# Patient Record
Sex: MALE | Race: WHITE | NOT HISPANIC OR LATINO
[De-identification: names, ages, dates, MRNs, and addresses within clinical notes are randomized per-mention and may not be internally consistent; named-entity substitution may affect disease eponyms.]

---

## 2019-12-19 ENCOUNTER — APPOINTMENT (OUTPATIENT)
Dept: RADIOLOGY | Facility: CLINIC | Age: 74
End: 2019-12-19
Payer: MEDICARE

## 2019-12-19 ENCOUNTER — APPOINTMENT (OUTPATIENT)
Dept: CT IMAGING | Facility: CLINIC | Age: 74
End: 2019-12-19
Payer: MEDICARE

## 2019-12-19 ENCOUNTER — OUTPATIENT (OUTPATIENT)
Dept: OUTPATIENT SERVICES | Facility: HOSPITAL | Age: 74
LOS: 1 days | End: 2019-12-19
Payer: MEDICARE

## 2019-12-19 DIAGNOSIS — Z00.00 ENCOUNTER FOR GENERAL ADULT MEDICAL EXAMINATION WITHOUT ABNORMAL FINDINGS: ICD-10-CM

## 2019-12-19 PROCEDURE — 73110 X-RAY EXAM OF WRIST: CPT

## 2019-12-19 PROCEDURE — 73522 X-RAY EXAM HIPS BI 3-4 VIEWS: CPT | Mod: 26

## 2019-12-19 PROCEDURE — 71260 CT THORAX DX C+: CPT | Mod: 26

## 2019-12-19 PROCEDURE — 72100 X-RAY EXAM L-S SPINE 2/3 VWS: CPT | Mod: 26

## 2019-12-19 PROCEDURE — 73130 X-RAY EXAM OF HAND: CPT | Mod: 26,50

## 2019-12-19 PROCEDURE — 73110 X-RAY EXAM OF WRIST: CPT | Mod: 26,50

## 2019-12-19 PROCEDURE — 74177 CT ABD & PELVIS W/CONTRAST: CPT | Mod: 26

## 2019-12-19 PROCEDURE — 73522 X-RAY EXAM HIPS BI 3-4 VIEWS: CPT

## 2019-12-19 PROCEDURE — 74177 CT ABD & PELVIS W/CONTRAST: CPT

## 2019-12-19 PROCEDURE — 72040 X-RAY EXAM NECK SPINE 2-3 VW: CPT | Mod: 26

## 2019-12-19 PROCEDURE — 72040 X-RAY EXAM NECK SPINE 2-3 VW: CPT

## 2019-12-19 PROCEDURE — 71260 CT THORAX DX C+: CPT

## 2019-12-19 PROCEDURE — 72100 X-RAY EXAM L-S SPINE 2/3 VWS: CPT

## 2019-12-19 PROCEDURE — 73130 X-RAY EXAM OF HAND: CPT

## 2019-12-19 PROCEDURE — 82565 ASSAY OF CREATININE: CPT

## 2020-11-06 ENCOUNTER — TRANSCRIPTION ENCOUNTER (OUTPATIENT)
Age: 75
End: 2020-11-06

## 2020-12-04 ENCOUNTER — APPOINTMENT (OUTPATIENT)
Dept: DISASTER EMERGENCY | Facility: CLINIC | Age: 75
End: 2020-12-04

## 2020-12-04 DIAGNOSIS — Z01.818 ENCOUNTER FOR OTHER PREPROCEDURAL EXAMINATION: ICD-10-CM

## 2020-12-05 LAB — SARS-COV-2 N GENE NPH QL NAA+PROBE: NOT DETECTED

## 2021-01-29 ENCOUNTER — APPOINTMENT (OUTPATIENT)
Dept: DISASTER EMERGENCY | Facility: CLINIC | Age: 76
End: 2021-01-29

## 2021-01-30 LAB — SARS-COV-2 N GENE NPH QL NAA+PROBE: NOT DETECTED

## 2022-02-14 ENCOUNTER — RESULT REVIEW (OUTPATIENT)
Age: 77
End: 2022-02-14

## 2022-02-14 ENCOUNTER — APPOINTMENT (OUTPATIENT)
Dept: RHEUMATOLOGY | Facility: CLINIC | Age: 77
End: 2022-02-14
Payer: MEDICARE

## 2022-02-14 ENCOUNTER — LABORATORY RESULT (OUTPATIENT)
Age: 77
End: 2022-02-14

## 2022-02-14 VITALS
HEIGHT: 72 IN | RESPIRATION RATE: 17 BRPM | OXYGEN SATURATION: 97 % | SYSTOLIC BLOOD PRESSURE: 132 MMHG | HEART RATE: 68 BPM | DIASTOLIC BLOOD PRESSURE: 84 MMHG | TEMPERATURE: 98.3 F

## 2022-02-14 DIAGNOSIS — Z83.3 FAMILY HISTORY OF DIABETES MELLITUS: ICD-10-CM

## 2022-02-14 DIAGNOSIS — M19.90 UNSPECIFIED OSTEOARTHRITIS, UNSPECIFIED SITE: ICD-10-CM

## 2022-02-14 DIAGNOSIS — Z87.11 PERSONAL HISTORY OF PEPTIC ULCER DISEASE: ICD-10-CM

## 2022-02-14 DIAGNOSIS — M25.50 PAIN IN UNSPECIFIED JOINT: ICD-10-CM

## 2022-02-14 DIAGNOSIS — M75.21 BICIPITAL TENDINITIS, RIGHT SHOULDER: ICD-10-CM

## 2022-02-14 DIAGNOSIS — M75.22 BICIPITAL TENDINITIS, LEFT SHOULDER: ICD-10-CM

## 2022-02-14 PROCEDURE — 36415 COLL VENOUS BLD VENIPUNCTURE: CPT

## 2022-02-14 PROCEDURE — 99205 OFFICE O/P NEW HI 60 MIN: CPT | Mod: 25

## 2022-02-15 LAB
ALBUMIN SERPL ELPH-MCNC: 4.7 G/DL
ALP BLD-CCNC: 75 U/L
ALT SERPL-CCNC: 22 U/L
ANION GAP SERPL CALC-SCNC: 15 MMOL/L
APPEARANCE: ABNORMAL
AST SERPL-CCNC: 27 U/L
BACTERIA: NEGATIVE
BASOPHILS # BLD AUTO: 0.05 K/UL
BASOPHILS NFR BLD AUTO: 0.9 %
BILIRUB SERPL-MCNC: 0.6 MG/DL
BILIRUBIN URINE: NEGATIVE
BLOOD URINE: NEGATIVE
BUN SERPL-MCNC: 21 MG/DL
CALCIUM SERPL-MCNC: 9.7 MG/DL
CHLORIDE SERPL-SCNC: 107 MMOL/L
CK SERPL-CCNC: 85 U/L
CO2 SERPL-SCNC: 22 MMOL/L
COLOR: ABNORMAL
CREAT SERPL-MCNC: 1.13 MG/DL
CRP SERPL-MCNC: <3 MG/L
DEPRECATED KAPPA LC FREE/LAMBDA SER: 1.1 RATIO
EOSINOPHIL # BLD AUTO: 0.5 K/UL
EOSINOPHIL NFR BLD AUTO: 9.5 %
ERYTHROCYTE [SEDIMENTATION RATE] IN BLOOD BY WESTERGREN METHOD: 2 MM/HR
FOLATE SERPL-MCNC: >20 NG/ML
GLUCOSE QUALITATIVE U: NEGATIVE
GLUCOSE SERPL-MCNC: 106 MG/DL
HAV IGM SER QL: NONREACTIVE
HBV CORE IGG+IGM SER QL: NONREACTIVE
HBV CORE IGM SER QL: NONREACTIVE
HBV SURFACE AG SER QL: NORMAL
HCT VFR BLD CALC: 49.2 %
HCV AB SER QL: NONREACTIVE
HCV S/CO RATIO: 0.12 S/CO
HGB BLD-MCNC: 16.4 G/DL
HYALINE CASTS: 0 /LPF
IGA SER QL IEP: 274 MG/DL
IGG SER QL IEP: 761 MG/DL
IGM SER QL IEP: 222 MG/DL
IMM GRANULOCYTES NFR BLD AUTO: 0 %
KAPPA LC CSF-MCNC: 1.92 MG/DL
KAPPA LC SERPL-MCNC: 2.12 MG/DL
KETONES URINE: NEGATIVE
LDH SERPL-CCNC: 198 U/L
LEUKOCYTE ESTERASE URINE: NEGATIVE
LYMPHOCYTES # BLD AUTO: 1.51 K/UL
LYMPHOCYTES NFR BLD AUTO: 28.6 %
MAGNESIUM SERPL-MCNC: 2 MG/DL
MAN DIFF?: NORMAL
MCHC RBC-ENTMCNC: 32.9 PG
MCHC RBC-ENTMCNC: 33.3 GM/DL
MCV RBC AUTO: 98.6 FL
MICROSCOPIC-UA: NORMAL
MONOCYTES # BLD AUTO: 0.68 K/UL
MONOCYTES NFR BLD AUTO: 12.9 %
NEUTROPHILS # BLD AUTO: 2.54 K/UL
NEUTROPHILS NFR BLD AUTO: 48.1 %
NITRITE URINE: NEGATIVE
PH URINE: 5.5
PHOSPHATE SERPL-MCNC: 3.4 MG/DL
PLATELET # BLD AUTO: 158 K/UL
POTASSIUM SERPL-SCNC: 4.5 MMOL/L
PROT SERPL-MCNC: 6.6 G/DL
PROTEIN URINE: NORMAL
RBC # BLD: 4.99 M/UL
RBC # FLD: 12.3 %
RED BLOOD CELLS URINE: 1 /HPF
RHEUMATOID FACT SER QL: 11 IU/ML
SODIUM SERPL-SCNC: 143 MMOL/L
SPECIFIC GRAVITY URINE: 1.03
SQUAMOUS EPITHELIAL CELLS: 0 /HPF
T PALLIDUM AB SER QL IA: NEGATIVE
THYROGLOB AB SERPL-ACNC: <20 IU/ML
THYROPEROXIDASE AB SERPL IA-ACNC: 224 IU/ML
TSH SERPL-ACNC: 4.22 UIU/ML
URATE SERPL-MCNC: 7.5 MG/DL
UROBILINOGEN URINE: ABNORMAL
VIT B12 SERPL-MCNC: 420 PG/ML
WBC # FLD AUTO: 5.28 K/UL
WHITE BLOOD CELLS URINE: 0 /HPF

## 2022-02-16 ENCOUNTER — APPOINTMENT (OUTPATIENT)
Dept: OTOLARYNGOLOGY | Facility: CLINIC | Age: 77
End: 2022-02-16
Payer: MEDICARE

## 2022-02-16 VITALS
HEIGHT: 72 IN | BODY MASS INDEX: 27.77 KG/M2 | SYSTOLIC BLOOD PRESSURE: 153 MMHG | HEART RATE: 69 BPM | DIASTOLIC BLOOD PRESSURE: 99 MMHG | WEIGHT: 205 LBS

## 2022-02-16 DIAGNOSIS — R22.1 LOCALIZED SWELLING, MASS AND LUMP, NECK: ICD-10-CM

## 2022-02-16 PROCEDURE — 99204 OFFICE O/P NEW MOD 45 MIN: CPT | Mod: 25

## 2022-02-16 PROCEDURE — 31575 DIAGNOSTIC LARYNGOSCOPY: CPT

## 2022-02-16 NOTE — PHYSICAL EXAM
[Nodule] : nodule [Midline] : trachea located in midline position [Normal] : no rashes [FreeTextEntry1] : poorly mobile but nonfixed mass R parotid body.  close to but not involving overlying skin.  no other palp masses [de-identified] : pt w multiple cutaneous small red lesions over entire face  and non hair bearing scalp.

## 2022-02-16 NOTE — PROCEDURE
[None] : none [Flexible Endoscope] : examined with the flexible endoscope [Normal] : normal [de-identified] : c ANALIA.  nl bilat TVF motion.   [de-identified] : hx c spine surgery

## 2022-02-16 NOTE — CONSULT LETTER
[Dear  ___] : Dear  [unfilled], [Consult Letter:] : I had the pleasure of evaluating your patient, [unfilled]. [Please see my note below.] : Please see my note below. [Sincerely,] : Sincerely, [FreeTextEntry2] : Alex Boston MD ([place], [place])\par  [FreeTextEntry3] : Misbah Recio MD, FACS\par \par    [facility] [place]\par Associate Chair\par    Department of Otolaryngology\par \par Professor\par Otolaryngology & Molecular Medicine\par [employer] [facility]\par

## 2022-02-16 NOTE — HISTORY OF PRESENT ILLNESS
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 Alex Boston. States first felt a lump on the right side of his face in November. Was placed on PO antibiotic by his dentist without improvement.   No assoc pain.  no trismus.  not sure if enlarging. Is a hx SCCA skin rx 10 years ago R periauriclar area.  has had multiple lesions over the years.  no major lesion R side over last couple years.  Is a hx cardiac stents  X 6. Only on asa now. last stent 6 yrs ago. No hx MI. Is hx asthma. Is a cigar smoker. 1-4 cigars a day. Is a remote hx R ant c spine fusion.  Has recent cardiac cath and stress. Is also a hx R eye issues and h/o cataracts.

## 2022-02-16 NOTE — REASON FOR VISIT
[Initial Consultation] : an initial consultation for [Spouse] : spouse [FreeTextEntry2] : SCCa right parotid gland, referred by Dr. Alex Boston

## 2022-02-19 ENCOUNTER — OUTPATIENT (OUTPATIENT)
Dept: OUTPATIENT SERVICES | Facility: HOSPITAL | Age: 77
LOS: 1 days | End: 2022-02-19
Payer: MEDICARE

## 2022-02-19 ENCOUNTER — APPOINTMENT (OUTPATIENT)
Dept: RADIOLOGY | Facility: CLINIC | Age: 77
End: 2022-02-19
Payer: MEDICARE

## 2022-02-19 DIAGNOSIS — Z00.00 ENCOUNTER FOR GENERAL ADULT MEDICAL EXAMINATION WITHOUT ABNORMAL FINDINGS: ICD-10-CM

## 2022-02-19 PROCEDURE — 73630 X-RAY EXAM OF FOOT: CPT

## 2022-02-19 PROCEDURE — 73562 X-RAY EXAM OF KNEE 3: CPT | Mod: 26,50

## 2022-02-19 PROCEDURE — 73030 X-RAY EXAM OF SHOULDER: CPT

## 2022-02-19 PROCEDURE — 72050 X-RAY EXAM NECK SPINE 4/5VWS: CPT

## 2022-02-19 PROCEDURE — 73630 X-RAY EXAM OF FOOT: CPT | Mod: 26,50

## 2022-02-19 PROCEDURE — 77085 DXA BONE DENSITY AXL VRT FX: CPT

## 2022-02-19 PROCEDURE — 72110 X-RAY EXAM L-2 SPINE 4/>VWS: CPT

## 2022-02-19 PROCEDURE — 73130 X-RAY EXAM OF HAND: CPT

## 2022-02-19 PROCEDURE — 73110 X-RAY EXAM OF WRIST: CPT | Mod: 26,50

## 2022-02-19 PROCEDURE — 72110 X-RAY EXAM L-2 SPINE 4/>VWS: CPT | Mod: 26

## 2022-02-19 PROCEDURE — 73521 X-RAY EXAM HIPS BI 2 VIEWS: CPT | Mod: 26

## 2022-02-19 PROCEDURE — 73130 X-RAY EXAM OF HAND: CPT | Mod: 26,50

## 2022-02-19 PROCEDURE — 73030 X-RAY EXAM OF SHOULDER: CPT | Mod: 26,50

## 2022-02-19 PROCEDURE — 72050 X-RAY EXAM NECK SPINE 4/5VWS: CPT | Mod: 26

## 2022-02-19 PROCEDURE — 73562 X-RAY EXAM OF KNEE 3: CPT

## 2022-02-19 PROCEDURE — 77085 DXA BONE DENSITY AXL VRT FX: CPT | Mod: 26

## 2022-02-19 PROCEDURE — 73110 X-RAY EXAM OF WRIST: CPT

## 2022-02-19 PROCEDURE — 73521 X-RAY EXAM HIPS BI 2 VIEWS: CPT

## 2022-02-20 PROBLEM — Z83.3 FAMILY HISTORY OF DIABETES MELLITUS: Status: ACTIVE | Noted: 2022-02-14

## 2022-02-20 NOTE — CONSULT LETTER
[Dear  ___] : Dear  [unfilled], [Consult Letter:] : I had the pleasure of evaluating your patient, [unfilled]. [Please see my note below.] : Please see my note below. [Consult Closing:] : Thank you very much for allowing me to participate in the care of this patient.  If you have any questions, please do not hesitate to contact me. [Sincerely,] : Sincerely, [FreeTextEntry3] : Raheem\par Myron I. Kleiner, M.D., FACR\par Chief, Division of Rheumatology\par Department of Medicine\par [facility] [DrTristen  ___] : Dr. WHITMORE

## 2022-02-20 NOTE — PHYSICAL EXAM
[General Appearance - Alert] : alert [General Appearance - In No Acute Distress] : in no acute distress [General Appearance - Well Nourished] : well nourished [General Appearance - Well Developed] : well developed [General Appearance - Well-Appearing] : healthy appearing [Sclera] : the sclera and conjunctiva were normal [PERRL With Normal Accommodation] : pupils were equal in size, round, and reactive to light [Extraocular Movements] : extraocular movements were intact [Outer Ear] : the ears and nose were normal in appearance [Oropharynx] : the oropharynx was normal [Neck Appearance] : the appearance of the neck was normal [Neck Cervical Mass (___cm)] : no neck mass was observed [Jugular Venous Distention Increased] : there was no jugular-venous distention [Thyroid Diffuse Enlargement] : the thyroid was not enlarged [Thyroid Nodule] : there were no palpable thyroid nodules [Lungs Percussion] : the lungs were normal to percussion [Heart Rate And Rhythm] : heart rate was normal and rhythm regular [Edema] : there was no peripheral edema [Abdomen Soft] : soft [Abdomen Tenderness] : non-tender [Abdomen Mass (___ Cm)] : no abdominal mass palpated [Cervical Lymph Nodes Enlarged Posterior Bilaterally] : posterior cervical [Cervical Lymph Nodes Enlarged Anterior Bilaterally] : anterior cervical [Supraclavicular Lymph Nodes Enlarged Bilaterally] : supraclavicular [Axillary Lymph Nodes Enlarged Bilaterally] : axillary [No CVA Tenderness] : no ~M costovertebral angle tenderness [No Spinal Tenderness] : no spinal tenderness [Skin Color & Pigmentation] : normal skin color and pigmentation [] : no rash [Cranial Nerves] : cranial nerves 2-12 were intact [Deep Tendon Reflexes (DTR)] : deep tendon reflexes were 2+ and symmetric [Sensation] : the sensory exam was normal to light touch and pinprick [Motor Exam] : the motor exam was normal [No Focal Deficits] : no focal deficits [Oriented To Time, Place, And Person] : oriented to person, place, and time [Impaired Insight] : insight and judgment were intact [Affect] : the affect was normal [Mood] : the mood was normal [FreeTextEntry1] : Strength-5/5

## 2022-02-20 NOTE — ASSESSMENT
[FreeTextEntry1] : Impression: JOSIANE SHAW is a 76 year old  man who was referred here for further evaluation of joint symptoms and rheumatic diseases.\par \par Approximate 2 year history of intermittent pain in various joints. I will evaluate for various types of rheumatic diseases including various types of inflammatory arthritis, including rheumatoid arthritis.  His previous rheumatologist  diagnosed polymyalgia rheumatica and treated with Prednisone 40 mg q.d. with much relief which was later tapered and also treated with Actemra.. Some sleep disturbance and fatigue with snoring consistent with fibromyalgia. In 2020, patient developed swelling bilateral MCP/PIPs, treated with Actemra once every 4 weeks for 6 months without relief and developed pruritic, whitish scaly rash bilateral upper extremities with superficial ulcerations. Dermatology diagnosed dry skin. Today, he has persistent pain in bilateral shoulders, wrists, MCP/PIPs, hips, and ankles. Previous x-ray results revealed osteoarthritis in bilateral shoulders and hips.  Diffuse swelling bilateral fingers on exam - consider scleroderma, MCTD and other related diseases. He has bilateral bicipital tendonitis contributing to his pain. He has dry eyes and enlarged nontender right parotid gland - consider Sjogren's Syndrome and evaluate for sarcoidosis, hepatitis C, IgG 4 related disease and other related diseases.  However, apparently he was diagnosed with parotid cancer recently about which she will see ENT tomorrow to discuss further steps.  Paresthesias bilateral fingers possibly secondary to residual bilateral carpal tunnel syndrome versus cervical radiculopathy. Chronic neck pain without radiation. Chronic low back pain with radiation to the left lower extremity to the knee - treated with Diclofenac gel or OTC Voltaren p.r.n. Patient referred here for further evaluation. \par \par Plan:\par I reviewed previous lab results March 2021 (which patient handed to me) with patient with extensive discussion\par I reviewed previous x-ray results March 2021 (which patient handed to me) with patient with extensive discussion\par Laboratory tests ordered today - see list below\par X-rays ordered - see list below\par Bone densitometry ordered\par Diagnosis and prognosis discussed\par Continue current medications (other than those changed below)\par Discontinue Actemra\par Etodolac  mg q.d. end of breakfast (Possible side effects explained including cardiovascular risk/MI/CVA) \par Change timing Prophylactic aspirin 81 mg q.d. at end of lunch (Possible side effects explained) \par Flexeril 10 mg one half tab q.d. at supper time; if no better/side effects 7 days, increase to 10 mg q.d. (possible side effects explained) \par Diclofenac gel 4 g to left hip twice daily (Possible side effects explained including cardiovascular risk/MI/CVA) \par Artificial tears one drop each eye q.i.d. and p.r.n.(Possible side effects explained)\par Biotene mouthwash/spray q.i.d. and p.r.n.(Possible side effects explained) \par Oral Hydration\par Daily exercise starting at 10 minutes per day, gradually increasing to at least 30 minutes per day--emphasized \par Requested pending PET scan report be sent in \par Return visit 2-3 weeks

## 2022-02-20 NOTE — ADDENDUM
[FreeTextEntry1] : I, Karen Kaufman, acted solely as a scribe for Dr. Myron I. Kleiner, MD. on 02/14/2022 .

## 2022-02-20 NOTE — HISTORY OF PRESENT ILLNESS
[FreeTextEntry1] : JOSIANE SHAW is a 76 year old  man who was referred here for further evaluation of joint symptoms and rheumatic diseases.\par \par 2 and a half years ago, November 2019, patient developed intermittent pain in bilateral shoulder, hips, knees, wrists, MCP/PIPs. Difficulty sleeping secondary to pain. Denies joint swelling, erythema and heat. Pain worse as day progressed. Morning stiffness 1 hour. Denies trauma or injury. Patient saw rheumatologist  - diagnosed polymyalgia rheumatica and treated with Prednisone 40 mg q.d. with much relief and intra-articular cortisone injections bilateral shoulders x2 with relief and left hip x2 without relief. December 2019 - after playing golf, patient had an episode of syncope - was told secondary to polymyalgia rheumatica, and treated by decreasing Prednisone to 20 mg q.d. Patient experienced increased joint pain with sleep disturbance and fatigue with snoring. Prednisone 20 mg q.d. was gradually tapered to 5 mg. In 2020, patient developed swelling bilateral MCP/PIPs. Discontinued Prednisone November 2020. He was then given Actemra once every 4 weeks for 6 months without relief. Patient developed pruritic, whitish scaly rash bilateral upper extremities with superficial ulcerations. Summer 2021, Actemra switched to Cimzia 2 injections (400 mg) once every 4 weeks. Dermatology follow up suggests not psoriasis and diagnosed dry skin - treated with moisturizer. Cimzia was then discontinued and put back on Actemra.\par Nowadays,  he has persistent pain in bilateral shoulders, wrists, MCP/PIPs, hips, and ankles. Swelling bilateral MCP/PIPs. Morning stiffness 2 hours.  Last dose of Actemra was January 30.\par Status post bilateral carpal tunnel release - Dr.Pallata - EMG confirmed bilateral carpal tunnel syndrome. \par Denies dry eyes and dry mouth. Paresthesias bilateral fingers - same as before carpal tunnel release. Chronic neck pain without radiation. Chronic low back pain with radiation to the left lower extremity to the knee - treated with Diclofenac gel or OTC Voltaren p.r.n. Lyrica without relief.  On further questioning, he denies eating undercooked pork or beef.  Patient referred here for further evaluation. \par \par Of note, recently diagnosed right parotid gland carcinoma--he will be discussing options tomorrow with ENT

## 2022-02-20 NOTE — REASON FOR VISIT
[Consultation] : a consultation visit [FreeTextEntry1] : who was referred here for further evaluation of joint symptoms and rheumatic diseases

## 2022-02-21 LAB
A PHAGOCYTOPH IGG TITR SER IF: NORMAL TITER
ACE BLD-CCNC: 9 U/L
ANA SER IF-ACNC: NEGATIVE
B BURGDOR AB SER QL IA: NEGATIVE
B MICROTI IGG TITR SER: NORMAL TITER
CCP AB SER IA-ACNC: <8 UNITS
DSDNA AB SER-ACNC: <12 IU/ML
E CHAFFEENSIS IGG TITR SER IF: NORMAL TITER
ENA RNP AB SER IA-ACNC: <0.2 AL
ENA SCL70 IGG SER IA-ACNC: <0.2 AL
ENA SM AB SER IA-ACNC: <0.2 AL
ENA SS-A AB SER IA-ACNC: <0.2 AL
ENA SS-B AB SER IA-ACNC: <0.2 AL
ENDOMYSIUM IGA SER QL: NEGATIVE
ENDOMYSIUM IGA TITR SER: NORMAL
GLIADIN IGA SER QL: <5 UNITS
GLIADIN IGG SER QL: <5 UNITS
GLIADIN PEPTIDE IGA SER-ACNC: NEGATIVE
GLIADIN PEPTIDE IGG SER-ACNC: NEGATIVE
HBV DNA # SERPL NAA+PROBE: NOT DETECTED IU/ML
HBV E AB SER QL: NONREACTIVE
HBV E AG SER QL: NONREACTIVE
HBV SURFACE AB SER QL: NONREACTIVE
HEPB DNA PCR LOG: NOT DETECTED LOG10IU/ML
IGG SUBSET TOTAL IGG: 709 MG/DL
IGG1 SER-MCNC: 404 MG/DL
IGG2 SER-MCNC: 270 MG/DL
IGG3 SER-MCNC: 64 MG/DL
IGG4 SER-MCNC: 23 MG/DL
M PROTEIN SPEC IFE-MCNC: NORMAL
RF+CCP IGG SER-IMP: NEGATIVE
TRICHINELLA AB SER QL: NEGATIVE
TTG IGA SER IA-ACNC: <1.2 U/ML
TTG IGA SER-ACNC: NEGATIVE
TTG IGG SER IA-ACNC: <1.2 U/ML
TTG IGG SER IA-ACNC: NEGATIVE

## 2022-02-23 ENCOUNTER — RESULT REVIEW (OUTPATIENT)
Age: 77
End: 2022-02-23

## 2022-02-25 ENCOUNTER — OUTPATIENT (OUTPATIENT)
Dept: OUTPATIENT SERVICES | Facility: HOSPITAL | Age: 77
LOS: 1 days | End: 2022-02-25
Payer: MEDICARE

## 2022-02-25 VITALS
TEMPERATURE: 98 F | HEART RATE: 70 BPM | OXYGEN SATURATION: 99 % | HEIGHT: 72 IN | WEIGHT: 205.03 LBS | RESPIRATION RATE: 16 BRPM | DIASTOLIC BLOOD PRESSURE: 80 MMHG | SYSTOLIC BLOOD PRESSURE: 138 MMHG

## 2022-02-25 DIAGNOSIS — I10 ESSENTIAL (PRIMARY) HYPERTENSION: ICD-10-CM

## 2022-02-25 DIAGNOSIS — I25.10 ATHEROSCLEROTIC HEART DISEASE OF NATIVE CORONARY ARTERY WITHOUT ANGINA PECTORIS: ICD-10-CM

## 2022-02-25 DIAGNOSIS — C77.0 SECONDARY AND UNSPECIFIED MALIGNANT NEOPLASM OF LYMPH NODES OF HEAD, FACE AND NECK: ICD-10-CM

## 2022-02-25 DIAGNOSIS — Z98.1 ARTHRODESIS STATUS: Chronic | ICD-10-CM

## 2022-02-25 DIAGNOSIS — T78.40XA ALLERGY, UNSPECIFIED, INITIAL ENCOUNTER: ICD-10-CM

## 2022-02-25 DIAGNOSIS — Z95.5 PRESENCE OF CORONARY ANGIOPLASTY IMPLANT AND GRAFT: Chronic | ICD-10-CM

## 2022-02-25 LAB
ALBUMIN SERPL ELPH-MCNC: 4.6 G/DL — SIGNIFICANT CHANGE UP (ref 3.3–5)
ALP SERPL-CCNC: 67 U/L — SIGNIFICANT CHANGE UP (ref 40–120)
ALT FLD-CCNC: 24 U/L — SIGNIFICANT CHANGE UP (ref 4–41)
ANION GAP SERPL CALC-SCNC: 9 MMOL/L — SIGNIFICANT CHANGE UP (ref 7–14)
AST SERPL-CCNC: 31 U/L — SIGNIFICANT CHANGE UP (ref 4–40)
BILIRUB SERPL-MCNC: 0.8 MG/DL — SIGNIFICANT CHANGE UP (ref 0.2–1.2)
BLD GP AB SCN SERPL QL: NEGATIVE — SIGNIFICANT CHANGE UP
BUN SERPL-MCNC: 20 MG/DL — SIGNIFICANT CHANGE UP (ref 7–23)
CALCIUM SERPL-MCNC: 9.6 MG/DL — SIGNIFICANT CHANGE UP (ref 8.4–10.5)
CHLORIDE SERPL-SCNC: 103 MMOL/L — SIGNIFICANT CHANGE UP (ref 98–107)
CO2 SERPL-SCNC: 27 MMOL/L — SIGNIFICANT CHANGE UP (ref 22–31)
CREAT SERPL-MCNC: 1.13 MG/DL — SIGNIFICANT CHANGE UP (ref 0.5–1.3)
GLUCOSE SERPL-MCNC: 95 MG/DL — SIGNIFICANT CHANGE UP (ref 70–99)
HCT VFR BLD CALC: 46.5 % — SIGNIFICANT CHANGE UP (ref 39–50)
HGB BLD-MCNC: 15.7 G/DL — SIGNIFICANT CHANGE UP (ref 13–17)
MCHC RBC-ENTMCNC: 32.4 PG — SIGNIFICANT CHANGE UP (ref 27–34)
MCHC RBC-ENTMCNC: 33.8 GM/DL — SIGNIFICANT CHANGE UP (ref 32–36)
MCV RBC AUTO: 95.9 FL — SIGNIFICANT CHANGE UP (ref 80–100)
NRBC # BLD: 0 /100 WBCS — SIGNIFICANT CHANGE UP
NRBC # FLD: 0 K/UL — SIGNIFICANT CHANGE UP
PLATELET # BLD AUTO: 150 K/UL — SIGNIFICANT CHANGE UP (ref 150–400)
POTASSIUM SERPL-MCNC: 4.3 MMOL/L — SIGNIFICANT CHANGE UP (ref 3.5–5.3)
POTASSIUM SERPL-SCNC: 4.3 MMOL/L — SIGNIFICANT CHANGE UP (ref 3.5–5.3)
PROT SERPL-MCNC: 6.8 G/DL — SIGNIFICANT CHANGE UP (ref 6–8.3)
RBC # BLD: 4.85 M/UL — SIGNIFICANT CHANGE UP (ref 4.2–5.8)
RBC # FLD: 12.6 % — SIGNIFICANT CHANGE UP (ref 10.3–14.5)
RH IG SCN BLD-IMP: POSITIVE — SIGNIFICANT CHANGE UP
SODIUM SERPL-SCNC: 139 MMOL/L — SIGNIFICANT CHANGE UP (ref 135–145)
WBC # BLD: 4.99 K/UL — SIGNIFICANT CHANGE UP (ref 3.8–10.5)
WBC # FLD AUTO: 4.99 K/UL — SIGNIFICANT CHANGE UP (ref 3.8–10.5)

## 2022-02-25 PROCEDURE — 93010 ELECTROCARDIOGRAM REPORT: CPT

## 2022-02-25 RX ORDER — SODIUM CHLORIDE 9 MG/ML
1000 INJECTION, SOLUTION INTRAVENOUS
Refills: 0 | Status: DISCONTINUED | OUTPATIENT
Start: 2022-03-03 | End: 2022-03-05

## 2022-02-25 NOTE — H&P PST ADULT - NSICDXPASTMEDICALHX_GEN_ALL_CORE_FT
PAST MEDICAL HISTORY:  Arthritis     CAD (coronary artery disease)     GERD (gastroesophageal reflux disease)     HTN (hypertension)

## 2022-02-25 NOTE — H&P PST ADULT - ENMT COMMENTS
pre op dx: Sec & Uns malignant svetalna lymph nodes head face and neck pre op dx: Sec & Uns malignant svetlana lymph nodes head face and neck

## 2022-02-25 NOTE — H&P PST ADULT - NSICDXPASTSURGICALHX_GEN_ALL_CORE_FT
PAST SURGICAL HISTORY:  History of fusion of lumbar spine 1981    S/P coronary artery stent placement x 6 - last one 6 years ago    Status post cervical spinal fusion 1991

## 2022-02-25 NOTE — H&P PST ADULT - PROBLEM SELECTOR PLAN 2
Patient with of cardiac stents x 6. Patient instructed to continue low dose aspirin as advised by cardiologist.   Patient obtained Cardiac eval copy requested.    Last echo and stress results requested

## 2022-02-25 NOTE — H&P PST ADULT - PROBLEM SELECTOR PLAN 1
Patient tentatively scheduled for right neck dissection right parotidectomy excision of malignant lesion neck on 3/3/22.  Pre-op instructions provided. Pt given verbal and written instructions with teach back on chlorhexidine wash and will take his own pantoprazole . Pt verbalized understanding with return demonstration.   Covid testing scheduled prior to surgery as per patient.  TANESHA precautions,

## 2022-02-25 NOTE — H&P PST ADULT - HISTORY OF PRESENT ILLNESS
76 year old male with PMH of HTN, GERD, CAD (s/p Stent x 6) , arthritis presents to Presurgical testing with diagnosis of Sec & Uns malignant svetlana lymph nodes head face and neck scheduled for right neck dissection right parotidectomy excision of malignant lesion neck

## 2022-03-01 ENCOUNTER — APPOINTMENT (OUTPATIENT)
Dept: PLASTIC SURGERY | Facility: CLINIC | Age: 77
End: 2022-03-01
Payer: MEDICARE

## 2022-03-01 VITALS
HEART RATE: 77 BPM | DIASTOLIC BLOOD PRESSURE: 86 MMHG | SYSTOLIC BLOOD PRESSURE: 137 MMHG | OXYGEN SATURATION: 96 % | WEIGHT: 205 LBS | BODY MASS INDEX: 27.77 KG/M2 | TEMPERATURE: 98 F | HEIGHT: 72 IN

## 2022-03-01 PROCEDURE — 99203 OFFICE O/P NEW LOW 30 MIN: CPT

## 2022-03-02 ENCOUNTER — TRANSCRIPTION ENCOUNTER (OUTPATIENT)
Age: 77
End: 2022-03-02

## 2022-03-02 PROBLEM — K21.9 GASTRO-ESOPHAGEAL REFLUX DISEASE WITHOUT ESOPHAGITIS: Chronic | Status: ACTIVE | Noted: 2022-02-25

## 2022-03-02 PROBLEM — I10 ESSENTIAL (PRIMARY) HYPERTENSION: Chronic | Status: ACTIVE | Noted: 2022-02-25

## 2022-03-02 PROBLEM — I25.10 ATHEROSCLEROTIC HEART DISEASE OF NATIVE CORONARY ARTERY WITHOUT ANGINA PECTORIS: Chronic | Status: ACTIVE | Noted: 2022-02-25

## 2022-03-02 PROBLEM — M19.90 UNSPECIFIED OSTEOARTHRITIS, UNSPECIFIED SITE: Chronic | Status: ACTIVE | Noted: 2022-02-25

## 2022-03-02 NOTE — ASU PATIENT PROFILE, ADULT - FALL HARM RISK - UNIVERSAL INTERVENTIONS
Bed in lowest position, wheels locked, appropriate side rails in place/Call bell, personal items and telephone in reach/Non-slip footwear when patient is out of bed/[place] to call system/Physically safe environment - no spills, clutter or unnecessary equipment/Purposeful Proactive Rounding/Room/bathroom lighting operational, light cord in reach

## 2022-03-03 ENCOUNTER — RESULT REVIEW (OUTPATIENT)
Age: 77
End: 2022-03-03

## 2022-03-03 ENCOUNTER — INPATIENT (INPATIENT)
Facility: HOSPITAL | Age: 77
LOS: 2 days | Discharge: HOME | End: 2022-03-06
Attending: OTOLARYNGOLOGY | Admitting: PLASTIC SURGERY
Payer: MEDICARE

## 2022-03-03 ENCOUNTER — APPOINTMENT (OUTPATIENT)
Dept: OTOLARYNGOLOGY | Facility: HOSPITAL | Age: 77
End: 2022-03-03

## 2022-03-03 ENCOUNTER — APPOINTMENT (OUTPATIENT)
Dept: PLASTIC SURGERY | Facility: HOSPITAL | Age: 77
End: 2022-03-03

## 2022-03-03 VITALS
HEIGHT: 72 IN | OXYGEN SATURATION: 97 % | DIASTOLIC BLOOD PRESSURE: 78 MMHG | SYSTOLIC BLOOD PRESSURE: 121 MMHG | HEART RATE: 65 BPM | TEMPERATURE: 98 F | WEIGHT: 205.03 LBS | RESPIRATION RATE: 18 BRPM

## 2022-03-03 DIAGNOSIS — Z98.1 ARTHRODESIS STATUS: Chronic | ICD-10-CM

## 2022-03-03 DIAGNOSIS — Z95.5 PRESENCE OF CORONARY ANGIOPLASTY IMPLANT AND GRAFT: Chronic | ICD-10-CM

## 2022-03-03 DIAGNOSIS — C77.0 SECONDARY AND UNSPECIFIED MALIGNANT NEOPLASM OF LYMPH NODES OF HEAD, FACE AND NECK: ICD-10-CM

## 2022-03-03 LAB — RH IG SCN BLD-IMP: POSITIVE — SIGNIFICANT CHANGE UP

## 2022-03-03 PROCEDURE — 88307 TISSUE EXAM BY PATHOLOGIST: CPT | Mod: 26

## 2022-03-03 PROCEDURE — 88305 TISSUE EXAM BY PATHOLOGIST: CPT | Mod: 26

## 2022-03-03 PROCEDURE — 38724 REMOVAL OF LYMPH NODES NECK: CPT | Mod: GC,RT

## 2022-03-03 PROCEDURE — 42420 EXCISE PAROTID GLAND/LESION: CPT | Mod: GC,RT

## 2022-03-03 DEVICE — SURGICEL 2 X 14": Type: IMPLANTABLE DEVICE | Status: FUNCTIONAL

## 2022-03-03 RX ORDER — LISINOPRIL 2.5 MG/1
40 TABLET ORAL DAILY
Refills: 0 | Status: DISCONTINUED | OUTPATIENT
Start: 2022-03-04 | End: 2022-03-06

## 2022-03-03 RX ORDER — LISINOPRIL 2.5 MG/1
40 TABLET ORAL DAILY
Refills: 0 | Status: DISCONTINUED | OUTPATIENT
Start: 2022-03-03 | End: 2022-03-03

## 2022-03-03 RX ORDER — OXYCODONE HYDROCHLORIDE 5 MG/1
10 TABLET ORAL EVERY 8 HOURS
Refills: 0 | Status: DISCONTINUED | OUTPATIENT
Start: 2022-03-03 | End: 2022-03-06

## 2022-03-03 RX ORDER — HEPARIN SODIUM 5000 [USP'U]/ML
5000 INJECTION INTRAVENOUS; SUBCUTANEOUS EVERY 12 HOURS
Refills: 0 | Status: DISCONTINUED | OUTPATIENT
Start: 2022-03-04 | End: 2022-03-06

## 2022-03-03 RX ORDER — CHLORHEXIDINE GLUCONATE 213 G/1000ML
15 SOLUTION TOPICAL ONCE
Refills: 0 | Status: COMPLETED | OUTPATIENT
Start: 2022-03-03 | End: 2022-03-03

## 2022-03-03 RX ORDER — HYDROMORPHONE HYDROCHLORIDE 2 MG/ML
0.5 INJECTION INTRAMUSCULAR; INTRAVENOUS; SUBCUTANEOUS
Refills: 0 | Status: DISCONTINUED | OUTPATIENT
Start: 2022-03-03 | End: 2022-03-03

## 2022-03-03 RX ORDER — OXYCODONE HYDROCHLORIDE 5 MG/1
5 TABLET ORAL EVERY 8 HOURS
Refills: 0 | Status: DISCONTINUED | OUTPATIENT
Start: 2022-03-03 | End: 2022-03-06

## 2022-03-03 RX ORDER — SENNA PLUS 8.6 MG/1
1 TABLET ORAL AT BEDTIME
Refills: 0 | Status: DISCONTINUED | OUTPATIENT
Start: 2022-03-04 | End: 2022-03-06

## 2022-03-03 RX ORDER — POLYETHYLENE GLYCOL 3350 17 G/17G
17 POWDER, FOR SOLUTION ORAL DAILY
Refills: 0 | Status: DISCONTINUED | OUTPATIENT
Start: 2022-03-04 | End: 2022-03-06

## 2022-03-03 RX ORDER — HYDROCHLOROTHIAZIDE 25 MG
25 TABLET ORAL DAILY
Refills: 0 | Status: DISCONTINUED | OUTPATIENT
Start: 2022-03-03 | End: 2022-03-03

## 2022-03-03 RX ORDER — CEFAZOLIN SODIUM 1 G
2000 VIAL (EA) INJECTION EVERY 8 HOURS
Refills: 0 | Status: DISCONTINUED | OUTPATIENT
Start: 2022-03-03 | End: 2022-03-06

## 2022-03-03 RX ORDER — FENTANYL CITRATE 50 UG/ML
50 INJECTION INTRAVENOUS
Refills: 0 | Status: DISCONTINUED | OUTPATIENT
Start: 2022-03-03 | End: 2022-03-03

## 2022-03-03 RX ORDER — FENTANYL CITRATE 50 UG/ML
25 INJECTION INTRAVENOUS
Refills: 0 | Status: DISCONTINUED | OUTPATIENT
Start: 2022-03-03 | End: 2022-03-03

## 2022-03-03 RX ORDER — HYDROCHLOROTHIAZIDE 25 MG
25 TABLET ORAL DAILY
Refills: 0 | Status: DISCONTINUED | OUTPATIENT
Start: 2022-03-04 | End: 2022-03-06

## 2022-03-03 RX ORDER — ACETAMINOPHEN 500 MG
650 TABLET ORAL EVERY 6 HOURS
Refills: 0 | Status: DISCONTINUED | OUTPATIENT
Start: 2022-03-03 | End: 2022-03-06

## 2022-03-03 RX ORDER — ONDANSETRON 8 MG/1
4 TABLET, FILM COATED ORAL ONCE
Refills: 0 | Status: DISCONTINUED | OUTPATIENT
Start: 2022-03-03 | End: 2022-03-03

## 2022-03-03 RX ORDER — PANTOPRAZOLE SODIUM 20 MG/1
40 TABLET, DELAYED RELEASE ORAL
Refills: 0 | Status: DISCONTINUED | OUTPATIENT
Start: 2022-03-03 | End: 2022-03-06

## 2022-03-03 RX ORDER — ACETAMINOPHEN 500 MG
975 TABLET ORAL ONCE
Refills: 0 | Status: COMPLETED | OUTPATIENT
Start: 2022-03-03 | End: 2022-03-03

## 2022-03-03 RX ORDER — APREPITANT 80 MG/1
40 CAPSULE ORAL ONCE
Refills: 0 | Status: COMPLETED | OUTPATIENT
Start: 2022-03-03 | End: 2022-03-03

## 2022-03-03 RX ORDER — ASPIRIN/CALCIUM CARB/MAGNESIUM 324 MG
81 TABLET ORAL DAILY
Refills: 0 | Status: DISCONTINUED | OUTPATIENT
Start: 2022-03-03 | End: 2022-03-06

## 2022-03-03 RX ADMIN — Medication 100 MILLIGRAM(S): at 22:21

## 2022-03-03 RX ADMIN — FENTANYL CITRATE 50 MICROGRAM(S): 50 INJECTION INTRAVENOUS at 20:30

## 2022-03-03 RX ADMIN — Medication 975 MILLIGRAM(S): at 12:00

## 2022-03-03 RX ADMIN — CHLORHEXIDINE GLUCONATE 15 MILLILITER(S): 213 SOLUTION TOPICAL at 12:00

## 2022-03-03 RX ADMIN — APREPITANT 40 MILLIGRAM(S): 80 CAPSULE ORAL at 12:00

## 2022-03-03 RX ADMIN — FENTANYL CITRATE 50 MICROGRAM(S): 50 INJECTION INTRAVENOUS at 20:00

## 2022-03-03 NOTE — BRIEF OPERATIVE NOTE - OPERATION/FINDINGS
1) Large right sided parotid mass that was overlying all branches of the facial nerve   2) Facial nerve stimulating throughout case

## 2022-03-03 NOTE — PROGRESS NOTE ADULT - SUBJECTIVE AND OBJECTIVE BOX
ENT PROGRESS NOTE / POST-OP CHECK    HPI: 76M s/p R parotid, R SND 3/3    INTERVAL:    3/3: No acute periop events. Patient was extubated and transferred to room with no issues. Pt is awake and interactive. Pain well controlled. Breathing comfortably.     ICU Vital Signs Last 24 Hrs  T(C): 36.6 (03 Mar 2022 18:20), Max: 36.8 (03 Mar 2022 11:28)  T(F): 97.9 (03 Mar 2022 18:20), Max: 98.2 (03 Mar 2022 11:28)  HR: 82 (03 Mar 2022 19:00) (65 - 85)  BP: 144/90 (03 Mar 2022 19:00) (121/78 - 146/80)  BP(mean): 105 (03 Mar 2022 19:00) (88 - 105)  ABP: 144/65 (03 Mar 2022 19:00) (140/67 - 152/78)  ABP(mean): 97 (03 Mar 2022 19:00) (97 - 109)  RR: 12 (03 Mar 2022 19:00) (12 - 18)  SpO2: 95% (03 Mar 2022 19:00) (95% - 99%)    PHYSICAL EXAM:    CONSTITUTIONAL: Well nourished, well developed, NON-DYSMORPHIC, and in no acute distress.    HEAD: normocephalic, atraumatic.  NECK: Neck incision c/d/i. VALENTE 1x ss.  RESPIRATORY: Respirations unlabored, no increased work of breathing with use of accessory muscles and retractions. No stridor.  CARDIAC: Warm extremities, no cyanosis.     A/P: 76M s/p R parotid, R SND 3/3 - recovering adequately.    - VALENTE care  - Pain control  - C/w home meds  - Regular diet ENT PROGRESS NOTE / POST-OP CHECK    HPI: 76M s/p R parotid, R SND 3/3    INTERVAL:    3/3: No acute periop events. Patient was extubated and transferred to room with no issues. Pt is awake and interactive. Pain well controlled. Breathing comfortably.     ICU Vital Signs Last 24 Hrs  T(C): 36.6 (03 Mar 2022 18:20), Max: 36.8 (03 Mar 2022 11:28)  T(F): 97.9 (03 Mar 2022 18:20), Max: 98.2 (03 Mar 2022 11:28)  HR: 82 (03 Mar 2022 19:00) (65 - 85)  BP: 144/90 (03 Mar 2022 19:00) (121/78 - 146/80)  BP(mean): 105 (03 Mar 2022 19:00) (88 - 105)  ABP: 144/65 (03 Mar 2022 19:00) (140/67 - 152/78)  ABP(mean): 97 (03 Mar 2022 19:00) (97 - 109)  RR: 12 (03 Mar 2022 19:00) (12 - 18)  SpO2: 95% (03 Mar 2022 19:00) (95% - 99%)    PHYSICAL EXAM:    CONSTITUTIONAL: Well nourished, well developed, NON-DYSMORPHIC, and in no acute distress.    HEAD: normocephalic, atraumatic. R facial palsy, can close eyes  NECK: Neck incision c/d/i. VALENTE 1x ss.  RESPIRATORY: Respirations unlabored, no increased work of breathing with use of accessory muscles and retractions. No stridor.  CARDIAC: Warm extremities, no cyanosis.     A/P: 76M s/p R parotid, R SND 3/3 - recovering adequately.    - VALENTE care  - Pain control  - C/w home meds  - Regular diet

## 2022-03-04 RX ADMIN — HEPARIN SODIUM 5000 UNIT(S): 5000 INJECTION INTRAVENOUS; SUBCUTANEOUS at 17:22

## 2022-03-04 RX ADMIN — Medication 25 MILLIGRAM(S): at 05:47

## 2022-03-04 RX ADMIN — Medication 100 MILLIGRAM(S): at 05:47

## 2022-03-04 RX ADMIN — Medication 81 MILLIGRAM(S): at 12:26

## 2022-03-04 RX ADMIN — Medication 1 TABLET(S): at 12:25

## 2022-03-04 RX ADMIN — HEPARIN SODIUM 5000 UNIT(S): 5000 INJECTION INTRAVENOUS; SUBCUTANEOUS at 05:47

## 2022-03-04 RX ADMIN — LISINOPRIL 40 MILLIGRAM(S): 2.5 TABLET ORAL at 05:47

## 2022-03-04 RX ADMIN — Medication 100 MILLIGRAM(S): at 21:56

## 2022-03-04 RX ADMIN — Medication 100 MILLIGRAM(S): at 14:09

## 2022-03-05 ENCOUNTER — TRANSCRIPTION ENCOUNTER (OUTPATIENT)
Age: 77
End: 2022-03-05

## 2022-03-05 RX ADMIN — Medication 100 MILLIGRAM(S): at 05:34

## 2022-03-05 RX ADMIN — HEPARIN SODIUM 5000 UNIT(S): 5000 INJECTION INTRAVENOUS; SUBCUTANEOUS at 05:35

## 2022-03-05 RX ADMIN — Medication 100 MILLIGRAM(S): at 13:38

## 2022-03-05 RX ADMIN — HEPARIN SODIUM 5000 UNIT(S): 5000 INJECTION INTRAVENOUS; SUBCUTANEOUS at 17:38

## 2022-03-05 RX ADMIN — Medication 1 TABLET(S): at 12:23

## 2022-03-05 RX ADMIN — Medication 81 MILLIGRAM(S): at 12:23

## 2022-03-05 RX ADMIN — Medication 100 MILLIGRAM(S): at 21:40

## 2022-03-05 RX ADMIN — SENNA PLUS 1 TABLET(S): 8.6 TABLET ORAL at 21:45

## 2022-03-05 RX ADMIN — PANTOPRAZOLE SODIUM 40 MILLIGRAM(S): 20 TABLET, DELAYED RELEASE ORAL at 05:35

## 2022-03-05 RX ADMIN — LISINOPRIL 40 MILLIGRAM(S): 2.5 TABLET ORAL at 05:35

## 2022-03-05 RX ADMIN — Medication 25 MILLIGRAM(S): at 05:41

## 2022-03-05 NOTE — DISCHARGE NOTE PROVIDER - NSDCCPCAREPLAN_GEN_ALL_CORE_FT
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

## 2022-03-05 NOTE — DISCHARGE NOTE PROVIDER - CARE PROVIDER_API CALL
Misbah Recio)  [facility]; Otolaryngology  [address] [place] [address]  Phone: [phone]  Fax: [phone]  Follow Up Time:

## 2022-03-05 NOTE — DISCHARGE NOTE PROVIDER - NSDCMRMEDTOKEN_GEN_ALL_CORE_FT
aspirin 81 mg oral tablet: 1 tab(s) orally once a day  hydroCHLOROthiazide 25 mg oral tablet: 1 tab(s) orally once a day  Multiple Vitamins oral tablet: 1 tab(s) orally once a day  pantoprazole 40 mg oral delayed release tablet: 1 tab(s) orally once a day  ramipril 10 mg oral capsule: 2 cap(s) orally once a day   acetaminophen 325 mg oral tablet: 2 tab(s) orally every 6 hours, As needed, Mild Pain (1 - 3)  aspirin 81 mg oral tablet: 1 tab(s) orally once a day  hydroCHLOROthiazide 25 mg oral tablet: 1 tab(s) orally once a day  Multiple Vitamins oral tablet: 1 tab(s) orally once a day  oxyCODONE 5 mg oral capsule: 1 cap(s) orally every 8 hours MDD:max 15mg daily TAKE ONLY AS NEEDED FOR SEVERE PAIN  pantoprazole 40 mg oral delayed release tablet: 1 tab(s) orally once a day  ramipril 10 mg oral capsule: 2 cap(s) orally once a day

## 2022-03-05 NOTE — PROGRESS NOTE ADULT - SUBJECTIVE AND OBJECTIVE BOX
ENT PROGRESS NOTE / POST-OP CHECK    HPI: 76M s/p R parotid, R SND 3/3    INTERVAL:    Pt doing well overnight with no issues. He has been out of bed and tolerating this well. This morning, his VALENTE was noted to be slightly more bloody. Neck felt soft with no evidence of collection. Will continue to monitor.     Vital Signs Last 24 Hrs  T(C): 36.4 (05 Mar 2022 05:34), Max: 36.7 (04 Mar 2022 20:51)  T(F): 97.5 (05 Mar 2022 05:34), Max: 98.1 (05 Mar 2022 02:09)  HR: 66 (05 Mar 2022 05:34) (60 - 74)  BP: 126/84 (05 Mar 2022 05:34) (126/84 - 137/81)  BP(mean): --  RR: 16 (05 Mar 2022 05:34) (16 - 18)  SpO2: 97% (05 Mar 2022 05:34) (96% - 98%)    PHYSICAL EXAM:    CONSTITUTIONAL: Well nourished, well developed, NON-DYSMORPHIC, and in no acute distress.    HEAD: normocephalic, atraumatic. R facial palsy, can close eyes  NECK: Neck incision c/d/i. VALENTE 1x ss.  RESPIRATORY: Respirations unlabored, no increased work of breathing with use of accessory muscles and retractions. No stridor.  CARDIAC: Warm extremities, no cyanosis.     A/P: 76M s/p R parotid, R SND 3/3 - recovering well with no issues. Will continue to monitor neck and VALENTE output through the day today. Nursing aware.     - VALENTE care  - Pain control  - C/w home meds  - Regular diet

## 2022-03-05 NOTE — DISCHARGE NOTE PROVIDER - CARE PROVIDERS DIRECT ADDRESSES
[email or web address]

## 2022-03-05 NOTE — DISCHARGE NOTE PROVIDER - NSDCFUSCHEDAPPT_GEN_ALL_CORE_FT
JOSIANE SHAW ; 03/09/2022 ; [facility] ; [facility] ; 03/11/2022 ; NPP OtoLaryng [facility]  JOSIANE SHAW ; 05/18/2022 ; NPP Rheum [facility]

## 2022-03-05 NOTE — DISCHARGE NOTE PROVIDER - HOSPITAL COURSE
Pt was admitted s/p right parotidectomy with facial nerve dissection and right neck dissection. He did well postoperatively and passed his trial of void. His VALENTE was carefully monitored while he was admitted and his neck was carefully watched. He continued to progress nicely. He was given 24 hours of IV antibiotics post op. He was tolerating a diet well without issues. He was able to ambulate around the unit. On the day of discharge, all questions were answered and he was confident that he would do well at home. All instructions were given to him on the day of discharge.

## 2022-03-05 NOTE — DISCHARGE NOTE PROVIDER - NSDCFUADDINST_GEN_ALL_CORE_FT
- Please make sure to empty your drain and record the output as has been instructed by your nurse   - Please take Tylenol for pain control.   - Please follow up in Dr. Recio's office as scheduled early this week to remove your drain  - If you start to develop difficulty breathing, shortness of breath, neck swelling, severe increase in drain output or any other concerning symptom, please come back to the emergency room - Please take Tylenol every 6 hours, you can take oxycodone ONLY AS NEEDED FOR SEVERE BREAKTHROUGH PAIN   - Please follow up in Dr. Recio's office for postop check up   - If you start to develop difficulty breathing, shortness of breath, neck swelling, severe increase in drain output or any other concerning symptom, please come back to the emergency room

## 2022-03-06 ENCOUNTER — TRANSCRIPTION ENCOUNTER (OUTPATIENT)
Age: 77
End: 2022-03-06

## 2022-03-06 VITALS
DIASTOLIC BLOOD PRESSURE: 75 MMHG | OXYGEN SATURATION: 95 % | HEART RATE: 64 BPM | SYSTOLIC BLOOD PRESSURE: 119 MMHG | TEMPERATURE: 98 F | RESPIRATION RATE: 18 BRPM

## 2022-03-06 RX ORDER — OXYCODONE HYDROCHLORIDE 5 MG/1
1 TABLET ORAL
Qty: 9 | Refills: 0
Start: 2022-03-06 | End: 2022-03-08

## 2022-03-06 RX ORDER — ACETAMINOPHEN 500 MG
2 TABLET ORAL
Qty: 0 | Refills: 0 | DISCHARGE
Start: 2022-03-06

## 2022-03-06 RX ADMIN — Medication 100 MILLIGRAM(S): at 05:41

## 2022-03-06 RX ADMIN — LISINOPRIL 40 MILLIGRAM(S): 2.5 TABLET ORAL at 05:41

## 2022-03-06 RX ADMIN — HEPARIN SODIUM 5000 UNIT(S): 5000 INJECTION INTRAVENOUS; SUBCUTANEOUS at 05:41

## 2022-03-06 RX ADMIN — Medication 25 MILLIGRAM(S): at 05:41

## 2022-03-06 RX ADMIN — PANTOPRAZOLE SODIUM 40 MILLIGRAM(S): 20 TABLET, DELAYED RELEASE ORAL at 08:02

## 2022-03-06 NOTE — DISCHARGE NOTE NURSING/CASE MANAGEMENT/SOCIAL WORK - NSDCPEFALRISK_GEN_ALL_CORE
For information on Fall & Injury Prevention, visit: [email or web address] OR  [email or web address] OR  https://www.cdc.gov/steadi/patient.html

## 2022-03-06 NOTE — PROGRESS NOTE ADULT - SUBJECTIVE AND OBJECTIVE BOX
ENT PROGRESS NOTE  HPI: 76M s/p R parotid, R SND 3/3    INTERVAL:    Pt doing well overnight with no issues. He has been out of bed and tolerating this well. Neck felt soft with no evidence of collection. Will continue to monitor. Decreasing VALENTE output. Pt feels comfortable taking care of VALENTE at home and following up with Dr. Recio outpatient    Vital Signs Last 24 Hrs  T(C): 36.7 (06 Mar 2022 05:20), Max: 36.9 (05 Mar 2022 17:43)  T(F): 98.1 (06 Mar 2022 05:20), Max: 98.5 (05 Mar 2022 17:43)  HR: 61 (06 Mar 2022 05:20) (60 - 82)  BP: 114/77 (06 Mar 2022 05:20) (114/77 - 146/90)  BP(mean): --  RR: 18 (06 Mar 2022 05:20) (17 - 18)  SpO2: 99% (06 Mar 2022 05:20) (95% - 99%)    PHYSICAL EXAM:    CONSTITUTIONAL: Well nourished, well developed, NON-DYSMORPHIC, and in no acute distress.    HEAD: normocephalic, atraumatic. R facial palsy, can close eyes  NECK: Neck incision c/d/i. VALENTE 1x ss.  RESPIRATORY: Respirations unlabored, no increased work of breathing with use of accessory muscles and retractions. No stridor.  CARDIAC: Warm extremities, no cyanosis.     A/P: 76M s/p R parotid, R SND 3/3 - recovering well with no issues. Will continue to monitor neck and VALENTE output through the day today. Nursing aware.     - VALENTE care  - Pain control  - C/w home meds  - Regular diet

## 2022-03-06 NOTE — DISCHARGE NOTE NURSING/CASE MANAGEMENT/SOCIAL WORK - PATIENT PORTAL LINK FT
You can access the FollowMyHealth Patient Portal offered by [facility] by registering at the following website: [email or web address]. By joining [employer]’s FollowMyHealth portal, you will also be able to view your health information using other applications (apps) compatible with our system.

## 2022-03-09 ENCOUNTER — OUTPATIENT (OUTPATIENT)
Dept: OUTPATIENT SERVICES | Facility: HOSPITAL | Age: 77
LOS: 1 days | Discharge: HOME | End: 2022-03-09
Payer: MEDICARE

## 2022-03-09 ENCOUNTER — APPOINTMENT (OUTPATIENT)
Dept: RADIATION ONCOLOGY | Facility: CLINIC | Age: 77
End: 2022-03-09
Payer: MEDICARE

## 2022-03-09 VITALS — BODY MASS INDEX: 28.43 KG/M2 | WEIGHT: 209.6 LBS

## 2022-03-09 DIAGNOSIS — Z85.818 PERSONAL HISTORY OF MALIGNANT NEOPLASM OF OTHER SITES OF LIP, ORAL CAVITY, AND PHARYNX: ICD-10-CM

## 2022-03-09 DIAGNOSIS — F17.290 NICOTINE DEPENDENCE, OTHER TOBACCO PRODUCT, UNCOMPLICATED: ICD-10-CM

## 2022-03-09 DIAGNOSIS — Z85.828 PERSONAL HISTORY OF OTHER MALIGNANT NEOPLASM OF SKIN: ICD-10-CM

## 2022-03-09 DIAGNOSIS — Z98.1 ARTHRODESIS STATUS: Chronic | ICD-10-CM

## 2022-03-09 DIAGNOSIS — Z95.5 PRESENCE OF CORONARY ANGIOPLASTY IMPLANT AND GRAFT: Chronic | ICD-10-CM

## 2022-03-09 DIAGNOSIS — R25.2 CRAMP AND SPASM: ICD-10-CM

## 2022-03-09 DIAGNOSIS — Z72.89 OTHER PROBLEMS RELATED TO LIFESTYLE: ICD-10-CM

## 2022-03-09 LAB — SURGICAL PATHOLOGY STUDY: SIGNIFICANT CHANGE UP

## 2022-03-09 PROCEDURE — 77263 THER RADIOLOGY TX PLNG CPLX: CPT

## 2022-03-09 PROCEDURE — 99204 OFFICE O/P NEW MOD 45 MIN: CPT | Mod: 25

## 2022-03-09 NOTE — HISTORY OF PRESENT ILLNESS
[FreeTextEntry1] : This 76 year-old male presents for radiation medicine consultation.  Mr. 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 Recio by Dr. Alex Boston.  He reports initially feeling a lump on the right side of his face in November.  He was placed on PO antibiotic by his dentist without improvement.  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  He also reports having multiple skin lesions over the years. \par \par Mr. Daily is now s/p Right parotidectomy, Right neck dissection levels 1B, 2, and 3, and Right external jugular chain dissection 0n 3/3/2022 with Dr. Recio and Dr. Alvarado.\par \par Surgical pathology:\par \par 1. Lymph nodes, right level 1B, neck dissection\par - 3 lymph nodes negative for metastatic carcinoma\par - Submandibular gland negative for carcinoma\par \par 2. Parotid gland, right parotid gland with tumor, parotidectomy\par - Squamous cell carcinoma, moderately to poorly differentiated (3.0\par cm in greatest dimension) involving parotid gland and soft tissue, favor\par metastatic based on clinical information provided by surgeon\par - Carcinoma focally very close to (less than 0.1 mm) inked\par circumferential margin\par - 4 intraparotid lymph nodes negative for metastatic carcinoma\par \par 3. Lymph nodes, right levels 2 and 3, neck dissection\par - 15 lymph nodes negative for metastatic carcinoma\par \par 4. Neck, right external jugular chain, excision\par - Neurovascular and adipose tissue, negative for carcinoma\par

## 2022-03-09 NOTE — LETTER GREETING
[Dear Doctor] : Dear Doctor, [Consult Letter:] : Your patient, [unfilled] was seen in my office today for consultation. [Please see my note below.] : Please see my note below. [FreeTextEntry2] : Misbah Recio MD

## 2022-03-09 NOTE — LETTER CLOSING
[Consult Closing:] : Thank you for allowing me to participate in the care of this patient.  If you have any questions, please do not hesitate to contact me. [Sincerely yours,] : Sincerely yours, [FreeTextEntry3] : Frantz Richardson MD\par Physician in Chief\par Department of Radiation Medicine\par [facility] [place]\par [facility] [place]\par \par  of Radiation Medicine\par [facility]\par at  [facility]\par \par Radiation \par [facility]/\par [facility] [place]\par [address]\par [place], [place] [address]\par \par Tel: [phone]\par Fax: [phone]\par

## 2022-03-09 NOTE — DISEASE MANAGEMENT
[Pathological] : TNM Stage: p [II] : II [FreeTextEntry4] : squamous cell carcinoma [TTNM] : 2 [NTNM] : 0 [MTNM] : 0 [de-identified] : 2859 [de-identified] : right face and neck

## 2022-03-09 NOTE — REVIEW OF SYSTEMS
[Loss of Hearing] : loss of hearing [Negative] : Integumentary [Fever] : no fever [Chills] : no chills [Night Sweats] : no night sweats [Fatigue] : no fatigue [Recent Change In Weight] : ~T no recent weight change [Dysphagia] : no dysphagia [Nosebleeds] : no nosebleeds [Hoarseness] : no hoarseness [Odynophagia] : no odynophagia [Mucosal Pain] : no mucosal pain [FreeTextEntry2] : s/p head and neck surgery 3/3/2022 [FreeTextEntry3] : right lower lid ectropion  [FreeTextEntry4] : upper and lower full dentures, xerostomia, mild trismus, mild right-sided hearing loss [FreeTextEntry5] : cardiac stent X6, hypertension [FreeTextEntry6] : asthma [FreeTextEntry7] : Hx gastric ulcer [FreeTextEntry9] : osteoarthritis, fibromyalgia, b/l carpel tunnel syndrome

## 2022-03-09 NOTE — VITALS
[Maximal Pain Intensity: 2/10] : 2/10 [Least Pain Intensity: 0/10] : 0/10 [Pain Description/Quality: ___] : Pain description/quality: [unfilled] [Pain Duration: ___] : Pain duration: [unfilled] [Pain Location: ___] : Pain Location: [unfilled] [NoTreatment Scheduled] : no treatment scheduled [Date: ____________] : Patient's last distress assessment performed on [unfilled]. [10 - Extreme Distress] : Distress Level: 10 [Patient given social work contact information and resource sheet] : Patient was given social work contact information and resource sheet [Pain Interferes with ADLs] : Pain does not interfere with activities of daily living [80: Normal activity with effort; some signs or symptoms of disease.] : 80: Normal activity with effort; some signs or symptoms of disease.  [ECOG Performance Status: 1 - Restricted in physically strenuous activity but ambulatory and able to carry out work of a light or sedentary nature] : Performance Status: 1 - Restricted in physically strenuous activity but ambulatory and able to carry out work of a light or sedentary nature, e.g., light house work, office work

## 2022-03-09 NOTE — PHYSICAL EXAM
[Obese] : obese [Normal] : normal skin color and pigmentation and no rash [de-identified] : Right lower eyelid ectropion; slightly weak eyelid closure on right  [de-identified] : incision along anterior auricle well approximated with suture in place, slightly weak right perioral muscles. [de-identified] : s/p Right parotidectomy bed 2 X 3 cm area of scabbed tissue; neck dissection -- 10 cm incision edges well approximated, steri-strips in place with areas of scabbing [de-identified] : diffuse sun damage in exposed skin.

## 2022-03-10 ENCOUNTER — APPOINTMENT (OUTPATIENT)
Dept: RHEUMATOLOGY | Facility: CLINIC | Age: 77
End: 2022-03-10
Payer: MEDICARE

## 2022-03-10 VITALS
DIASTOLIC BLOOD PRESSURE: 80 MMHG | SYSTOLIC BLOOD PRESSURE: 130 MMHG | RESPIRATION RATE: 17 BRPM | WEIGHT: 203 LBS | OXYGEN SATURATION: 98 % | TEMPERATURE: 98.7 F | BODY MASS INDEX: 27.5 KG/M2 | HEIGHT: 72 IN | HEART RATE: 76 BPM

## 2022-03-10 PROCEDURE — 99215 OFFICE O/P EST HI 40 MIN: CPT | Mod: 25

## 2022-03-10 PROCEDURE — 36415 COLL VENOUS BLD VENIPUNCTURE: CPT

## 2022-03-10 PROCEDURE — G2212 PROLONG OUTPT/OFFICE VIS: CPT

## 2022-03-10 RX ORDER — ETODOLAC 500 MG/1
500 TABLET, FILM COATED, EXTENDED RELEASE ORAL
Qty: 90 | Refills: 0 | Status: DISCONTINUED | COMMUNITY
Start: 2022-02-14 | End: 2022-03-10

## 2022-03-11 ENCOUNTER — APPOINTMENT (OUTPATIENT)
Dept: OTOLARYNGOLOGY | Facility: CLINIC | Age: 77
End: 2022-03-11
Payer: MEDICARE

## 2022-03-11 PROCEDURE — 99024 POSTOP FOLLOW-UP VISIT: CPT

## 2022-03-11 NOTE — HISTORY OF PRESENT ILLNESS
[de-identified] : Mr. 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 Presents today foe postop incision assessment and possible suture removal. Facial n preserved\par Denies pain, dysphagia, dyspnea, dysphonia. No recent fevers or chills.\par C/o right neck swelling slightly larger over the past week. \par Also with bottom right eyelid droopiness.

## 2022-03-11 NOTE — PHYSICAL EXAM
[de-identified] : right neck surgical incision well-approximated. No discharge or warmth noted. Upper right neck with dark red scab. Swelling in right submandibular area  [de-identified] : right eye ectropion

## 2022-03-11 NOTE — CONSULT LETTER
[Dear  ___] : Dear  [unfilled], [Courtesy Letter:] : I had the pleasure of seeing your patient, [unfilled], in my office today. [Please see my note below.] : Please see my note below. [Sincerely,] : Sincerely, [FreeTextEntry2] : Alex Boston MD ([place], [place]) [FreeTextEntry3] : Misbah Recio MD, FACS\par \par    [facility] [place]\par Associate Chair\par    Department of Otolaryngology\par \par Professor\par Otolaryngology & Molecular Medicine\par [facility]\par

## 2022-03-11 NOTE — REASON FOR VISIT
[Post-Operative Visit] : a post-operative visit [FreeTextEntry2] : s/p  Right parotidectomy with facial nerve dissection and right neck dissection of levels 1 through 3 on 3/3/22.

## 2022-03-15 ENCOUNTER — APPOINTMENT (OUTPATIENT)
Dept: OTOLARYNGOLOGY | Facility: CLINIC | Age: 77
End: 2022-03-15
Payer: MEDICARE

## 2022-03-15 PROCEDURE — 99024 POSTOP FOLLOW-UP VISIT: CPT

## 2022-03-15 NOTE — CONSULT LETTER
[Dear  ___] : Dear  [unfilled], [Courtesy Letter:] : I had the pleasure of seeing your patient, [unfilled], in my office today. [Please see my note below.] : Please see my note below. [Sincerely,] : Sincerely, [FreeTextEntry2] : Alex Boston MD ([place], [place]) [FreeTextEntry3] : Misbah Recio MD, FACS\par \par    [facility] [place]\par Associate Chair\par    Department of Otolaryngology\par \par Professor\par Otolaryngology & Molecular Medicine\par [facility]\par

## 2022-03-15 NOTE — PHYSICAL EXAM
[FreeTextEntry1] : healing well except at post as pect inciosn where skin has sloughed [Midline] : trachea located in midline position [de-identified] : there is some facila weakness and epiphora. no sxs dry eye.  [Normal] : no rashes

## 2022-03-15 NOTE — HISTORY OF PRESENT ILLNESS
[de-identified] : Mr. Daily is  s/p Right parotidectomy with facial nerve dissection and right neck dissection of levels 1 through 3 on 3/3/2022 for met scca, facial nerve preserved. Presents today for postop incision assessment and possible? suture removal. \par

## 2022-03-17 ENCOUNTER — NON-APPOINTMENT (OUTPATIENT)
Age: 77
End: 2022-03-17

## 2022-03-20 LAB — HBV SURFACE AG SER QL: NONREACTIVE

## 2022-03-20 NOTE — HISTORY OF PRESENT ILLNESS
[FreeTextEntry1] : JOSIANE SHAW is a 76 year old man who presents for initial follow up for further evaluation of joint symptoms and rheumatic diseases. \par \par Patient has pain in the left proximal thigh radiating through the lower extremity to the lower heel, especially with standing up, but relief with weightbearing. Denies other joint pain. Patient discontinued cyclobenzaprine 10 mg secondary to recent hospital visit for his right parotid gland resection, but during duration of taking medication previously reported no relief. He also discontinued diclofenac gel and etodolac 500 mg with concerns of dyspnea on exertion and weakness several hours after taking the etodolac, no ankle swelling. Bilateral fingers paraesthesia has resolved, but has not received wrists splints. Some sleep disturbance and fatigue.  He has snoring.  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 \par \par

## 2022-03-20 NOTE — ADDENDUM
[FreeTextEntry1] : I, Thiago Blackwood, acted solely as a scribe for Dr. Myron I. Kleiner, MD. on 03/10/2022 .

## 2022-03-20 NOTE — ASSESSMENT
[FreeTextEntry1] : Impression: JOSIANE SHAW is a 76 year old man who presents for initial follow up for further evaluation of joint symptoms and rheumatic diseases, including osteoarthritis, fibromyalgia, consider Sjogren's syndrome.\par \par Patient has pain in the left proximal thigh radiating through the lower extremity to the lower heel, especially with standing up secondary to osteoarthritis and fibromyalgia--also consider herniated disc, spinal stenosis.  Recent x-ray tests revealed a right heel spur and several areas of osteoarthritis. Recent bone density was normal.  Recent lab tests reveal overactive thyroid gland, possibly secondary to Hashimoto's thyroiditis. Patient's dry eyes and dry mouth may be due to possibly Sjogren's syndrome--we will continue to monitor.. His persistent pain in bilateral shoulders, wrists, MCP/PIPs, hips, and ankles secondary to his osteoarthritis have improved since last visit, however patient has discontinued the medications. The patient denies other joint pain. Patient discontinued cyclobenzaprine 10 mg secondary to last hospital visit, but during duration of taking reported no relief. He also discontinued diclofenac gel and etodolac 500 mg with concerns of dyspnea on exertion and weakness, no ankle swelling. Bilateral fingers paraesthesia secondary to bilateral carpal tunnel syndrome has resolved, but has not received wrists splints. Some sleep disturbance and fatigue secondary to his fibromyalgia.  He does have snoring.\par \par Plan:\par I reviewed previous recent lab results with patient with extensive discussion\par I reviewed previous recent x-ray results with patient with extensive discussion\par I reviewed the bone densitometry with my analysis of the raw data with the patient with extensive discussion\par Laboratory tests ordered today - see list below\par Diagnosis and prognosis discussed\par Urged for MRI of lower LS spine -- patient declined\par Urged for sleep study -- patient declined\par Continue current medications (other than those changed below)\par Restart Flexeril 10 mg full tab q.d. at supper time (possible side effects explained) (he had taking only one half tab previously)\par Stay off etodolac\par Sulindac 150 mg b.i.d. at end of breakfast and supper (Possible side effects explained including cardiovascular risk/MI/CVA)\par If he gets recurrent dyspnea exertion with the sulindac, he should stop it and call me\par Prophylactic aspirin 81 mg q.d. at end of lunch (possible side effects explained) \par Artificial tears one drop each eye q.i.d. and p.r.n.(Possible side effects explained)\par Biotin mouthwash/spray q.i.d. and p.r.n.(Possible side effects explained)\par Oral Hydration\par Continue Daily exercise  at least 30 minutes per day--emphasized \par If his repeat hepatitis B surface antigen is positive, will obtain a hepatology consultation\par Follow-up PCP regarding serologies positive for Hashimoto's thyroiditis\par Return visit 3 months\par Total time for this office visit, including face-to-face time and nonface-to-face time, 100 minutes--- including review the chart and previous records, review of recent ENT notes regarding his parotid diagnosis and surgery, review of recent lab results with extensive discussion with the patient, review of his recent x-ray results with extensive discussion with the patient, review of his recent bone densitometry with my analysis of the raw data and extensive discussion with the patient, extensive discussion regarding the need for sleep study but patient declined despite knowing the need and the consequences, extensive discussion regarding the need for MRI LS spine which the patient declined, detailed medication history, review of his medications going forward including the possible side effects, extensive discussion regarding the need to continue his daily exercise as noted above, reviewed the impact of his rheumatic disease on his other medical problems, reviewed the impact of his other medical problems on his rheumatic disease

## 2022-03-20 NOTE — CONSULT LETTER
[Dear  ___] : Dear  [unfilled], [Consult Letter:] : I had the pleasure of evaluating your patient, [unfilled]. [Please see my note below.] : Please see my note below. [Consult Closing:] : Thank you very much for allowing me to participate in the care of this patient.  If you have any questions, please do not hesitate to contact me. [Sincerely,] : Sincerely, [DrTristen  ___] : Dr. WHITMORE [FreeTextEntry3] : Raheem\par Myron I. Kleiner, M.D., FACR\par Chief, Division of Rheumatology\par Department of Medicine\par [facility]

## 2022-04-08 PROCEDURE — 77301 RADIOTHERAPY DOSE PLAN IMRT: CPT | Mod: 26

## 2022-04-08 PROCEDURE — 77338 DESIGN MLC DEVICE FOR IMRT: CPT | Mod: 26

## 2022-04-08 PROCEDURE — 77300 RADIATION THERAPY DOSE PLAN: CPT | Mod: 26

## 2022-04-15 PROCEDURE — 77387B: CUSTOM | Mod: 26

## 2022-04-15 PROCEDURE — 77427 RADIATION TX MANAGEMENT X5: CPT

## 2022-04-18 ENCOUNTER — NON-APPOINTMENT (OUTPATIENT)
Age: 77
End: 2022-04-18

## 2022-04-18 PROCEDURE — 77387B: CUSTOM | Mod: 26

## 2022-04-19 ENCOUNTER — NON-APPOINTMENT (OUTPATIENT)
Age: 77
End: 2022-04-19

## 2022-04-19 PROCEDURE — 77387B: CUSTOM | Mod: 26

## 2022-04-20 PROCEDURE — 77387B: CUSTOM | Mod: 26

## 2022-04-21 ENCOUNTER — NON-APPOINTMENT (OUTPATIENT)
Age: 77
End: 2022-04-21

## 2022-04-21 VITALS
HEART RATE: 84 BPM | BODY MASS INDEX: 27.67 KG/M2 | WEIGHT: 204 LBS | DIASTOLIC BLOOD PRESSURE: 77 MMHG | SYSTOLIC BLOOD PRESSURE: 135 MMHG | OXYGEN SATURATION: 96 % | RESPIRATION RATE: 16 BRPM

## 2022-04-21 PROCEDURE — 77014: CPT | Mod: 26

## 2022-04-21 NOTE — PHYSICAL EXAM
[Normal] : well developed, well nourished, in no acute distress [Normal Oral Cavity] : oral cavity was normal [] : no respiratory distress [Heart Rate And Rhythm] : heart rate and rhythm were normal [de-identified] : trismus. no mucositis

## 2022-04-21 NOTE — DISEASE MANAGEMENT
[FreeTextEntry4] : squamous cell carcinoma [TTNM] : 2 [NTNM] : 0 [MTNM] : 0 [de-identified] : 400 [de-identified] : 2607 [de-identified] : right face and neck

## 2022-04-22 PROCEDURE — 77427 RADIATION TX MANAGEMENT X5: CPT

## 2022-04-22 PROCEDURE — 77387B: CUSTOM | Mod: 26

## 2022-04-24 NOTE — REASON FOR VISIT
[Consultation] : a consultation visit [FreeTextEntry1] : 77 y/o male presents in office for preop sx with Dr. Recio 3/3/22 on a SCCa of right parotid gland. Pt first felt lump on right side of face in November. Pt was placed on antibiotics by his dentist with no improvement. Pt denies pain. Hx of cardiac stents. Last stent placed 6 years ago. Hx of asthma and cigar smoking. Hx of remote spine fusion.

## 2022-04-24 NOTE — PHYSICAL EXAM
[NI] : Normal [de-identified] : poorly mobile but nonfixed mass R parotid body. close to but not involving overlying skin. no other palp masses. \par pt w multiple cutaneous small red lesions over entire face and non hair bearing scalp. \par \par

## 2022-04-24 NOTE — HISTORY OF PRESENT ILLNESS
[FreeTextEntry1] : 76 year old male referred by Dr. 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 States first felt a lump on the right side of his face in November. Was placed on PO antibiotic by his dentist without improvement. No assoc pain. no trismus. not sure if enlarging. Is a hx SCCA skin rx 10 years ago R periauriclar area. has had multiple lesions over the years. no major lesion R side over last couple years. Is a hx cardiac stents X 6. Only on asa now. last stent 6 yrs ago. No hx MI. Is hx asthma. Is a cigar smoker. 1-4 cigars a day. Is a remote hx R ant c spine fusion. Has recent cardiac cath and stress. Is also a hx R eye issues and h/o cataracts.

## 2022-04-25 ENCOUNTER — NON-APPOINTMENT (OUTPATIENT)
Age: 77
End: 2022-04-25

## 2022-04-25 VITALS
BODY MASS INDEX: 27.8 KG/M2 | SYSTOLIC BLOOD PRESSURE: 160 MMHG | WEIGHT: 205 LBS | OXYGEN SATURATION: 97 % | DIASTOLIC BLOOD PRESSURE: 97 MMHG | HEART RATE: 77 BPM | RESPIRATION RATE: 16 BRPM

## 2022-04-25 PROCEDURE — 77387B: CUSTOM | Mod: 26

## 2022-04-25 NOTE — PHYSICAL EXAM
[Normal] : well developed, well nourished, in no acute distress [Normal Oral Cavity] : oral cavity was normal [] : no respiratory distress [Heart Rate And Rhythm] : heart rate and rhythm were normal [de-identified] : trismus. no mucositis

## 2022-04-25 NOTE — REVIEW OF SYSTEMS
[Dysphagia: Grade 0] : Dysphagia: Grade 0 [Esophagitis: Grade 0] : Esophagitis: Grade 0 [Mucositis Oral: Grade 0] : Mucositis Oral: Grade 0  [Xerostomia: Grade 0] : Xerostomia: Grade 0 [Dysgeusia: Grade 0] : Dysgeusia: Grade 0 [Dermatitis Radiation: Grade 1 - Faint erythema or dry desquamation] : Dermatitis Radiation: Grade 1 - Faint erythema or dry desquamation

## 2022-04-25 NOTE — DISEASE MANAGEMENT
[Pathological] : TNM Stage: p [FreeTextEntry4] : squamous cell carcinoma [TTNM] : 2 [NTNM] : 0 [II] : II [MTNM] : 0 [de-identified] : 1400 cGy [de-identified] : 6600 cGy [de-identified] : right face and neck

## 2022-04-26 ENCOUNTER — APPOINTMENT (OUTPATIENT)
Dept: OTOLARYNGOLOGY | Facility: CLINIC | Age: 77
End: 2022-04-26

## 2022-04-26 ENCOUNTER — NON-APPOINTMENT (OUTPATIENT)
Age: 77
End: 2022-04-26

## 2022-04-26 PROCEDURE — 77387B: CUSTOM | Mod: 26

## 2022-04-27 PROCEDURE — 77387B: CUSTOM | Mod: 26

## 2022-04-28 PROCEDURE — 77014: CPT | Mod: 26

## 2022-04-29 PROCEDURE — 77387B: CUSTOM | Mod: 26

## 2022-05-02 ENCOUNTER — NON-APPOINTMENT (OUTPATIENT)
Age: 77
End: 2022-05-02

## 2022-05-02 VITALS
RESPIRATION RATE: 16 BRPM | WEIGHT: 204.6 LBS | OXYGEN SATURATION: 98 % | DIASTOLIC BLOOD PRESSURE: 89 MMHG | HEART RATE: 75 BPM | BODY MASS INDEX: 27.75 KG/M2 | SYSTOLIC BLOOD PRESSURE: 149 MMHG

## 2022-05-02 PROCEDURE — 77427 RADIATION TX MANAGEMENT X5: CPT

## 2022-05-02 PROCEDURE — 77387B: CUSTOM | Mod: 26

## 2022-05-02 NOTE — PHYSICAL EXAM
[Normal] : well developed, well nourished, in no acute distress [Normal Oral Cavity] : oral cavity was normal [Heart Rate And Rhythm] : heart rate and rhythm were normal [] : no respiratory distress [de-identified] : trismus. no mucositis

## 2022-05-02 NOTE — DISEASE MANAGEMENT
[Pathological] : TNM Stage: p [FreeTextEntry4] : squamous cell carcinoma [TTNM] : 2 [NTNM] : 0 [MTNM] : 0 [II] : II [de-identified] : 2400 cGy [de-identified] : 6600 cGy [de-identified] : right face and neck

## 2022-05-03 PROCEDURE — 77387B: CUSTOM | Mod: 26

## 2022-05-04 PROCEDURE — 77387B: CUSTOM | Mod: 26

## 2022-05-05 ENCOUNTER — NON-APPOINTMENT (OUTPATIENT)
Age: 77
End: 2022-05-05

## 2022-05-05 PROCEDURE — 77014: CPT | Mod: 26

## 2022-05-06 PROCEDURE — 77427 RADIATION TX MANAGEMENT X5: CPT

## 2022-05-06 PROCEDURE — 77387B: CUSTOM | Mod: 26

## 2022-05-09 ENCOUNTER — NON-APPOINTMENT (OUTPATIENT)
Age: 77
End: 2022-05-09

## 2022-05-09 VITALS
RESPIRATION RATE: 16 BRPM | DIASTOLIC BLOOD PRESSURE: 83 MMHG | OXYGEN SATURATION: 97 % | SYSTOLIC BLOOD PRESSURE: 122 MMHG | BODY MASS INDEX: 27.45 KG/M2 | WEIGHT: 202.4 LBS | HEART RATE: 83 BPM

## 2022-05-09 DIAGNOSIS — R11.0 NAUSEA: ICD-10-CM

## 2022-05-09 PROCEDURE — 77387B: CUSTOM | Mod: 26

## 2022-05-09 NOTE — VITALS
[Least Pain Intensity: 0/10] : 0/10 [80: Normal activity with effort; some signs or symptoms of disease.] : 80: Normal activity with effort; some signs or symptoms of disease.  [Maximal Pain Intensity: 3/10] : 3/10 [Pain Description/Quality: ___] : Pain description/quality: [unfilled] [Pain Duration: ___] : Pain duration: [unfilled] [Pain Location: ___] : Pain Location: [unfilled] [NoTreatment Scheduled] : no treatment scheduled [Pain Interferes with ADLs] : Pain does not interfere with activities of daily living

## 2022-05-09 NOTE — PHYSICAL EXAM
[Normal] : well developed, well nourished, in no acute distress [Normal Oral Cavity] : oral cavity was normal [] : no respiratory distress [Heart Rate And Rhythm] : heart rate and rhythm were normal [de-identified] : ipsilateral trismus; no oral mucositis [de-identified] : moderate erythema and dryness of the right face; no desquamation

## 2022-05-09 NOTE — DISEASE MANAGEMENT
[Pathological] : TNM Stage: p [II] : II [FreeTextEntry4] : squamous cell carcinoma [TTNM] : 2 [NTNM] : 0 [MTNM] : 0 [de-identified] : 3400 cGy [de-identified] : 6600 cGy [de-identified] : right face and neck

## 2022-05-09 NOTE — REVIEW OF SYSTEMS
[Dysphagia: Grade 0] : Dysphagia: Grade 0 [Esophagitis: Grade 0] : Esophagitis: Grade 0 [Mucositis Oral: Grade 0] : Mucositis Oral: Grade 0  [Dermatitis Radiation: Grade 1 - Faint erythema or dry desquamation] : Dermatitis Radiation: Grade 1 - Faint erythema or dry desquamation [Xerostomia: Grade 1 - Symptomatic (e.g., dry or thick saliva) without significant dietary alteration; unstimulated saliva flow >0.2 ml/min] : Xerostomia: Grade 1 - Symptomatic (e.g., dry or thick saliva) without significant dietary alteration; unstimulated saliva flow >0.2 ml/min [Dysgeusia: Grade 1- Altered taste but no change in diet] : Dysgeusia: Grade 1 - Altered taste but no change in diet

## 2022-05-10 PROCEDURE — 77387B: CUSTOM | Mod: 26

## 2022-05-11 PROCEDURE — 77387B: CUSTOM | Mod: 26

## 2022-05-12 PROCEDURE — 77014: CPT | Mod: 26

## 2022-05-13 PROCEDURE — 77427 RADIATION TX MANAGEMENT X5: CPT

## 2022-05-13 PROCEDURE — 77387B: CUSTOM | Mod: 26

## 2022-05-16 ENCOUNTER — NON-APPOINTMENT (OUTPATIENT)
Age: 77
End: 2022-05-16

## 2022-05-16 VITALS
DIASTOLIC BLOOD PRESSURE: 87 MMHG | RESPIRATION RATE: 16 BRPM | WEIGHT: 198 LBS | BODY MASS INDEX: 26.85 KG/M2 | OXYGEN SATURATION: 97 % | HEART RATE: 69 BPM | SYSTOLIC BLOOD PRESSURE: 133 MMHG

## 2022-05-16 PROCEDURE — 77387B: CUSTOM | Mod: 26

## 2022-05-16 NOTE — PHYSICAL EXAM
[Normal] : well developed, well nourished, in no acute distress [Normal Oral Cavity] : oral cavity was normal [] : no respiratory distress [Heart Rate And Rhythm] : heart rate and rhythm were normal [de-identified] : ipsilateral trismus; no oral mucositis [de-identified] : moderate erythema and dryness of the right face; no desquamation

## 2022-05-16 NOTE — DISEASE MANAGEMENT
[Pathological] : TNM Stage: p [FreeTextEntry4] : squamous cell carcinoma [TTNM] : 2 [NTNM] : 0 [MTNM] : 0 [II] : II [de-identified] : 4400 cGy [de-identified] : 6600 cGy [de-identified] : right face and neck

## 2022-05-16 NOTE — REVIEW OF SYSTEMS
[Dysphagia: Grade 0] : Dysphagia: Grade 0 [Mucositis Oral: Grade 0] : Mucositis Oral: Grade 0  [Xerostomia: Grade 1 - Symptomatic (e.g., dry or thick saliva) without significant dietary alteration; unstimulated saliva flow >0.2 ml/min] : Xerostomia: Grade 1 - Symptomatic (e.g., dry or thick saliva) without significant dietary alteration; unstimulated saliva flow >0.2 ml/min [Dysgeusia: Grade 1- Altered taste but no change in diet] : Dysgeusia: Grade 1 - Altered taste but no change in diet

## 2022-05-17 ENCOUNTER — NON-APPOINTMENT (OUTPATIENT)
Age: 77
End: 2022-05-17

## 2022-05-17 PROCEDURE — 77387B: CUSTOM | Mod: 26

## 2022-05-18 ENCOUNTER — APPOINTMENT (OUTPATIENT)
Dept: RHEUMATOLOGY | Facility: CLINIC | Age: 77
End: 2022-05-18

## 2022-05-18 PROCEDURE — 77387B: CUSTOM | Mod: 26

## 2022-05-19 PROCEDURE — 77014: CPT | Mod: 26

## 2022-05-20 PROCEDURE — 77387B: CUSTOM | Mod: 26

## 2022-05-23 ENCOUNTER — NON-APPOINTMENT (OUTPATIENT)
Age: 77
End: 2022-05-23

## 2022-05-23 VITALS
DIASTOLIC BLOOD PRESSURE: 85 MMHG | OXYGEN SATURATION: 97 % | SYSTOLIC BLOOD PRESSURE: 128 MMHG | HEART RATE: 68 BPM | BODY MASS INDEX: 26.45 KG/M2 | WEIGHT: 195 LBS | RESPIRATION RATE: 16 BRPM

## 2022-05-23 PROCEDURE — 77387B: CUSTOM | Mod: 26

## 2022-05-23 NOTE — PHYSICAL EXAM
[Normal] : well developed, well nourished, in no acute distress [Normal Oral Cavity] : oral cavity was normal [] : no respiratory distress [Heart Rate And Rhythm] : heart rate and rhythm were normal [de-identified] : ipsilateral trismus; no oral mucositis [de-identified] : brisk erythema and dryness of the right face; no desquamation

## 2022-05-23 NOTE — DISEASE MANAGEMENT
[Pathological] : TNM Stage: p [FreeTextEntry4] : squamous cell carcinoma [TTNM] : 2 [NTNM] : 0 [MTNM] : 0 [II] : II [de-identified] : 5400 cGy [de-identified] : 6600 cGy [de-identified] : right face and neck

## 2022-05-23 NOTE — REVIEW OF SYSTEMS
[Dysphagia: Grade 0] : Dysphagia: Grade 0 [Mucositis Oral: Grade 0] : Mucositis Oral: Grade 0  [Xerostomia: Grade 2 - Moderate symptoms; oral intake alterations (e.g., copious water, other lubricants, diet limited to purees and/or soft, moist foods); unstimulated saliva 0.1 to 0.2 ml/min] : Xerostomia: Grade 2 - Moderate symptoms; oral intake alterations (e.g., copious water, other lubricants, diet limited to purees and/or soft, moist foods); unstimulated saliva 0.1 to 0.2 ml/min [Dysgeusia: Grade 2 - Altered taste with change in diet (e.g., oral supplements); noxious or unpleasant taste; loss of taste] : Dysgeusia: Grade 2 - Altered taste with change in diet (e.g., oral supplements); noxious or unpleasant taste; loss of taste

## 2022-05-24 ENCOUNTER — NON-APPOINTMENT (OUTPATIENT)
Age: 77
End: 2022-05-24

## 2022-05-24 PROCEDURE — 77387B: CUSTOM | Mod: 26

## 2022-05-25 PROCEDURE — 77427 RADIATION TX MANAGEMENT X5: CPT

## 2022-05-25 PROCEDURE — 77387B: CUSTOM | Mod: 26

## 2022-05-26 PROCEDURE — 77014: CPT | Mod: 26

## 2022-05-27 PROCEDURE — 77387B: CUSTOM | Mod: 26

## 2022-05-31 ENCOUNTER — NON-APPOINTMENT (OUTPATIENT)
Age: 77
End: 2022-05-31

## 2022-05-31 VITALS
WEIGHT: 191.6 LBS | SYSTOLIC BLOOD PRESSURE: 138 MMHG | OXYGEN SATURATION: 97 % | DIASTOLIC BLOOD PRESSURE: 75 MMHG | BODY MASS INDEX: 25.99 KG/M2 | HEART RATE: 77 BPM | RESPIRATION RATE: 16 BRPM

## 2022-05-31 PROCEDURE — 77387B: CUSTOM | Mod: 26

## 2022-05-31 NOTE — VITALS
[Maximal Pain Intensity: 3/10] : 3/10 [Least Pain Intensity: 0/10] : 0/10 [Pain Description/Quality: ___] : Pain description/quality: [unfilled] [Pain Duration: ___] : Pain duration: [unfilled] [Pain Location: ___] : Pain Location: [unfilled] [NoTreatment Scheduled] : no treatment scheduled [70: Cares for self; unalbe to carry on normal activity or do active work.] : 70: Cares for self; unable to carry on normal activity or do active work. [ECOG Performance Status: 2 - Ambulatory and capable of all self care but unable to carry out any work activities] : Performance Status: 2 - Ambulatory and capable of all self care but unable to carry out any work activities. Up and about more than 50% of waking hours [Pain Interferes with ADLs] : Pain does not interfere with activities of daily living

## 2022-05-31 NOTE — DISEASE MANAGEMENT
[Pathological] : TNM Stage: p [II] : II [FreeTextEntry4] : squamous cell carcinoma [TTNM] : 2 [NTNM] : 0 [MTNM] : 0 [de-identified] : 6400 cGy [de-identified] : 6600 cGy [de-identified] : right face and neck

## 2022-05-31 NOTE — PHYSICAL EXAM
[Normal] : well developed, well nourished, in no acute distress [Normal Oral Cavity] : oral cavity was normal [] : no respiratory distress [Heart Rate And Rhythm] : heart rate and rhythm were normal [de-identified] : ipsilateral trismus; no oral mucositis [de-identified] : brisk erythema and dryness of the right face; no desquamation

## 2022-06-01 PROCEDURE — 77387B: CUSTOM | Mod: 26

## 2022-06-07 ENCOUNTER — NON-APPOINTMENT (OUTPATIENT)
Age: 77
End: 2022-06-07

## 2022-06-14 ENCOUNTER — NON-APPOINTMENT (OUTPATIENT)
Age: 77
End: 2022-06-14

## 2022-06-21 ENCOUNTER — APPOINTMENT (OUTPATIENT)
Dept: RHEUMATOLOGY | Facility: CLINIC | Age: 77
End: 2022-06-21
Payer: MEDICARE

## 2022-06-21 ENCOUNTER — NON-APPOINTMENT (OUTPATIENT)
Age: 77
End: 2022-06-21

## 2022-06-21 VITALS
TEMPERATURE: 97.7 F | SYSTOLIC BLOOD PRESSURE: 110 MMHG | DIASTOLIC BLOOD PRESSURE: 65 MMHG | HEIGHT: 72 IN | HEART RATE: 74 BPM | OXYGEN SATURATION: 98 %

## 2022-06-21 PROCEDURE — 99215 OFFICE O/P EST HI 40 MIN: CPT

## 2022-06-21 RX ORDER — ONDANSETRON 4 MG/1
4 TABLET, ORALLY DISINTEGRATING ORAL
Qty: 30 | Refills: 0 | Status: DISCONTINUED | COMMUNITY
Start: 2022-05-09 | End: 2022-06-21

## 2022-06-21 RX ORDER — OXYCODONE 5 MG/1
5 TABLET ORAL
Qty: 9 | Refills: 0 | Status: DISCONTINUED | COMMUNITY
Start: 2022-03-06 | End: 2022-06-21

## 2022-06-26 LAB
ALBUMIN SERPL ELPH-MCNC: 4.6 G/DL
ALP BLD-CCNC: 93 U/L
ALT SERPL-CCNC: 13 U/L
ANION GAP SERPL CALC-SCNC: 12 MMOL/L
APPEARANCE: CLEAR
AST SERPL-CCNC: 25 U/L
BACTERIA: NEGATIVE
BASOPHILS # BLD AUTO: 0.04 K/UL
BASOPHILS NFR BLD AUTO: 0.7 %
BILIRUB SERPL-MCNC: 0.7 MG/DL
BILIRUBIN URINE: NEGATIVE
BLOOD URINE: NEGATIVE
BUN SERPL-MCNC: 27 MG/DL
CALCIUM SERPL-MCNC: 9.8 MG/DL
CHLORIDE SERPL-SCNC: 103 MMOL/L
CK SERPL-CCNC: 131 U/L
CO2 SERPL-SCNC: 27 MMOL/L
COLOR: YELLOW
CREAT SERPL-MCNC: 1.27 MG/DL
CRP SERPL-MCNC: 9 MG/L
EGFR: 59 ML/MIN/1.73M2
ENA RNP AB SER IA-ACNC: <0.2 AL
ENA SCL70 IGG SER IA-ACNC: <0.2 AL
ENA SM AB SER IA-ACNC: <0.2 AL
ENA SS-A AB SER IA-ACNC: <0.2 AL
ENA SS-B AB SER IA-ACNC: <0.2 AL
EOSINOPHIL # BLD AUTO: 0.71 K/UL
EOSINOPHIL NFR BLD AUTO: 13 %
ERYTHROCYTE [SEDIMENTATION RATE] IN BLOOD BY WESTERGREN METHOD: 21 MM/HR
GLUCOSE QUALITATIVE U: NEGATIVE
GLUCOSE SERPL-MCNC: 98 MG/DL
HCT VFR BLD CALC: 43.2 %
HGB BLD-MCNC: 14 G/DL
HYALINE CASTS: 0 /LPF
IMM GRANULOCYTES NFR BLD AUTO: 0.2 %
KETONES URINE: NEGATIVE
LDH SERPL-CCNC: 173 U/L
LEUKOCYTE ESTERASE URINE: NEGATIVE
LYMPHOCYTES # BLD AUTO: 0.59 K/UL
LYMPHOCYTES NFR BLD AUTO: 10.8 %
MAN DIFF?: NORMAL
MCHC RBC-ENTMCNC: 30.9 PG
MCHC RBC-ENTMCNC: 32.4 GM/DL
MCV RBC AUTO: 95.4 FL
MICROSCOPIC-UA: NORMAL
MONOCYTES # BLD AUTO: 0.58 K/UL
MONOCYTES NFR BLD AUTO: 10.6 %
NEUTROPHILS # BLD AUTO: 3.55 K/UL
NEUTROPHILS NFR BLD AUTO: 64.7 %
NITRITE URINE: NEGATIVE
PH URINE: 6
PHOSPHATE SERPL-MCNC: 3.2 MG/DL
PLATELET # BLD AUTO: 197 K/UL
POTASSIUM SERPL-SCNC: 4.5 MMOL/L
PROT SERPL-MCNC: 6.9 G/DL
PROTEIN URINE: NORMAL
RBC # BLD: 4.53 M/UL
RBC # FLD: 12.8 %
RED BLOOD CELLS URINE: 5 /HPF
SODIUM SERPL-SCNC: 142 MMOL/L
SPECIFIC GRAVITY URINE: 1.03
SQUAMOUS EPITHELIAL CELLS: 3 /HPF
UROBILINOGEN URINE: ABNORMAL
WBC # FLD AUTO: 5.48 K/UL
WHITE BLOOD CELLS URINE: 1 /HPF

## 2022-06-26 RX ORDER — SULINDAC 150 MG/1
150 TABLET ORAL
Qty: 180 | Refills: 0 | Status: DISCONTINUED | COMMUNITY
Start: 2022-03-10 | End: 2022-06-26

## 2022-06-26 RX ORDER — CYCLOBENZAPRINE HYDROCHLORIDE 10 MG/1
10 TABLET, FILM COATED ORAL
Qty: 30 | Refills: 1 | Status: DISCONTINUED | COMMUNITY
Start: 2022-02-14 | End: 2022-06-26

## 2022-06-26 RX ORDER — DICLOFENAC SODIUM 1% 10 MG/G
1 GEL TOPICAL
Qty: 7 | Refills: 0 | Status: DISCONTINUED | COMMUNITY
Start: 2022-02-14 | End: 2022-06-26

## 2022-06-26 NOTE — HISTORY OF PRESENT ILLNESS
[FreeTextEntry1] : JOSIANE SHAW is a 76 year old man who presents for initial follow up for further evaluation of joint symptoms and rheumatic diseases. \par \par Patient feels fairly well. He reports intermittent pain right PIPs greater than DIPs. His left lower extremity pain has resolved since last visit. He reports occasional locking right 3rd and 4th finger. Morning stiffness 20 minutes. Some sleep disturbance and fatigue. His bilateral carpal tunnel has greatly improved. Some dry eyes and dry mouth, using artificial tears and oral hydration with relief. He mentions biotene mouthwash does not relieve dry mouth. Patient denies rash or side effects with current medications. Patient is content with current medication regimen. 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

## 2022-06-26 NOTE — PHYSICAL EXAM
[General Appearance - Alert] : alert [General Appearance - In No Acute Distress] : in no acute distress [General Appearance - Well Nourished] : well nourished [General Appearance - Well Developed] : well developed [General Appearance - Well-Appearing] : healthy appearing [Sclera] : the sclera and conjunctiva were normal [PERRL With Normal Accommodation] : pupils were equal in size, round, and reactive to light [Extraocular Movements] : extraocular movements were intact [Outer Ear] : the ears and nose were normal in appearance [Oropharynx] : the oropharynx was normal [Neck Appearance] : the appearance of the neck was normal [Neck Cervical Mass (___cm)] : no neck mass was observed [Jugular Venous Distention Increased] : there was no jugular-venous distention [Thyroid Diffuse Enlargement] : the thyroid was not enlarged [Thyroid Nodule] : there were no palpable thyroid nodules [Lungs Percussion] : the lungs were normal to percussion [Heart Rate And Rhythm] : heart rate was normal and rhythm regular [Edema] : there was no peripheral edema [Abdomen Soft] : soft [Abdomen Tenderness] : non-tender [] : no hepato-splenomegaly [Abdomen Mass (___ Cm)] : no abdominal mass palpated [Cervical Lymph Nodes Enlarged Posterior Bilaterally] : posterior cervical [Cervical Lymph Nodes Enlarged Anterior Bilaterally] : anterior cervical [Supraclavicular Lymph Nodes Enlarged Bilaterally] : supraclavicular [Axillary Lymph Nodes Enlarged Bilaterally] : axillary [No CVA Tenderness] : no ~M costovertebral angle tenderness [No Spinal Tenderness] : no spinal tenderness [Skin Color & Pigmentation] : normal skin color and pigmentation [Cranial Nerves] : cranial nerves 2-12 were intact [Deep Tendon Reflexes (DTR)] : deep tendon reflexes were 2+ and symmetric [Sensation] : the sensory exam was normal to light touch and pinprick [Motor Exam] : the motor exam was normal [No Focal Deficits] : no focal deficits [Oriented To Time, Place, And Person] : oriented to person, place, and time [Impaired Insight] : insight and judgment were intact [Affect] : the affect was normal [Mood] : the mood was normal [FreeTextEntry1] : Strength-5/5

## 2022-06-26 NOTE — CONSULT LETTER
[Consult Letter:] : I had the pleasure of evaluating your patient, [unfilled]. [Please see my note below.] : Please see my note below. [Consult Closing:] : Thank you very much for allowing me to participate in the care of this patient.  If you have any questions, please do not hesitate to contact me. [Sincerely,] : Sincerely, [DrTristen  ___] : Dr. WHITMORE [Dear  ___] : Dear  [unfilled], [FreeTextEntry3] : Raheem\par Myron I. Kleiner, M.D., FACR\par Chief, Division of Rheumatology\par Department of Medicine\par [facility]

## 2022-06-26 NOTE — ASSESSMENT
[FreeTextEntry1] : Impression: JOSIANE SHAW is a 76 year old man who presents for initial follow up for further evaluation of joint symptoms and rheumatic diseases, including osteoarthritis, fibromyalgia, consider Sjogren's syndrome.\par \par Patient feels fairly well. He reports intermittent pain right PIPs greater than DIPs secondary to his osteoarthritis. His left lower extremity pain has resolved since last visit. He reports occasional locking right 3rd and 4th finger secondary to his osteoarthritis. Morning stiffness 20 minutes. Some sleep disturbance and fatigue secondary to his fibromyalgia.  He denies paresthesias bilateral fingers ---his bilateral carpal tunnel has greatly improved. Some dry eyes and dry mouth--I am monitoring him for Sjogren's syndrome, using artificial tears and oral hydration with relief. He mentions biotene mouthwash does not relieve dry mouth. Recent lab tests revealed no significant results or changes, with extensive discussion. Patient denies rash or side effects with current medications. Patient is content with current medication regimen. \par Patient stated to me  "I am thrilled––I am very thankful––you are doing a great job"\par \par Plan:\par I reviewed previous recent lab results with patient with extensive discussion\par Laboratory tests ordered today - see list below- with coordination of care \par Diagnosis and prognosis discussed\par Continue current medications (other than those changed below) \par Discontinue sulindac\par Discontinue cyclobenzaprine\par Gabapentin 100 mg t.i.d.; if no better/side effects 7 days, increase 200 mg t.i.d.; if no better/side effects 7 days, increase 300 mg t.i.d.(Possible side effects explained) \par Nabumetone 500 mg b.i.d. end of breakfast and supper (Possible side effects explained including cardiovascular risk/MI/CVA) \par Artificial tears one drop each eye q.i.d. and p.r.n.(Possible side effects explained)\par Biotin mouthwash/spray q.i.d. and p.r.n.(Possible side effects explained)\par Oral Hydration\par Continue Daily exercise  at least 30 minutes per day--emphasized \par Urged to follow-up with PCP regarding positive thyroid antibodies/Hashimoto's thyroiditis (thyroid function normal)-- emphasized\par Return visit 3 months\par

## 2022-06-28 ENCOUNTER — NON-APPOINTMENT (OUTPATIENT)
Age: 77
End: 2022-06-28

## 2022-07-02 LAB — RNA POLYMERASE III IGG: 5 UNITS

## 2022-07-05 ENCOUNTER — NON-APPOINTMENT (OUTPATIENT)
Age: 77
End: 2022-07-05

## 2022-07-06 ENCOUNTER — APPOINTMENT (OUTPATIENT)
Dept: RADIATION ONCOLOGY | Facility: CLINIC | Age: 77
End: 2022-07-06

## 2022-07-06 NOTE — DISEASE MANAGEMENT
[Pathological] : TNM Stage: p [II] : II [FreeTextEntry4] : squamous cell carcinoma [TTNM] : 2 [NTNM] : 0 [MTNM] : 0 [de-identified] : 6600 cGy [de-identified] : right face and neck

## 2022-07-06 NOTE — HISTORY OF PRESENT ILLNESS
[FreeTextEntry1] : This 75 y/o man is conferencing for post-treatment evaluation.  Mr. 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 Margin of resection <0.1mm.  He completed radiation therapy to the right cheek and neck on 6/1/2022.\par \par At last on-treatment visit:\par Advised to avoid sun exposure.  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  Begin Silvadene/Xeroform dressings at least at night.  \par Reports fatigue, and poor appetite due to trismus, dysgeusia, and xerostomia. Taking Zofran prn.  Reported drinking Boost supplement protein drinks with 20 grams protein and about 500 calories each (3 daily).  Advised to increase to 4 per day and increase hydration to 2 quarts/day.\par Reported moderate odynophagia.\par \par Today Mr. Daily repots he is maintaining his weight, eating moderate meals again.  \par Reports most taste sensation returned.\par Reports skin well healed.\par Notes trismus continues.  Refer to PM&R.\par \par \par \par

## 2022-07-12 ENCOUNTER — NON-APPOINTMENT (OUTPATIENT)
Age: 77
End: 2022-07-12

## 2022-07-14 ENCOUNTER — APPOINTMENT (OUTPATIENT)
Dept: RHEUMATOLOGY | Facility: CLINIC | Age: 77
End: 2022-07-14

## 2022-07-14 ENCOUNTER — NON-APPOINTMENT (OUTPATIENT)
Age: 77
End: 2022-07-14

## 2022-07-14 LAB
EJ AB SER QL: NEGATIVE
ENA JO1 AB SER IA-ACNC: <20 UNITS
ENA PM/SCL AB SER-ACNC: <20 UNITS
ENA SM+RNP AB SER IA-ACNC: <20 UNITS
ENA SS-A IGG SER QL: <20 UNITS
FIBRILLARIN AB SER QL: NEGATIVE
KU AB SER QL: NEGATIVE
MDA-5 (P140)(CADM-140): <20 UNITS
MI2 AB SER QL: NEGATIVE
NXP-2 (P140): <20 UNITS
OJ AB SER QL: NEGATIVE
PL12 AB SER QL: NEGATIVE
PL7 AB SER QL: NEGATIVE
SRP AB SERPL QL: NEGATIVE
TIF GAMMA (P155/140): <20 UNITS
U2 SNRNP AB SER QL: NEGATIVE

## 2022-07-14 PROCEDURE — 99442: CPT | Mod: 95

## 2022-07-14 RX ORDER — NABUMETONE 500 MG/1
500 TABLET, FILM COATED ORAL
Qty: 180 | Refills: 0 | Status: DISCONTINUED | COMMUNITY
Start: 2022-06-26 | End: 2022-07-14

## 2022-08-15 ENCOUNTER — NON-APPOINTMENT (OUTPATIENT)
Age: 77
End: 2022-08-15

## 2022-09-01 ENCOUNTER — LABORATORY RESULT (OUTPATIENT)
Age: 77
End: 2022-09-01

## 2022-09-01 ENCOUNTER — APPOINTMENT (OUTPATIENT)
Dept: RHEUMATOLOGY | Facility: CLINIC | Age: 77
End: 2022-09-01

## 2022-09-01 VITALS
OXYGEN SATURATION: 96 % | TEMPERATURE: 98 F | RESPIRATION RATE: 17 BRPM | WEIGHT: 191 LBS | DIASTOLIC BLOOD PRESSURE: 70 MMHG | HEART RATE: 77 BPM | BODY MASS INDEX: 25.87 KG/M2 | SYSTOLIC BLOOD PRESSURE: 104 MMHG | HEIGHT: 72 IN

## 2022-09-01 DIAGNOSIS — M13.0 POLYARTHRITIS, UNSPECIFIED: ICD-10-CM

## 2022-09-01 DIAGNOSIS — R20.2 PARESTHESIA OF SKIN: ICD-10-CM

## 2022-09-01 PROCEDURE — 36415 COLL VENOUS BLD VENIPUNCTURE: CPT

## 2022-09-01 PROCEDURE — 99215 OFFICE O/P EST HI 40 MIN: CPT | Mod: 25

## 2022-09-01 PROCEDURE — G2212 PROLONG OUTPT/OFFICE VIS: CPT

## 2022-09-01 RX ORDER — FLURBIPROFEN 100 MG
100 TABLET ORAL
Qty: 180 | Refills: 0 | Status: DISCONTINUED | COMMUNITY
Start: 2022-07-18 | End: 2022-09-01

## 2022-09-01 RX ORDER — GABAPENTIN 100 MG/1
100 CAPSULE ORAL
Qty: 270 | Refills: 0 | Status: DISCONTINUED | COMMUNITY
Start: 2022-06-26 | End: 2022-09-01

## 2022-09-01 NOTE — REVIEW OF SYSTEMS
[Feeling Poorly] : feeling poorly [As Noted in HPI] : as noted in HPI [Joint Pain] : joint pain [Negative] : Heme/Lymph

## 2022-09-02 RX ORDER — SULINDAC 150 MG/1
150 TABLET ORAL
Qty: 180 | Refills: 0 | Status: DISCONTINUED | COMMUNITY
Start: 2022-07-14 | End: 2022-09-02

## 2022-09-02 RX ORDER — GABAPENTIN 300 MG/1
300 CAPSULE ORAL
Qty: 270 | Refills: 0 | Status: DISCONTINUED | COMMUNITY
Start: 2022-07-18 | End: 2022-09-02

## 2022-09-02 RX ORDER — CYCLOBENZAPRINE HYDROCHLORIDE 10 MG/1
10 TABLET, FILM COATED ORAL
Qty: 30 | Refills: 3 | Status: DISCONTINUED | COMMUNITY
End: 2022-09-02

## 2022-09-02 NOTE — PHYSICAL EXAM
[General Appearance - Alert] : alert [General Appearance - In No Acute Distress] : in no acute distress [General Appearance - Well Nourished] : well nourished [General Appearance - Well Developed] : well developed [General Appearance - Well-Appearing] : healthy appearing [Sclera] : the sclera and conjunctiva were normal [PERRL With Normal Accommodation] : pupils were equal in size, round, and reactive to light [Extraocular Movements] : extraocular movements were intact [Outer Ear] : the ears and nose were normal in appearance [Neck Appearance] : the appearance of the neck was normal [Neck Cervical Mass (___cm)] : no neck mass was observed [Jugular Venous Distention Increased] : there was no jugular-venous distention [Thyroid Diffuse Enlargement] : the thyroid was not enlarged [Thyroid Nodule] : there were no palpable thyroid nodules [Lungs Percussion] : the lungs were normal to percussion [Heart Rate And Rhythm] : heart rate was normal and rhythm regular [Edema] : there was no peripheral edema [Abdomen Soft] : soft [Abdomen Tenderness] : non-tender [] : no hepato-splenomegaly [Abdomen Mass (___ Cm)] : no abdominal mass palpated [Cervical Lymph Nodes Enlarged Posterior Bilaterally] : posterior cervical [Cervical Lymph Nodes Enlarged Anterior Bilaterally] : anterior cervical [Supraclavicular Lymph Nodes Enlarged Bilaterally] : supraclavicular [Axillary Lymph Nodes Enlarged Bilaterally] : axillary [No CVA Tenderness] : no ~M costovertebral angle tenderness [No Spinal Tenderness] : no spinal tenderness [Skin Color & Pigmentation] : normal skin color and pigmentation [Cranial Nerves] : cranial nerves 2-12 were intact [Deep Tendon Reflexes (DTR)] : deep tendon reflexes were 2+ and symmetric [Sensation] : the sensory exam was normal to light touch and pinprick [Motor Exam] : the motor exam was normal [No Focal Deficits] : no focal deficits [Oriented To Time, Place, And Person] : oriented to person, place, and time [Impaired Insight] : insight and judgment were intact [Affect] : the affect was normal [Mood] : the mood was normal [FreeTextEntry1] : Strength-5/5

## 2022-09-02 NOTE — ASSESSMENT
[FreeTextEntry1] : Impression: JOSIANE SHAW is a 76 year old man who presents for follow up for further evaluation of joint symptoms and rheumatic diseases, including rheumatoid arthritis, osteoarthritis, fibromyalgia, and Sjogren's syndrome.\par \par Patient feels "terrible." He reports increased pain bilateral shoulders, wrists, base of both thumbs, PIPs/DIPs (with swelling), hips,  secondary to polyarthritis secondary to rheumatoid arthritis.  Sjogren's syndrome can also be contributing to this polyarthritis.  He also has pain bilateral lower legs and paresthesias bilateral toes and distal feet as well as a history of low back pain with radiation to both lower extremities secondary to LS radiculopathy secondary to his osteoarthritis--also consider herniated disc, spinal stenosis.  In addition, consider peripheral neuropathy as an etiology for the paresthesias in his toes and feet.  Of note, Sjogren's syndrome can have an associated peripheral neuropathy.  His osteoarthritis and fibromyalgia are also contributing to his generalized joint pains.     Since last visit, patient has mentioned that flurbiprofen or nabumetone are not helpful and restarted sulindac which was discussed on telephone. He has dry eyes and dry mouth, is not using artificial tears or biotene mouthwash -- he was using Listerine, but now it burns his tongue. Recent lab tests revealed no significant results or changes, with extensive discussion. Patient denies rash or side effects with current medications. Patient is content with current medication regimen.  Of note, his quality of life is suffering, in particular secondary to his inability to golf which is very important to him.\par \par Plan:\par I reviewed previous recent lab results with patient with extensive discussion\par I reviewed past x-ray results with patient with extensive discussion\par MRI ordered of LS spine (no contraindications) with coordination of care--- he will inform radiology about his 6 coronary stents, last one 7 years ago\par Laboratory tests ordered today - see list below- with coordination of care \par Diagnosis and prognosis discussed\par Continue current medications (other than those changed below)\par Discontinue sulindac\par Discontinue cyclobenzaprine\par Duloxetine 20 mg q.d. at supper time (possible side effects explained) \par Risedronate Sodium 150 mg q.1 month on an empty stomach with a large glass of water 30 minutes a.c breakfast (possible side effects explained); if insurance does not approve, I will prescribe Alendronate 70 mg q.1 week on empty stomach with large glass of water 30 min a.c breakfast (possible side effects explained) \par After calling the office for results of his G6PD, start sulfasalazine 500 mg b.i.d. with a large glass of fluid and food x7 days then increase t.i.d x7 days then increase q.i.d. thereafter (possible side effects explained)-- start after lab test\par Sun protection and sunscreen use emphasized\par Folic acid 1 mg q.d. (possible side effects explained) \par Continue multivitamin daily\par Calcium 500 mg b.i.d. at the end of meals or 600 mg b.i.d end of meals (Possible side effects explained)\par Vitamin D 50,000 units once a week (possible side effects explained) \par Do not start calcium or vitamin D until after lab tests \par Prednisone 40 mg q.d. at end of breakfast for 5 days, then 30 mg q.d. x5 total time for this office visit, including face-to-face time and non-face-to-face time, 85 minutes--- including review of the chart and previous records, review of previous lab results with extensive discussion with the patient, ordering lab tests with coordination of care, review of recent imaging reports/x-ray results with extensive discussion with the patient, ordering of new x-rays with coordination of care, detailed medication history, review of medications going forward with their possible side effects, reviewed the impact of the patient's rheumatic disease on their other medical problems, reviewed the impact of the patient's other medical problems on their rheumatic disease days; 25 mg daily x10 days; 20 mg daily x2 weeks; then 15 mg daily thereafter (Possible side effects explained including extensive discussion regarding risks, benefits, alternative treatments including AVN requiring joint replacement)\par Decrease gabapentin to 300 mg t.i.d. for 5 days, then to b.i.d. for 5 days, then q.d. for 5 days, then stop.  (Possible side effects explained)\par Artificial tears one drop each eye q.i.d. and p.r.n.(Possible side effects explained)\par Discontinue Listerine mouthwash\par Biotin mouthwash/spray q.i.d. and p.r.n.(Possible side effects explained)\par Oral Hydration\par Continue Daily exercise  at least 30 minutes per day--emphasized \par Call the office with a progress report in 1 week\par Labs every 2 weeks with coordination of care until next visit\par Return visit 2 months\par Total time for this office visit, including face-to-face time and non-face-to-face time, 70 minutes--- including review of the chart and previous records, review of previous lab results with extensive discussion with the patient, ordering lab tests with coordination of care, review of recent imaging reports/x-ray results with extensive discussion with the patient, ordering of new MRI LS spine with coordination of care, detailed medication history, review of multiple 70 insert face medications going forward with their possible side effects, reviewed the impact of the patient's rheumatic disease on their other medical problems, reviewed the impact of the patient's other medical problems on their rheumatic disease\par

## 2022-09-02 NOTE — HISTORY OF PRESENT ILLNESS
[FreeTextEntry1] : JOSIANE SHAW is a 76 year old man who presents for follow up for further evaluation of joint symptoms and rheumatic diseases, including osteoarthritis, fibromyalgia, consider Sjogren's syndrome.\par \par Patient feels "terrible." He reports increased pain bilateral shoulders, wrists, base of both thumbs, PIPs/DIPs, hips, and diffuse pain bilateral lower legs. He mentions increased swelling in his hands, which effects daily tasks.  Because of his decreased for stability, he cannot play golf, which is an important activity for him.  He reports paraesthesias bilateral toes and distal feet. Morning stiffness half a day. Since last visit, patient has mentioned that flurbiprofen or nabumetone are not helpful and restarted sulindac which was discussed on telephone. He has dry eyes and dry mouth, is not using artificial tears or biotene mouthwash -- he was using Listerine, but now it burns his tongue.  He is not exercising.  Patient denies rash or side effects with current medications.\par \par SH: [facility] past week\par \par

## 2022-09-02 NOTE — ADDENDUM
[FreeTextEntry1] : I, Thiago Blackwood, acted solely as a scribe for Dr. Myron I. Kleiner, MD. on 09/01/2022 .

## 2022-09-02 NOTE — CONSULT LETTER
[Dear  ___] : Dear  [unfilled], [Consult Letter:] : I had the pleasure of evaluating your patient, [unfilled]. [Please see my note below.] : Please see my note below. [Consult Closing:] : Thank you very much for allowing me to participate in the care of this patient.  If you have any questions, please do not hesitate to contact me. [Sincerely,] : Sincerely, [DrTristen  ___] : Dr. WHITMORE [FreeTextEntry3] : Raheem\par Myron I. Kleiner, M.D., FACR\par Chief, Division of Rheumatology\par Department of Medicine\par [facility]

## 2022-09-06 LAB
ALBUMIN SERPL ELPH-MCNC: 4.2 G/DL
ALP BLD-CCNC: 100 U/L
ALT SERPL-CCNC: 11 U/L
ANION GAP SERPL CALC-SCNC: 16 MMOL/L
AST SERPL-CCNC: 19 U/L
BASOPHILS # BLD AUTO: 0.06 K/UL
BASOPHILS NFR BLD AUTO: 0.9 %
BILIRUB SERPL-MCNC: 0.3 MG/DL
BUN SERPL-MCNC: 26 MG/DL
CALCIUM SERPL-MCNC: 9.9 MG/DL
CCP AB SER IA-ACNC: <8 UNITS
CHLORIDE SERPL-SCNC: 102 MMOL/L
CO2 SERPL-SCNC: 23 MMOL/L
CREAT SERPL-MCNC: 1.23 MG/DL
CRP SERPL-MCNC: 36 MG/L
EGFR: 62 ML/MIN/1.73M2
ENA SS-A AB SER IA-ACNC: <0.2 AL
ENA SS-B AB SER IA-ACNC: <0.2 AL
EOSINOPHIL # BLD AUTO: 0.61 K/UL
EOSINOPHIL NFR BLD AUTO: 8.7 %
ERYTHROCYTE [SEDIMENTATION RATE] IN BLOOD BY WESTERGREN METHOD: 67 MM/HR
FOLATE SERPL-MCNC: >20 NG/ML
G6PD SER-CCNC: 15.9 U/G HGB
GLUCOSE SERPL-MCNC: 117 MG/DL
HCT VFR BLD CALC: 38.2 %
HGB BLD-MCNC: 12.2 G/DL
IMM GRANULOCYTES NFR BLD AUTO: 0.4 %
LDH SERPL-CCNC: 157 U/L
LYMPHOCYTES # BLD AUTO: 0.6 K/UL
LYMPHOCYTES NFR BLD AUTO: 8.5 %
MAN DIFF?: NORMAL
MCHC RBC-ENTMCNC: 31 PG
MCHC RBC-ENTMCNC: 31.9 GM/DL
MCV RBC AUTO: 97 FL
MONOCYTES # BLD AUTO: 0.63 K/UL
MONOCYTES NFR BLD AUTO: 8.9 %
NEUTROPHILS # BLD AUTO: 5.12 K/UL
NEUTROPHILS NFR BLD AUTO: 72.6 %
PHOSPHATE SERPL-MCNC: 3.2 MG/DL
PLATELET # BLD AUTO: 275 K/UL
POTASSIUM SERPL-SCNC: 4.2 MMOL/L
PROT SERPL-MCNC: 6.8 G/DL
RBC # BLD: 3.94 M/UL
RBC # FLD: 13.6 %
RF+CCP IGG SER-IMP: NEGATIVE
RHEUMATOID FACT SER QL: 10 IU/ML
SODIUM SERPL-SCNC: 141 MMOL/L
T PALLIDUM AB SER QL IA: NEGATIVE
VIT B12 SERPL-MCNC: 360 PG/ML
WBC # FLD AUTO: 7.05 K/UL

## 2022-09-08 ENCOUNTER — NON-APPOINTMENT (OUTPATIENT)
Age: 77
End: 2022-09-08

## 2022-09-09 ENCOUNTER — OUTPATIENT (OUTPATIENT)
Dept: OUTPATIENT SERVICES | Facility: HOSPITAL | Age: 77
LOS: 1 days | End: 2022-09-09
Payer: MEDICARE

## 2022-09-09 ENCOUNTER — APPOINTMENT (OUTPATIENT)
Dept: MRI IMAGING | Facility: CLINIC | Age: 77
End: 2022-09-09

## 2022-09-09 DIAGNOSIS — Z98.1 ARTHRODESIS STATUS: Chronic | ICD-10-CM

## 2022-09-09 DIAGNOSIS — H04.123 DRY EYE SYNDROME OF BILATERAL LACRIMAL GLANDS: ICD-10-CM

## 2022-09-09 DIAGNOSIS — Z95.5 PRESENCE OF CORONARY ANGIOPLASTY IMPLANT AND GRAFT: Chronic | ICD-10-CM

## 2022-09-09 PROCEDURE — 72148 MRI LUMBAR SPINE W/O DYE: CPT | Mod: 26,MH

## 2022-09-09 PROCEDURE — 72148 MRI LUMBAR SPINE W/O DYE: CPT

## 2022-09-13 LAB — 14-3-3 ETA AG SER IA-MCNC: <0.2 NG/ML

## 2022-09-17 ENCOUNTER — APPOINTMENT (OUTPATIENT)
Dept: RHEUMATOLOGY | Facility: CLINIC | Age: 77
End: 2022-09-17

## 2022-09-17 DIAGNOSIS — M43.06 SPONDYLOLYSIS, LUMBAR REGION: ICD-10-CM

## 2022-09-17 DIAGNOSIS — M48.061 SPINAL STENOSIS, LUMBAR REGION WITHOUT NEUROGENIC CLAUDICATION: ICD-10-CM

## 2022-09-17 PROCEDURE — 99442: CPT | Mod: 95

## 2022-09-21 ENCOUNTER — NON-APPOINTMENT (OUTPATIENT)
Age: 77
End: 2022-09-21

## 2022-10-06 ENCOUNTER — APPOINTMENT (OUTPATIENT)
Dept: ORTHOPEDIC SURGERY | Facility: CLINIC | Age: 77
End: 2022-10-06

## 2022-10-06 VITALS
WEIGHT: 191 LBS | HEIGHT: 72 IN | DIASTOLIC BLOOD PRESSURE: 79 MMHG | HEART RATE: 74 BPM | SYSTOLIC BLOOD PRESSURE: 124 MMHG | BODY MASS INDEX: 25.87 KG/M2

## 2022-10-06 PROCEDURE — 73502 X-RAY EXAM HIP UNI 2-3 VIEWS: CPT

## 2022-10-06 PROCEDURE — 99204 OFFICE O/P NEW MOD 45 MIN: CPT

## 2022-10-10 ENCOUNTER — OUTPATIENT (OUTPATIENT)
Dept: OUTPATIENT SERVICES | Facility: HOSPITAL | Age: 77
LOS: 1 days | End: 2022-10-10
Payer: MEDICARE

## 2022-10-10 ENCOUNTER — APPOINTMENT (OUTPATIENT)
Dept: MRI IMAGING | Facility: CLINIC | Age: 77
End: 2022-10-10

## 2022-10-10 DIAGNOSIS — Z95.5 PRESENCE OF CORONARY ANGIOPLASTY IMPLANT AND GRAFT: Chronic | ICD-10-CM

## 2022-10-10 DIAGNOSIS — Z98.1 ARTHRODESIS STATUS: Chronic | ICD-10-CM

## 2022-10-10 DIAGNOSIS — M25.552 PAIN IN LEFT HIP: ICD-10-CM

## 2022-10-10 PROCEDURE — 73721 MRI JNT OF LWR EXTRE W/O DYE: CPT | Mod: 26,LT,MH

## 2022-10-10 PROCEDURE — 73721 MRI JNT OF LWR EXTRE W/O DYE: CPT

## 2022-10-11 ENCOUNTER — APPOINTMENT (OUTPATIENT)
Dept: ORTHOPEDIC SURGERY | Facility: CLINIC | Age: 77
End: 2022-10-11

## 2022-10-11 PROCEDURE — 99441: CPT | Mod: 95

## 2022-10-11 NOTE — HISTORY OF PRESENT ILLNESS
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  The patient states that he has had left hip pain for 4 years and he has been seeing a rheumatologist.  He has been trying to differentiate his pain from back pain and has a lumbar injection planned for later this afternoon, since his pain sometimes radiates to his groin his spine doctor sent a referral to have his hip examined.  His pain is exacerbated by walking, ascending/descending stairs, rising from a seated position, getting out of the car.  He states that it is worse in the morning and tends to get better as he does more activity.  He has been taking Celebrex as well as a course of prednisone since the beginning about September which she is currently tapering off of.  He is an avid golfer and states that over the past 3 weeks his pain has been interrupting his ability to golf has become more constant. He had participated in physical therapy a few years ago but has not been recently, he also has received cortisone injections in his left hip in the past his last one was last year and he said he gets about 6 weeks of relief from them.  Denies use of assistive device ambulation.  Denies any recent injuries falls or trauma, swelling in the left lower extremity, numbness or tingling in the left lower extremity, mechanical symptoms of the hip, radiation of pain to the knee, surgeries in the hips. [Direct Pressure] : not worsened by direct pressure [Ice] : not relieved by ice [Physical Therapy] : not relieved by physical therapy [Rest] : not relieved with rest

## 2022-10-11 NOTE — DISCUSSION/SUMMARY
[Medication Risks Reviewed] : Medication risks reviewed [de-identified] : Patient is a 77-year-old male with left hip pain has been going on for over 1 year.  He is having significant pain and dysfunction.  States he gets a sharp shooting pain into the hip joint.  Has had injections that have provided relief.  He has no severe degenerative changes on his x-ray today.  I therefore recommended an MRI of his left hip for further evaluation management.  He is scheduled to undergo an epidural steroid injection in his back today.  I will see him back after the MRI and injection for further evaluation and management.  All questions were asked and answered.

## 2022-10-11 NOTE — PHYSICAL EXAM
[de-identified] : GENERAL APPEARANCE: Well nourished and hydrated, pleasant, alert, and oriented x 3. Appears their stated age. \par HEENT: Normocephalic, extraocular eye motion intact. Nasal septum midline. Oral cavity clear. External auditory canal clear. \par RESPIRATORY: Breath sounds clear and audible in all lobes. No wheezing, No accessory muscle use.\par CARDIOVASCULAR: No apparent abnormalities. No lower leg edema. No varicosities. Pedal pulses are palpable.\par NEUROLOGIC: Sensation is normal, no muscle weakness in the upper or lower extremities.\par DERMATOLOGIC: No apparent skin lesions, moist, warm, no rash.\par SPINE: Cervical spine appears normal and moves freely; thoracic spine appears normal and moves freely; there is tenderness palpation over the lateral lumbar spine, there is pain with range of motion, there is a positive straight leg raise on left\par MUSCULOSKELETAL: Hands, wrists, and elbows are normal and move freely, shoulders are normal and move freely. \par Psychiatric: Oriented to person, place, and time, insight and judgement were intact and the affect was normal. \par Musculoskeletal: ambulates normally. Left hip exam showed no groin pain with SLR, ROM is full, GRANT mildly positive, FADIR of negative\par 5/5 motor strength in bilateral lower extremities. Sensory: Intact in bilateral lower extremities. DTRs: Biceps, brachioradialis, triceps, patellar, ankle and plantar 2+ and symmetric bilaterally. Pulses: dorsalis pedis, posterior tibial, femoral, popliteal, and radial 2+ and symmetric bilaterally. \par  [de-identified] : AP pelvis and 2 views of the left hip obtained the office today show no acute fracture or dislocation.  No significant degenerative changes are noted.  Possible circumscribed mass present in the inferior aspect of the IT region adjacent to the lesser trochanter.

## 2022-10-12 ENCOUNTER — APPOINTMENT (OUTPATIENT)
Dept: ORTHOPEDIC SURGERY | Facility: CLINIC | Age: 77
End: 2022-10-12

## 2022-10-12 PROCEDURE — 99214 OFFICE O/P EST MOD 30 MIN: CPT

## 2022-10-12 NOTE — DATA REVIEWED
[Imaging Present] : Present [de-identified] : X-ray of the hips from October 6, 2022 as well as x-rays of the left femur today showed a femur at the lesser trochanter.  There is approximately 2-1 of centimeter lucent round lesion.  There is no sclerotic border.  There is minimal posterior cortical problem.\par \par MRI scan from October 10, 2022 shows a lesion in the left lesser trochanter with periosteal reaction and bone marrow edema.  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  There was an intensely FDG avid right intraparotid lesion.  There were no other sites of disease.

## 2022-10-12 NOTE — DISCUSSION/SUMMARY
[Surgical risks reviewed] : Surgical risks reviewed [All Questions Answered] : Patient (and family) had all questions answered to an agreeable level of satisfaction [Interested in Proceeding] : Patient (and family) expressed understanding and interest in proceeding with the plan as outlined [de-identified] : I am worried that the patient does have some pathologic lesion in the left proximal femur.  This could be metastatic from swingset or could be other disease versus primary.  My recommendation is to get a PET/CT in order to stage him and see if there is any other areas as well as to see activity both here as well as at any of the bones.  We will also be able to see his solid organs.  In addition I would get him ready for surgery.  He is going to be for biopsy and intramedullary nail should this be metastatic disease.  I told him this is a primary I would not do anything based on frozen section we will wait on final pathology otherwise I would stabilize him with an intramedullary yaron in order to plan for further support.\par \par \par \par If imaging was ordered, the patient was told to make an appointment to review findings right after all imaging is completed.\par \par We discussed risks, benefits and alternatives. Rationale of care was reviewed and all questions were answered. Patient (and family) had all questions answered to her degree of the level of satisfaction. Patient (and family) expressed understanding and interest in proceeding with the plan as outlined.\par \par \par \par \par This note was done with a voice recognition transcription software and any typos are related to this rather than medical error. Surgical risks reviewed. Patient (and family) had all questions answered to an agreeable level of satisfaction. Patient (and family) expressed understanding and interest in proceeding with the plan as outlined.  \par

## 2022-10-12 NOTE — HISTORY OF PRESENT ILLNESS
[FreeTextEntry1] : This 77-year-old gentleman has had on and off hip pain for a few months.  Sometimes will bother him when he is walking and he has to walk less than he has before.  He is still playing golf but will start.  He saw another orthopedist who took an x-ray found a lesion and sent for MRI and was sent to me for further  evaluation.  Longstanding back pain which she has known about and was told that he has significant degeneration after having had fusion 40 years ago.\par \par Of note patient recently had surgery and radiation on his right parotid and neck dissection for squamous cell carcinoma.  This was thought to be metastatic in nature however they were unable to find any other site from the PET/CT in February. [Worsening] : worsening [___ mths] : [unfilled] month(s) ago [2] : currently ~his/her~ pain is 2 out of 10 [Bending] : not exacerbated by bending [Direct Pressure] : not exacerbated by direct pressure [None] : No relieving factors are noted

## 2022-10-12 NOTE — PHYSICAL EXAM
[FreeTextEntry1] : On exam patient is able to stand.  He does have some tenderness in the area of the groin.  He also has back pain well-known to him with no significant paresthesias.  He has occasional radiculopathy down both legs.  He is neurovascularly intact. [General Appearance - Well-Appearing] : Well appearing [General Appearance - Well Nourished] : well nourished [Oriented To Time, Place, And Person] : Oriented to person, place, and time [Impaired Insight] : Insight and judgment were intact [Affect] : The affect was normal. [Mood] : the mood was normal [Sclera] : the sclera and conjunctiva were normal [Heart Rate And Rhythm] : heart rate was normal and rhythm regular [] : No respiratory distress [Abdomen Soft] : Soft [Antalgic Gait Left] : antalgic on the left [Swelling] : no swelling [Skin Changes - Describe changes:] : No skin changes noted [Full ROM Unless otherwise noted:] : Full range of motion unless otherwise noted: [LE  Motor Strength Normal unless otherwise noted:] : 5/5 strength in bilateral lower extemities unless otherwise noted. [Tenderness] : no tenderness [Erythema] : no erythema [Ecchymosis] : no ecchymosis [Full ROM unless Otherwise Noted.] : Full range of motion unless otherwise noted.  [Motor Strength Normal Bilateral except as noted:] : 5/5 strength bilaterally except as noted. [Normal] : Sensation intact to light touch.

## 2022-10-17 ENCOUNTER — APPOINTMENT (OUTPATIENT)
Dept: NUCLEAR MEDICINE | Facility: CLINIC | Age: 77
End: 2022-10-17

## 2022-10-20 ENCOUNTER — APPOINTMENT (OUTPATIENT)
Dept: RADIATION ONCOLOGY | Facility: CLINIC | Age: 77
End: 2022-10-20

## 2022-10-20 ENCOUNTER — APPOINTMENT (OUTPATIENT)
Dept: ORTHOPEDIC SURGERY | Facility: CLINIC | Age: 77
End: 2022-10-20

## 2022-10-20 VITALS
WEIGHT: 190 LBS | RESPIRATION RATE: 16 BRPM | BODY MASS INDEX: 25.77 KG/M2 | HEART RATE: 74 BPM | DIASTOLIC BLOOD PRESSURE: 92 MMHG | OXYGEN SATURATION: 98 % | SYSTOLIC BLOOD PRESSURE: 172 MMHG

## 2022-10-20 PROCEDURE — 99213 OFFICE O/P EST LOW 20 MIN: CPT

## 2022-10-20 PROCEDURE — 99214 OFFICE O/P EST MOD 30 MIN: CPT | Mod: 95

## 2022-10-20 NOTE — DATA REVIEWED
[Imaging Present] : Present [de-identified] : PET/CT October 14, 2022 shows:\par IMPRESSION:\par 1. Right parotid gland hypermetabolic mass resection and right neck low-level\par prominent soft tissue postsurgical changes; recommend neck MRI for further\par characterization.\par 2. Left femoral intertrochanteric hypermetabolic lytic lesion consistent with\par bony metastasis.\par 3. Otherwise, no other hypermetabolic evidence of malignant disease involvement.

## 2022-10-20 NOTE — REASON FOR VISIT
[Home] : at home, [unfilled] , at the time of the visit. [Medical Office: ([facility])___] : at the medical office located in  [FreeTextEntry1] : Follow-up PET/CT.

## 2022-10-20 NOTE — DISCUSSION/SUMMARY
[Surgical risks reviewed] : Surgical risks reviewed [All Questions Answered] : Patient (and family) had all questions answered to an agreeable level of satisfaction [Interested in Proceeding] : Patient (and family) expressed understanding and interest in proceeding with the plan as outlined [de-identified] : Patient has findings consistent with possible metastatic disease however this is a solitary site.  I discussed with him doing an open operative biopsy and at the same time possibly stabilizing the femur with an intramedullary yaron.  If this is the same metastatic carcinoma then I would plan to do this with a curettage otherwise I will plan to do a biopsy and possibly wait.  If they are unsure what this is based on frozen section versus think it could be a primary tumor then I would wait.  This would be his second cancer in the same year while he already has a metastatic diagnosis however he has never had bony disease and this is a solitary site.  For this reason we will plan for surgery in the next week or so.  Risk benefits and alternatives discussed including the possibility of only biopsy versus biopsy with operative stabilization.\par \par If imaging was ordered, the patient was told to make an appointment to review findings right after all imaging is completed.\par \par We discussed risks, benefits and alternatives. Rationale of care was reviewed and all questions were answered. Patient (and family) had all questions answered to her degree of the level of satisfaction. Patient (and family) expressed understanding and interest in proceeding with the plan as outlined.\par \par \par \par \par This note was done with a voice recognition transcription software and any typos are related to this rather than medical error. Surgical risks reviewed. Patient (and family) had all questions answered to an agreeable level of satisfaction. Patient (and family) expressed understanding and interest in proceeding with the plan as outlined.  \par

## 2022-10-20 NOTE — PHYSICAL EXAM
[FreeTextEntry1] : On exam patient is able to stand.  He does have some tenderness in the area of the groin.  He also has back pain well-known to him with no significant paresthesias.  He has occasional radiculopathy down both legs.  He is neurovascularly intact. [General Appearance - Well Nourished] : well nourished [General Appearance - Well-Appearing] : Well appearing [Impaired Insight] : Insight and judgment were intact [Oriented To Time, Place, And Person] : Oriented to person, place, and time [Affect] : The affect was normal. [Mood] : the mood was normal [Sclera] : the sclera and conjunctiva were normal [Heart Rate And Rhythm] : heart rate was normal and rhythm regular [Abdomen Soft] : Soft [] : No respiratory distress [Antalgic Gait Left] : antalgic on the left [Swelling] : no swelling [Skin Changes - Describe changes:] : No skin changes noted [Full ROM Unless otherwise noted:] : Full range of motion unless otherwise noted: [LE  Motor Strength Normal unless otherwise noted:] : 5/5 strength in bilateral lower extemities unless otherwise noted. [Tenderness] : no tenderness [Ecchymosis] : no ecchymosis [Erythema] : no erythema [Full ROM unless Otherwise Noted.] : Full range of motion unless otherwise noted.  [Motor Strength Normal Bilateral except as noted:] : 5/5 strength bilaterally except as noted. [Normal] : Sensation intact to light touch.

## 2022-10-20 NOTE — HISTORY OF PRESENT ILLNESS
[Home] : at home, [unfilled] , at the time of the visit. [Medical Office: ([facility])___] : at the medical office located in  [Stable] : stable [FreeTextEntry1] : Patient was called to discuss his PET/CT and his continued left hip problems.

## 2022-10-21 NOTE — PHYSICAL EXAM
[Normal] : oriented to person, place and time, the affect was normal, the mood was normal and not anxious [de-identified] : tenderness with palpation of left proximal thigh. Uses cane with ambulation. [de-identified] : modest oral dryness.

## 2022-10-21 NOTE — LETTER GREETING
[Dear Doctor] : Dear Doctor, [Follow-Up] : Your patient, [unfilled] was seen in my office today for follow-up [Please see my note below.] : Please see my note below. [FreeTextEntry2] : Misbah Recio MD

## 2022-10-21 NOTE — LETTER CLOSING
[Sincerely yours,] : Sincerely yours, [FreeTextEntry3] : Frantz Richardson MD\par Physician in Chief\par Department of Radiation Medicine\par [facility] [place]\par [facility]\par \par  of Radiation Medicine\par [facility]\par at  [facility]\par \par Radiation \par [facility]/\par [facility] [place]\par [address]\par [place], [place] [address]\par \par Tel: [phone]\par Fax: [phone]\par

## 2022-10-21 NOTE — VITALS
[Maximal Pain Intensity: 5/10] : 5/10 [Least Pain Intensity: 1/10] : 1/10 [Pain Location: ___] : Pain Location: [unfilled] [Pain Interferes with ADLs] : Pain interferes with activities of daily living. [Opioid] : opioid [Adjuvant (neuroleptics)] : adjuvant (neuroleptics) [80: Normal activity with effort; some signs or symptoms of disease.] : 80: Normal activity with effort; some signs or symptoms of disease.  [ECOG Performance Status: 1 - Restricted in physically strenuous activity but ambulatory and able to carry out work of a light or sedentary nature] : Performance Status: 1 - Restricted in physically strenuous activity but ambulatory and able to carry out work of a light or sedentary nature, e.g., light house work, office work

## 2022-10-21 NOTE — DISEASE MANAGEMENT
[Pathological] : TNM Stage: p [II] : II [TTNM] : 2 [NTNM] : 0 [MTNM] : 0 [de-identified] : 3617cGy [de-identified] : right parotid bed and neck

## 2022-10-21 NOTE — REVIEW OF SYSTEMS
[FreeTextEntry4] : notes mucous, oral dryness , altered taste [FreeTextEntry9] : pain left hip area; using a cane for ambulation

## 2022-10-21 NOTE — HISTORY OF PRESENT ILLNESS
[FreeTextEntry1] : This 77 y/o man is conferencing for post-treatment evaluation. Mr. 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 Margin of resection <0.1mm. He completed radiation therapy to the right cheek and neck on 6/1/2022.\par \par At last on-treatment visit:\par Advised to avoid sun exposure. 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 Begin Silvadene/Xeroform dressings at least at night. \par Reports fatigue, and poor appetite due to trismus, dysgeusia, and xerostomia. Taking Zofran prn. Reported drinking Boost supplement protein drinks with 20 grams protein and about 500 calories each (3 daily). Advised to increase to 4 per day and increase hydration to 2 quarts/day.\par Reported moderate odynophagia.\par \par Today Mr. Daily repots he is maintaining his weight, eating moderate meals again. \par Reports most taste sensation returned.\par Reports skin well healed.\par Notes trismus continues. Refer to PM&R.\par \par Interval history is remarkable for the development of pain and decreased range of motion in left hip. Work up with MRI reveals an erosive tumor involving the femoral neck. Patient will have an excisional biopsy and possibly resection later with week with Dr. Aaron.\par

## 2022-10-21 NOTE — ASSESSMENT
[Metastatic disease without local control] : Metastatic disease without local control [FreeTextEntry1] : Possible metastatic disease vs new primary malignancy in left hip area

## 2022-10-22 ENCOUNTER — NON-APPOINTMENT (OUTPATIENT)
Age: 77
End: 2022-10-22

## 2022-10-24 ENCOUNTER — OUTPATIENT (OUTPATIENT)
Dept: OUTPATIENT SERVICES | Facility: HOSPITAL | Age: 77
LOS: 1 days | End: 2022-10-24

## 2022-10-24 VITALS
RESPIRATION RATE: 16 BRPM | TEMPERATURE: 98 F | SYSTOLIC BLOOD PRESSURE: 130 MMHG | HEART RATE: 64 BPM | WEIGHT: 197.09 LBS | HEIGHT: 71 IN | OXYGEN SATURATION: 98 % | DIASTOLIC BLOOD PRESSURE: 80 MMHG

## 2022-10-24 DIAGNOSIS — Z91.89 OTHER SPECIFIED PERSONAL RISK FACTORS, NOT ELSEWHERE CLASSIFIED: ICD-10-CM

## 2022-10-24 DIAGNOSIS — Z90.49 ACQUIRED ABSENCE OF OTHER SPECIFIED PARTS OF DIGESTIVE TRACT: Chronic | ICD-10-CM

## 2022-10-24 DIAGNOSIS — Z98.1 ARTHRODESIS STATUS: Chronic | ICD-10-CM

## 2022-10-24 DIAGNOSIS — I10 ESSENTIAL (PRIMARY) HYPERTENSION: ICD-10-CM

## 2022-10-24 DIAGNOSIS — I25.10 ATHEROSCLEROTIC HEART DISEASE OF NATIVE CORONARY ARTERY WITHOUT ANGINA PECTORIS: ICD-10-CM

## 2022-10-24 DIAGNOSIS — M89.9 DISORDER OF BONE, UNSPECIFIED: ICD-10-CM

## 2022-10-24 DIAGNOSIS — Z95.5 PRESENCE OF CORONARY ANGIOPLASTY IMPLANT AND GRAFT: Chronic | ICD-10-CM

## 2022-10-24 LAB
ALBUMIN SERPL ELPH-MCNC: 3.8 G/DL — SIGNIFICANT CHANGE UP (ref 3.3–5)
ALP SERPL-CCNC: 68 U/L — SIGNIFICANT CHANGE UP (ref 40–120)
ALT FLD-CCNC: 7 U/L — SIGNIFICANT CHANGE UP (ref 4–41)
ANION GAP SERPL CALC-SCNC: 10 MMOL/L — SIGNIFICANT CHANGE UP (ref 7–14)
AST SERPL-CCNC: 17 U/L — SIGNIFICANT CHANGE UP (ref 4–40)
BILIRUB SERPL-MCNC: 0.4 MG/DL — SIGNIFICANT CHANGE UP (ref 0.2–1.2)
BLD GP AB SCN SERPL QL: NEGATIVE — SIGNIFICANT CHANGE UP
BUN SERPL-MCNC: 14 MG/DL — SIGNIFICANT CHANGE UP (ref 7–23)
CALCIUM SERPL-MCNC: 10.3 MG/DL — SIGNIFICANT CHANGE UP (ref 8.4–10.5)
CHLORIDE SERPL-SCNC: 100 MMOL/L — SIGNIFICANT CHANGE UP (ref 98–107)
CO2 SERPL-SCNC: 29 MMOL/L — SIGNIFICANT CHANGE UP (ref 22–31)
CREAT SERPL-MCNC: 1.03 MG/DL — SIGNIFICANT CHANGE UP (ref 0.5–1.3)
EGFR: 76 ML/MIN/1.73M2 — SIGNIFICANT CHANGE UP
GLUCOSE SERPL-MCNC: 92 MG/DL — SIGNIFICANT CHANGE UP (ref 70–99)
HCT VFR BLD CALC: 37.4 % — LOW (ref 39–50)
HGB BLD-MCNC: 12 G/DL — LOW (ref 13–17)
MCHC RBC-ENTMCNC: 30.9 PG — SIGNIFICANT CHANGE UP (ref 27–34)
MCHC RBC-ENTMCNC: 32.1 GM/DL — SIGNIFICANT CHANGE UP (ref 32–36)
MCV RBC AUTO: 96.4 FL — SIGNIFICANT CHANGE UP (ref 80–100)
NRBC # BLD: 0 /100 WBCS — SIGNIFICANT CHANGE UP (ref 0–0)
NRBC # FLD: 0 K/UL — SIGNIFICANT CHANGE UP (ref 0–0)
PLATELET # BLD AUTO: 223 K/UL — SIGNIFICANT CHANGE UP (ref 150–400)
POTASSIUM SERPL-MCNC: 4.6 MMOL/L — SIGNIFICANT CHANGE UP (ref 3.5–5.3)
POTASSIUM SERPL-SCNC: 4.6 MMOL/L — SIGNIFICANT CHANGE UP (ref 3.5–5.3)
PROT SERPL-MCNC: 6.8 G/DL — SIGNIFICANT CHANGE UP (ref 6–8.3)
RBC # BLD: 3.88 M/UL — LOW (ref 4.2–5.8)
RBC # FLD: 13.4 % — SIGNIFICANT CHANGE UP (ref 10.3–14.5)
RH IG SCN BLD-IMP: POSITIVE — SIGNIFICANT CHANGE UP
SODIUM SERPL-SCNC: 139 MMOL/L — SIGNIFICANT CHANGE UP (ref 135–145)
WBC # BLD: 5.37 K/UL — SIGNIFICANT CHANGE UP (ref 3.8–10.5)
WBC # FLD AUTO: 5.37 K/UL — SIGNIFICANT CHANGE UP (ref 3.8–10.5)

## 2022-10-24 PROCEDURE — 93010 ELECTROCARDIOGRAM REPORT: CPT

## 2022-10-24 RX ORDER — PANTOPRAZOLE SODIUM 20 MG/1
1 TABLET, DELAYED RELEASE ORAL
Qty: 0 | Refills: 0 | DISCHARGE

## 2022-10-24 RX ORDER — SODIUM CHLORIDE 9 MG/ML
1000 INJECTION, SOLUTION INTRAVENOUS
Refills: 0 | Status: DISCONTINUED | OUTPATIENT
Start: 2022-10-27 | End: 2022-10-28

## 2022-10-24 RX ORDER — RAMIPRIL 5 MG
2 CAPSULE ORAL
Qty: 0 | Refills: 0 | DISCHARGE

## 2022-10-24 RX ORDER — SODIUM CHLORIDE 9 MG/ML
3 INJECTION INTRAMUSCULAR; INTRAVENOUS; SUBCUTANEOUS EVERY 8 HOURS
Refills: 0 | Status: DISCONTINUED | OUTPATIENT
Start: 2022-10-27 | End: 2022-10-28

## 2022-10-24 NOTE — H&P PST ADULT - NSICDXPASTMEDICALHX_GEN_ALL_CORE_FT
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

## 2022-10-24 NOTE — H&P PST ADULT - MUSCULOSKELETAL
decreased ROM due to pain/decreased strength details… decreased ROM due to pain/decreased strength/abnormal gait

## 2022-10-24 NOTE — H&P PST ADULT - HISTORY OF PRESENT ILLNESS
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

## 2022-10-24 NOTE — H&P PST ADULT - VENOUS THROMBOEMBOLISM CURRENT STATUS
(1) other risk factor (includes escalating BMI, pack-years of smoking, diabetes requiring insulin, chemotherapy, female gender and length of surgery)/(2) malignancy (present or previous) (1) swollen legs (current)/(1) other risk factor (includes escalating BMI, pack-years of smoking, diabetes requiring insulin, chemotherapy, female gender and length of surgery)/(2) malignancy (present or previous)

## 2022-10-24 NOTE — H&P PST ADULT - ENMT COMMENTS
hx of radiation to neck region hx of radiation to neck region, swelling to right buccal neck region after parotidectomy and RT, limited ROM extending and lateral rotation

## 2022-10-24 NOTE — H&P PST ADULT - CARDIOVASCULAR
regular rate and rhythm/S1 S2 present/pedal edema details… regular rate and rhythm/S1 S2 present/murmur/pedal edema

## 2022-10-24 NOTE — H&P PST ADULT - PROBLEM SELECTOR PLAN 1
Patient scheduled for surgery on:   Patient provided with verbal and written presurgical instructions; verbalized understanding  with teach back on the following : Famotidine for GI prophylaxis;  and  Chlorhexidine wash  Patient reminded to obtain Preop COVID PCR, lab directory provided     Medical  Cardiac evaluation requested by PST:  , will obtain  EKG, Echo and Stress test requested    Case discussed with      Problem: Pre-menopausal   Urine specimen cup provided, UCG on DOS Patient scheduled for surgery on: 10/27/22  Patient provided with verbal and written presurgical instructions; verbalized understanding  with teach back on the following : take his protonix for GI prophylaxis;  and  Chlorhexidine wash  Preop COVID PCR completed yesterday    Cardiac evaluation requested by PST:  new pedal edema, will obtain  Echo and Stress test requested

## 2022-10-24 NOTE — H&P PST ADULT - ATTENDING COMMENTS
I have reviewed and agree with note as written above  for biopsy / resection with intramedullary nail left femur\  *Risk  Jacques Aaron MD  Musculoskeletal Oncology  [phone]

## 2022-10-24 NOTE — H&P PST ADULT - NSICDXPASTSURGICALHX_GEN_ALL_CORE_FT
PAST SURGICAL HISTORY:  H/O parotidectomy right    History of fusion of lumbar spine 1981    S/P cholecystectomy     S/P coronary artery stent placement x 6 - last one 6 years ago    Status post cervical spinal fusion 1991

## 2022-10-24 NOTE — H&P PST ADULT - ACTIVITY
walks for an hour prior to hip pain started 2 months ago, now walks very little due to pain, climbs 1 flight of stairs  ADLs

## 2022-10-26 ENCOUNTER — TRANSCRIPTION ENCOUNTER (OUTPATIENT)
Age: 77
End: 2022-10-26

## 2022-10-26 PROBLEM — Z87.39 PERSONAL HISTORY OF OTHER DISEASES OF THE MUSCULOSKELETAL SYSTEM AND CONNECTIVE TISSUE: Chronic | Status: ACTIVE | Noted: 2022-10-24

## 2022-10-26 PROBLEM — M06.9 RHEUMATOID ARTHRITIS, UNSPECIFIED: Chronic | Status: ACTIVE | Noted: 2022-10-24

## 2022-10-26 PROBLEM — Z92.89 PERSONAL HISTORY OF OTHER MEDICAL TREATMENT: Chronic | Status: ACTIVE | Noted: 2022-10-24

## 2022-10-26 PROBLEM — J45.909 UNSPECIFIED ASTHMA, UNCOMPLICATED: Chronic | Status: ACTIVE | Noted: 2022-10-24

## 2022-10-26 PROBLEM — M89.9 DISORDER OF BONE, UNSPECIFIED: Chronic | Status: ACTIVE | Noted: 2022-10-24

## 2022-10-26 PROBLEM — C08.9 MALIGNANT NEOPLASM OF MAJOR SALIVARY GLAND, UNSPECIFIED: Chronic | Status: ACTIVE | Noted: 2022-10-24

## 2022-10-26 PROBLEM — C44.91 BASAL CELL CARCINOMA OF SKIN, UNSPECIFIED: Chronic | Status: ACTIVE | Noted: 2022-10-24

## 2022-10-26 PROBLEM — C44.92 SQUAMOUS CELL CARCINOMA OF SKIN, UNSPECIFIED: Chronic | Status: ACTIVE | Noted: 2022-10-24

## 2022-10-26 NOTE — ASU PATIENT PROFILE, ADULT - FALL HARM RISK - UNIVERSAL INTERVENTIONS
Bed in lowest position, wheels locked, appropriate side rails in place/Call bell, personal items and telephone in reach/Instruct patient to call for assistance before getting out of bed or chair/Non-slip footwear when patient is out of bed/[place] to call system/Physically safe environment - no spills, clutter or unnecessary equipment/Purposeful Proactive Rounding/Room/bathroom lighting operational, light cord in reach

## 2022-10-27 ENCOUNTER — TRANSCRIPTION ENCOUNTER (OUTPATIENT)
Age: 77
End: 2022-10-27

## 2022-10-27 ENCOUNTER — RESULT REVIEW (OUTPATIENT)
Age: 77
End: 2022-10-27

## 2022-10-27 ENCOUNTER — APPOINTMENT (OUTPATIENT)
Dept: ORTHOPEDIC SURGERY | Facility: HOSPITAL | Age: 77
End: 2022-10-27

## 2022-10-27 ENCOUNTER — INPATIENT (INPATIENT)
Facility: HOSPITAL | Age: 77
LOS: 0 days | Discharge: HOME | End: 2022-10-28
Attending: ORTHOPAEDIC SURGERY | Admitting: ORTHOPAEDIC SURGERY

## 2022-10-27 ENCOUNTER — APPOINTMENT (OUTPATIENT)
Dept: ORTHOPEDIC SURGERY | Facility: CLINIC | Age: 77
End: 2022-10-27

## 2022-10-27 VITALS
HEIGHT: 71 IN | WEIGHT: 197.09 LBS | RESPIRATION RATE: 15 BRPM | DIASTOLIC BLOOD PRESSURE: 75 MMHG | SYSTOLIC BLOOD PRESSURE: 134 MMHG | HEART RATE: 65 BPM | OXYGEN SATURATION: 96 % | TEMPERATURE: 98 F

## 2022-10-27 DIAGNOSIS — Z90.49 ACQUIRED ABSENCE OF OTHER SPECIFIED PARTS OF DIGESTIVE TRACT: Chronic | ICD-10-CM

## 2022-10-27 DIAGNOSIS — Z98.1 ARTHRODESIS STATUS: Chronic | ICD-10-CM

## 2022-10-27 DIAGNOSIS — M89.9 DISORDER OF BONE, UNSPECIFIED: ICD-10-CM

## 2022-10-27 DIAGNOSIS — Z95.5 PRESENCE OF CORONARY ANGIOPLASTY IMPLANT AND GRAFT: Chronic | ICD-10-CM

## 2022-10-27 PROCEDURE — 88307 TISSUE EXAM BY PATHOLOGIST: CPT | Mod: 26

## 2022-10-27 PROCEDURE — 88342 IMHCHEM/IMCYTCHM 1ST ANTB: CPT | Mod: 26,59

## 2022-10-27 PROCEDURE — 88331 PATH CONSLTJ SURG 1 BLK 1SPC: CPT | Mod: 26

## 2022-10-27 PROCEDURE — 88360 TUMOR IMMUNOHISTOCHEM/MANUAL: CPT | Mod: 26

## 2022-10-27 PROCEDURE — 27245 TREAT THIGH FRACTURE: CPT | Mod: LT

## 2022-10-27 PROCEDURE — 88341 IMHCHEM/IMCYTCHM EA ADD ANTB: CPT | Mod: 26,59

## 2022-10-27 PROCEDURE — 27355 REMOVE FEMUR LESION: CPT | Mod: LT

## 2022-10-27 PROCEDURE — 26992 DRAINAGE OF BONE LESION: CPT | Mod: LT

## 2022-10-27 DEVICE — NAIL GAMMA3 LAG SCREW 10.5X100MM: Type: IMPLANTABLE DEVICE | Site: LEFT | Status: FUNCTIONAL

## 2022-10-27 DEVICE — IMPLANTABLE DEVICE: Type: IMPLANTABLE DEVICE | Site: LEFT | Status: FUNCTIONAL

## 2022-10-27 DEVICE — K-WIRE STRYKER 3.2M X 450MM: Type: IMPLANTABLE DEVICE | Site: LEFT | Status: FUNCTIONAL

## 2022-10-27 DEVICE — GUIDEWIRE BALL TIP 3MM X 1000MM FOR T2 R1.5 FEMORAL NAILING SYSTEM: Type: IMPLANTABLE DEVICE | Site: LEFT | Status: FUNCTIONAL

## 2022-10-27 DEVICE — SCREW LOKG 5X57MM STRL: Type: IMPLANTABLE DEVICE | Site: LEFT | Status: FUNCTIONAL

## 2022-10-27 DEVICE — SCREW ALPHA LOKG 5X60MM STRL: Type: IMPLANTABLE DEVICE | Site: LEFT | Status: FUNCTIONAL

## 2022-10-27 RX ORDER — SODIUM CHLORIDE 9 MG/ML
1000 INJECTION INTRAMUSCULAR; INTRAVENOUS; SUBCUTANEOUS
Refills: 0 | Status: DISCONTINUED | OUTPATIENT
Start: 2022-10-27 | End: 2022-10-28

## 2022-10-27 RX ORDER — HYDROMORPHONE HYDROCHLORIDE 2 MG/ML
1 INJECTION INTRAMUSCULAR; INTRAVENOUS; SUBCUTANEOUS
Refills: 0 | Status: DISCONTINUED | OUTPATIENT
Start: 2022-10-27 | End: 2022-10-27

## 2022-10-27 RX ORDER — OXYCODONE HYDROCHLORIDE 5 MG/1
10 TABLET ORAL EVERY 4 HOURS
Refills: 0 | Status: DISCONTINUED | OUTPATIENT
Start: 2022-10-27 | End: 2022-10-28

## 2022-10-27 RX ORDER — ACETAMINOPHEN 500 MG
975 TABLET ORAL ONCE
Refills: 0 | Status: COMPLETED | OUTPATIENT
Start: 2022-10-27 | End: 2022-10-27

## 2022-10-27 RX ORDER — FENTANYL CITRATE 50 UG/ML
50 INJECTION INTRAVENOUS ONCE
Refills: 0 | Status: DISCONTINUED | OUTPATIENT
Start: 2022-10-27 | End: 2022-10-27

## 2022-10-27 RX ORDER — ONDANSETRON 8 MG/1
4 TABLET, FILM COATED ORAL ONCE
Refills: 0 | Status: DISCONTINUED | OUTPATIENT
Start: 2022-10-27 | End: 2022-10-27

## 2022-10-27 RX ORDER — FENTANYL CITRATE 50 UG/ML
25 INJECTION INTRAVENOUS
Refills: 0 | Status: DISCONTINUED | OUTPATIENT
Start: 2022-10-27 | End: 2022-10-27

## 2022-10-27 RX ORDER — INFLUENZA VIRUS VACCINE 15; 15; 15; 15 UG/.5ML; UG/.5ML; UG/.5ML; UG/.5ML
0.7 SUSPENSION INTRAMUSCULAR ONCE
Refills: 0 | Status: COMPLETED | OUTPATIENT
Start: 2022-10-27 | End: 2022-10-28

## 2022-10-27 RX ORDER — HYDROMORPHONE HYDROCHLORIDE 2 MG/ML
0.5 INJECTION INTRAMUSCULAR; INTRAVENOUS; SUBCUTANEOUS ONCE
Refills: 0 | Status: DISCONTINUED | OUTPATIENT
Start: 2022-10-27 | End: 2022-10-27

## 2022-10-27 RX ORDER — RIVAROXABAN 15 MG-20MG
10 KIT ORAL DAILY
Refills: 0 | Status: DISCONTINUED | OUTPATIENT
Start: 2022-10-28 | End: 2022-10-28

## 2022-10-27 RX ORDER — HYDROMORPHONE HYDROCHLORIDE 2 MG/ML
0.5 INJECTION INTRAMUSCULAR; INTRAVENOUS; SUBCUTANEOUS
Refills: 0 | Status: DISCONTINUED | OUTPATIENT
Start: 2022-10-27 | End: 2022-10-27

## 2022-10-27 RX ORDER — ACETAMINOPHEN 500 MG
975 TABLET ORAL EVERY 6 HOURS
Refills: 0 | Status: DISCONTINUED | OUTPATIENT
Start: 2022-10-27 | End: 2022-10-28

## 2022-10-27 RX ORDER — OXYCODONE HYDROCHLORIDE 5 MG/1
5 TABLET ORAL EVERY 4 HOURS
Refills: 0 | Status: DISCONTINUED | OUTPATIENT
Start: 2022-10-27 | End: 2022-10-28

## 2022-10-27 RX ORDER — CEFAZOLIN SODIUM 1 G
2000 VIAL (EA) INJECTION EVERY 8 HOURS
Refills: 0 | Status: COMPLETED | OUTPATIENT
Start: 2022-10-27 | End: 2022-10-28

## 2022-10-27 RX ADMIN — Medication 100 MILLIGRAM(S): at 21:13

## 2022-10-27 RX ADMIN — HYDROMORPHONE HYDROCHLORIDE 0.5 MILLIGRAM(S): 2 INJECTION INTRAMUSCULAR; INTRAVENOUS; SUBCUTANEOUS at 21:45

## 2022-10-27 RX ADMIN — SODIUM CHLORIDE 3 MILLILITER(S): 9 INJECTION INTRAMUSCULAR; INTRAVENOUS; SUBCUTANEOUS at 21:13

## 2022-10-27 RX ADMIN — SODIUM CHLORIDE 120 MILLILITER(S): 9 INJECTION INTRAMUSCULAR; INTRAVENOUS; SUBCUTANEOUS at 17:31

## 2022-10-27 RX ADMIN — SODIUM CHLORIDE 30 MILLILITER(S): 9 INJECTION, SOLUTION INTRAVENOUS at 13:25

## 2022-10-27 RX ADMIN — HYDROMORPHONE HYDROCHLORIDE 0.5 MILLIGRAM(S): 2 INJECTION INTRAMUSCULAR; INTRAVENOUS; SUBCUTANEOUS at 21:12

## 2022-10-27 RX ADMIN — Medication 975 MILLIGRAM(S): at 13:27

## 2022-10-27 RX ADMIN — OXYCODONE HYDROCHLORIDE 10 MILLIGRAM(S): 5 TABLET ORAL at 20:54

## 2022-10-27 RX ADMIN — OXYCODONE HYDROCHLORIDE 10 MILLIGRAM(S): 5 TABLET ORAL at 19:50

## 2022-10-27 RX ADMIN — SODIUM CHLORIDE 120 MILLILITER(S): 9 INJECTION INTRAMUSCULAR; INTRAVENOUS; SUBCUTANEOUS at 19:51

## 2022-10-27 NOTE — PATIENT PROFILE ADULT - FALL HARM RISK - HARM RISK INTERVENTIONS
Assistance with ambulation/Assistance OOB with selected safe patient handling equipment/Communicate Risk of Fall with Harm to all staff/Discuss with provider need for PT consult/Monitor gait and stability/Provide patient with walking aids - walker, cane, crutches/Reinforce activity limits and safety measures with patient and family/Sit up slowly, dangle for a short time, stand at bedside before walking/Tailored Fall Risk Interventions/Use of alarms - bed, chair and/or voice tab/Visual Cue: Yellow wristband and red socks/Bed in lowest position, wheels locked, appropriate side rails in place/Call bell, personal items and telephone in reach/Instruct patient to call for assistance before getting out of bed or chair/Non-slip footwear when patient is out of bed/[place] to call system/Physically safe environment - no spills, clutter or unnecessary equipment/Purposeful Proactive Rounding/Room/bathroom lighting operational, light cord in reach

## 2022-10-27 NOTE — PATIENT PROFILE ADULT - WILL THE PATIENT ACCEPT THE PFIZER COVID-19 VACCINE IF ELIGIBLE AND IT IS AVAILABLE?
Health Maintenance Summary     Topic Due On Due Status Completed On Postpone Until Reason    MAMMOGRAM - BREAST CANCER SCREENING Apr 7, 2018 Not Due Apr 7, 2016      Colorectal Cancer Screening - Colonoscopy Dec 16, 2020 Not Due Dec 16, 2010      Osteoporosis Screening  Completed Dec 19, 2013      Immunization-Zoster  Completed Oct 26, 2015      Immunization - Pneumococcal  Completed Mar 21, 2016      Diabetes Eye Exam Jun 14, 2017 Due On Jun 14, 2016      Glycohemoglobin A1C  (Diabetes Sugar)  Dec 13, 2017 Not Due Jun 13, 2017      Microalbumin  (Diabetes Urine Test)  Jun 13, 2018 Not Due Jun 13, 2017      GFR  (Kidney Function Test)  Jun 13, 2018 Not Due Jun 13, 2017      Immunization - TDAP Pregnancy  Hidden       Diabetes Foot Exam  Jun 16, 2018 Not Due Jun 16, 2017      Medicare Wellness Visit Jun 16, 2018 Not Due Jun 16, 2017      IMMUNIZATION - DTaP/Tdap/Td Oct 9, 2010 Postponed Oct 8, 2010 Jun 20, 2018 Insurance or Financial    Immunization-Influenza  Completed Nov 7, 2016            Patient is due for topics as listed above, she wishes to discuss with provider .     Yes

## 2022-10-27 NOTE — CHART NOTE - NSCHARTNOTEFT_GEN_A_CORE
ORTHOPEDIC SURGERY POST-OP CHECK    S: Patient seen and examined at bedside POD0 s/p L femur bx and prophylactic IMN. Pain well controlled with current regimen. Denies numbness/tingling in the extremity. Denies fever, chills, shortness of breath, and chest pain.     O: T(C): 36.7 (10-27-22 @ 18:00), Max: 36.7 (10-27-22 @ 12:25)  HR: 64 (10-27-22 @ 18:45) (61 - 65)  BP: 140/73 (10-27-22 @ 18:45) (127/65 - 140/73)  RR: 15 (10-27-22 @ 18:45) (12 - 18)  SpO2: 97% (10-27-22 @ 18:45) (94% - 98%)    Exam:   Gen: NAD, resting in bed  Resp: unlabored breathing  LLE: dressing c/d/i ACE in place        +EHL/FHL/TA/GS         SILT Wilson/Saph/Tib/DP/SP        2+ DP, cap refill <2 sec              10-27-22 @ 07:01  -  10-27-22 @ 19:28  --------------------------------------------------------  IN: 120 mL / OUT: 600 mL / NET: -480 mL          A/P: 77yMale POD0 s/p L femur bx and prophylactic IMN. recovering well  - Pain control  - WBAT LLE  - DVT ppx:  Xarelto  - PT/OT  - OOB/AAT  - Regular diet  - Monitor I&Os  - Dispo home

## 2022-10-27 NOTE — BRIEF OPERATIVE NOTE - NSICDXBRIEFPROCEDURE_GEN_ALL_CORE_FT
Will route to Dr. Lepe. Please advise, thank you.   PROCEDURES:  Insertion of locking intramedullary yaron into left hip 27-Oct-2022 17:01:04 left hip intramedullary nail with open biopsy and Eh Judd

## 2022-10-28 ENCOUNTER — TRANSCRIPTION ENCOUNTER (OUTPATIENT)
Age: 77
End: 2022-10-28

## 2022-10-28 VITALS
RESPIRATION RATE: 16 BRPM | TEMPERATURE: 99 F | DIASTOLIC BLOOD PRESSURE: 69 MMHG | OXYGEN SATURATION: 95 % | HEART RATE: 81 BPM | SYSTOLIC BLOOD PRESSURE: 121 MMHG

## 2022-10-28 LAB
ANION GAP SERPL CALC-SCNC: 10 MMOL/L — SIGNIFICANT CHANGE UP (ref 7–14)
BASOPHILS # BLD AUTO: 0 K/UL — SIGNIFICANT CHANGE UP (ref 0–0.2)
BASOPHILS NFR BLD AUTO: 0 % — SIGNIFICANT CHANGE UP (ref 0–2)
BUN SERPL-MCNC: 17 MG/DL — SIGNIFICANT CHANGE UP (ref 7–23)
CALCIUM SERPL-MCNC: 9.3 MG/DL — SIGNIFICANT CHANGE UP (ref 8.4–10.5)
CHLORIDE SERPL-SCNC: 101 MMOL/L — SIGNIFICANT CHANGE UP (ref 98–107)
CO2 SERPL-SCNC: 24 MMOL/L — SIGNIFICANT CHANGE UP (ref 22–31)
CREAT SERPL-MCNC: 1 MG/DL — SIGNIFICANT CHANGE UP (ref 0.5–1.3)
EGFR: 78 ML/MIN/1.73M2 — SIGNIFICANT CHANGE UP
EOSINOPHIL # BLD AUTO: 0 K/UL — SIGNIFICANT CHANGE UP (ref 0–0.5)
EOSINOPHIL NFR BLD AUTO: 0 % — SIGNIFICANT CHANGE UP (ref 0–6)
GLUCOSE SERPL-MCNC: 111 MG/DL — HIGH (ref 70–99)
HCT VFR BLD CALC: 34.2 % — LOW (ref 39–50)
HGB BLD-MCNC: 11.4 G/DL — LOW (ref 13–17)
IANC: 6.83 K/UL — SIGNIFICANT CHANGE UP (ref 1.8–7.4)
LYMPHOCYTES # BLD AUTO: 0.64 K/UL — LOW (ref 1–3.3)
LYMPHOCYTES # BLD AUTO: 7.8 % — LOW (ref 13–44)
MCHC RBC-ENTMCNC: 31.2 PG — SIGNIFICANT CHANGE UP (ref 27–34)
MCHC RBC-ENTMCNC: 33.3 GM/DL — SIGNIFICANT CHANGE UP (ref 32–36)
MCV RBC AUTO: 93.7 FL — SIGNIFICANT CHANGE UP (ref 80–100)
MONOCYTES # BLD AUTO: 0.57 K/UL — SIGNIFICANT CHANGE UP (ref 0–0.9)
MONOCYTES NFR BLD AUTO: 7 % — SIGNIFICANT CHANGE UP (ref 2–14)
NEUTROPHILS # BLD AUTO: 6.96 K/UL — SIGNIFICANT CHANGE UP (ref 1.8–7.4)
NEUTROPHILS NFR BLD AUTO: 81.7 % — HIGH (ref 43–77)
PLATELET # BLD AUTO: 271 K/UL — SIGNIFICANT CHANGE UP (ref 150–400)
POTASSIUM SERPL-MCNC: 4 MMOL/L — SIGNIFICANT CHANGE UP (ref 3.5–5.3)
POTASSIUM SERPL-SCNC: 4 MMOL/L — SIGNIFICANT CHANGE UP (ref 3.5–5.3)
RBC # BLD: 3.65 M/UL — LOW (ref 4.2–5.8)
RBC # FLD: 13 % — SIGNIFICANT CHANGE UP (ref 10.3–14.5)
SODIUM SERPL-SCNC: 135 MMOL/L — SIGNIFICANT CHANGE UP (ref 135–145)
WBC # BLD: 8.17 K/UL — SIGNIFICANT CHANGE UP (ref 3.8–10.5)
WBC # FLD AUTO: 8.17 K/UL — SIGNIFICANT CHANGE UP (ref 3.8–10.5)

## 2022-10-28 RX ORDER — PANTOPRAZOLE SODIUM 20 MG/1
1 TABLET, DELAYED RELEASE ORAL
Qty: 30 | Refills: 0
Start: 2022-10-28 | End: 2022-11-26

## 2022-10-28 RX ORDER — CELECOXIB 200 MG/1
1 CAPSULE ORAL
Qty: 0 | Refills: 0 | DISCHARGE

## 2022-10-28 RX ORDER — TRAMADOL HYDROCHLORIDE 50 MG/1
1 TABLET ORAL
Qty: 0 | Refills: 0 | DISCHARGE

## 2022-10-28 RX ORDER — HYDROMORPHONE HYDROCHLORIDE 2 MG/ML
1 INJECTION INTRAMUSCULAR; INTRAVENOUS; SUBCUTANEOUS
Qty: 42 | Refills: 0
Start: 2022-10-28 | End: 2022-11-03

## 2022-10-28 RX ORDER — PANTOPRAZOLE SODIUM 20 MG/1
1 TABLET, DELAYED RELEASE ORAL
Qty: 0 | Refills: 0 | DISCHARGE

## 2022-10-28 RX ORDER — LANOLIN ALCOHOL/MO/W.PET/CERES
5 CREAM (GRAM) TOPICAL AT BEDTIME
Refills: 0 | Status: DISCONTINUED | OUTPATIENT
Start: 2022-10-28 | End: 2022-10-28

## 2022-10-28 RX ORDER — HYDROMORPHONE HYDROCHLORIDE 2 MG/ML
4 INJECTION INTRAMUSCULAR; INTRAVENOUS; SUBCUTANEOUS EVERY 4 HOURS
Refills: 0 | Status: DISCONTINUED | OUTPATIENT
Start: 2022-10-28 | End: 2022-10-28

## 2022-10-28 RX ORDER — HYDROMORPHONE HYDROCHLORIDE 2 MG/ML
2 INJECTION INTRAMUSCULAR; INTRAVENOUS; SUBCUTANEOUS EVERY 4 HOURS
Refills: 0 | Status: DISCONTINUED | OUTPATIENT
Start: 2022-10-28 | End: 2022-10-28

## 2022-10-28 RX ORDER — RIVAROXABAN 15 MG-20MG
1 KIT ORAL
Qty: 30 | Refills: 0
Start: 2022-10-28 | End: 2022-11-26

## 2022-10-28 RX ORDER — ERGOCALCIFEROL 1.25 MG/1
1 CAPSULE ORAL
Qty: 0 | Refills: 0 | DISCHARGE

## 2022-10-28 RX ORDER — HYDROMORPHONE HYDROCHLORIDE 2 MG/ML
1 INJECTION INTRAMUSCULAR; INTRAVENOUS; SUBCUTANEOUS ONCE
Refills: 0 | Status: DISCONTINUED | OUTPATIENT
Start: 2022-10-28 | End: 2022-10-28

## 2022-10-28 RX ORDER — LANOLIN ALCOHOL/MO/W.PET/CERES
3 CREAM (GRAM) TOPICAL AT BEDTIME
Refills: 0 | Status: DISCONTINUED | OUTPATIENT
Start: 2022-10-28 | End: 2022-10-28

## 2022-10-28 RX ORDER — SULFASALAZINE 500 MG
2 TABLET ORAL
Qty: 0 | Refills: 0 | DISCHARGE

## 2022-10-28 RX ORDER — ACETAMINOPHEN 500 MG
3 TABLET ORAL
Qty: 0 | Refills: 0 | DISCHARGE
Start: 2022-10-28

## 2022-10-28 RX ORDER — FOLIC ACID 0.8 MG
1 TABLET ORAL
Qty: 0 | Refills: 0 | DISCHARGE

## 2022-10-28 RX ADMIN — HYDROMORPHONE HYDROCHLORIDE 4 MILLIGRAM(S): 2 INJECTION INTRAMUSCULAR; INTRAVENOUS; SUBCUTANEOUS at 11:33

## 2022-10-28 RX ADMIN — OXYCODONE HYDROCHLORIDE 10 MILLIGRAM(S): 5 TABLET ORAL at 05:30

## 2022-10-28 RX ADMIN — HYDROMORPHONE HYDROCHLORIDE 4 MILLIGRAM(S): 2 INJECTION INTRAMUSCULAR; INTRAVENOUS; SUBCUTANEOUS at 12:30

## 2022-10-28 RX ADMIN — INFLUENZA VIRUS VACCINE 0.7 MILLILITER(S): 15; 15; 15; 15 SUSPENSION INTRAMUSCULAR at 12:29

## 2022-10-28 RX ADMIN — SODIUM CHLORIDE 120 MILLILITER(S): 9 INJECTION INTRAMUSCULAR; INTRAVENOUS; SUBCUTANEOUS at 03:00

## 2022-10-28 RX ADMIN — Medication 100 MILLIGRAM(S): at 05:12

## 2022-10-28 RX ADMIN — HYDROMORPHONE HYDROCHLORIDE 1 MILLIGRAM(S): 2 INJECTION INTRAMUSCULAR; INTRAVENOUS; SUBCUTANEOUS at 07:35

## 2022-10-28 RX ADMIN — SODIUM CHLORIDE 3 MILLILITER(S): 9 INJECTION INTRAMUSCULAR; INTRAVENOUS; SUBCUTANEOUS at 05:28

## 2022-10-28 RX ADMIN — OXYCODONE HYDROCHLORIDE 10 MILLIGRAM(S): 5 TABLET ORAL at 00:23

## 2022-10-28 RX ADMIN — RIVAROXABAN 10 MILLIGRAM(S): KIT at 11:29

## 2022-10-28 RX ADMIN — OXYCODONE HYDROCHLORIDE 10 MILLIGRAM(S): 5 TABLET ORAL at 01:30

## 2022-10-28 RX ADMIN — OXYCODONE HYDROCHLORIDE 10 MILLIGRAM(S): 5 TABLET ORAL at 04:35

## 2022-10-28 RX ADMIN — SODIUM CHLORIDE 3 MILLILITER(S): 9 INJECTION INTRAMUSCULAR; INTRAVENOUS; SUBCUTANEOUS at 13:06

## 2022-10-28 RX ADMIN — Medication 3 MILLIGRAM(S): at 03:22

## 2022-10-28 NOTE — OCCUPATIONAL THERAPY INITIAL EVALUATION ADULT - NSOTDISCHREC_GEN_A_CORE
Anticipate Home with no skilled OT services. Pt. may benefit from PT services. Pt reports his wife is available to assist with ADL's as daniel.

## 2022-10-28 NOTE — DISCHARGE NOTE NURSING/CASE MANAGEMENT/SOCIAL WORK - NSDCPEFALRISK_GEN_ALL_CORE
For information on Fall & Injury Prevention, visit: [email or web address] OR  [email or web address] OR  https://www.cdc.gov/steadi/patient.html

## 2022-10-28 NOTE — DISCHARGE NOTE PROVIDER - CARE PROVIDER_API CALL
Jacques Aaron (MD)  Orthopaedic Surgery  [address]  [place] [address]  Phone: [phone]  Fax: [phone]  Follow Up Time: 2 weeks

## 2022-10-28 NOTE — DISCHARGE NOTE NURSING/CASE MANAGEMENT/SOCIAL WORK - TOBACCO CESSATION EDUCATION/COUNSELLING(PROVIDED IF TOBACCO USED IN THE PAST 30 DAYS) NON CORE MEASURE SITES
Offered and patient declined Quality 111:Pneumonia Vaccination Status For Older Adults: Pneumococcal Vaccination Previously Received Quality 431: Preventive Care And Screening: Unhealthy Alcohol Use - Screening: Patient screened for unhealthy alcohol use using a single question and scores less than 2 times per year Detail Level: Detailed Quality 110: Preventive Care And Screening: Influenza Immunization: Influenza Immunization Administered during Influenza season

## 2022-10-28 NOTE — DISCHARGE NOTE PROVIDER - NSDCFUSCHEDAPPT_GEN_ALL_CORE_FT
Jacques Aaron  [facility] Physician [facility]  ORTHOSURG [address]  Scheduled Appointment: 11/09/2022    Kleiner, Myron I  [facility] Physician [facility]  RHEUM [address]  Scheduled Appointment: 12/02/2022    Misbah Recio  [facility]  OTOLARYNG [address]  Scheduled Appointment: 12/27/2022    Frantz Richardson  [facility]  RADMED [address]  Scheduled Appointment: 01/25/2023

## 2022-10-28 NOTE — DISCHARGE NOTE NURSING/CASE MANAGEMENT/SOCIAL WORK - NSDCVIVACCINE_GEN_ALL_CORE_FT
No Vaccines Administered. influenza, high-dose, quadrivalent; 28-Oct-2022 12:29; Leilani Ji (RN); [employer]; [id number] (Exp. Date: 30-Jun-2023); IntraMuscular; Deltoid Right.; 0.7 milliLiter(s); VIS (VIS Published: 06-Aug-2021, VIS Presented: 28-Oct-2022);

## 2022-10-28 NOTE — DISCHARGE NOTE PROVIDER - NSDCCPTREATMENT_GEN_ALL_CORE_FT
PRINCIPAL PROCEDURE  Procedure: Insertion of locking intramedullary yaron into left hip  Findings and Treatment: left hip intramedullary nail with open biopsy and curretage  See dictated operative note

## 2022-10-28 NOTE — PHYSICAL THERAPY INITIAL EVALUATION ADULT - NSPTDISCHREC_GEN_A_CORE
Patient has safely demonstrated the ability to perform ambulation, stairs and car transfers therefore is functionally cleared from Physical Therapy perspective.  Will continue to follow while here in the hospital until discharge. anticipate D/C to home with outpatient PT once cleared by MD

## 2022-10-28 NOTE — PHYSICAL THERAPY INITIAL EVALUATION ADULT - NSPTDMEREC_GEN_A_CORE
pt. would benefit from use of Rolling Walker upon D/C to optimize safety within the home and decrease fall risk./rolling walker

## 2022-10-28 NOTE — OCCUPATIONAL THERAPY INITIAL EVALUATION ADULT - LIVES WITH, PROFILE
Pt. reports he lives with his wife in an apartment building with no steps to enter. Once inside, pt. reports bedroom and bathroom are located on the main level. Per pt., he has a shower stall in his bathroom.

## 2022-10-28 NOTE — OCCUPATIONAL THERAPY INITIAL EVALUATION ADULT - DIAGNOSIS, OT EVAL
s/p Left hip intramedullary nail with open biopsy and curettage of left hip lesion, squamous cell carcinoma; Decreased functional mobility; Decreased ADL management

## 2022-10-28 NOTE — DISCHARGE NOTE PROVIDER - NSDCCPCAREPLAN_GEN_ALL_CORE_FT
PRINCIPAL DISCHARGE DIAGNOSIS  Diagnosis: Lesion of left femur  Assessment and Plan of Treatment: Patient is a 76 yo male history of left proximal femur lesion s/p biopsy, prophylactic left femur Intramedullary nail with Dr. Aaron on 10/27/2022 without any intraoperative complications. Biopsy was received by pathology department. Results will be followed. Patient is doing well and stable for discharge.  Patient is tolerating physical therapy: weight bearing to left leg, gait training.  Keep dressing on as is until day of staple removal.  Staples/sutures to be removed in Dr. Aaron's office 14 days from date of surgery. The patient had no post operative complications and clinically progressed faster than anticipated.  Patient was safely and appropriately discharged home. Patient is on Xarelto for anticoagulation.  Orthopaedic Surgeon Dr. Aaron would like patient to call private MD/Internist for appointment postop to maintain continuity of care.  Follow up with Dr. Aaron's office in 1-2 weeks.   istop:  Reference #: [id number]

## 2022-10-28 NOTE — OCCUPATIONAL THERAPY INITIAL EVALUATION ADULT - PERTINENT HX OF CURRENT PROBLEM, REHAB EVAL
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 Pt is now s/p Left hip intramedullary nail with open biopsy and curettage of left hip lesion, squamous cell carcinoma on 10/27/22.

## 2022-10-28 NOTE — DISCHARGE NOTE PROVIDER - HOSPITAL COURSE
Patient is a 76 yo male history of left proximal femur lesion s/p biopsy, prophylactic left femur Intramedullary nail with Dr. Aaron on 10/27/2022 without any intraoperative complications.  Patient is doing well and stable for discharge.  Patient is tolerating physical therapy: weight bearing to left leg, gait training.  Keep dressing on as is until day of staple removal.  Staples/sutures to be removed in Dr. Aaron's office 14 days from date of surgery. The patient had no post operative complications and clinically progressed faster than anticipated.  Patient was safely and appropriately discharged home. Patient is on Xarelto for anticoagulation.  Orthopaedic Surgeon Dr. Aaron would like patient to call private MD/Internist for appointment postop to maintain continuity of care.  Follow up with Dr. Aaron's office in 1-2 weeks.   istop: Patient is a 78 yo male history of left proximal femur lesion s/p biopsy, prophylactic left femur Intramedullary nail with Dr. Aaron on 10/27/2022 without any intraoperative complications. Biopsy was received by pathology department. Results will be followed. Patient is doing well and stable for discharge.  Patient is tolerating physical therapy: weight bearing to left leg, gait training.  Keep dressing on as is until day of staple removal.  Staples/sutures to be removed in Dr. Aaron's office 14 days from date of surgery. The patient had no post operative complications and clinically progressed faster than anticipated.  Patient was safely and appropriately discharged home. Patient is on Xarelto for anticoagulation.  Orthopaedic Surgeon Dr. Aaron would like patient to call private MD/Internist for appointment postop to maintain continuity of care.  Follow up with Dr. Aaron's office in 1-2 weeks.   istop:  Reference #: [id number]

## 2022-10-28 NOTE — DISCHARGE NOTE NURSING/CASE MANAGEMENT/SOCIAL WORK - PATIENT PORTAL LINK FT
You can access the FollowMyHealth Patient Portal offered by [facility] by registering at the following website: [email or web address]. By joining [facility]’s FollowMyHealth portal, you will also be able to view your health information using other applications (apps) compatible with our system.

## 2022-10-28 NOTE — PROGRESS NOTE ADULT - SUBJECTIVE AND OBJECTIVE BOX
ORTHO PROGRESS NOTE     Pt seen and examined at bedside, denies SOB, CP, Dizziness. N/V/D /HA.  No significant overnight events. Pain well controlled. Patient ambulated  with PT     Vital Signs Last 24 Hrs  T(C): 37.1 (28 Oct 2022 05:26), Max: 37.1 (28 Oct 2022 05:26)  T(F): 98.8 (28 Oct 2022 05:26), Max: 98.8 (28 Oct 2022 05:26)  HR: 83 (28 Oct 2022 05:26) (61 - 88)  BP: 131/71 (28 Oct 2022 05:26) (127/65 - 162/70)  BP(mean): 87 (27 Oct 2022 18:00) (78 - 87)  RR: 17 (28 Oct 2022 05:26) (12 - 18)  SpO2: 95% (28 Oct 2022 05:26) (94% - 99%)    Parameters below as of 28 Oct 2022 05:26  Patient On (Oxygen Delivery Method): room air        Gen: No apparent distress    left LE:  Dressing C/D I    BLE: motor intact EHL/FHL/TA/GS .  Sensation is grossly intact to light touch in the distal extremities.    Compartments are soft bilaterally.  Extremities are warm .  DP 2+ BLE     Labs: AM      A/P  s/p left femur IMN, POD #1    - Pain control/ Analgesia  - DVT ppx Xarelto-, foot pumps  - PT/OT - wbat/oob    - Incentive Spirometer  - notify Ortho for questions

## 2022-10-28 NOTE — OCCUPATIONAL THERAPY INITIAL EVALUATION ADULT - GENERAL OBSERVATIONS, REHAB EVAL
cardiac precautions/fall precautions/sternal precautions
Pt. received semisupine in bed. No acute distress. Patient agreed to evaluation from Occupational Therapist. +Saji. Wife bedside.

## 2022-10-28 NOTE — DISCHARGE NOTE NURSING/CASE MANAGEMENT/SOCIAL WORK - NSDCPNINST_GEN_ALL_CORE
notify Dr Aaron if you experience any increase in pain not relieved with medication, any redness, drainage or swelling around incision or any fever >100.5.  drink plenty of fluids.  no heavy lifting or straining.  Use over the counter stool softeners to assist with constipation.   notify Dr Aaron if you experience any increase in pain not relieved with medication, any redness, drainage or swelling around incision or any fever >100.5.  Drink plenty of fluids.  No heavy lifting or straining.  Use over the counter stool softeners to assist with constipation.

## 2022-10-28 NOTE — OCCUPATIONAL THERAPY INITIAL EVALUATION ADULT - MD ORDER
Occupational Therapy (OT) to evaluate and treat. Per EDDIE Ji, pt is okay to participate in OT evaluation and perform activity as tolerated.

## 2022-10-28 NOTE — PHYSICAL THERAPY INITIAL EVALUATION ADULT - RANGE OF MOTION EXAMINATION, REHAB EVAL
left hip 0-90 degrees/bilateral upper extremity ROM was WFL (within functional limits)/bilateral lower extremity ROM was WFL (within functional limits)

## 2022-10-28 NOTE — PHYSICAL THERAPY INITIAL EVALUATION ADULT - PERTINENT HX OF CURRENT PROBLEM, REHAB EVAL
Pt. is a 77 year old male s/p insertion of locking intramedullary yaron into left hip. PMH: RA, Cancer of salivary gland, squamous ell skin cancer.

## 2022-10-28 NOTE — PHYSICAL THERAPY INITIAL EVALUATION ADULT - ADDITIONAL COMMENTS
pt has no steps at home     Pt. left comfortable in bedside chair with all tubes/lines intact, call bell in reach and in NAD.     Pt. performed car transfer safely with supervision

## 2022-10-28 NOTE — DISCHARGE NOTE PROVIDER - NSDCMRMEDTOKEN_GEN_ALL_CORE_FT
aspirin 81 mg oral tablet: 1 tab(s) orally once a day  celecoxib 200 mg oral capsule: 1 cap(s) orally 2 times a day-last dose 10/20/22  DULoxetine 20 mg oral delayed release capsule: 1 cap(s) orally once a day-morning  DULoxetine 20 mg oral delayed release capsule: 1 cap(s) orally once a day (at bedtime)  folic acid 1 mg oral tablet: 1 tab(s) orally once a day-morning  hydroCHLOROthiazide 25 mg oral tablet: 1 tab(s) orally once a day-morning  Multiple Vitamins oral tablet: 1 tab(s) orally once a day-last dose 10/24/22  pantoprazole 40 mg oral delayed release tablet: 1 tab(s) orally once a day-morning  predniSONE 5 mg oral tablet: 4 tab(s) orally 3 times a day-last dose 2.5 weeks ago  ramipril 10 mg oral capsule: 2 cap(s) orally once a day-morning  risedronate 150 mg oral tablet: 1 tab(s) orally once a month  sulfaSALAzine 500 mg oral tablet: 2 tab(s) orally 2 times a day  traMADol 50 mg oral tablet: 1 tab(s) orally every 6 hours, As Needed  Vitamin D2 1.25 mg (50,000 intl units) oral capsule: 1 cap(s) orally once a week   acetaminophen 325 mg oral tablet: 3 tab(s) orally every 6 hours, As needed, Mild Pain (1 - 3)  aspirin 81 mg oral tablet: 1 tab(s) orally once a day  DULoxetine 20 mg oral delayed release capsule: 1 cap(s) orally once a day-morning  DULoxetine 20 mg oral delayed release capsule: 1 cap(s) orally once a day (at bedtime)  hydroCHLOROthiazide 25 mg oral tablet: 1 tab(s) orally once a day-morning  HYDROmorphone 2 mg oral tablet: 1 tab(s) orally every 4-6 hours, As needed, Moderate-severe Pain MDD:6  pantoprazole 40 mg oral delayed release tablet: 1 tab(s) orally once a day-morning  ramipril 10 mg oral capsule: 2 cap(s) orally once a day-morning  risedronate 150 mg oral tablet: 1 tab(s) orally once a month  rivaroxaban 10 mg oral tablet: 1 tab(s) orally once a day MDD:1

## 2022-11-01 DIAGNOSIS — M89.9 DISORDER OF BONE, UNSPECIFIED: ICD-10-CM

## 2022-11-01 DIAGNOSIS — M79.652 PAIN IN LEFT THIGH: ICD-10-CM

## 2022-11-09 ENCOUNTER — APPOINTMENT (OUTPATIENT)
Dept: ORTHOPEDIC SURGERY | Facility: CLINIC | Age: 77
End: 2022-11-09

## 2022-11-09 PROCEDURE — 99024 POSTOP FOLLOW-UP VISIT: CPT

## 2022-11-10 ENCOUNTER — OUTPATIENT (OUTPATIENT)
Dept: OUTPATIENT SERVICES | Facility: HOSPITAL | Age: 77
LOS: 1 days | Discharge: HOME | End: 2022-11-10

## 2022-11-10 DIAGNOSIS — Z95.5 PRESENCE OF CORONARY ANGIOPLASTY IMPLANT AND GRAFT: Chronic | ICD-10-CM

## 2022-11-10 DIAGNOSIS — C76.0 MALIGNANT NEOPLASM OF HEAD, FACE AND NECK: ICD-10-CM

## 2022-11-10 DIAGNOSIS — Z90.49 ACQUIRED ABSENCE OF OTHER SPECIFIED PARTS OF DIGESTIVE TRACT: Chronic | ICD-10-CM

## 2022-11-10 DIAGNOSIS — Z98.1 ARTHRODESIS STATUS: Chronic | ICD-10-CM

## 2022-11-11 ENCOUNTER — APPOINTMENT (OUTPATIENT)
Dept: HEMATOLOGY ONCOLOGY | Facility: CLINIC | Age: 77
End: 2022-11-11

## 2022-11-11 VITALS
DIASTOLIC BLOOD PRESSURE: 79 MMHG | SYSTOLIC BLOOD PRESSURE: 133 MMHG | BODY MASS INDEX: 25.33 KG/M2 | HEIGHT: 72 IN | HEART RATE: 78 BPM | WEIGHT: 187 LBS | OXYGEN SATURATION: 98 % | TEMPERATURE: 98.2 F

## 2022-11-11 PROCEDURE — 99205 OFFICE O/P NEW HI 60 MIN: CPT

## 2022-11-16 ENCOUNTER — APPOINTMENT (OUTPATIENT)
Dept: RADIATION ONCOLOGY | Facility: CLINIC | Age: 77
End: 2022-11-16

## 2022-11-16 ENCOUNTER — NON-APPOINTMENT (OUTPATIENT)
Age: 77
End: 2022-11-16

## 2022-11-16 ENCOUNTER — RESULT REVIEW (OUTPATIENT)
Age: 77
End: 2022-11-16

## 2022-11-16 ENCOUNTER — OUTPATIENT (OUTPATIENT)
Dept: OUTPATIENT SERVICES | Facility: HOSPITAL | Age: 77
LOS: 1 days | Discharge: HOME | End: 2022-11-16

## 2022-11-16 VITALS
SYSTOLIC BLOOD PRESSURE: 145 MMHG | RESPIRATION RATE: 18 BRPM | DIASTOLIC BLOOD PRESSURE: 83 MMHG | HEART RATE: 78 BPM | OXYGEN SATURATION: 97 %

## 2022-11-16 DIAGNOSIS — Z90.49 ACQUIRED ABSENCE OF OTHER SPECIFIED PARTS OF DIGESTIVE TRACT: Chronic | ICD-10-CM

## 2022-11-16 DIAGNOSIS — Z95.5 PRESENCE OF CORONARY ANGIOPLASTY IMPLANT AND GRAFT: Chronic | ICD-10-CM

## 2022-11-16 DIAGNOSIS — Z98.1 ARTHRODESIS STATUS: Chronic | ICD-10-CM

## 2022-11-16 DIAGNOSIS — C79.51 SECONDARY MALIGNANT NEOPLASM OF BONE: ICD-10-CM

## 2022-11-16 DIAGNOSIS — C07 MALIGNANT NEOPLASM OF PAROTID GLAND: ICD-10-CM

## 2022-11-16 PROCEDURE — 77290 THER RAD SIMULAJ FIELD CPLX: CPT | Mod: 26

## 2022-11-16 PROCEDURE — 77263 THER RADIOLOGY TX PLNG CPLX: CPT

## 2022-11-16 PROCEDURE — 77334 RADIATION TREATMENT AID(S): CPT | Mod: 26

## 2022-11-16 PROCEDURE — 99214 OFFICE O/P EST MOD 30 MIN: CPT | Mod: 25

## 2022-11-16 RX ORDER — SULFASALAZINE 500 MG/1
500 TABLET ORAL
Qty: 360 | Refills: 0 | Status: DISCONTINUED | COMMUNITY
Start: 2022-09-02 | End: 2022-11-16

## 2022-11-16 RX ORDER — ERGOCALCIFEROL 1.25 MG/1
1.25 MG CAPSULE, LIQUID FILLED ORAL
Qty: 12 | Refills: 0 | Status: DISCONTINUED | COMMUNITY
Start: 2022-09-02 | End: 2022-11-16

## 2022-11-16 RX ORDER — TRAMADOL HYDROCHLORIDE 50 MG/1
50 TABLET, COATED ORAL
Qty: 28 | Refills: 0 | Status: DISCONTINUED | COMMUNITY
Start: 2022-10-20 | End: 2022-11-16

## 2022-11-16 RX ORDER — PREDNISONE 5 MG/1
5 TABLET ORAL
Qty: 300 | Refills: 0 | Status: DISCONTINUED | COMMUNITY
Start: 2022-09-02 | End: 2022-11-16

## 2022-11-16 RX ORDER — FOLIC ACID 1 MG/1
1 TABLET ORAL DAILY
Qty: 90 | Refills: 0 | Status: DISCONTINUED | COMMUNITY
Start: 2022-09-02 | End: 2022-11-16

## 2022-11-16 RX ORDER — GABAPENTIN 300 MG/1
300 CAPSULE ORAL
Qty: 540 | Refills: 0 | Status: DISCONTINUED | COMMUNITY
End: 2022-11-16

## 2022-11-16 NOTE — PHYSICAL EXAM
[Normal] : oriented to person, place and time, the affect was normal, the mood was normal and not anxious [de-identified] : well healed surgical scar right neck . [de-identified] : well healed surgical scars on left thigh.

## 2022-11-16 NOTE — VITALS
[Maximal Pain Intensity: 10/10] : 10/10 [Least Pain Intensity: 4/10] : 4/10 [Pain Description/Quality: ___] : Pain description/quality: [unfilled] [Pain Duration: ___] : Pain duration: [unfilled] [Pain Location: ___] : Pain Location: [unfilled] [Pain Interferes with ADLs] : Pain interferes with activities of daily living. [Opioid] : opioid [70: Cares for self; unalbe to carry on normal activity or do active work.] : 70: Cares for self; unable to carry on normal activity or do active work. [ECOG Performance Status: 3 - Capable of only limited self care, confined to bed or chair more than 50% of waking hours] : Performance Status: 3 - Capable of only limited self care, confined to bed or chair more than 50% of waking hours

## 2022-11-16 NOTE — DISEASE MANAGEMENT
[Pathological] : TNM Stage: p [IV] : IV [FreeTextEntry4] : head and neck [TTNM] : 2 [NTNM] : 0 [MTNM] : 1

## 2022-11-16 NOTE — REVIEW OF SYSTEMS
[Chills] : chills [Fatigue] : fatigue [Joint Pain] : joint pain [Muscle Pain] : muscle pain [Muscle Weakness] : muscle weakness [Disturbance Of Gait] : gait disturbance [Difficulty Walking] : difficulty walking [Easy Bleeding] : a tendency for easy bleeding [Easy Bruising] : a tendency for easy bruising [Constipation: Grade 0] : Constipation: Grade 0 [Diarrhea: Grade 0] : Diarrhea: Grade 0 [Dysphagia: Grade 0] : Dysphagia: Grade 0 [Nausea: Grade 0] : Nausea: Grade 0 [Vomiting: Grade 0] : Vomiting: Grade 0 [Fatigue: Grade 1 - Fatigue relieved by rest] : Fatigue: Grade 1 - Fatigue relieved by rest [Cough: Grade 0] : Cough: Grade 0 [Dyspnea: Grade 0] : Dyspnea: Grade 0 [Negative] : Allergic/Immunologic

## 2022-11-16 NOTE — LETTER CLOSING
[Sincerely yours,] : Sincerely yours, [FreeTextEntry3] : Frantz Richardson MD\par Physician in Chief\par Department of Radiation Medicine\par [facility] [place]\par [facility] [place]\par \par  of Radiation Medicine\par [facility]\par at  [facility]\par \par Radiation \par [facility]/\par [facility] [place]\par [address]\par [place], [place] [address]\par \par Tel: [phone]\par Fax: [phone]\par

## 2022-11-16 NOTE — ASSESSMENT
[Metastatic disease with local control] : Metastatic disease with local control [FreeTextEntry1] : minimal radiation related sequelae . Progression of malignant  disease distantly , s/p resection , at risk of local recurrence in left proximal  lower extremity.

## 2022-11-16 NOTE — LETTER GREETING
[Dear Doctor] : Dear Doctor, [Follow-Up] : Your patient, [unfilled] was seen in my office today for follow-up [Please see my note below.] : Please see my note below. [FreeTextEntry2] : Meredith Palmer MD

## 2022-11-16 NOTE — HISTORY OF PRESENT ILLNESS
[FreeTextEntry1] : Tu vargas is a 77 year old man who was treated to the parotid  in 1/22..He is s/p Parotidectomy with facial nerve dissection and right neck dissection  3/3/22.. Path showed  cells positive for malignancy (Richard Class lV) consistent with squamos differentiation .\par Surgery followed by RT. which completed 6/1/22.\par \par Patient presented to Orthopedist on 10/6/22 for ongoing left hip pain for the past 4 years. He has been following Rheumatology, Dr. Kleiner, for pain. Pain has been progressive affect quality of life. \par MRI of hip was performed on 10/10/22 revealing a fat-containing mass centered within the tensor fascia manjula muscle measuring 4 x 3.1 x 7.2 cm and a mass is seen centered within the lesser trochanter measuring 2.9 x 3.4 x 4.4 cm. There is overlying periosteal edema and cortical thinning\par \par Subsequent PET/CT on 10/14/22 visualized osseous structures reveal an intramedullary lytic 3 cm lesion along the left femoral intertrochanteric region max SUV 10.6.\par Right parotid gland demonstrates interval resection of the hypermetabolic mass as well as right neck prominent soft tissue postsurgical low level uptake. No evidence of atypical lymph node activity identified.\par \par Biopsy of left proximal femur lesion on 10/27/2022. Pathology reported metastatic carcinoma,the tumor shows squamous differentiation on H T E. Performed immunostains show that the tumor cells are positive for AE1/AE3, CK5/ and p40. This supports the diagnosis of metastatic carcinoma with squamous differentiation.\par \par Here today to discuss treatment plan.\par

## 2022-11-16 NOTE — REASON FOR VISIT
[Other: ___] : [unfilled] [Spouse] : spouse [Re-evaluation] : re-evaluation for [Head and Neck Cancer] : head and neck cancer

## 2022-11-18 PROCEDURE — 77334 RADIATION TREATMENT AID(S): CPT | Mod: 26

## 2022-11-18 PROCEDURE — 77307 TELETHX ISODOSE PLAN CPLX: CPT | Mod: 26

## 2022-11-20 ENCOUNTER — TRANSCRIPTION ENCOUNTER (OUTPATIENT)
Age: 77
End: 2022-11-20

## 2022-11-23 PROCEDURE — 77427 RADIATION TX MANAGEMENT X5: CPT

## 2022-11-23 PROCEDURE — 77280 THER RAD SIMULAJ FIELD SMPL: CPT | Mod: 26

## 2022-11-28 ENCOUNTER — APPOINTMENT (OUTPATIENT)
Dept: RHEUMATOLOGY | Facility: CLINIC | Age: 77
End: 2022-11-28

## 2022-11-28 ENCOUNTER — NON-APPOINTMENT (OUTPATIENT)
Age: 77
End: 2022-11-28

## 2022-11-28 VITALS
HEART RATE: 68 BPM | DIASTOLIC BLOOD PRESSURE: 65 MMHG | BODY MASS INDEX: 26.04 KG/M2 | SYSTOLIC BLOOD PRESSURE: 137 MMHG | WEIGHT: 192 LBS | RESPIRATION RATE: 16 BRPM | OXYGEN SATURATION: 96 %

## 2022-11-28 DIAGNOSIS — R52 PAIN, UNSPECIFIED: ICD-10-CM

## 2022-11-28 NOTE — DISEASE MANAGEMENT
[Pathological] : TNM Stage: p [IV] : IV [FreeTextEntry4] : metastatic squamous cell carcinoma [TTNM] : 2 [NTNM] : 0 [MTNM] : 1 [de-identified] : 400 cGy [de-identified] : 2000 cGy [de-identified] : left hip/femur

## 2022-12-02 ENCOUNTER — APPOINTMENT (OUTPATIENT)
Dept: RHEUMATOLOGY | Facility: CLINIC | Age: 77
End: 2022-12-02

## 2022-12-02 VITALS
TEMPERATURE: 98 F | WEIGHT: 188 LBS | DIASTOLIC BLOOD PRESSURE: 84 MMHG | BODY MASS INDEX: 25.47 KG/M2 | SYSTOLIC BLOOD PRESSURE: 120 MMHG | HEIGHT: 72 IN

## 2022-12-02 DIAGNOSIS — M79.642 PAIN IN RIGHT HAND: ICD-10-CM

## 2022-12-02 DIAGNOSIS — R53.83 OTHER FATIGUE: ICD-10-CM

## 2022-12-02 DIAGNOSIS — Z87.39 PERSONAL HISTORY OF OTHER DISEASES OF THE MUSCULOSKELETAL SYSTEM AND CONNECTIVE TISSUE: ICD-10-CM

## 2022-12-02 DIAGNOSIS — M79.641 PAIN IN RIGHT HAND: ICD-10-CM

## 2022-12-02 PROCEDURE — 99215 OFFICE O/P EST HI 40 MIN: CPT

## 2022-12-02 RX ORDER — RISEDRONATE SODIUM 150 MG/1
150 TABLET, FILM COATED ORAL
Qty: 3 | Refills: 0 | Status: DISCONTINUED | COMMUNITY
Start: 2022-09-02 | End: 2022-12-02

## 2022-12-03 ENCOUNTER — INPATIENT (INPATIENT)
Facility: HOSPITAL | Age: 77
LOS: 1 days | Discharge: HOME | DRG: 71 | End: 2022-12-05
Attending: STUDENT IN AN ORGANIZED HEALTH CARE EDUCATION/TRAINING PROGRAM | Admitting: HOSPITALIST
Payer: MEDICARE

## 2022-12-03 VITALS
RESPIRATION RATE: 22 BRPM | OXYGEN SATURATION: 100 % | HEIGHT: 71 IN | SYSTOLIC BLOOD PRESSURE: 159 MMHG | DIASTOLIC BLOOD PRESSURE: 154 MMHG | HEART RATE: 71 BPM

## 2022-12-03 DIAGNOSIS — Z98.1 ARTHRODESIS STATUS: Chronic | ICD-10-CM

## 2022-12-03 DIAGNOSIS — R41.82 ALTERED MENTAL STATUS, UNSPECIFIED: ICD-10-CM

## 2022-12-03 DIAGNOSIS — Z90.49 ACQUIRED ABSENCE OF OTHER SPECIFIED PARTS OF DIGESTIVE TRACT: Chronic | ICD-10-CM

## 2022-12-03 DIAGNOSIS — Z95.5 PRESENCE OF CORONARY ANGIOPLASTY IMPLANT AND GRAFT: Chronic | ICD-10-CM

## 2022-12-03 LAB
ALBUMIN SERPL ELPH-MCNC: 3.5 G/DL — SIGNIFICANT CHANGE UP (ref 3.3–5.2)
ALP SERPL-CCNC: 66 U/L — SIGNIFICANT CHANGE UP (ref 40–120)
ALT FLD-CCNC: 6 U/L — SIGNIFICANT CHANGE UP
AMPHET UR-MCNC: NEGATIVE — SIGNIFICANT CHANGE UP
ANION GAP SERPL CALC-SCNC: 12 MMOL/L — SIGNIFICANT CHANGE UP (ref 5–17)
APPEARANCE UR: ABNORMAL
APTT BLD: 30.6 SEC — SIGNIFICANT CHANGE UP (ref 27.5–35.5)
AST SERPL-CCNC: 15 U/L — SIGNIFICANT CHANGE UP
BACTERIA # UR AUTO: ABNORMAL
BARBITURATES UR SCN-MCNC: NEGATIVE — SIGNIFICANT CHANGE UP
BASOPHILS # BLD AUTO: 0.02 K/UL — SIGNIFICANT CHANGE UP (ref 0–0.2)
BASOPHILS NFR BLD AUTO: 0.3 % — SIGNIFICANT CHANGE UP (ref 0–2)
BENZODIAZ UR-MCNC: POSITIVE
BILIRUB SERPL-MCNC: 0.5 MG/DL — SIGNIFICANT CHANGE UP (ref 0.4–2)
BILIRUB UR-MCNC: NEGATIVE — SIGNIFICANT CHANGE UP
BUN SERPL-MCNC: 26.4 MG/DL — HIGH (ref 8–20)
CALCIUM SERPL-MCNC: 10.4 MG/DL — SIGNIFICANT CHANGE UP (ref 8.4–10.5)
CHLORIDE SERPL-SCNC: 101 MMOL/L — SIGNIFICANT CHANGE UP (ref 96–108)
CO2 SERPL-SCNC: 25 MMOL/L — SIGNIFICANT CHANGE UP (ref 22–29)
COCAINE METAB.OTHER UR-MCNC: NEGATIVE — SIGNIFICANT CHANGE UP
COLOR SPEC: YELLOW — SIGNIFICANT CHANGE UP
CREAT SERPL-MCNC: 1.01 MG/DL — SIGNIFICANT CHANGE UP (ref 0.5–1.3)
DIFF PNL FLD: ABNORMAL
EGFR: 78 ML/MIN/1.73M2 — SIGNIFICANT CHANGE UP
EOSINOPHIL # BLD AUTO: 0.09 K/UL — SIGNIFICANT CHANGE UP (ref 0–0.5)
EOSINOPHIL NFR BLD AUTO: 1.2 % — SIGNIFICANT CHANGE UP (ref 0–6)
EPI CELLS # UR: SIGNIFICANT CHANGE UP
FLUAV AG NPH QL: SIGNIFICANT CHANGE UP
FLUBV AG NPH QL: SIGNIFICANT CHANGE UP
GLUCOSE SERPL-MCNC: 104 MG/DL — HIGH (ref 70–99)
GLUCOSE UR QL: NEGATIVE MG/DL — SIGNIFICANT CHANGE UP
HCT VFR BLD CALC: 29.5 % — LOW (ref 39–50)
HGB BLD-MCNC: 9.8 G/DL — LOW (ref 13–17)
IMM GRANULOCYTES NFR BLD AUTO: 0.3 % — SIGNIFICANT CHANGE UP (ref 0–0.9)
INR BLD: 1.15 RATIO — SIGNIFICANT CHANGE UP (ref 0.88–1.16)
KETONES UR-MCNC: ABNORMAL
LEUKOCYTE ESTERASE UR-ACNC: ABNORMAL
LYMPHOCYTES # BLD AUTO: 0.99 K/UL — LOW (ref 1–3.3)
LYMPHOCYTES # BLD AUTO: 12.7 % — LOW (ref 13–44)
MCHC RBC-ENTMCNC: 29.8 PG — SIGNIFICANT CHANGE UP (ref 27–34)
MCHC RBC-ENTMCNC: 33.2 GM/DL — SIGNIFICANT CHANGE UP (ref 32–36)
MCV RBC AUTO: 89.7 FL — SIGNIFICANT CHANGE UP (ref 80–100)
METHADONE UR-MCNC: NEGATIVE — SIGNIFICANT CHANGE UP
MONOCYTES # BLD AUTO: 0.91 K/UL — HIGH (ref 0–0.9)
MONOCYTES NFR BLD AUTO: 11.7 % — SIGNIFICANT CHANGE UP (ref 2–14)
NEUTROPHILS # BLD AUTO: 5.78 K/UL — SIGNIFICANT CHANGE UP (ref 1.8–7.4)
NEUTROPHILS NFR BLD AUTO: 73.8 % — SIGNIFICANT CHANGE UP (ref 43–77)
NITRITE UR-MCNC: NEGATIVE — SIGNIFICANT CHANGE UP
OPIATES UR-MCNC: NEGATIVE — SIGNIFICANT CHANGE UP
PCP SPEC-MCNC: SIGNIFICANT CHANGE UP
PCP UR-MCNC: NEGATIVE — SIGNIFICANT CHANGE UP
PH UR: 6.5 — SIGNIFICANT CHANGE UP (ref 5–8)
PLATELET # BLD AUTO: 314 K/UL — SIGNIFICANT CHANGE UP (ref 150–400)
POTASSIUM SERPL-MCNC: 3.5 MMOL/L — SIGNIFICANT CHANGE UP (ref 3.5–5.3)
POTASSIUM SERPL-SCNC: 3.5 MMOL/L — SIGNIFICANT CHANGE UP (ref 3.5–5.3)
PROT SERPL-MCNC: 6.7 G/DL — SIGNIFICANT CHANGE UP (ref 6.6–8.7)
PROT UR-MCNC: 15
PROTHROM AB SERPL-ACNC: 13.4 SEC — SIGNIFICANT CHANGE UP (ref 10.5–13.4)
RBC # BLD: 3.29 M/UL — LOW (ref 4.2–5.8)
RBC # FLD: 13.3 % — SIGNIFICANT CHANGE UP (ref 10.3–14.5)
RBC CASTS # UR COMP ASSIST: ABNORMAL /HPF (ref 0–4)
RSV RNA NPH QL NAA+NON-PROBE: SIGNIFICANT CHANGE UP
SARS-COV-2 RNA SPEC QL NAA+PROBE: SIGNIFICANT CHANGE UP
SODIUM SERPL-SCNC: 138 MMOL/L — SIGNIFICANT CHANGE UP (ref 135–145)
SP GR SPEC: 1.01 — SIGNIFICANT CHANGE UP (ref 1.01–1.02)
THC UR QL: NEGATIVE — SIGNIFICANT CHANGE UP
TROPONIN T SERPL-MCNC: <0.01 NG/ML — SIGNIFICANT CHANGE UP (ref 0–0.06)
UROBILINOGEN FLD QL: NEGATIVE MG/DL — SIGNIFICANT CHANGE UP
WBC # BLD: 7.81 K/UL — SIGNIFICANT CHANGE UP (ref 3.8–10.5)
WBC # FLD AUTO: 7.81 K/UL — SIGNIFICANT CHANGE UP (ref 3.8–10.5)
WBC UR QL: SIGNIFICANT CHANGE UP /HPF (ref 0–5)

## 2022-12-03 PROCEDURE — 93010 ELECTROCARDIOGRAM REPORT: CPT

## 2022-12-03 PROCEDURE — 0042T: CPT | Mod: MA

## 2022-12-03 PROCEDURE — 70496 CT ANGIOGRAPHY HEAD: CPT | Mod: 26,MA

## 2022-12-03 PROCEDURE — 99291 CRITICAL CARE FIRST HOUR: CPT

## 2022-12-03 PROCEDURE — 70498 CT ANGIOGRAPHY NECK: CPT | Mod: 26,MA

## 2022-12-03 PROCEDURE — 99223 1ST HOSP IP/OBS HIGH 75: CPT

## 2022-12-03 PROCEDURE — 71045 X-RAY EXAM CHEST 1 VIEW: CPT | Mod: 26

## 2022-12-03 PROCEDURE — 70450 CT HEAD/BRAIN W/O DYE: CPT | Mod: 26,59,MA

## 2022-12-03 RX ORDER — OLANZAPINE 15 MG/1
5 TABLET, FILM COATED ORAL ONCE
Refills: 0 | Status: COMPLETED | OUTPATIENT
Start: 2022-12-03 | End: 2022-12-03

## 2022-12-03 RX ORDER — DULOXETINE HYDROCHLORIDE 30 MG/1
40 CAPSULE, DELAYED RELEASE ORAL AT BEDTIME
Refills: 0 | Status: DISCONTINUED | OUTPATIENT
Start: 2022-12-03 | End: 2022-12-05

## 2022-12-03 RX ORDER — ACETAMINOPHEN 500 MG
650 TABLET ORAL EVERY 6 HOURS
Refills: 0 | Status: DISCONTINUED | OUTPATIENT
Start: 2022-12-03 | End: 2022-12-05

## 2022-12-03 RX ORDER — ASPIRIN/CALCIUM CARB/MAGNESIUM 324 MG
81 TABLET ORAL DAILY
Refills: 0 | Status: DISCONTINUED | OUTPATIENT
Start: 2022-12-03 | End: 2022-12-05

## 2022-12-03 RX ORDER — MIDAZOLAM HYDROCHLORIDE 1 MG/ML
4 INJECTION, SOLUTION INTRAMUSCULAR; INTRAVENOUS ONCE
Refills: 0 | Status: DISCONTINUED | OUTPATIENT
Start: 2022-12-03 | End: 2022-12-03

## 2022-12-03 RX ORDER — DULOXETINE HYDROCHLORIDE 30 MG/1
20 CAPSULE, DELAYED RELEASE ORAL DAILY
Refills: 0 | Status: DISCONTINUED | OUTPATIENT
Start: 2022-12-03 | End: 2022-12-05

## 2022-12-03 RX ORDER — LISINOPRIL 2.5 MG/1
40 TABLET ORAL DAILY
Refills: 0 | Status: DISCONTINUED | OUTPATIENT
Start: 2022-12-03 | End: 2022-12-05

## 2022-12-03 RX ORDER — PANTOPRAZOLE SODIUM 20 MG/1
40 TABLET, DELAYED RELEASE ORAL
Refills: 0 | Status: DISCONTINUED | OUTPATIENT
Start: 2022-12-03 | End: 2022-12-05

## 2022-12-03 RX ORDER — ENOXAPARIN SODIUM 100 MG/ML
40 INJECTION SUBCUTANEOUS EVERY 24 HOURS
Refills: 0 | Status: DISCONTINUED | OUTPATIENT
Start: 2022-12-03 | End: 2022-12-05

## 2022-12-03 RX ADMIN — OLANZAPINE 5 MILLIGRAM(S): 15 TABLET, FILM COATED ORAL at 16:40

## 2022-12-03 RX ADMIN — MIDAZOLAM HYDROCHLORIDE 4 MILLIGRAM(S): 1 INJECTION, SOLUTION INTRAMUSCULAR; INTRAVENOUS at 12:35

## 2022-12-03 NOTE — ED PROVIDER NOTE - ATTENDING CONTRIBUTION TO CARE
Pt with hx of metastatic CA just had radiation therapy the past week. Pt went to bed fine last night and woke up this morning confused and nonverbal. EMS called. Pt unable to provide hx and is at times combative.    physical - rrr. ctab. abd - soft, nt. no edema. no rash. neuro moving all extremities equally. no facial droop. intermittently agitated.  alert says occasional words like get away from me but will not answer questions or follow commands.    plan - labs and imaging reviewed. mental status improved but still confused. will admit to medicine for further eval. Pt was given 4 mg of IV midazolam for treatment of agitation while on CT table.

## 2022-12-03 NOTE — CHART NOTE - NSCHARTNOTEFT_GEN_A_CORE
Called by staff for pt. who is admitted with AMS, pulling at tubes, attempting to get out of bed.  Pt is not alert and oriented enough to redirect at the present time  1:1 initiated for safety

## 2022-12-03 NOTE — ED PROVIDER NOTE - OBJECTIVE STATEMENT
76 yo male history of Squamous Cell CA presents after waking up today am with AMS. Per wife, patient was fine when he went to bed yesterday PM. In the ED patient is combative and not responding to questioning. ED Code stroke called and patient taken to CT scan for emergent imaging.

## 2022-12-03 NOTE — ED ADULT NURSE NOTE - ED STAT RN HANDOFF DETAILS
COVID Screen    Does Patient OR patient's household members have any of the following:    · Temperature: Fever greater than or equal to 100.4 F   No   · Respiratory symptoms: New or worsening cough, shortness of breath, or sore throat   No   · GI Symptoms: New onset of nausea, vomiting or diarrhea   No   · Miscellaneous: New onset of loss of taste or smell, chills, repeated shaking with chills, muscle pain or headache   No   · In the last 14 days,  have you or anyone in your household tested positive, suspected of having or have a test in process for covid?   No   · In the last 14 days, have you or anyone in your household had any contact with anyone who has tested positive, suspected of having, or have a test in process for covid?   No     If patient is scheduled for a non virtual appointment (in clinic):  • Patient informed of policy for masking while present for appointments and asked to bring own mask if able to and/or told a mask will be given at arrival to clinic   Yes  • Patient informed of visitor restrictions (if a visitor/chaperone is necessary please list name of person)   Yes  • Patient advised not to arrive more than 20 minutes before appointment time   Yes  
Report given to EDDIE Kasper.  Transported to bed B15L, placed on CM and .  Side rails up.

## 2022-12-03 NOTE — H&P ADULT - ASSESSMENT
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    Altered mental status / Metabolic encephalopathy - CT of the head was without acute intracranial pathology. CT angiogram and perfusion scan were limited due to patient movement. Laboratory studies were without significant derangements. No obvious source of infection noted. Urinalysis was without significant findings and urine toxicology was positive for benzodiazepine which was administered in the emergency department. Possibility due to metastatic disease given prior history of metastases to the parotid gland and hip. Would benefit from MRI if sedation can be coordinated.    Squamous cell carcinoma - Prior radiation to the head and neck noted earlier in the year and, most recently, intramedullary nail to the left hip with radiation therapy. Analgesic medications as needed. The patient previously completed the course of rivaroxaban, to continued on enoxaparin for venous thromboembolism prophylaxis.    Coronary artery disease - On aspirin.    Hypertension - Close blood pressure monitoring. On lisinopril.    GERD - On pantoprazole.    Discussed with the patient's wife at the bedside. She was uncertain of the patient's medications and would bring in the list for review.

## 2022-12-03 NOTE — H&P ADULT - NSHPPHYSICALEXAM_GEN_ALL_CORE
Vital Signs Last 24 Hrs  T(C): 36.6 (03 Dec 2022 12:18), Max: 36.6 (03 Dec 2022 12:18)  T(F): 97.9 (03 Dec 2022 12:18), Max: 97.9 (03 Dec 2022 12:18)  HR: 85 (03 Dec 2022 15:26) (71 - 87)  BP: 167/80 (03 Dec 2022 15:26) (117/58 - 167/80)  BP(mean): --  RR: 18 (03 Dec 2022 15:26) (18 - 22)  SpO2: 100% (03 Dec 2022 15:26) (100% - 100%)    Parameters below as of 03 Dec 2022 15:26  Patient On (Oxygen Delivery Method): room air    General appearance: No acute distress, Awake, Disoriented, Only recognizes wife  HEENT: Normocephalic, Atraumatic, Conjunctiva clear, EOMI  Neck: Supple, No JVD, No tenderness  Lungs: Breath sound equal bilaterally, No wheezes, No rales  Cardiovascular: S1S2, Regular rhythm  Abdomen: Soft, Nontender, Nondistended, No guarding/rebound, Positive bowel sounds  Extremities: No clubbing, No cyanosis, No edema, No calf tenderness  Neuro: No tremors, Moves all extermities  Psychiatric: Disoriented

## 2022-12-03 NOTE — ED ADULT NURSE REASSESSMENT NOTE - NS ED NURSE REASSESS COMMENT FT1
Pt now counting 1-20 and then saying random numbers.  Wife at bedside.  Pt still does not answer to his name.  Follows some simple commands such as opening his eyes but does not follow any additional commands.

## 2022-12-03 NOTE — ED ADULT NURSE NOTE - CHIEF COMPLAINT QUOTE
as per EMS wife states pt is usually AOX4, needs assistance for walking, went to bed normally and today woke up altered, not acting himself, confused. MD Mcclelland at bedside for evaluation.Pt has hx of skin cancer with mets to his hip., had radiation yesterday and started on prednisone yesterday as well.

## 2022-12-03 NOTE — ED ADULT TRIAGE NOTE - CHIEF COMPLAINT QUOTE
OB Visit    Annelise Johnson is a 32 year old Black/  female, , KINSEY 2017, by Last Menstrual Period @ 19w6d who presents for routine prenatal care visit.   Doing well.  Denies contractions, bleeding, dysuria, fever, rash, abnl d/c, leaking, headache, vision changes or other concerns.    Visit Vitals  /64 (BP Location: [email or web address] Patient Position: Sitting, Cuff Size: Large Adult)   Pulse 78   Ht 5' 7\" (1.702 m)   Wt 111.1 kg   LMP 2018 (Exact Date)   BMI 38.37 kg/m²         First Trimester Screen Negative  Plan: [facility] to screen for Spina bifida. Drawn.    Anatomy ultra sound discussed: Scheduled   Breast feeding and bottle feeding  Flu vaccine: will get at work  Reviewed self care and comfort measures, warning S/Ss including threatened miscarriage, abnormal vaginal bleeding, leaking or discharge, fever, persistent headaches not relieved by rest, fluids or Tylenol, or vomiting for 24 hours, or inability to eat or drink because of nausea and vomiting.    Birth Plan:  Prenatal classes:  [facility]:  RTC in 4 weeks.    as per EMS wife states pt is usually AOX4, needs assistance for walking, went to bed normally and today woke up altered, not acting himself, confused. MD Mcclelland at bedside for evaluation.Pt has hx of skin cancer with mets to his hip., had radiation yesterday and started on prednisone yesterday as well.

## 2022-12-03 NOTE — H&P ADULT - HISTORY OF PRESENT ILLNESS
77M with a history of metastatic squamous cell carcinoma who presented with altered mental status noted by his wife. The patient is unable to provide significant history due to his current state. Information was obtained through interview with his wife a the bedside. The patient was noted to be in his usual state of health up until last night. He was noted to have had poor oral intake over the past month. He was also noted to have complaints of persistent pain to the hip despite home medications. He had also required hip surgery about one month prior and most recently under went palliative radiation to the hip. The patient was noted to be confused this morning and non-verbal. The patient had not had similar symptoms in the past. There were no obvious aggravating factors. There were no recent fevers, chills, or sick contacts. In the emergency department, the patient was noted to be confused but became more verbal. The patient's wife reported that the only change in his medication recently was resumption of prednisone.

## 2022-12-03 NOTE — ED ADULT NURSE REASSESSMENT NOTE - NS ED NURSE REASSESS COMMENT FT1
assumed care of pt 1430. pt on monitor, pt alert, head and eyes rotating to right and left, pt not following commands. when saying patients name pt responds and looks at nurse.

## 2022-12-03 NOTE — ED ADULT NURSE REASSESSMENT NOTE - NS ED NURSE REASSESS COMMENT FT1
pt agitated, combative towards staff and wife, MD called patient given zyprexa 5mg IM. pt removed IV, new IV placed to R ac 20G. report given to RN FELICITA. pt placed on cardiac monitor, brought to CDU 14R

## 2022-12-03 NOTE — ED PROVIDER NOTE - PHYSICAL EXAMINATION
Gen: NAD.   Head: NC/AT  Neck: trachea midline  Resp:  No distress  Ext: no deformities  Neuro:  A&O x0. Not responsive to questioning. Appears nonfocal on exam.   Skin:  Warm and dry as visualized

## 2022-12-03 NOTE — ED ADULT NURSE NOTE - OBJECTIVE STATEMENT
Assumed pt care as Critical Care RN called to code stroke.  Per wife, patient was normal yesterday and walking with a cane.  LKW 2200 last night.  Pt arrives awake, not following any commands, and with little incomprehensible speech.  He does not respond to his name either.  He is responsive to painful stimulous with strong left arm which needed to be held to Place IV.  Wife states pt has a history of squamous cell carcinoma with mets to parotid gland and hip. Had right hip surgery at [facility] this year in May. Pt took 5mg oxycodone last night.

## 2022-12-04 LAB
ALBUMIN SERPL ELPH-MCNC: 3.2 G/DL — LOW (ref 3.3–5.2)
ALP SERPL-CCNC: 63 U/L — SIGNIFICANT CHANGE UP (ref 40–120)
ALT FLD-CCNC: 7 U/L — SIGNIFICANT CHANGE UP
ANION GAP SERPL CALC-SCNC: 13 MMOL/L — SIGNIFICANT CHANGE UP (ref 5–17)
AST SERPL-CCNC: 21 U/L — SIGNIFICANT CHANGE UP
BILIRUB SERPL-MCNC: 0.5 MG/DL — SIGNIFICANT CHANGE UP (ref 0.4–2)
BUN SERPL-MCNC: 21.8 MG/DL — HIGH (ref 8–20)
CALCIUM SERPL-MCNC: 9.5 MG/DL — SIGNIFICANT CHANGE UP (ref 8.4–10.5)
CHLORIDE SERPL-SCNC: 102 MMOL/L — SIGNIFICANT CHANGE UP (ref 96–108)
CO2 SERPL-SCNC: 26 MMOL/L — SIGNIFICANT CHANGE UP (ref 22–29)
CREAT SERPL-MCNC: 0.95 MG/DL — SIGNIFICANT CHANGE UP (ref 0.5–1.3)
CULTURE RESULTS: NO GROWTH — SIGNIFICANT CHANGE UP
EGFR: 83 ML/MIN/1.73M2 — SIGNIFICANT CHANGE UP
FOLATE RBC-MCNC: 1731 NG/ML — HIGH (ref 499–1504)
GLUCOSE SERPL-MCNC: 101 MG/DL — HIGH (ref 70–99)
HCT VFR BLD CALC: 30.4 % — LOW (ref 39–50)
HCT VFR BLD CALC: 30.9 % — LOW (ref 39–50)
HCV AB S/CO SERPL IA: 0.07 S/CO — SIGNIFICANT CHANGE UP (ref 0–0.99)
HCV AB SERPL-IMP: SIGNIFICANT CHANGE UP
HGB BLD-MCNC: 9.9 G/DL — LOW (ref 13–17)
MCHC RBC-ENTMCNC: 29.6 PG — SIGNIFICANT CHANGE UP (ref 27–34)
MCHC RBC-ENTMCNC: 32.6 GM/DL — SIGNIFICANT CHANGE UP (ref 32–36)
MCV RBC AUTO: 91 FL — SIGNIFICANT CHANGE UP (ref 80–100)
PLATELET # BLD AUTO: 309 K/UL — SIGNIFICANT CHANGE UP (ref 150–400)
POTASSIUM SERPL-MCNC: 3.5 MMOL/L — SIGNIFICANT CHANGE UP (ref 3.5–5.3)
POTASSIUM SERPL-SCNC: 3.5 MMOL/L — SIGNIFICANT CHANGE UP (ref 3.5–5.3)
PROT SERPL-MCNC: 6.2 G/DL — LOW (ref 6.6–8.7)
RBC # BLD: 3.34 M/UL — LOW (ref 4.2–5.8)
RBC # FLD: 13.6 % — SIGNIFICANT CHANGE UP (ref 10.3–14.5)
SODIUM SERPL-SCNC: 140 MMOL/L — SIGNIFICANT CHANGE UP (ref 135–145)
SPECIMEN SOURCE: SIGNIFICANT CHANGE UP
TSH SERPL-MCNC: 1.67 UIU/ML — SIGNIFICANT CHANGE UP (ref 0.27–4.2)
VIT B12 SERPL-MCNC: 253 PG/ML — SIGNIFICANT CHANGE UP (ref 232–1245)
WBC # BLD: 7.22 K/UL — SIGNIFICANT CHANGE UP (ref 3.8–10.5)
WBC # FLD AUTO: 7.22 K/UL — SIGNIFICANT CHANGE UP (ref 3.8–10.5)

## 2022-12-04 PROCEDURE — 70553 MRI BRAIN STEM W/O & W/DYE: CPT | Mod: 26

## 2022-12-04 PROCEDURE — 99233 SBSQ HOSP IP/OBS HIGH 50: CPT

## 2022-12-04 RX ORDER — CELECOXIB 200 MG/1
200 CAPSULE ORAL
Qty: 90 | Refills: 0 | Status: DISCONTINUED | COMMUNITY
Start: 2022-09-17 | End: 2022-12-04

## 2022-12-04 RX ADMIN — DULOXETINE HYDROCHLORIDE 40 MILLIGRAM(S): 30 CAPSULE, DELAYED RELEASE ORAL at 21:23

## 2022-12-04 RX ADMIN — PANTOPRAZOLE SODIUM 40 MILLIGRAM(S): 20 TABLET, DELAYED RELEASE ORAL at 07:55

## 2022-12-04 RX ADMIN — Medication 81 MILLIGRAM(S): at 14:17

## 2022-12-04 RX ADMIN — DULOXETINE HYDROCHLORIDE 20 MILLIGRAM(S): 30 CAPSULE, DELAYED RELEASE ORAL at 14:18

## 2022-12-04 RX ADMIN — ENOXAPARIN SODIUM 40 MILLIGRAM(S): 100 INJECTION SUBCUTANEOUS at 07:55

## 2022-12-04 NOTE — PHYSICAL EXAM
[General Appearance - Alert] : alert [General Appearance - In No Acute Distress] : in no acute distress [General Appearance - Well Nourished] : well nourished [General Appearance - Well Developed] : well developed [General Appearance - Well-Appearing] : healthy appearing [Sclera] : the sclera and conjunctiva were normal [PERRL With Normal Accommodation] : pupils were equal in size, round, and reactive to light [Extraocular Movements] : extraocular movements were intact [Outer Ear] : the ears and nose were normal in appearance [Neck Appearance] : the appearance of the neck was normal [Neck Cervical Mass (___cm)] : no neck mass was observed [Jugular Venous Distention Increased] : there was no jugular-venous distention [Thyroid Diffuse Enlargement] : the thyroid was not enlarged [Thyroid Nodule] : there were no palpable thyroid nodules [Lungs Percussion] : the lungs were normal to percussion [Heart Rate And Rhythm] : heart rate was normal and rhythm regular [Edema] : there was no peripheral edema [Abdomen Soft] : soft [Abdomen Tenderness] : non-tender [Abdomen Mass (___ Cm)] : no abdominal mass palpated [Cervical Lymph Nodes Enlarged Posterior Bilaterally] : posterior cervical [Cervical Lymph Nodes Enlarged Anterior Bilaterally] : anterior cervical [Supraclavicular Lymph Nodes Enlarged Bilaterally] : supraclavicular [Axillary Lymph Nodes Enlarged Bilaterally] : axillary [No CVA Tenderness] : no ~M costovertebral angle tenderness [No Spinal Tenderness] : no spinal tenderness [Skin Color & Pigmentation] : normal skin color and pigmentation [Cranial Nerves] : cranial nerves 2-12 were intact [Deep Tendon Reflexes (DTR)] : deep tendon reflexes were 2+ and symmetric [Sensation] : the sensory exam was normal to light touch and pinprick [Motor Exam] : the motor exam was normal [No Focal Deficits] : no focal deficits [Oriented To Time, Place, And Person] : oriented to person, place, and time [Impaired Insight] : insight and judgment were intact [Affect] : the affect was normal [Mood] : the mood was normal [Skin Turgor] : normal skin turgor [] : no rash [FreeTextEntry1] : Strength-5/5

## 2022-12-04 NOTE — ASSESSMENT
[FreeTextEntry1] : Impression: JOSIANE SHAW is a 77 year old man who presents for follow up office visit for further evaluation of joint symptoms and rheumatic diseases including Rheumatoid Arthritis, osteoarthritis, fibromyalgia and Sjogren's Syndrome.\par \par Patient has persistent arthralgias and swelling of his joints secondary to a combination of his rheumatoid arthritis, osteoarthritis, and fibromyalgia with some sleep disturbance and fatigue. Swelling bilateral PIPs and knees secondary to active synovitis secondary to active rheumatoid arthritis.  Patient feels miserable.  Denies recent headache, jaw claudication, or visual symptoms. Persistent paresthesias bilateral feet and toes - consider secondary to his  chronic low back pain with lumbar herniated discs, spinal stenosis and LS radiculopathy, peripheral neuropathy or a combination. Some dry eyes and dry mouth consistent with Sjogren's Syndrome, using artificial tears and biotene mouthwash with relief. Of note, Sjogren's Syndrome can have an associated peripheral neuropathy. Recent lab tests revealed moderate inflammation secondary to his rheumatoid arthritis, otherwise no significant results or changes. Patient denies rash or side effects with current medications. Patient is content with current medication regimen. \par \par Plan: I reviewed recent lab results with patient with extensive discussion \par Laboratory tests ordered - see list below - with coordination of care \par Diagnosis and prognosis discussed\par Continue current medications (other than those changed below)\par Prednisone 10 mg q.d. end of breakfast, when symptoms dramatically improve, decrease 7.5 mg q.d. thereafter (Possible side effects explained including extensive discussion regarding risks including AVN requiring joint replacement) \par Discontinue Celebrex\par Restart Sulfasalazine 1000 mg b.i.d. with large glass of fluid and food (possible side effects explained)\par Restart Folic acid 1 mg q.d. (possible side effects explained) \par Continue duloxetine 20 mg in the morning and 40 mg in the evening (Possible side effects explained)\par Restart OTC Calcium 500 mg b.i.d end of meals (Possible side effects explained)\par Restart Vitamin D 50,000 units once a week (possible side effects explained)  \par Continue to stay off risedronate at this time in view of his recent hip/femur surgery\par Artificial tears one drop each eye q.i.d. and p.r.n.(Possible side effects explained)\par Biotene mouthwash/spray q.i.d. and p.r.n.(Possible side effects explained) \par Oral Hydration\par Patient declines oral medication for dryness \par Sun protection and sunscreen use emphasized \par Lab tests every 2 weeks - with coordination of care \par Return visit 1 month

## 2022-12-04 NOTE — PROGRESS NOTE ADULT - SUBJECTIVE AND OBJECTIVE BOX
Hospitalist Daily Progress Note    Chief Complaint:  Patient is a 77y old  Male who presents with a chief complaint of     SUBJECTIVE / OVERNIGHT EVENTS:  Patient was seen and examined at bedside. 1:1 at bedside, reports that patient has been calm. No other issues. Only complaint of pain at left hip but reports it is chronic without any new acute pain.   Patient denies chest pain, SOB, abd pain, N/V, fever, chills, dysuria or any other complaints. All remainder ROS negative.     MEDICATIONS  (STANDING):  aspirin  chewable 81 milliGRAM(s) Oral daily  DULoxetine 20 milliGRAM(s) Oral daily  DULoxetine 40 milliGRAM(s) Oral at bedtime  enoxaparin Injectable 40 milliGRAM(s) SubCutaneous every 24 hours  lisinopril 40 milliGRAM(s) Oral daily  pantoprazole    Tablet 40 milliGRAM(s) Oral before breakfast    MEDICATIONS  (PRN):  acetaminophen     Tablet .. 650 milliGRAM(s) Oral every 6 hours PRN Temp greater or equal to 38C (100.4F), Mild Pain (1 - 3)  oxycodone    5 mG/acetaminophen 325 mG 1 Tablet(s) Oral every 6 hours PRN Moderate Pain (4 - 6)        I&O's Summary      PHYSICAL EXAM:  Vital Signs Last 24 Hrs  T(C): 36.5 (04 Dec 2022 07:38), Max: 36.7 (03 Dec 2022 15:26)  T(F): 97.7 (04 Dec 2022 07:38), Max: 98 (03 Dec 2022 15:26)  HR: 63 (04 Dec 2022 07:38) (63 - 89)  BP: 146/65 (04 Dec 2022 07:38) (117/58 - 167/80)  BP(mean): --  RR: 20 (04 Dec 2022 07:38) (18 - 22)  SpO2: 99% (04 Dec 2022 07:38) (98% - 100%)    Parameters below as of 04 Dec 2022 07:38  Patient On (Oxygen Delivery Method): room air          Constitutional: NAD, Resting  ENT: Supple, No JVD  Lungs: CTA B/L, Non-labored breathing  Cardio: RRR, S1/S2, No murmur  Abdomen: Soft, Nontender, Nondistended; Bowel sounds present  Extremities: No calf tenderness, No pitting edema  Musculoskeletal:   No clubbing or cyanosis of digits; no joint swelling or tenderness to palpation  Psych: Calm, cooperative affect appropriate  Neuro: Awake and alert, oriented to name, location and date, Left LLE decreased movement due to pain but able to move all other extremities, No facial droop noted  Skin: No rashes; no palpable lesions    LABS:                        9.9    7.22  )-----------( 309      ( 04 Dec 2022 02:18 )             30.4     12-04    140  |  102  |  21.8<H>  ----------------------------<  101<H>  3.5   |  26.0  |  0.95    Ca    9.5      04 Dec 2022 02:18    TPro  6.2<L>  /  Alb  3.2<L>  /  TBili  0.5  /  DBili  x   /  AST  21  /  ALT  7   /  AlkPhos  63  12-04    PT/INR - ( 03 Dec 2022 12:00 )   PT: 13.4 sec;   INR: 1.15 ratio         PTT - ( 03 Dec 2022 12:00 )  PTT:30.6 sec  CARDIAC MARKERS ( 03 Dec 2022 12:00 )  x     / <0.01 ng/mL / x     / x     / x          Urinalysis Basic - ( 03 Dec 2022 14:00 )    Color: Yellow / Appearance: Slightly Turbid / S.015 / pH: x  Gluc: x / Ketone: Small  / Bili: Negative / Urobili: Negative mg/dL   Blood: x / Protein: 15 / Nitrite: Negative   Leuk Esterase: Trace / RBC: 3-5 /HPF / WBC 0-2 /HPF   Sq Epi: x / Non Sq Epi: Occasional / Bacteria: Few        CAPILLARY BLOOD GLUCOSE      POCT Blood Glucose.: 114 mg/dL (03 Dec 2022 11:51)        RADIOLOGY REVIEWED

## 2022-12-04 NOTE — CONSULT LETTER
[Dear  ___] : Dear  [unfilled], [Consult Letter:] : I had the pleasure of evaluating your patient, [unfilled]. [Please see my note below.] : Please see my note below. [Consult Closing:] : Thank you very much for allowing me to participate in the care of this patient.  If you have any questions, please do not hesitate to contact me. [Sincerely,] : Sincerely, [DrTristen  ___] : Dr. WHITMORE [FreeTextEntry3] : Raheem\par Myron I. Kleiner, M.D., FACR\par Chief, Division of Rheumatology\par Department of Medicine\par [facility]

## 2022-12-04 NOTE — PROGRESS NOTE ADULT - ASSESSMENT
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    #Acute metabolic encephalopathy  CT head was negative  CTA with perfusion was limited due to motion  Tele monitoring  UA without sign of infection  UTOX pos for benzos but given by ED  CXR without significant findings   MRI brain ordered - Now mental status has improved - Want to r/o acute cva    #Squamous cell carcinoma   Prior radiation to the head and neck noted earlier in the year and, most recently, intramedullary nail to the left hip with radiation therapy.  Pain meds as needed    #Coronary artery disease   C/w ASA    #Hypertension   Close blood pressure monitoring  C/w lisinopril    #GERD   On pantoprazole    DVT prophylaxis: Lovenox    Spoke to patients wife Owen over the phone and updated  Will bring meds to review this afternoon    Dispo: Needs MRI brain, PT eval

## 2022-12-04 NOTE — ADDENDUM
[FreeTextEntry1] : I, Karen Kaufman, acted solely as a scribe for Dr. Myron I. Kleiner, MD. on 12/02/2022.

## 2022-12-05 ENCOUNTER — NON-APPOINTMENT (OUTPATIENT)
Age: 77
End: 2022-12-05

## 2022-12-05 ENCOUNTER — TRANSCRIPTION ENCOUNTER (OUTPATIENT)
Age: 77
End: 2022-12-05

## 2022-12-05 VITALS
TEMPERATURE: 98 F | HEART RATE: 75 BPM | SYSTOLIC BLOOD PRESSURE: 105 MMHG | DIASTOLIC BLOOD PRESSURE: 73 MMHG | RESPIRATION RATE: 16 BRPM | OXYGEN SATURATION: 96 %

## 2022-12-05 PROCEDURE — 70498 CT ANGIOGRAPHY NECK: CPT | Mod: MA

## 2022-12-05 PROCEDURE — 85025 COMPLETE CBC W/AUTO DIFF WBC: CPT

## 2022-12-05 PROCEDURE — 80053 COMPREHEN METABOLIC PANEL: CPT

## 2022-12-05 PROCEDURE — 85027 COMPLETE CBC AUTOMATED: CPT

## 2022-12-05 PROCEDURE — 84484 ASSAY OF TROPONIN QUANT: CPT

## 2022-12-05 PROCEDURE — 84443 ASSAY THYROID STIM HORMONE: CPT

## 2022-12-05 PROCEDURE — 86803 HEPATITIS C AB TEST: CPT

## 2022-12-05 PROCEDURE — 36415 COLL VENOUS BLD VENIPUNCTURE: CPT

## 2022-12-05 PROCEDURE — 85610 PROTHROMBIN TIME: CPT

## 2022-12-05 PROCEDURE — 93005 ELECTROCARDIOGRAM TRACING: CPT

## 2022-12-05 PROCEDURE — 85730 THROMBOPLASTIN TIME PARTIAL: CPT

## 2022-12-05 PROCEDURE — 70450 CT HEAD/BRAIN W/O DYE: CPT | Mod: MA

## 2022-12-05 PROCEDURE — 87086 URINE CULTURE/COLONY COUNT: CPT

## 2022-12-05 PROCEDURE — 70553 MRI BRAIN STEM W/O & W/DYE: CPT

## 2022-12-05 PROCEDURE — 0042T: CPT | Mod: MA

## 2022-12-05 PROCEDURE — 99239 HOSP IP/OBS DSCHRG MGMT >30: CPT

## 2022-12-05 PROCEDURE — 96374 THER/PROPH/DIAG INJ IV PUSH: CPT

## 2022-12-05 PROCEDURE — 87637 SARSCOV2&INF A&B&RSV AMP PRB: CPT

## 2022-12-05 PROCEDURE — 99291 CRITICAL CARE FIRST HOUR: CPT | Mod: 25

## 2022-12-05 PROCEDURE — 80307 DRUG TEST PRSMV CHEM ANLYZR: CPT

## 2022-12-05 PROCEDURE — 82962 GLUCOSE BLOOD TEST: CPT

## 2022-12-05 PROCEDURE — 71045 X-RAY EXAM CHEST 1 VIEW: CPT

## 2022-12-05 PROCEDURE — 70496 CT ANGIOGRAPHY HEAD: CPT | Mod: MA

## 2022-12-05 PROCEDURE — 82607 VITAMIN B-12: CPT

## 2022-12-05 PROCEDURE — 82747 ASSAY OF FOLIC ACID RBC: CPT

## 2022-12-05 PROCEDURE — 81001 URINALYSIS AUTO W/SCOPE: CPT

## 2022-12-05 RX ORDER — DULOXETINE HYDROCHLORIDE 30 MG/1
1 CAPSULE, DELAYED RELEASE ORAL
Qty: 0 | Refills: 0 | DISCHARGE

## 2022-12-05 RX ORDER — RISEDRONATE SODIUM 25.8; 4.2 MG/1; MG/1
1 TABLET, FILM COATED ORAL
Qty: 0 | Refills: 0 | DISCHARGE

## 2022-12-05 RX ADMIN — PANTOPRAZOLE SODIUM 40 MILLIGRAM(S): 20 TABLET, DELAYED RELEASE ORAL at 06:39

## 2022-12-05 RX ADMIN — Medication 81 MILLIGRAM(S): at 11:22

## 2022-12-05 RX ADMIN — LISINOPRIL 40 MILLIGRAM(S): 2.5 TABLET ORAL at 05:30

## 2022-12-05 RX ADMIN — ENOXAPARIN SODIUM 40 MILLIGRAM(S): 100 INJECTION SUBCUTANEOUS at 07:47

## 2022-12-05 RX ADMIN — DULOXETINE HYDROCHLORIDE 20 MILLIGRAM(S): 30 CAPSULE, DELAYED RELEASE ORAL at 11:22

## 2022-12-05 NOTE — PHYSICAL THERAPY INITIAL EVALUATION ADULT - ADDITIONAL COMMENTS
Pt lives with wife in a 1 story house with 1 step to enter.  Modified Independent with RW PTA, wife assists with ADLs and self care.

## 2022-12-05 NOTE — DISCHARGE NOTE NURSING/CASE MANAGEMENT/SOCIAL WORK - PATIENT PORTAL LINK FT
You can access the FollowMyHealth Patient Portal offered by [facility] by registering at the following website: [email or web address]. By joining [facility]’s FollowMyHealth portal, you will also be able to view your health information using other applications (apps) compatible with our system.

## 2022-12-05 NOTE — DISCHARGE NOTE PROVIDER - NSDCFUSCHEDAPPT_GEN_ALL_CORE_FT
Jacques Aaron  [facility]  ORTHOSURG [address]  Scheduled Appointment: 12/06/2022    Misbah Recio  [facility]  OTOLARYNG [address]  Scheduled Appointment: 12/27/2022    Denise Morin  [facility]  RADMED [address]  Scheduled Appointment: 01/25/2023    Kleiner, Myron I  [facility]  RHEUM [address]  Scheduled Appointment: 01/26/2023    Meredith Daniel  [facility]  [facility]  Scheduled Appointment: 02/21/2023    Kleiner, Myron I  [facility]  R[address]UM [address]  Scheduled Appointment: 02/21/2023

## 2022-12-05 NOTE — DISCHARGE NOTE NURSING/CASE MANAGEMENT/SOCIAL WORK - NSDCVIVACCINE_GEN_ALL_CORE_FT
influenza, high-dose, quadrivalent; 28-Oct-2022 12:29; Leilani Ji (RN); [employer]; [id number] (Exp. Date: 30-Jun-2023); IntraMuscular; Deltoid Right.; 0.7 milliLiter(s); VIS (VIS Published: 06-Aug-2021, VIS Presented: 28-Oct-2022);

## 2022-12-05 NOTE — DISCHARGE NOTE PROVIDER - NSDCCPCAREPLAN_GEN_ALL_CORE_FT
PRINCIPAL DISCHARGE DIAGNOSIS  Diagnosis: Altered mental status  Assessment and Plan of Treatment: Improved.  IT has een determined that there are no signs of infection.  MRI of brain was negative for stroke.  Importance of medication compliance, and importance of knowning dosing, timing, and frequency of medications taken.  Follow up with PCP upon discharge.      SECONDARY DISCHARGE DIAGNOSES  Diagnosis: History of squamous cell carcinoma excision  Assessment and Plan of Treatment: Continue to follow up outpt wit Oncologist.    Diagnosis: CAD (coronary artery disease)  Assessment and Plan of Treatment: Continue ASA.  Folow up with PCP upon discharge.    Diagnosis: GERD (gastroesophageal reflux disease)  Assessment and Plan of Treatment: Continue Pantoprazole.    Diagnosis: HTN (hypertension)  Assessment and Plan of Treatment: Continue to monitor BP.  Continue lisinopril.  Follow up with PCP upon discharge.

## 2022-12-05 NOTE — DISCHARGE NOTE PROVIDER - NSDCMRMEDTOKEN_GEN_ALL_CORE_FT
acetaminophen 325 mg oral tablet: 3 tab(s) orally every 6 hours, As needed, Mild Pain (1 - 3)  aspirin 81 mg oral tablet: 1 tab(s) orally once a day  DULoxetine 20 mg oral delayed release capsule: 1 cap(s) orally once a day-morning  DULoxetine 20 mg oral delayed release capsule: 1 cap(s) orally once a day (at bedtime)  hydroCHLOROthiazide 25 mg oral tablet: 1 tab(s) orally once a day-morning  HYDROmorphone 2 mg oral tablet: 1 tab(s) orally every 4-6 hours, As needed, Moderate-severe Pain MDD:6  pantoprazole 40 mg oral delayed release tablet: 1 tab(s) orally once a day-morning  ramipril 10 mg oral capsule: 2 cap(s) orally once a day-morning  risedronate 150 mg oral tablet: 1 tab(s) orally once a month  rivaroxaban 10 mg oral tablet: 1 tab(s) orally once a day MDD:1   acetaminophen 325 mg oral tablet: 3 tab(s) orally every 6 hours, As needed, Mild Pain (1 - 3)  aspirin 81 mg oral tablet: 1 tab(s) orally once a day  DULoxetine 20 mg oral delayed release capsule: 1 cap(s) orally once a day-morning  DULoxetine 20 mg oral delayed release capsule: 2 cap(s) orally once a day (at bedtime)  folic acid 1 mg oral tablet: 1 tab(s) orally once a day  hydroCHLOROthiazide 25 mg oral tablet: 1 tab(s) orally once a day-morning  pantoprazole 40 mg oral delayed release tablet: 1 tab(s) orally once a day-morning  ramipril 10 mg oral capsule: 2 cap(s) orally once a day-morning  sulfaSALAzine 500 mg oral tablet: 2 tab(s) orally 2 times a day

## 2022-12-05 NOTE — DISCHARGE NOTE PROVIDER - ATTENDING DISCHARGE PHYSICAL EXAMINATION:
PHYSICAL EXAM:  Vital Signs Last 24 Hrs  T(F): 97.6 (05 Dec 2022 10:13), Max: 98.3 (05 Dec 2022 04:35)  HR: 75 (05 Dec 2022 10:13) (70 - 75)  BP: 105/73 (05 Dec 2022 10:13) (105/73 - 147/70)  RR: 16 (05 Dec 2022 10:13) (16 - 20)  SpO2: 96% (05 Dec 2022 10:13) (96% - 97%)    GENERAL: NAD, Resting in bed  Eyes: EOMI, PERRLA  ENMT: Conjunctiva and sclera clear; supple neck, No JVD  Cardiovascular: S1,S2, RRR, No murmur  Respiratory: CTA B/L, Non-labored breathing  GI: Bowel sounds present; Soft, Nontender, Nondistended. No hepatomegaly  Genitourinary: Deferred  Skin:  no breakdowns, ulcers or discharge, No rashes or lesions  Neurology: Alert & Oriented X3, non-focal and spontaneous movements of all extremities, CN 2 to 12 grossly intact   Psych: Appropriate mood and affect, calm, pleasant, Responds appropriately to questions

## 2022-12-05 NOTE — PHYSICAL THERAPY INITIAL EVALUATION ADULT - PERTINENT HX OF CURRENT PROBLEM, REHAB EVAL
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

## 2022-12-05 NOTE — DISCHARGE NOTE NURSING/CASE MANAGEMENT/SOCIAL WORK - NSDCPEFALRISK_GEN_ALL_CORE
For information on Fall & Injury Prevention, visit: [email or web address] OR  [email or web address] OR  https://www.cdc.gov/steadi/patient.html

## 2022-12-05 NOTE — DISCHARGE NOTE PROVIDER - HOSPITAL COURSE
77M with a history of metastatic squamous cell carcinoma who presented with altered mental status noted by his wife. The patient is unable to provide significant history due to his current state. Information was obtained through interview with his wife a the bedside. The patient was noted to be in his usual state of health up until last night. He was noted to have had poor oral intake over the past month. He was also noted to have complaints of persistent pain to the hip despite home medications. He had also required hip surgery about one month prior and most recently under went palliative radiation to the hip. The patient was noted to be confused this morning and non-verbal. The patient had not had similar symptoms in the past. There were no obvious aggravating factors. There were no recent fevers, chills, or sick contacts. In the emergency department, the patient was noted to be confused but became more verbal. The patient's wife reported that the only change in his medication recently was resumption of prednisone. While in ED, Urinalysis was without significant findings and urine toxicology was positive for benzodiazepine which was administered in the emergency department and pt was admitted for further evaluation. MRI was 77M with a history of metastatic squamous cell carcinoma who presented with altered mental status noted by his wife. The patient's wife reported that the only change in his medication recently was resumption of prednisone. While in ED, Urinalysis was without significant findings and urine toxicology was positive for benzodiazepine which was administered in the emergency department and pt was admitted for further evaluation. MRI of the brain with and without contrast was performed without any acute findings. Patients mentation improved to baseline. Patient was seen by PT and recommended home PT. Patients AMS was likely secondary to Polypharmacy. Mental status now at baseline. Medically stable for DC home.

## 2022-12-06 ENCOUNTER — APPOINTMENT (OUTPATIENT)
Dept: ORTHOPEDIC SURGERY | Facility: CLINIC | Age: 77
End: 2022-12-06

## 2022-12-07 ENCOUNTER — NON-APPOINTMENT (OUTPATIENT)
Age: 77
End: 2022-12-07

## 2022-12-12 ENCOUNTER — EMERGENCY (EMERGENCY)
Facility: HOSPITAL | Age: 77
LOS: 1 days | Discharge: HOME | End: 2022-12-12
Attending: EMERGENCY MEDICINE
Payer: MEDICARE

## 2022-12-12 VITALS
RESPIRATION RATE: 18 BRPM | DIASTOLIC BLOOD PRESSURE: 71 MMHG | HEIGHT: 71 IN | TEMPERATURE: 98 F | OXYGEN SATURATION: 97 % | SYSTOLIC BLOOD PRESSURE: 136 MMHG | HEART RATE: 61 BPM | WEIGHT: 167.99 LBS

## 2022-12-12 DIAGNOSIS — Z90.49 ACQUIRED ABSENCE OF OTHER SPECIFIED PARTS OF DIGESTIVE TRACT: Chronic | ICD-10-CM

## 2022-12-12 DIAGNOSIS — Z98.1 ARTHRODESIS STATUS: Chronic | ICD-10-CM

## 2022-12-12 DIAGNOSIS — Z95.5 PRESENCE OF CORONARY ANGIOPLASTY IMPLANT AND GRAFT: Chronic | ICD-10-CM

## 2022-12-12 LAB
ALBUMIN SERPL ELPH-MCNC: 2.9 G/DL — LOW (ref 3.3–5.2)
ALP SERPL-CCNC: 63 U/L — SIGNIFICANT CHANGE UP (ref 40–120)
ALT FLD-CCNC: 13 U/L — SIGNIFICANT CHANGE UP
ANION GAP SERPL CALC-SCNC: 11 MMOL/L — SIGNIFICANT CHANGE UP (ref 5–17)
AST SERPL-CCNC: 19 U/L — SIGNIFICANT CHANGE UP
BASOPHILS # BLD AUTO: 0 K/UL — SIGNIFICANT CHANGE UP (ref 0–0.2)
BASOPHILS NFR BLD AUTO: 0 % — SIGNIFICANT CHANGE UP (ref 0–2)
BILIRUB SERPL-MCNC: 0.4 MG/DL — SIGNIFICANT CHANGE UP (ref 0.4–2)
BUN SERPL-MCNC: 20.8 MG/DL — HIGH (ref 8–20)
CALCIUM SERPL-MCNC: 9.9 MG/DL — SIGNIFICANT CHANGE UP (ref 8.4–10.5)
CHLORIDE SERPL-SCNC: 97 MMOL/L — SIGNIFICANT CHANGE UP (ref 96–108)
CO2 SERPL-SCNC: 26 MMOL/L — SIGNIFICANT CHANGE UP (ref 22–29)
CREAT SERPL-MCNC: 0.87 MG/DL — SIGNIFICANT CHANGE UP (ref 0.5–1.3)
EGFR: 90 ML/MIN/1.73M2 — SIGNIFICANT CHANGE UP
EOSINOPHIL # BLD AUTO: 0 K/UL — SIGNIFICANT CHANGE UP (ref 0–0.5)
EOSINOPHIL NFR BLD AUTO: 0 % — SIGNIFICANT CHANGE UP (ref 0–6)
FLUAV AG NPH QL: SIGNIFICANT CHANGE UP
FLUBV AG NPH QL: SIGNIFICANT CHANGE UP
GLUCOSE SERPL-MCNC: 89 MG/DL — SIGNIFICANT CHANGE UP (ref 70–99)
HCT VFR BLD CALC: 26.9 % — LOW (ref 39–50)
HGB BLD-MCNC: 8.6 G/DL — LOW (ref 13–17)
LYMPHOCYTES # BLD AUTO: 0.58 K/UL — LOW (ref 1–3.3)
LYMPHOCYTES # BLD AUTO: 6.9 % — LOW (ref 13–44)
MCHC RBC-ENTMCNC: 29.2 PG — SIGNIFICANT CHANGE UP (ref 27–34)
MCHC RBC-ENTMCNC: 32 GM/DL — SIGNIFICANT CHANGE UP (ref 32–36)
MCV RBC AUTO: 91.2 FL — SIGNIFICANT CHANGE UP (ref 80–100)
MONOCYTES # BLD AUTO: 1.16 K/UL — HIGH (ref 0–0.9)
MONOCYTES NFR BLD AUTO: 13.9 % — SIGNIFICANT CHANGE UP (ref 2–14)
NEUTROPHILS # BLD AUTO: 6.53 K/UL — SIGNIFICANT CHANGE UP (ref 1.8–7.4)
NEUTROPHILS NFR BLD AUTO: 78.3 % — HIGH (ref 43–77)
PLATELET # BLD AUTO: 336 K/UL — SIGNIFICANT CHANGE UP (ref 150–400)
POTASSIUM SERPL-MCNC: 4 MMOL/L — SIGNIFICANT CHANGE UP (ref 3.5–5.3)
POTASSIUM SERPL-SCNC: 4 MMOL/L — SIGNIFICANT CHANGE UP (ref 3.5–5.3)
PROT SERPL-MCNC: 6 G/DL — LOW (ref 6.6–8.7)
RBC # BLD: 2.95 M/UL — LOW (ref 4.2–5.8)
RBC # FLD: 13.5 % — SIGNIFICANT CHANGE UP (ref 10.3–14.5)
RSV RNA NPH QL NAA+NON-PROBE: SIGNIFICANT CHANGE UP
SARS-COV-2 RNA SPEC QL NAA+PROBE: SIGNIFICANT CHANGE UP
SODIUM SERPL-SCNC: 134 MMOL/L — LOW (ref 135–145)
WBC # BLD: 8.34 K/UL — SIGNIFICANT CHANGE UP (ref 3.8–10.5)
WBC # FLD AUTO: 8.34 K/UL — SIGNIFICANT CHANGE UP (ref 3.8–10.5)

## 2022-12-12 PROCEDURE — 73551 X-RAY EXAM OF FEMUR 1: CPT | Mod: 26,LT

## 2022-12-12 PROCEDURE — 99220: CPT | Mod: FS

## 2022-12-12 PROCEDURE — 73501 X-RAY EXAM HIP UNI 1 VIEW: CPT | Mod: 26,LT

## 2022-12-12 RX ORDER — DULOXETINE HYDROCHLORIDE 30 MG/1
40 CAPSULE, DELAYED RELEASE ORAL AT BEDTIME
Refills: 0 | Status: DISCONTINUED | OUTPATIENT
Start: 2022-12-12 | End: 2022-12-19

## 2022-12-12 RX ORDER — SULFASALAZINE 500 MG
500 TABLET ORAL ONCE
Refills: 0 | Status: COMPLETED | OUTPATIENT
Start: 2022-12-12 | End: 2022-12-12

## 2022-12-12 RX ORDER — LISINOPRIL 2.5 MG/1
40 TABLET ORAL DAILY
Refills: 0 | Status: DISCONTINUED | OUTPATIENT
Start: 2022-12-12 | End: 2022-12-19

## 2022-12-12 RX ORDER — ACETAMINOPHEN 500 MG
1000 TABLET ORAL ONCE
Refills: 0 | Status: COMPLETED | OUTPATIENT
Start: 2022-12-12 | End: 2022-12-12

## 2022-12-12 RX ORDER — ACETAMINOPHEN 500 MG
650 TABLET ORAL EVERY 6 HOURS
Refills: 0 | Status: DISCONTINUED | OUTPATIENT
Start: 2022-12-12 | End: 2022-12-19

## 2022-12-12 RX ORDER — DULOXETINE HYDROCHLORIDE 30 MG/1
20 CAPSULE, DELAYED RELEASE ORAL DAILY
Refills: 0 | Status: DISCONTINUED | OUTPATIENT
Start: 2022-12-12 | End: 2022-12-19

## 2022-12-12 RX ORDER — FOLIC ACID 0.8 MG
1 TABLET ORAL DAILY
Refills: 0 | Status: DISCONTINUED | OUTPATIENT
Start: 2022-12-12 | End: 2022-12-19

## 2022-12-12 RX ORDER — ASPIRIN/CALCIUM CARB/MAGNESIUM 324 MG
81 TABLET ORAL DAILY
Refills: 0 | Status: DISCONTINUED | OUTPATIENT
Start: 2022-12-12 | End: 2022-12-19

## 2022-12-12 RX ORDER — IBUPROFEN 200 MG
600 TABLET ORAL EVERY 6 HOURS
Refills: 0 | Status: DISCONTINUED | OUTPATIENT
Start: 2022-12-12 | End: 2022-12-19

## 2022-12-12 RX ORDER — OXYCODONE HYDROCHLORIDE 5 MG/1
5 TABLET ORAL ONCE
Refills: 0 | Status: DISCONTINUED | OUTPATIENT
Start: 2022-12-12 | End: 2022-12-12

## 2022-12-12 RX ORDER — SULFASALAZINE 500 MG
1000 TABLET ORAL
Refills: 0 | Status: DISCONTINUED | OUTPATIENT
Start: 2022-12-12 | End: 2022-12-19

## 2022-12-12 RX ORDER — OXYCODONE HYDROCHLORIDE 5 MG/1
5 TABLET ORAL EVERY 4 HOURS
Refills: 0 | Status: COMPLETED | OUTPATIENT
Start: 2022-12-12 | End: 2022-12-19

## 2022-12-12 RX ORDER — ENOXAPARIN SODIUM 100 MG/ML
40 INJECTION SUBCUTANEOUS EVERY 24 HOURS
Refills: 0 | Status: DISCONTINUED | OUTPATIENT
Start: 2022-12-12 | End: 2022-12-19

## 2022-12-12 RX ORDER — PANTOPRAZOLE SODIUM 20 MG/1
40 TABLET, DELAYED RELEASE ORAL
Refills: 0 | Status: DISCONTINUED | OUTPATIENT
Start: 2022-12-12 | End: 2022-12-19

## 2022-12-12 RX ORDER — SODIUM CHLORIDE 9 MG/ML
1000 INJECTION, SOLUTION INTRAVENOUS ONCE
Refills: 0 | Status: COMPLETED | OUTPATIENT
Start: 2022-12-12 | End: 2022-12-12

## 2022-12-12 RX ADMIN — Medication 500 MILLIGRAM(S): at 15:02

## 2022-12-12 RX ADMIN — ENOXAPARIN SODIUM 40 MILLIGRAM(S): 100 INJECTION SUBCUTANEOUS at 22:10

## 2022-12-12 RX ADMIN — OXYCODONE HYDROCHLORIDE 5 MILLIGRAM(S): 5 TABLET ORAL at 11:47

## 2022-12-12 RX ADMIN — Medication 600 MILLIGRAM(S): at 19:58

## 2022-12-12 RX ADMIN — PANTOPRAZOLE SODIUM 40 MILLIGRAM(S): 20 TABLET, DELAYED RELEASE ORAL at 15:03

## 2022-12-12 RX ADMIN — OXYCODONE HYDROCHLORIDE 5 MILLIGRAM(S): 5 TABLET ORAL at 19:19

## 2022-12-12 RX ADMIN — DULOXETINE HYDROCHLORIDE 20 MILLIGRAM(S): 30 CAPSULE, DELAYED RELEASE ORAL at 15:03

## 2022-12-12 RX ADMIN — Medication 600 MILLIGRAM(S): at 19:20

## 2022-12-12 RX ADMIN — OXYCODONE HYDROCHLORIDE 5 MILLIGRAM(S): 5 TABLET ORAL at 22:09

## 2022-12-12 RX ADMIN — OXYCODONE HYDROCHLORIDE 5 MILLIGRAM(S): 5 TABLET ORAL at 15:07

## 2022-12-12 RX ADMIN — SODIUM CHLORIDE 1000 MILLILITER(S): 9 INJECTION, SOLUTION INTRAVENOUS at 12:23

## 2022-12-12 RX ADMIN — SODIUM CHLORIDE 1000 MILLILITER(S): 9 INJECTION, SOLUTION INTRAVENOUS at 15:07

## 2022-12-12 RX ADMIN — Medication 400 MILLIGRAM(S): at 15:07

## 2022-12-12 RX ADMIN — Medication 81 MILLIGRAM(S): at 15:02

## 2022-12-12 RX ADMIN — OXYCODONE HYDROCHLORIDE 5 MILLIGRAM(S): 5 TABLET ORAL at 23:09

## 2022-12-12 RX ADMIN — OXYCODONE HYDROCHLORIDE 5 MILLIGRAM(S): 5 TABLET ORAL at 20:47

## 2022-12-12 NOTE — PROVIDER CONTACT NOTE (OTHER) - SITUATION
PT orders received and noted, chart reviewed. PT eval completed and documented in chart.
PT orders received and noted, chart reviewed. PT eval completed and documented in chart.

## 2022-12-12 NOTE — ED PROVIDER NOTE - PHYSICAL EXAMINATION
Vital Signs Last 24 Hrs  T(C): 36.7 (12 Dec 2022 10:44), Max: 36.7 (12 Dec 2022 10:44)  T(F): 98.1 (12 Dec 2022 10:44), Max: 98.1 (12 Dec 2022 10:44)  HR: 61 (12 Dec 2022 10:44) (61 - 61)  BP: 136/71 (12 Dec 2022 10:44) (136/71 - 136/71)  RR: 18 (12 Dec 2022 10:44) (18 - 18)  SpO2: 97% (12 Dec 2022 10:44) (97% - 97%)

## 2022-12-12 NOTE — ED PROVIDER NOTE - OBJECTIVE STATEMENT
Patient is a 77 year old male with a pmhx of metastatic squamous cell carcinoma s/p radiation therapy last Friday and s/p yaron placement into the left hip and thigh 10/28/2022 who reports tho the ED with diffuse weakness and pain. While the pain is diffuse, that patient noted that the pain in his hip/thigh is rated 10/10. The patient was previously prescribed Oxycodone 5mg q4hrs however, his last dose was 6Pm last night, the wife states that she notes some behavioural changes that believes it may be related to the oxycodone use. The patient also reports that he also has experienced decreased appetitive but denies nausea and vomiting currently. Of note, the patient was previously admitted to [facility] on 12/3 for altered mental status likely secondary to polypharmacy. Traditionally the family states that the patient has difficulty with ADLs 2/2 weakness and pain.

## 2022-12-12 NOTE — PROVIDER CONTACT NOTE (OTHER) - ASSESSMENT
Pt received on stretcher, pt amb, and returned with CBWR in NAD on stretcher.  Pt with Pre 8/10,  session  9/10, Post  9/10 pain levels st Left LE: RN Aware. PT will follow.

## 2022-12-12 NOTE — ED CDU PROVIDER INITIAL DAY NOTE - OBJECTIVE STATEMENT
Patient is a 77 year old male with a pmhx of metastatic squamous cell carcinoma s/p radiation therapy last Friday and s/p yaron placement into the left hip and thigh 10/28/2022 who reports tho the ED with diffuse weakness and pain. While the pain is diffuse, that patient noted that the pain in his hip/thigh is rated 10/10. The patient was previously prescribed Oxycodone 5mg q4hrs however, his last dose was 6Pm last night, the wife states that she notes some behavioural changes that believes it may be related to the oxycodone use. The patient also reports that he also has experienced decreased appetitive but denies nausea and vomiting currently. Of note, the patient was previously admitted to [facility] on 12/3 for altered mental status likely secondary to polypharmacy. Traditionally the family states that the patient has difficulty with ADLs 2/2 weakness and pain. No new trauma/falls

## 2022-12-12 NOTE — PHYSICAL THERAPY INITIAL EVALUATION ADULT - PERTINENT HX OF CURRENT PROBLEM, REHAB EVAL
76 y/o M with metastatic squamous cell carcinoma presents c/o left hip pain. pt had surgery to L Hip in october and therapeutic radiation to area. no new falls/trauma. xray with +pathologic fracture. Dr. Cisneros discussed case with orthopedics with no acute intervention.

## 2022-12-12 NOTE — ED CDU PROVIDER INITIAL DAY NOTE - MEDICAL DECISION MAKING DETAILS
76 y/o M with metastatic squamous cell carcinoma presents c/o left hip pain. pt had surgery to L Hip in october and therapeutic radiation to area. no new falls/trauma. xray with +pathologic fracture. Dr. Cisneros discussed case with orthopedics with no acute intervention. PT eval - recommended pain control and re-evaluate but if pain remains uncontrolled likely needs QASIM. Will keep ot in obs for pain mgmt consult ,  PT elisha, HARI for dispo planning

## 2022-12-12 NOTE — ED PROVIDER NOTE - CARE PROVIDERS DIRECT ADDRESSES
,DirectAddress_Unknown,[email or web address] ,DirectAddress_Unknown

## 2022-12-12 NOTE — ED ADULT TRIAGE NOTE - CHIEF COMPLAINT QUOTE
c/o left hip pain that he has had since october has been taking oxycodone for the pain last dose yesterday.

## 2022-12-12 NOTE — ED ADULT NURSE NOTE - OBJECTIVE STATEMENT
77 yom presents to ed c/o left hip pain onset of symptoms october. with minimal relief of pain. patient on oxycodone for pain. decreased appetite pt on radiation for cancer.

## 2022-12-12 NOTE — ED PROVIDER NOTE - PROGRESS NOTE DETAILS
Blayne:  Pt with pathologic fx to L lesser troch and already has an intermedullary nail for prophylaxis.  Pt stable for PT WBAT.

## 2022-12-12 NOTE — PHYSICAL THERAPY INITIAL EVALUATION ADULT - ADDITIONAL COMMENTS
Pt reports living with spouse in a condo with 0 IRINA. Pt amb with rollator and is independent with functional mobility. Pt has assist for ADLs and IADLs. Pt drives and is working part time. Pt has support of family. Pt owns RW, rollator, and SAC.

## 2022-12-12 NOTE — ED PROVIDER NOTE - ATTENDING CONTRIBUTION TO CARE
Pt was recently admitted here for altered mental status which was attributed to polypharmacy and patient was discharged.  Pt's wife states that he has been increasingly weak at home and has had worsening L hip pain. no numbness/focal weakness. no cp. no sob. no other complaints.    physical - rrr. ctab. abd - soft, nt. no edema. no rash. pain on ROM of L hip.    plan - labs and xr reviewed. pt with pathologic fx to L lesser troch but already has an intermedullary yaron in place.  Pt hgb mildly low compared to last admission but no bloody stools or black stools.  PT evaluated and recommends better pain control and if not possible ENEDELIA.  Pt gets more confused with increasing opiates for pain control. case d/w dr. pizano and JEANNINE Stubbs with plan to put in obs for pain management consult and possibly enedelia placement.

## 2022-12-12 NOTE — ED PROVIDER NOTE - CARE PROVIDER_API CALL
Nay Javier)  Anesthesiology; Pain Medicine  [address] [facility], [place] [address]  Phone: [phone]  Fax: [phone]  Follow Up Time: 4-6 Days    Alejandro Baxter)  Surgery  [address] [place] [address]  Phone: [phone]  Fax: [phone]  Follow Up Time: 7-10 Days   Nay Javier)  Anesthesiology; Pain Medicine  [address] [place] [address], [place] [address]  Phone: [phone]  Fax: [phone]  Follow Up Time: 4-6 Days

## 2022-12-12 NOTE — ED PROVIDER NOTE - PROVIDER TOKENS
PROVIDER:[TOKEN:[[id number]],FOLLOWUP:[4-6 Days]],PROVIDER:[TOKEN:[[id number]],FOLLOWUP:[7-10 Days]] PROVIDER:[TOKEN:[[id number]],FOLLOWUP:[4-6 Days]]

## 2022-12-12 NOTE — PROVIDER CONTACT NOTE (OTHER) - ASSESSMENT
Pt received on stretcher, pt amb, and returned with CBWR in NAD on stretcher.  Pt with Pre 8/10,  session  9/10, Post  9/10 pain levels at Left LE: RN Aware. PT will follow.

## 2022-12-12 NOTE — PROVIDER CONTACT NOTE (OTHER) - ACTION/TREATMENT ORDERED:
PT goals (to be achieved in 5 days) supine <>sit c sup, sit<> stand c sup, amb 75 ft with RW and sup
PT goals (to be achieved in 5 days) supine <>sit c sup, sit<> stand c sup, amb 75 ft with RW and sup

## 2022-12-12 NOTE — ED CDU PROVIDER INITIAL DAY NOTE - ATTENDING APP SHARED VISIT CONTRIBUTION OF CARE
pt with joint pain with history metastatic ca; no orthopedic intervention;  pain manageent and dispo as per swl

## 2022-12-12 NOTE — ED CDU PROVIDER INITIAL DAY NOTE - NS ED ATTENDING STATEMENT MOD
This was a shared visit with the MYKE. I reviewed and verified the documentation and independently performed the documented:

## 2022-12-13 PROCEDURE — 99226: CPT | Mod: FS

## 2022-12-13 PROCEDURE — 72192 CT PELVIS W/O DYE: CPT | Mod: 26,MA

## 2022-12-13 RX ADMIN — Medication 650 MILLIGRAM(S): at 03:13

## 2022-12-13 RX ADMIN — PANTOPRAZOLE SODIUM 40 MILLIGRAM(S): 20 TABLET, DELAYED RELEASE ORAL at 05:13

## 2022-12-13 RX ADMIN — Medication 600 MILLIGRAM(S): at 08:44

## 2022-12-13 RX ADMIN — Medication 1000 MILLIGRAM(S): at 05:13

## 2022-12-13 RX ADMIN — Medication 600 MILLIGRAM(S): at 09:30

## 2022-12-13 RX ADMIN — DULOXETINE HYDROCHLORIDE 20 MILLIGRAM(S): 30 CAPSULE, DELAYED RELEASE ORAL at 11:42

## 2022-12-13 RX ADMIN — Medication 650 MILLIGRAM(S): at 02:13

## 2022-12-13 RX ADMIN — OXYCODONE HYDROCHLORIDE 5 MILLIGRAM(S): 5 TABLET ORAL at 05:13

## 2022-12-13 RX ADMIN — DULOXETINE HYDROCHLORIDE 40 MILLIGRAM(S): 30 CAPSULE, DELAYED RELEASE ORAL at 21:27

## 2022-12-13 RX ADMIN — Medication 81 MILLIGRAM(S): at 11:42

## 2022-12-13 RX ADMIN — Medication 10 MILLIGRAM(S): at 17:26

## 2022-12-13 RX ADMIN — OXYCODONE HYDROCHLORIDE 5 MILLIGRAM(S): 5 TABLET ORAL at 03:13

## 2022-12-13 RX ADMIN — OXYCODONE HYDROCHLORIDE 5 MILLIGRAM(S): 5 TABLET ORAL at 17:23

## 2022-12-13 RX ADMIN — OXYCODONE HYDROCHLORIDE 5 MILLIGRAM(S): 5 TABLET ORAL at 21:27

## 2022-12-13 RX ADMIN — Medication 1000 MILLIGRAM(S): at 17:26

## 2022-12-13 RX ADMIN — Medication 1 MILLIGRAM(S): at 11:42

## 2022-12-13 RX ADMIN — OXYCODONE HYDROCHLORIDE 5 MILLIGRAM(S): 5 TABLET ORAL at 02:13

## 2022-12-13 RX ADMIN — ENOXAPARIN SODIUM 40 MILLIGRAM(S): 100 INJECTION SUBCUTANEOUS at 21:27

## 2022-12-13 RX ADMIN — LISINOPRIL 40 MILLIGRAM(S): 2.5 TABLET ORAL at 05:13

## 2022-12-13 RX ADMIN — OXYCODONE HYDROCHLORIDE 5 MILLIGRAM(S): 5 TABLET ORAL at 19:18

## 2022-12-13 RX ADMIN — OXYCODONE HYDROCHLORIDE 5 MILLIGRAM(S): 5 TABLET ORAL at 11:43

## 2022-12-13 RX ADMIN — OXYCODONE HYDROCHLORIDE 5 MILLIGRAM(S): 5 TABLET ORAL at 12:30

## 2022-12-13 NOTE — ED CDU PROVIDER SUBSEQUENT DAY NOTE - PROGRESS NOTE DETAILS
ortho officially consulted for + lesser trochanter avulsion fracture. WBAT. can follow up with Dr. Aaron outpatient. PT recommended home w/ assistance vs. SATR. Wife Owen [phone] juanito pt needs QASIM. pain controlled. Vanessa wayne

## 2022-12-13 NOTE — ED CDU PROVIDER SUBSEQUENT DAY NOTE - MEDICAL DECISION MAKING DETAILS
78 y/o M with metastatic squamous cell carcinoma presents c/o left hip pain. pt had surgery to L Hip in october and therapeutic radiation to area. no new falls/trauma. xray with +pathologic fracture. Dr. Cisneros discussed case with orthopedics with no acute intervention. PT eval - recommended pain control and re-evaluate but if pain remains uncontrolled likely needs QASIM. Will keep ot in obs for pain mgmt consult ,  PT elisha, HARI for dispo planning

## 2022-12-13 NOTE — CONSULT NOTE ADULT - SUBJECTIVE AND OBJECTIVE BOX
Pt Name: JOSIANE SHAW    MRN: [id number]      Patient is a 77y Male presenting to the emergency department with a chief complaint of progressive left hip pain and general weakness.  Patient is s/p prophylactic Left hip IM nail by Dr. Aaron on 10/28/22 due to Metastatic SCC.  patient states pain to hip since Thanksgiving, no falls or recent injuries.  Patient family states they can not care for him at home currently due to difficulty ambulating.  Patient did walk with PT here.  Patient had CT showing Lesser troch avulsion fx.      REVIEW OF SYSTEMS    General: Alert, responsive, in NAD    Skin/Breast: No rashes, no pruritis   	  Ophthalmologic: No visual changes. No redness.   	  ENMT:	No discharge. No swelling.    Respiratory and Thorax: No difficulty breathing. No cough.  	   Cardiovascular:	No chest pain. No palpitations.    Gastrointestinal:	 No abdominal pain. No diarrhea.     Genitourinary: No dysuria. No bleeding.    Musculoskeletal: SEE HPI.    Neurological: No sensory or motor changes.     Psychiatric: No anxiety or depression.    Hematology/Lymphatics: No swelling.    Endocrine: No Hx of diabetes.    ROS is otherwise negative.    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 650 milliGRAM(s) Oral every 6 hours PRN  aspirin enteric coated 81 milliGRAM(s) Oral daily  DULoxetine 20 milliGRAM(s) Oral daily  DULoxetine 40 milliGRAM(s) Oral at bedtime  enoxaparin Injectable 40 milliGRAM(s) SubCutaneous every 24 hours  folic acid 1 milliGRAM(s) Oral daily  hydrochlorothiazide 25 milliGRAM(s) Oral daily  ibuprofen  Tablet. 600 milliGRAM(s) Oral every 6 hours PRN  lisinopril 40 milliGRAM(s) Oral daily  oxyCODONE    IR 5 milliGRAM(s) Oral every 4 hours  pantoprazole    Tablet 40 milliGRAM(s) Oral before breakfast  predniSONE   Tablet 10 milliGRAM(s) Oral once  sulfaSALAzine 1000 milliGRAM(s) Oral two times a day      FAMILY HISTORY:  FH: CAD (coronary artery disease) (Mother)    : non-contributory                        8.6    8.34  )-----------( 336      ( 12 Dec 2022 12:41 )             26.9       12-12    134<L>  |  97  |  20.8<H>  ----------------------------<  89  4.0   |  26.0  |  0.87    Ca    9.9      12 Dec 2022 12:41    TPro  6.0<L>  /  Alb  2.9<L>  /  TBili  0.4  /  DBili  x   /  AST  19  /  ALT  13  /  AlkPhos  63  12-12      Vital Signs Last 24 Hrs  T(C): 36.6 (13 Dec 2022 11:06), Max: 36.8 (12 Dec 2022 18:06)  T(F): 97.9 (13 Dec 2022 11:06), Max: 98.3 (12 Dec 2022 18:06)  HR: 67 (13 Dec 2022 11:06) (57 - 67)  BP: 118/73 (13 Dec 2022 11:06) (118/73 - 145/76)  BP(mean): --  RR: 17 (13 Dec 2022 11:06) (16 - 18)  SpO2: 97% (13 Dec 2022 11:06) (97% - 98%)    Parameters below as of 13 Dec 2022 11:06  Patient On (Oxygen Delivery Method): room air        Daily     Daily       PHYSICAL EXAM:      Appearance: Alert, responsive, in no acute distress.    Neurological: Sensation is grossly intact to light touch. 5/5 motor function of all extremities. No focal deficits or weaknesses found.    Skin: no rash on visible skin. Skin is clean, dry and intact. No bleeding. No abrasions. No ulcerations.    Vascular: 2+ distal pulses. Cap refill < 2 sec. No signs of venous insufficiency or stasis. No extremity ulcerations. No cyanosis.    Musculoskeletal:         Left Lower Extremity: well healed incisions to lateral hip, FROM - passive without pain.  no deformity, no tenderness  no tenderness to knee, ankle or foot.   no swelling     Imaging Studies:  < from: CT Pelvis Bony Only No Cont (12.13.22 @ 10:56) >  ACC: [id number] EXAM:  CT PELVIS BONY ONLY                          PROCEDURE DATE:  12/13/2022          INTERPRETATION:  CT PELVIS BONY dated 12/13/2022 10:56 AM    INDICATION: Hip pain. No left hip mass    COMPARISON: Hip MRI dated 10/10/2022    TECHNIQUE: CT imaging of the pelvis was performed. The data was   reformatted in the axial, coronal, and sagittal planes. Additionally, 3-D   reformatted imaging was created.    FINDINGS:    OSSEOUS STRUCTURES: The patient is status post ORIF with transcervical   screw and intramedullary yaron traversing a left proximal femoral mass.   There is cortical destruction involving the proximal femur and the left   lesser trochanter. There is avulsion of the lesser trochanter suggestive   of a superimposed pathologic fracture. Narrowing and productive change at   the pubic symphysis. And mild to moderate bilateral hip joint space   narrowing with marginal spurring. Degenerative changes of the lower   lumbar spine.  SYNOVIUM/ JOINT FLUID: No hip effusion is seen.  TENDONS: Intact tendons  MUSCLES: There is a fat-containing lesion within the tensor fascia manjula   on the left measuring 3.5 x 8.5 cm compatible with an intramuscular   lipoma.  NEUROVASCULAR STRUCTURES: Moderate vascular calcifications  INTRAPELVIC SOFT TISSUES: Colonic diverticulosis.  SUBCUTANEOUS SOFT TISSUES: Soft tissue swelling over the left hip   surgical change.    3-D reformatted imaging confirms these findings.    IMPRESSION:    1.  Status post ORIF of the left hip with metallic hardware extending   through a known left proximal femoral lesion.  2.  Avulsion of the lesser trochanter suspicious for an acute   superimposed pathologic avulsion fracture.  3.  Degenerative changes.  4.  Left tensor fascia manjula muscle lipoma.    --- End of Report ---            TOMAS THAYER MD; Attending Radiologist  This document has been electronically signed. Dec 13 2022 11:05AM    < end of copied text >    A/P:  Pt is a  77y Male found to have lesser troch avulsion fx    PLAN:   * Pain control   WBAT   Must follow up Dr. Aaron and oncology upon discharge  no acute ortho intervention     Case discussed with Dr. Turenr

## 2022-12-13 NOTE — CONSULT NOTE ADULT - NS ATTEND AMEND GEN_ALL_CORE FT
Patient to continue follow up with treating ortho surgeon and oncologist as there appears to be a lytic lesion of the IT region and IM nail in place.  Lesser trochanter fracture noted and may continue to be WBAT.  Ortho stable for discharge for outpatient follow up .

## 2022-12-14 VITALS
OXYGEN SATURATION: 97 % | DIASTOLIC BLOOD PRESSURE: 65 MMHG | SYSTOLIC BLOOD PRESSURE: 104 MMHG | RESPIRATION RATE: 17 BRPM | TEMPERATURE: 98 F | HEART RATE: 72 BPM

## 2022-12-14 PROCEDURE — 85025 COMPLETE CBC W/AUTO DIFF WBC: CPT

## 2022-12-14 PROCEDURE — 99284 EMERGENCY DEPT VISIT MOD MDM: CPT | Mod: 25

## 2022-12-14 PROCEDURE — 87637 SARSCOV2&INF A&B&RSV AMP PRB: CPT

## 2022-12-14 PROCEDURE — 73501 X-RAY EXAM HIP UNI 1 VIEW: CPT

## 2022-12-14 PROCEDURE — 73551 X-RAY EXAM OF FEMUR 1: CPT

## 2022-12-14 PROCEDURE — 72192 CT PELVIS W/O DYE: CPT | Mod: MA

## 2022-12-14 PROCEDURE — 96361 HYDRATE IV INFUSION ADD-ON: CPT

## 2022-12-14 PROCEDURE — G0378: CPT

## 2022-12-14 PROCEDURE — 80053 COMPREHEN METABOLIC PANEL: CPT

## 2022-12-14 PROCEDURE — 36415 COLL VENOUS BLD VENIPUNCTURE: CPT

## 2022-12-14 PROCEDURE — 99217: CPT

## 2022-12-14 PROCEDURE — 96374 THER/PROPH/DIAG INJ IV PUSH: CPT

## 2022-12-14 RX ADMIN — Medication 1000 MILLIGRAM(S): at 05:29

## 2022-12-14 RX ADMIN — Medication 600 MILLIGRAM(S): at 09:29

## 2022-12-14 RX ADMIN — Medication 600 MILLIGRAM(S): at 10:15

## 2022-12-14 RX ADMIN — DULOXETINE HYDROCHLORIDE 20 MILLIGRAM(S): 30 CAPSULE, DELAYED RELEASE ORAL at 11:34

## 2022-12-14 RX ADMIN — Medication 1 MILLIGRAM(S): at 11:34

## 2022-12-14 RX ADMIN — OXYCODONE HYDROCHLORIDE 5 MILLIGRAM(S): 5 TABLET ORAL at 09:00

## 2022-12-14 RX ADMIN — OXYCODONE HYDROCHLORIDE 5 MILLIGRAM(S): 5 TABLET ORAL at 01:52

## 2022-12-14 RX ADMIN — OXYCODONE HYDROCHLORIDE 5 MILLIGRAM(S): 5 TABLET ORAL at 11:35

## 2022-12-14 RX ADMIN — Medication 81 MILLIGRAM(S): at 11:34

## 2022-12-14 RX ADMIN — PANTOPRAZOLE SODIUM 40 MILLIGRAM(S): 20 TABLET, DELAYED RELEASE ORAL at 08:09

## 2022-12-14 RX ADMIN — OXYCODONE HYDROCHLORIDE 5 MILLIGRAM(S): 5 TABLET ORAL at 08:09

## 2022-12-14 NOTE — ED ADULT NURSE REASSESSMENT NOTE - GENERAL PATIENT STATE
comfortable appearance/cooperative
resting/sleeping
resting/sleeping
comfortable appearance/resting/sleeping
comfortable appearance/cooperative

## 2022-12-14 NOTE — CHART NOTE - NSCHARTNOTEFT_GEN_A_CORE
SW Note: Worker alerted by PA that pt and his wife (Owen- [phone]) are requesting to speak with SW in regards to QASIM. Worker met with pt and his wife at bedside. Worker explained his role in the ED as it pertains to the pts hospital stay. Pt and wife in agreement with QASIM placement, [facility], but open to anywhere that is near their residence in [place]. ANNIA to be circulated once created. SW to update wife as process unfolds. No auth will be needed. SW continues to follow.
SW NOTE: As per medical team, Pt is stable for d/c. [facility] has accepted pt, pt has accepted bed, message left for wife, Owen [phone] notifying of transfer to [facility]. Treatment team notified. SCREEN, NEAF and ANNIA up to date and in ACM. [facility] clerical notified for transport set up. Transport packet and ltr completed and provided. No additional SW needs at this time.

## 2022-12-14 NOTE — ED CDU PROVIDER DISPOSITION NOTE - PATIENT PORTAL LINK FT
You can access the FollowMyHealth Patient Portal offered by [facility] by registering at the following website: [email or web address]. By joining [employer]’s FollowMyHealth portal, you will also be able to view your health information using other applications (apps) compatible with our system.

## 2022-12-14 NOTE — ED CDU PROVIDER SUBSEQUENT DAY NOTE - NS ED ATTENDING STATEMENT MOD
This was a shared visit with the MYKE. I reviewed and verified the documentation and independently performed the documented:
This was a shared visit with the MYKE. I reviewed and verified the documentation and independently performed the documented:

## 2022-12-14 NOTE — ED ADULT NURSE REASSESSMENT NOTE - COMFORT CARE
assisted to bedpan/po fluids offered/side rails down
meal provided/plan of care explained/po fluids offered/wait time explained/warm blanket provided
meal provided/plan of care explained/po fluids offered/repositioned/side rails up/wait time explained/warm blanket provided

## 2022-12-14 NOTE — ED CDU PROVIDER SUBSEQUENT DAY NOTE - PHYSICAL EXAMINATION
Vital Signs Last 24 Hrs  T(C): 36.7 (12 Dec 2022 10:44), Max: 36.7 (12 Dec 2022 10:44)  T(F): 98.1 (12 Dec 2022 10:44), Max: 98.1 (12 Dec 2022 10:44)  HR: 61 (12 Dec 2022 10:44) (61 - 61)  BP: 136/71 (12 Dec 2022 10:44) (136/71 - 136/71)  RR: 18 (12 Dec 2022 10:44) (18 - 18)  SpO2: 97% (12 Dec 2022 10:44) (97% - 97%)
Vital Signs Last 24 Hrs  T(C): 36.7 (12 Dec 2022 10:44), Max: 36.7 (12 Dec 2022 10:44)  T(F): 98.1 (12 Dec 2022 10:44), Max: 98.1 (12 Dec 2022 10:44)  HR: 61 (12 Dec 2022 10:44) (61 - 61)  BP: 136/71 (12 Dec 2022 10:44) (136/71 - 136/71)  RR: 18 (12 Dec 2022 10:44) (18 - 18)  SpO2: 97% (12 Dec 2022 10:44) (97% - 97%)

## 2022-12-14 NOTE — ED ADULT NURSE REASSESSMENT NOTE - STATUS
QASIM/awaiting transfer, no change
QASIM placement
Pain management/awaiting consult
awaiting discharge, no change
QASIM placement/awaiting discharge, no change

## 2022-12-14 NOTE — ED CDU PROVIDER SUBSEQUENT DAY NOTE - NSICDXFAMILYHX_GEN_ALL_CORE_FT
FAMILY HISTORY:  Mother  Still living? Unknown  FH: CAD (coronary artery disease), Age at diagnosis: Age Unknown    
FAMILY HISTORY:  Mother  Still living? Unknown  FH: CAD (coronary artery disease), Age at diagnosis: Age Unknown

## 2022-12-14 NOTE — ED ADULT NURSE REASSESSMENT NOTE - NS ED NURSE REASSESS COMMENT FT1
assumed pt care at 1930 - pt under OBS status - pending pain management and physical therapy consult. pain 7/10 to left hip.   report given to Sylwia MORGAN of OBS.
pt resting comfortably on stretcher with eyes closed. nad, rounds frequently provided for pt comfort and safety. pt awaiting pain management consult and social work for dispo planning.
Assumed care of pt at 19:15 as stated in report from EDDIE Orozco. Charting as noted. Patient A&O x4, pt reports pain/discomfort in L hip. denies CP/SOB. Updated on the plan of care. Call bell within reach, bed locked in lowest position. IV site flushed w/ NS. No redness, swelling or pain noted to site. No signs of acute distress noted, safety maintained. pt awaiting dispo planning.
Assumed care of the patient @0730. Pt A&Ox4, VSS afebrile. Pt c/o pain medicated as ordered. Awaiting QASIM. Patient in understanding of plan of care. Patient with no further questions for the RN. Resting in comfort. Call bell within reach and encouraged to use when assistance needed. Will continue to monitor.
Assumed care of the patient @0730. Pt A&Ox4, VS afebrile. PT awaiting Pain Management consult. Patient in understanding of plan of care. Patient with no further questions for the RN. Resting in comfort. Call bell within reach and encouraged to use when assistance needed. Will continue to monitor.
pt received from May MORGAN at 4742. no s/s of acute distress, pt resting comfortably on stretcher. pt denies chest pain/pressure/tightness, palpitations, sob/dyspnea, fevers/chills. pt c/o sharp, constant pain to left hip/femur s/p yarno replacement 10/28/2022. pt medicated as per orders. two healed surgical incisions noted to outer thigh. pt is able to make needs known. call bell in reach and encouraged to use when assistance is needed. pt awaiting pain management consult ans social work for dispo planning.

## 2022-12-14 NOTE — ED CDU PROVIDER SUBSEQUENT DAY NOTE - NSICDXPASTSURGICALHX_GEN_ALL_CORE_FT
PAST SURGICAL HISTORY:  H/O parotidectomy right    History of fusion of lumbar spine 1981    S/P cholecystectomy     S/P coronary artery stent placement x 6 - last one 6 years ago    Status post cervical spinal fusion 1991    
PAST SURGICAL HISTORY:  H/O parotidectomy right    History of fusion of lumbar spine 1981    S/P cholecystectomy     S/P coronary artery stent placement x 6 - last one 6 years ago    Status post cervical spinal fusion 1991

## 2022-12-14 NOTE — ED CDU PROVIDER DISPOSITION NOTE - WR ORDER STATUS 1
I spoke with patient's wife, we will keep Tuesday appointment.  No need to call me on Monday.    Se Vann MD, FAAFP  Family Medicine Physician  [facility] [place]  [address] [facility] [place] [address]      
Reason for Call:  Other     Detailed comments: Patient calling. Dr. Vann asked him to call on Monday with an update, however he has an appointment scheduled with Dr. Vann on Tuesday. He is wondering if he needs to keep the appointment on Tuesday. Patient states he will be gone for a while, it is okay to leave a detailed message on the voicemail or with his wife.     Phone Number Patient can be reached at: Home number on file [phone] (home)    Best Time: any    Can we leave a detailed message on this number? YES    Call taken on 2/1/2021 at 1:16 PM by Jessica Carbajal      
Resulted

## 2022-12-14 NOTE — ED CDU PROVIDER DISPOSITION NOTE - NSFOLLOWUPINSTRUCTIONS_ED_ALL_ED_FT
Take home medications as prescribed.  Follow up with  Dr. Aaron.  Follow up with oncology.  Come back with new or worsening symptoms.

## 2022-12-14 NOTE — ED CDU PROVIDER SUBSEQUENT DAY NOTE - ATTENDING APP SHARED VISIT CONTRIBUTION OF CARE
Patient in observation pending QASIM placement we will continue care plan no acute complaints this morning and feels well.
+ lesser trochanter avulsion fracture. need for pain control and sw--maybe needs help at home as difficult for wife to manage mobility

## 2022-12-14 NOTE — ED CDU PROVIDER DISPOSITION NOTE - CLINICAL COURSE
Patient is a 77 year old male with a pmhx of metastatic squamous cell carcinoma s/p radiation therapy last Friday and s/p yaron placement into the left hip and thigh 10/28/2022 who reports tho the ED with diffuse weakness and pain. While the pain is diffuse, that patient noted that the pain in his hip/thigh is rated 10/10. The patient was previously prescribed Oxycodone 5mg q4hrs however, his last dose was 6Pm last night, the wife states that she notes some behavioural changes that believes it may be related to the oxycodone use. The patient also reports that he also has experienced decreased appetitive but denies nausea and vomiting currently. Traditionally the family states that the patient has difficulty with ADLs 2/2 weakness and pain. No new trauma/falls. Pt found to have lesser trochanter fx seen on CT. Ortho consulted and PT recommends QASIM. [facility] Advised needs continued f/u.

## 2022-12-14 NOTE — ED CDU PROVIDER DISPOSITION NOTE - ATTENDING APP SHARED VISIT CONTRIBUTION OF CARE
.  Patient able to be transferred to [facility] for further care.  Agree with dispo.

## 2022-12-14 NOTE — ED CDU PROVIDER SUBSEQUENT DAY NOTE - NSICDXPASTMEDICALHX_GEN_ALL_CORE_FT
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

## 2022-12-14 NOTE — ED CDU PROVIDER DISPOSITION NOTE - CARE PROVIDER_API CALL
German Fletcher)  Hematology; Internal Medicine; Medical Oncology  [address] [place] [address]  Phone: [phone]  Fax: [phone]  Follow Up Time:

## 2022-12-14 NOTE — ED CDU PROVIDER SUBSEQUENT DAY NOTE - HISTORY
PT placed in OBS, has had no acute incidents or complaints while in CDU PT is stable. PT Placed in OBS for placement in [facility].
No events. No pertinent interval history. Vital signs remained stable overnight. Received no calls by RN overnight.

## 2022-12-22 NOTE — ASSESSMENT
[FreeTextEntry1] : JOSIANE SHAW is a 77 year male with PMHX of CAD s/p stents HTN, GERD, fibromyalgia, h/o polymyalgia rheumatica, Parotid ca s/p dissection/RT, cigar smoker ( 1-4 cigars/day) presents for initial consultation for metastatic carcinoma.\par \par PD-L1 Immunohistochemistry\par Result: Combined Positive Score (CPS): <1, NEGATIVE\par Result: Tumor Proportion Score (TPS*)   <1%\par Interpretation: NEGATIVE\par \par \par Patient presented to ENT, Dr. Alex Boston, on 1/18/22 c/o right neck swelling for 5 weeks. On exam, swelling in the right parotid gland approx. 3 x 3 cm firm, nontender was noted, no enlarged LN.  \par Subsequently MRI of neck on 1/24/22 revealed irregularly peripherally enhancing mass in the superficial lobe of the right parotid gland measuring 2.7 x 2.3 cm in AP.\par \par Right parotid FNA biopsy performed on 2/1/22 at  consistent with carcinoma with squamous differentiation,Cytology; clusters and single scattered atypical squamous cells with prominent nucleoli, irregular nuclear membranes, irregular chromatin patterns, anisonucleosis and keratinization. Primary versus metastatic carcinoma.\par \par Patient s/p Parotidectomy with facial nerve dissection and right neck dissection on 3/3/22 by Dr. Misbah Recio.\par Pathology showed 22 lymph nodes negative for metastatic carcinoma.\par \par Completed radiation on 6/1/22 with Dr. Rowe\par \par Bone Mets:\par -PET/CT on 10/14/22 visualized osseous structures reveal an intramedullary lytic 3 cm lesion along the left femoral intertrochanteric region max SUV 10.6. Biopsy of left proximal femur lesion on 10/27/2022. Pathology reported metastatic carcinoma,the tumor shows squamous differentiation on H T E. Tumor cells are positive for AE1/AE3, CK5/ and p40.\par \par \par Plan: \par -Will order MR neck and face to better access area and if no signs of local recurrence and only oligo metastatic disease ronni met will favor located RT \par -Will send original tumor for Foundation one testing\par -F/u with Dr. Richardson\par -F/u in 3 months with restaging Pet scan

## 2022-12-22 NOTE — HISTORY OF PRESENT ILLNESS
[de-identified] : JOSIANE SHAW is a 77 year male with PMHX of CAD s/p stents HTN, GERD, fibromyalgia, h/o polymyalgia rheumatica, Parotid ca s/p dissection/RT, cigar smoker ( 1-4 cigars/day) presents for initial consultation for metastatic carcinoma \par \par Patient presented to ENT, Dr. Alex Boston, on 1/18/22 c/o right neck swelling for 5 weeks. On exam, swelling int he right parotid gland approx. 3 x 3 cm firm, nontender was noted, no enlarged LN.  \par MRI of neck was ordered and performed on 1/24/22. Scan revealed irregularly peripherally enhancing mass in the superficial lobe of the right parotid gland measuring 2.7 x 2.3 cm in AP . There is some strand-like internal enhancement as well. There is no left parotid mass.\par \par \par Right parotid FNA biopsy performed on 2/1/22 at . Pathology demonstrated cells positive for malignancy (Richard Class IV), Consistent with carcinoma with squamous differentiation,Cytology slide and cell block show clusters and single scattered atypical squamous cells with prominent nucleoli, irregular nuclear membranes, irregular chromatin patterns, anisonucleosis and keratinization. Primary versus metastatic carcinoma.\par \par Subsequent PET/CT on 2/13/22 reported a rim of FDG activity surrounding a right intraparotid lesion (SUV 6.3, 3.1 x 2.3 cm). Decreased FDG activity centrally may be secondary to tumor necrosis. There is no other focal abnormal  FDG activity identified in the head and neck. There are no lytic or blastic lesions.\par \par \par Patient referred to Head and Neck surgeon, Dr. Misbah Recio, on 2/16/22. Patient s/p Parotidectomy with facial nerve dissection and right neck dissection on 3/3/22. \par Pathology reported Squamous cell carcinoma, moderately to poorly differentiated (3.0 cm in greatest dimension) involving parotid gland and soft tissue, favor metastatic based on clinical information provided by surgeon. Carcinoma focally very close to (less than 0.1 mm) inked circumferential margin, 4 intraparotid lymph nodes negative for metastatic carcinoma, 3 Lymph nodes, right level 1B- negative for metastatic carcinoma, Submandibular gland negative for carcinoma\par Lymph nodes, right levels 2 and 3, neck dissection - 15 lymph nodes negative for metastatic carcinoma\par \par \par Patient referred to Dr. Richardson for post op RT. Patient completed radiation on 6/1/22. \par \par Patient presented to Orthopedist on 10/6/22 for ongoing left hip pain for the past 4 years. He has been following Rheumatology, Dr. Kleiner, for pain. Pain has been progressive affect quality of life. \par MRI of hip was performed  on 10/10/22 revealing  a fat-containing mass centered within the tensor fascia manjula muscle measuring 4 x 3.1 x 7.2 cm and a mass is seen centered within the lesser trochanter measuring 2.9 x 3.4 x 4.4 cm. There is overlying periosteal edema and cortical thinning\par \par Subsequent PET/CT on 10/14/22 visualized osseous structures reveal an intramedullary lytic 3 cm lesion along the left femoral intertrochanteric region max SUV 10.6.\par Right parotid gland demonstrates interval resection of the hypermetabolic mass as well as right neck prominent soft tissue postsurgical low level uptake. No evidence of atypical lymph node activity identified.\par \par Biopsy of left proximal femur lesion on 10/27/2022. Pathology reported metastatic carcinoma,the tumor shows squamous differentiation on H T E. Performed immunostains show that the tumor cells are positive for AE1/AE3, CK5/ and p40. This supports the diagnosis of metastatic carcinoma with squamous differentiation.\par \par PD-L1 Immunohistochemistry\par Result: Combined Positive Score (CPS): <1, NEGATIVE\par Result: Tumor Proportion Score (TPS*)   <1%\par Interpretation: NEGATIVE\par \par \par \par PMHX of CAD s/p stents HTN, GERD, fibromyalgia, h/o polymyalgia rheumatica, Parotid ca s/p dissection/RT, h/o SCCA skin 10 years ago\par SHx : b/l carpel tunnel surgery, CAD s/p 6 stents ( last stent 2016), Gallbladder removal, laminectomy, ,  Parotidectomy with facial nerve dissection and right neck dissection( 3/3/22) \par  [de-identified] : Patient presents for an initial consultation with wife Owen\par \par Patient doing well\par Continues to smoke cigars\par Patient reports he is full recovered from surgery. Admits he has dry mouth, loss of taste likely 2/2 to RT\par \par \par \par \par

## 2022-12-22 NOTE — ADDENDUM
[facility] lab is calling stating that they need order faxed to them for CBC. I electronically faxed order.    [FreeTextEntry1] : Documented by Ludivina Ramirez acting as scribe for Dr. Orozco on 11/11/2022 \par \par All Medical record entries made by the Scribe were at my, Dr. Orozco's, direction and personally dictated by me on 11/11/2022 . I have reviewed the chart and agree that the record accurately reflects my personal performance of the history, physical exam, assessment and plan. I have also personally directed, reviewed, and agreed with the discharge instructions.\par

## 2022-12-22 NOTE — RESULTS/DATA
[FreeTextEntry1] : 10/27/2022 Surgical Pathology \par \par Final Diagnosis\par 1.  Left proximal femur lesion, biopsy:\par -  Metastatic carcinoma \par \par 2.  Left proximal femur lesion, biopsy:\par - Metastatic carcinoma \par \par -The tumor shows squamous differentiation on H T E. Performed immunostains show that the tumor cells are positive for AE1/AE3, CK5/ and p40. This supports the diagnosis of metastatic carcinoma with squamous differentiation.\par \par PD-L1 Immunohistochemistry\par Result: Combined Positive Score (CPS): <1, NEGATIVE\par Result: Tumor Proportion Score (TPS*)   <1%\par Interpretation: NEGATIVE\par \par PET/CT 10/14/22\par \par - Right parotid gland demonstrates interval resection of the hypermetabolic mass as well as right neck prominent soft tissue postsurgical low level uptake. No evidence of atypical lymph node activity identified.\par - Hepatosplenic uptake max SUV ratio is 2.9/2.2. Splenic activity within normal limits. Splenic cyst 5 cm.\par - No abnormal pelvic lymphadenopathy involving the iliac chains and inguinal regions. \par -Visualized osseous structures reveal an intramedullary lytic 3 cm lesion along the left femoral intertrochanteric region max SUV 10.6.\par \par \par \par \par 10/10/22 MRI Left Hip\par MUSCLE AND TENDONS: There is mild edema at the insertion the gluteus minimus tendon. The remaining tendons appear intact. There is a fat-containing mass centered within the tensor fascia manjula muscle measuring 4 x 3.1 x 7.2 cm.\par BONE MARROW: A mass is seen centered within the lesser trochanter measuring 2.9 x 3.4 x 4.4 cm. There is overlying periosteal edema and cortical thinning\par \par 9/9/22 MRI L-spine\par \par IMPRESSION:\par 1.  Moderately severe right-sided neural canal stenosis at L4-L5 without definite impingement of the exiting ipsilateral L4 nerve root. Nonetheless correlate for possible right-sided L4 radiculopathy.\par 2.  Otherwise no high-grade lumbar spinal canal or neural canal stenosis.\par 3.  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 Moderate to severe right worse than left facet arthrosis and mixed Modic type I/II endplate changes at L4-L5.\par \par \par 3/4/2022 Surgical Pathology \par \par 1. Lymph nodes, right level 1B, neck dissection\par - 3 lymph nodes negative for metastatic carcinoma\par - Submandibular gland negative for carcinoma\par \par 2. Parotid gland, right parotid gland with tumor, parotidectomy\par - Squamous cell carcinoma, moderately to poorly differentiated (3.0 cm in greatest dimension) involving parotid gland and soft tissue, favor metastatic based on clinical information provided by surgeon\par - Carcinoma focally very close to (less than 0.1 mm) inked circumferential margin\par - 4 intraparotid lymph nodes negative for metastatic carcinoma\par \par 3. Lymph nodes, right levels 2 and 3, neck dissection\par - 15 lymph nodes negative for metastatic carcinoma\par \par 4. Neck, right external jugular chain, excision\par - Neurovascular and adipose tissue, negative for carcinoma\par 2/13/22 Pet\par \par 2/13/22 PET/CT ( [facility])\par  -There is a rim of FDG activity surrounding a right intraparotid lesion (SUV 6.3, 3.1 x 2.3 cm). Decreased FDG activity centrally may be secondary to tumor necrosis. There is no other focal abnormal  FDG activity identified in the head and neck.\par -There are no lytic or blastic lesions.\par \par \par  2/1/2022\par PAROTID, RIGHT, US GUIDED FNA\par -POSITIVE FOR MALIGNANCY (CLAIRE CLASS VI)\par -Consistent with carcinoma with squamous differentiation, 2.6 cm \par -Cytology slide and cell block show clusters and single scattered atypical squamous cells with prominent nucleoli, irregular nuclear membranes, irregular chromatin patterns, anisonucleosis and keratinization.\par Note:\par Primary versus metastatic carcinoma.\par \par \par \par 1/24/22 MRI Neck\par - irregularly peripherally enhancing mass in the superficial lobe of the right parotid gland measuring 2.7 x 2.3 cm in AP. There is some strand-like internal enhancement as well. There is no left parotid mass.\par

## 2022-12-27 ENCOUNTER — APPOINTMENT (OUTPATIENT)
Dept: OTOLARYNGOLOGY | Facility: CLINIC | Age: 77
End: 2022-12-27

## 2023-01-19 ENCOUNTER — OUTPATIENT (OUTPATIENT)
Dept: OUTPATIENT SERVICES | Facility: HOSPITAL | Age: 78
LOS: 1 days | End: 2023-01-19

## 2023-01-19 ENCOUNTER — APPOINTMENT (OUTPATIENT)
Dept: NUCLEAR MEDICINE | Facility: CLINIC | Age: 78
End: 2023-01-19
Payer: MEDICARE

## 2023-01-19 DIAGNOSIS — C79.51 SECONDARY MALIGNANT NEOPLASM OF BONE: ICD-10-CM

## 2023-01-19 DIAGNOSIS — Z95.5 PRESENCE OF CORONARY ANGIOPLASTY IMPLANT AND GRAFT: Chronic | ICD-10-CM

## 2023-01-19 DIAGNOSIS — Z90.49 ACQUIRED ABSENCE OF OTHER SPECIFIED PARTS OF DIGESTIVE TRACT: Chronic | ICD-10-CM

## 2023-01-19 DIAGNOSIS — Z98.1 ARTHRODESIS STATUS: Chronic | ICD-10-CM

## 2023-01-19 PROCEDURE — 78815 PET IMAGE W/CT SKULL-THIGH: CPT | Mod: 26,PS

## 2023-01-21 LAB
BASOPHILS # BLD AUTO: 0.05 K/UL
BASOPHILS NFR BLD AUTO: 0.7 %
EOSINOPHIL # BLD AUTO: 1.1 K/UL
EOSINOPHIL NFR BLD AUTO: 15 %
HCT VFR BLD CALC: 45.5 %
HGB BLD-MCNC: 14.7 G/DL
IMM GRANULOCYTES NFR BLD AUTO: 0.4 %
LYMPHOCYTES # BLD AUTO: 0.88 K/UL
LYMPHOCYTES NFR BLD AUTO: 12 %
MAN DIFF?: NORMAL
MCHC RBC-ENTMCNC: 32.2 PG
MCHC RBC-ENTMCNC: 32.3 GM/DL
MCV RBC AUTO: 99.6 FL
MONOCYTES # BLD AUTO: 0.69 K/UL
MONOCYTES NFR BLD AUTO: 9.4 %
NEUTROPHILS # BLD AUTO: 4.58 K/UL
NEUTROPHILS NFR BLD AUTO: 62.5 %
PLATELET # BLD AUTO: 223 K/UL
RBC # BLD: 4.57 M/UL
RBC # FLD: 12.6 %
WBC # FLD AUTO: 7.33 K/UL

## 2023-01-25 ENCOUNTER — APPOINTMENT (OUTPATIENT)
Dept: RADIATION ONCOLOGY | Facility: CLINIC | Age: 78
End: 2023-01-25
Payer: MEDICARE

## 2023-01-25 ENCOUNTER — APPOINTMENT (OUTPATIENT)
Dept: RADIATION ONCOLOGY | Facility: CLINIC | Age: 78
End: 2023-01-25

## 2023-01-25 PROCEDURE — 99024 POSTOP FOLLOW-UP VISIT: CPT

## 2023-01-25 NOTE — DISEASE MANAGEMENT
[Pathological] : TNM Stage: p [IV] : IV [FreeTextEntry4] : metastatic squamous cell carcinoma [TTNM] : 2 [NTNM] : 0 [MTNM] : 1 [de-identified] : 2000 cGy [de-identified] : left hip/femur

## 2023-01-25 NOTE — ASSESSMENT
[Metastatic disease without local control] : Metastatic disease without local control [FreeTextEntry1] : probable progression of disease on PET scan

## 2023-01-25 NOTE — VITALS
[Maximal Pain Intensity: 7/10] : 7/10 [Pain Description/Quality: ___] : Pain description/quality: [unfilled] [Pain Duration: ___] : Pain duration: [unfilled] [Pain Location: ___] : Pain Location: [unfilled] [Pain Interferes with ADLs] : Pain interferes with activities of daily living. [Opioid] : opioid [60: Requires occasional assistance, but is able to care for most of his/her needs] : 60: Requires occasional assistance, but is able to care for most of his/her needs [ECOG Performance Status: 3 - Capable of only limited self care, confined to bed or chair more than 50% of waking hours] : Performance Status: 3 - Capable of only limited self care, confined to bed or chair more than 50% of waking hours

## 2023-01-25 NOTE — REASON FOR VISIT
[Post-Treatment Evaluation] : post-treatment evaluation for [Bone Metastasis] : bone metastasis [Head and Neck Cancer] : head and neck cancer [Spouse] : spouse

## 2023-01-25 NOTE — HISTORY OF PRESENT ILLNESS
[FreeTextEntry1] : This 77 year-old male is conferencing for post-treatment evaluation accompanied by his wife Owen.\par \par Tu Daily is a 77 year-old man who was treated with radiation therapy to the parotid, completed 6/2022.  He is s/p Parotidectomy with facial nerve dissection and right neck dissection 3/3/22.  He most recently completed radiation therapy for metastatic disease to the left hip/femur on 12/2/2022.\par \par At last on-treatment visit:\par Reported left hip pain up to 10/10. Taking Tylenol and ibuprofen with poor pain relief. Begin oxycodone.\par Continue radiation as planned. \par \par 12/7/22 -- Spoke to Ms. Daily re .  She reported that the patient went to emergency department d/t extreme confusion.  CT scans -- no acute pathology, brain MRI negative for metastatic lesions. Wife noted patient was started on prednisone the day before to augment possible intractable pain relief. She confirmed the confusion resolved. Patient had teleconference with ortho, and was to be see by him in office as soon as patient can walk in. Undergoing physical therapy. \par \par Today Mrs. Owen Daily answered questions for her .  Mr. Daily is currently in a rehab facility and receiving PT.  However, she notes that he has increasing pain especially in the left hip and is having increasing difficulty moving around.  He also has frequent nausea and vomiting possibly d/t pain medication, and has received anti-nausea medication.  \par \par Ms. Daily reports having an appointment with Dr. Orozco on Friday 1/27/2023 to discuss treatment options.  Advised her that Dr. Richardson would be speaking to Dr. Pabon regarding treatment recommendations.

## 2023-01-26 ENCOUNTER — APPOINTMENT (OUTPATIENT)
Dept: RHEUMATOLOGY | Facility: CLINIC | Age: 78
End: 2023-01-26

## 2023-01-26 ENCOUNTER — OUTPATIENT (OUTPATIENT)
Dept: OUTPATIENT SERVICES | Facility: HOSPITAL | Age: 78
LOS: 1 days | Discharge: HOME | End: 2023-01-26

## 2023-01-26 DIAGNOSIS — Z90.49 ACQUIRED ABSENCE OF OTHER SPECIFIED PARTS OF DIGESTIVE TRACT: Chronic | ICD-10-CM

## 2023-01-26 DIAGNOSIS — Z98.1 ARTHRODESIS STATUS: Chronic | ICD-10-CM

## 2023-01-26 DIAGNOSIS — Z95.5 PRESENCE OF CORONARY ANGIOPLASTY IMPLANT AND GRAFT: Chronic | ICD-10-CM

## 2023-01-26 DIAGNOSIS — C76.0 MALIGNANT NEOPLASM OF HEAD, FACE AND NECK: ICD-10-CM

## 2023-01-27 ENCOUNTER — NON-APPOINTMENT (OUTPATIENT)
Age: 78
End: 2023-01-27

## 2023-01-27 ENCOUNTER — RESULT REVIEW (OUTPATIENT)
Age: 78
End: 2023-01-27

## 2023-01-27 ENCOUNTER — APPOINTMENT (OUTPATIENT)
Dept: HEMATOLOGY ONCOLOGY | Facility: CLINIC | Age: 78
End: 2023-01-27
Payer: MEDICARE

## 2023-01-27 VITALS
WEIGHT: 161 LBS | BODY MASS INDEX: 21.81 KG/M2 | DIASTOLIC BLOOD PRESSURE: 83 MMHG | SYSTOLIC BLOOD PRESSURE: 127 MMHG | OXYGEN SATURATION: 96 % | HEART RATE: 67 BPM | HEIGHT: 72 IN

## 2023-01-27 LAB
BASOPHILS # BLD AUTO: 0.1 K/UL — SIGNIFICANT CHANGE UP (ref 0–0.2)
BASOPHILS NFR BLD AUTO: 0.6 % — SIGNIFICANT CHANGE UP (ref 0–2)
EOSINOPHIL # BLD AUTO: 0.1 K/UL — SIGNIFICANT CHANGE UP (ref 0–0.5)
EOSINOPHIL NFR BLD AUTO: 0.7 % — SIGNIFICANT CHANGE UP (ref 0–6)
HCT VFR BLD CALC: 31.4 % — LOW (ref 39–50)
HGB BLD-MCNC: 10.3 G/DL — LOW (ref 13–17)
LYMPHOCYTES # BLD AUTO: 0.4 K/UL — LOW (ref 1–3.3)
LYMPHOCYTES # BLD AUTO: 4 % — LOW (ref 13–44)
MCHC RBC-ENTMCNC: 29.1 PG — SIGNIFICANT CHANGE UP (ref 27–34)
MCHC RBC-ENTMCNC: 32.7 G/DL — SIGNIFICANT CHANGE UP (ref 32–36)
MCV RBC AUTO: 89 FL — SIGNIFICANT CHANGE UP (ref 80–100)
MONOCYTES # BLD AUTO: 0.9 K/UL — SIGNIFICANT CHANGE UP (ref 0–0.9)
MONOCYTES NFR BLD AUTO: 8.9 % — SIGNIFICANT CHANGE UP (ref 2–14)
NEUTROPHILS # BLD AUTO: 9.1 K/UL — HIGH (ref 1.8–7.4)
NEUTROPHILS NFR BLD AUTO: 85.8 % — HIGH (ref 43–77)
PLATELET # BLD AUTO: 296 K/UL — SIGNIFICANT CHANGE UP (ref 150–400)
RBC # BLD: 3.53 M/UL — LOW (ref 4.2–5.8)
RBC # FLD: 14.1 % — SIGNIFICANT CHANGE UP (ref 10.3–14.5)
WBC # BLD: 10.6 K/UL — HIGH (ref 3.8–10.5)
WBC # FLD AUTO: 10.6 K/UL — HIGH (ref 3.8–10.5)

## 2023-01-27 PROCEDURE — 99215 OFFICE O/P EST HI 40 MIN: CPT

## 2023-01-27 PROCEDURE — G2212 PROLONG OUTPT/OFFICE VIS: CPT

## 2023-01-27 NOTE — PHYSICAL EXAM
[Normal] : affect appropriate [de-identified] : Left hip Keloid scar changes s/p surgery [de-identified] : 1-2 mm red lesion on face

## 2023-01-27 NOTE — ADDENDUM
[FreeTextEntry1] : Documented by Ludivina Ramirez acting as scribe for Dr. Orozco on 01/27/2023 \par \par All Medical record entries made by the Scribe were at my, Dr. Orozco's, direction and personally dictated by me on 01/27/2023 . I have reviewed the chart and agree that the record accurately reflects my personal performance of the history, physical exam, assessment and plan. I have also personally directed, reviewed, and agreed with the discharge instructions.\par

## 2023-01-27 NOTE — ASSESSMENT
[FreeTextEntry1] : JOSIANE SHAW is a 77 year male with PMHX of CAD s/p stents HTN, GERD, fibromyalgia, h/o polymyalgia rheumatica, Parotid ca s/p dissection/RT, cigar smoker ( 1-4 cigars/day) presents for initial consultation for metastatic carcinoma. Squamous cell carcinoma parotid\par \par Foundation: TMB 99 Muts/Mb, MS- stable\par \par \par Patient presented to ENT, Dr. Alex Boston, on 1/18/22 c/o right neck swelling for 5 weeks. On exam, swelling in the right parotid gland approx. 3 x 3 cm firm, nontender was noted, no enlarged LN.  \par Subsequently MRI of neck on 1/24/22 revealed irregularly peripherally enhancing mass in the superficial lobe of the right parotid gland measuring 2.7 x 2.3 cm in AP.\par \par Right parotid FNA biopsy performed on 2/1/22 at  consistent with carcinoma with squamous differentiation,Cytology; clusters and single scattered atypical squamous cells with prominent nucleoli, irregular nuclear membranes, irregular chromatin patterns, anisonucleosis and keratinization. Primary versus metastatic carcinoma.\par \par Patient s/p Parotidectomy with facial nerve dissection and right neck dissection on 3/3/22 by Dr. Misbah Recio.\par Pathology showed 22 lymph nodes negative for metastatic carcinoma.\par \par Completed radiation on 6/1/22 with Dr. Rowe\par \par Bone Mets:\par -PET/CT on 10/14/22 visualized osseous structures reveal an intramedullary lytic 3 cm lesion along the left femoral intertrochanteric region max SUV 10.6. Biopsy of left proximal femur lesion on 10/27/2022. Pathology reported metastatic carcinoma,the tumor shows squamous differentiation on H T E. Tumor cells are positive for AE1/AE3, CK5/ and p40.\par \par -PET/CT 1/19/23\par New hypermetabolic soft tissue abutting the right masseter muscle anteriorly measuring 1.6 x 1.1 cm with SUV 13.7 (prev measured 1.3 x 0.7 cm on the 12/2022 CT head) Interval left femoral ORIF with intense activity SUV 9.3 as well as to the surrounding soft tissues/muscles. Soft tissue in the subcutaneous region of the left thigh laterally measuring 2.7 x 2.5 cm SUV 9.2. Heterogeneous changes in the left gluteal muscle showing foci of increased activity as high as SUV 7.2. New lytic lesions are also noted since prior seen at T8  SUV 10.9; T12 SUV 8.0; right side of sacrum SUV 9.6 and right acetabulum with cortical disruption and soft tissue component SUV 10.6.\par \par \par Plan: \par -Patient with poor performance status in rehab, unlikley to tolerate chemotherapy with carbo/ taxol\par - Given the TMB is 99 will plan to treat with single agent pembro \par - Pembrolizumab 200mg q 3 AE include immune mediated side effects including but not limited to thyroiditis/ hypopit /myocarditis/ arrythmias/ joint pains/ rash/fatigue/. Patient with arthritis f/u with Dr Kleiner, risk of Immune mediated ae with keytruda \par -Will monitor counts/ and for immune mediated side effects\par -Consent signed for immunotherapy\par -Pain management: Advised to d/c Percocet.  Oxycontin 20 mg every 12 hours,  Oxycodone 10 mg q 4h Pain  PRN. Will consult [facility] to adjust pain medication dose.\par -Discussed the use of Denosumab  AE include flu like reaction, achiness, stiffness, low calcium, osteonecrosis of jaw (dental clearance needed).\par -Restaging PET/CT after C4, if worsening symptoms after C3\par -F/u in 3-4 weeks with Usha\par -F/u in 7-8 weeks

## 2023-01-27 NOTE — RESULTS/DATA
[FreeTextEntry1] : 11/11/22 MR neck and face: Post surgical changes seen in the right submandibular region and of right resected parotid mass. \par \par 10/27/2022 Surgical Pathology \par \par Final Diagnosis\par 1.  Left proximal femur lesion, biopsy:\par -  Metastatic carcinoma \par \par 2.  Left proximal femur lesion, biopsy:\par - Metastatic carcinoma \par \par -The tumor shows squamous differentiation on H T E. Performed immunostains show that the tumor cells are positive for AE1/AE3, CK5/ and p40. This supports the diagnosis of metastatic carcinoma with squamous differentiation.\par \par PD-L1 Immunohistochemistry\par Result: Combined Positive Score (CPS): <1, NEGATIVE\par Result: Tumor Proportion Score (TPS*)   <1%\par Interpretation: NEGATIVE\par \par PET/CT 10/14/22\par \par - Right parotid gland demonstrates interval resection of the hypermetabolic mass as well as right neck prominent soft tissue postsurgical low level uptake. No evidence of atypical lymph node activity identified.\par - Hepatosplenic uptake max SUV ratio is 2.9/2.2. Splenic activity within normal limits. Splenic cyst 5 cm.\par - No abnormal pelvic lymphadenopathy involving the iliac chains and inguinal regions. \par -Visualized osseous structures reveal an intramedullary lytic 3 cm lesion along the left femoral intertrochanteric region max SUV 10.6.\par \par \par \par \par 10/10/22 MRI Left Hip\par MUSCLE AND TENDONS: There is mild edema at the insertion the gluteus minimus tendon. The remaining tendons appear intact. There is a fat-containing mass centered within the tensor fascia manjula muscle measuring 4 x 3.1 x 7.2 cm.\par BONE MARROW: A mass is seen centered within the lesser trochanter measuring 2.9 x 3.4 x 4.4 cm. There is overlying periosteal edema and cortical thinning\par \par 9/9/22 MRI L-spine\par \par IMPRESSION:\par 1.  Moderately severe right-sided neural canal stenosis at L4-L5 without definite impingement of the exiting ipsilateral L4 nerve root. Nonetheless correlate for possible right-sided L4 radiculopathy.\par 2.  Otherwise no high-grade lumbar spinal canal or neural canal stenosis.\par 3.  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 Moderate to severe right worse than left facet arthrosis and mixed Modic type I/II endplate changes at L4-L5.\par \par \par 3/4/2022 Surgical Pathology \par \par 1. Lymph nodes, right level 1B, neck dissection\par - 3 lymph nodes negative for metastatic carcinoma\par - Submandibular gland negative for carcinoma\par \par 2. Parotid gland, right parotid gland with tumor, parotidectomy\par - Squamous cell carcinoma, moderately to poorly differentiated (3.0 cm in greatest dimension) involving parotid gland and soft tissue, favor metastatic based on clinical information provided by surgeon\par - Carcinoma focally very close to (less than 0.1 mm) inked circumferential margin\par - 4 intraparotid lymph nodes negative for metastatic carcinoma\par \par 3. Lymph nodes, right levels 2 and 3, neck dissection\par - 15 lymph nodes negative for metastatic carcinoma\par \par 4. Neck, right external jugular chain, excision\par - Neurovascular and adipose tissue, negative for carcinoma\par 2/13/22 Pet\par \par 2/13/22 PET/CT ( [facility])\par  -There is a rim of FDG activity surrounding a right intraparotid lesion (SUV 6.3, 3.1 x 2.3 cm). Decreased FDG activity centrally may be secondary to tumor necrosis. There is no other focal abnormal  FDG activity identified in the head and neck.\par -There are no lytic or blastic lesions.\par \par \par  2/1/2022\par PAROTID, RIGHT, US GUIDED FNA\par -POSITIVE FOR MALIGNANCY (CLAIRE CLASS VI)\par -Consistent with carcinoma with squamous differentiation, 2.6 cm \par -Cytology slide and cell block show clusters and single scattered atypical squamous cells with prominent nucleoli, irregular nuclear membranes, irregular chromatin patterns, anisonucleosis and keratinization.\par Note:\par Primary versus metastatic carcinoma.\par \par \par \par 1/24/22 MRI Neck\par - irregularly peripherally enhancing mass in the superficial lobe of the right parotid gland measuring 2.7 x 2.3 cm in AP. There is some strand-like internal enhancement as well. There is no left parotid mass.\par

## 2023-01-27 NOTE — HISTORY OF PRESENT ILLNESS
[de-identified] : JOSIANE SHAW is a 77 year male with PMHX of CAD s/p stents HTN, GERD, fibromyalgia, h/o polymyalgia rheumatica, Parotid ca s/p dissection/RT, cigar smoker ( 1-4 cigars/day) presents for initial consultation for metastatic carcinoma \par \par Patient presented to ENT, Dr. Alex Boston, on 1/18/22 c/o right neck swelling for 5 weeks. On exam, swelling int he right parotid gland approx. 3 x 3 cm firm, nontender was noted, no enlarged LN.  \par MRI of neck was ordered and performed on 1/24/22. Scan revealed irregularly peripherally enhancing mass in the superficial lobe of the right parotid gland measuring 2.7 x 2.3 cm in AP . There is some strand-like internal enhancement as well. There is no left parotid mass.\par \par \par Right parotid FNA biopsy performed on 2/1/22 at . Pathology demonstrated cells positive for malignancy (Richard Class IV), Consistent with carcinoma with squamous differentiation,Cytology slide and cell block show clusters and single scattered atypical squamous cells with prominent nucleoli, irregular nuclear membranes, irregular chromatin patterns, anisonucleosis and keratinization. Primary versus metastatic carcinoma.\par \par Subsequent PET/CT on 2/13/22 reported a rim of FDG activity surrounding a right intraparotid lesion (SUV 6.3, 3.1 x 2.3 cm). Decreased FDG activity centrally may be secondary to tumor necrosis. There is no other focal abnormal  FDG activity identified in the head and neck. There are no lytic or blastic lesions.\par \par \par Patient referred to Head and Neck surgeon, Dr. Misbah Recio, on 2/16/22. Patient s/p Parotidectomy with facial nerve dissection and right neck dissection on 3/3/22. \par Pathology reported Squamous cell carcinoma, moderately to poorly differentiated (3.0 cm in greatest dimension) involving parotid gland and soft tissue, favor metastatic based on clinical information provided by surgeon. Carcinoma focally very close to (less than 0.1 mm) inked circumferential margin, 4 intraparotid lymph nodes negative for metastatic carcinoma, 3 Lymph nodes, right level 1B- negative for metastatic carcinoma, Submandibular gland negative for carcinoma\par Lymph nodes, right levels 2 and 3, neck dissection - 15 lymph nodes negative for metastatic carcinoma\par \par \par Patient referred to Dr. Richardson for post op RT. Patient completed radiation on 6/1/22. \par \par Patient presented to Orthopedist on 10/6/22 for ongoing left hip pain for the past 4 years. He has been following Rheumatology, Dr. Kleiner, for pain. Pain has been progressive affect quality of life. \par MRI of hip was performed  on 10/10/22 revealing  a fat-containing mass centered within the tensor fascia manjula muscle measuring 4 x 3.1 x 7.2 cm and a mass is seen centered within the lesser trochanter measuring 2.9 x 3.4 x 4.4 cm. There is overlying periosteal edema and cortical thinning\par \par Subsequent PET/CT on 10/14/22 visualized osseous structures reveal an intramedullary lytic 3 cm lesion along the left femoral intertrochanteric region max SUV 10.6.\par Right parotid gland demonstrates interval resection of the hypermetabolic mass as well as right neck prominent soft tissue postsurgical low level uptake. No evidence of atypical lymph node activity identified.\par \par Biopsy of left proximal femur lesion on 10/27/2022. Pathology reported metastatic carcinoma,the tumor shows squamous differentiation on H T E. Performed immunostains show that the tumor cells are positive for AE1/AE3, CK5/ and p40. 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 \par Pain managed on Percocet 10 mg q 4 hours, reports little relief\par Trouble walking 2/2  to pain.\par Patient sees rheumatologist (Dr. Kleiner) for arthritis, continues on 10 mg of prednisone.\par Poor appetite, takes ensure once daily\par \par Reports red lesion on cheek area of face, patient noticed 1 week ago.\par Left hip Keloid scar changes s/p surgery\par \par \par \par

## 2023-01-28 ENCOUNTER — NON-APPOINTMENT (OUTPATIENT)
Age: 78
End: 2023-01-28

## 2023-01-30 ENCOUNTER — NON-APPOINTMENT (OUTPATIENT)
Age: 78
End: 2023-01-30

## 2023-01-30 LAB
ALBUMIN SERPL ELPH-MCNC: 3.8 G/DL
ALP BLD-CCNC: 75 U/L
ALT SERPL-CCNC: 9 U/L
ANION GAP SERPL CALC-SCNC: 9 MMOL/L
AST SERPL-CCNC: 16 U/L
BILIRUB SERPL-MCNC: 0.3 MG/DL
BUN SERPL-MCNC: 17 MG/DL
CALCIUM SERPL-MCNC: 13 MG/DL
CHLORIDE SERPL-SCNC: 99 MMOL/L
CO2 SERPL-SCNC: 31 MMOL/L
CREAT SERPL-MCNC: 1.03 MG/DL
EGFR: 76 ML/MIN/1.73M2
GLUCOSE SERPL-MCNC: 98 MG/DL
HAV IGM SER QL: NONREACTIVE
HBV CORE IGM SER QL: NONREACTIVE
HBV SURFACE AG SER QL: NONREACTIVE
HCV AB SER QL: NONREACTIVE
HCV S/CO RATIO: 0.09 S/CO
LDH SERPL-CCNC: 114 U/L
POTASSIUM SERPL-SCNC: 4.6 MMOL/L
PROT SERPL-MCNC: 6.5 G/DL
SODIUM SERPL-SCNC: 139 MMOL/L

## 2023-02-01 ENCOUNTER — NON-APPOINTMENT (OUTPATIENT)
Age: 78
End: 2023-02-01

## 2023-02-03 ENCOUNTER — RESULT REVIEW (OUTPATIENT)
Age: 78
End: 2023-02-03

## 2023-02-03 ENCOUNTER — APPOINTMENT (OUTPATIENT)
Age: 78
End: 2023-02-03

## 2023-02-03 LAB
BASOPHILS # BLD AUTO: 0 K/UL — SIGNIFICANT CHANGE UP (ref 0–0.2)
BASOPHILS NFR BLD AUTO: 0.3 % — SIGNIFICANT CHANGE UP (ref 0–2)
EOSINOPHIL # BLD AUTO: 0 K/UL — SIGNIFICANT CHANGE UP (ref 0–0.5)
EOSINOPHIL NFR BLD AUTO: 0.1 % — SIGNIFICANT CHANGE UP (ref 0–6)
HCT VFR BLD CALC: 31 % — LOW (ref 39–50)
HGB BLD-MCNC: 10.1 G/DL — LOW (ref 13–17)
LYMPHOCYTES # BLD AUTO: 0.4 K/UL — LOW (ref 1–3.3)
LYMPHOCYTES # BLD AUTO: 3.5 % — LOW (ref 13–44)
MCHC RBC-ENTMCNC: 28.8 PG — SIGNIFICANT CHANGE UP (ref 27–34)
MCHC RBC-ENTMCNC: 32.5 G/DL — SIGNIFICANT CHANGE UP (ref 32–36)
MCV RBC AUTO: 88.6 FL — SIGNIFICANT CHANGE UP (ref 80–100)
MONOCYTES # BLD AUTO: 0.8 K/UL — SIGNIFICANT CHANGE UP (ref 0–0.9)
MONOCYTES NFR BLD AUTO: 7.5 % — SIGNIFICANT CHANGE UP (ref 2–14)
NEUTROPHILS # BLD AUTO: 9.8 K/UL — HIGH (ref 1.8–7.4)
NEUTROPHILS NFR BLD AUTO: 88.6 % — HIGH (ref 43–77)
PLATELET # BLD AUTO: 324 K/UL — SIGNIFICANT CHANGE UP (ref 150–400)
RBC # BLD: 3.5 M/UL — LOW (ref 4.2–5.8)
RBC # FLD: 14.3 % — SIGNIFICANT CHANGE UP (ref 10.3–14.5)
WBC # BLD: 11.1 K/UL — HIGH (ref 3.8–10.5)
WBC # FLD AUTO: 11.1 K/UL — HIGH (ref 3.8–10.5)

## 2023-02-04 LAB
ALBUMIN SERPL ELPH-MCNC: 3.8 G/DL — SIGNIFICANT CHANGE UP (ref 3.3–5)
ALP SERPL-CCNC: 69 U/L — SIGNIFICANT CHANGE UP (ref 40–120)
ALT FLD-CCNC: 8 U/L — LOW (ref 10–45)
ANION GAP SERPL CALC-SCNC: 13 MMOL/L — SIGNIFICANT CHANGE UP (ref 5–17)
AST SERPL-CCNC: 17 U/L — SIGNIFICANT CHANGE UP (ref 10–40)
BILIRUB SERPL-MCNC: 0.4 MG/DL — SIGNIFICANT CHANGE UP (ref 0.2–1.2)
BUN SERPL-MCNC: 23 MG/DL — SIGNIFICANT CHANGE UP (ref 7–23)
CALCIUM SERPL-MCNC: 13.6 MG/DL — CRITICAL HIGH (ref 8.4–10.5)
CHLORIDE SERPL-SCNC: 97 MMOL/L — SIGNIFICANT CHANGE UP (ref 96–108)
CO2 SERPL-SCNC: 29 MMOL/L — SIGNIFICANT CHANGE UP (ref 22–31)
CREAT SERPL-MCNC: 1.2 MG/DL — SIGNIFICANT CHANGE UP (ref 0.5–1.3)
EGFR: 63 ML/MIN/1.73M2 — SIGNIFICANT CHANGE UP
GLUCOSE SERPL-MCNC: 98 MG/DL — SIGNIFICANT CHANGE UP (ref 70–99)
POTASSIUM SERPL-MCNC: 3.5 MMOL/L — SIGNIFICANT CHANGE UP (ref 3.5–5.3)
POTASSIUM SERPL-SCNC: 3.5 MMOL/L — SIGNIFICANT CHANGE UP (ref 3.5–5.3)
PROT SERPL-MCNC: 6.7 G/DL — SIGNIFICANT CHANGE UP (ref 6–8.3)
SODIUM SERPL-SCNC: 139 MMOL/L — SIGNIFICANT CHANGE UP (ref 135–145)
T4 FREE SERPL-MCNC: 1 NG/DL — SIGNIFICANT CHANGE UP (ref 0.9–1.8)
T4 FREE+ TSH PNL SERPL: 8.4 UIU/ML — HIGH (ref 0.27–4.2)

## 2023-02-06 ENCOUNTER — NON-APPOINTMENT (OUTPATIENT)
Age: 78
End: 2023-02-06

## 2023-02-06 ENCOUNTER — APPOINTMENT (OUTPATIENT)
Dept: HEMATOLOGY ONCOLOGY | Facility: CLINIC | Age: 78
End: 2023-02-06

## 2023-02-06 DIAGNOSIS — C80.1 MALIGNANT (PRIMARY) NEOPLASM, UNSPECIFIED: ICD-10-CM

## 2023-02-06 DIAGNOSIS — C77.0 SECONDARY AND UNSPECIFIED MALIGNANT NEOPLASM OF LYMPH NODES OF HEAD, FACE AND NECK: ICD-10-CM

## 2023-02-06 DIAGNOSIS — C79.51 SECONDARY MALIGNANT NEOPLASM OF BONE: ICD-10-CM

## 2023-02-06 DIAGNOSIS — Z51.11 ENCOUNTER FOR ANTINEOPLASTIC CHEMOTHERAPY: ICD-10-CM

## 2023-02-08 ENCOUNTER — NON-APPOINTMENT (OUTPATIENT)
Age: 78
End: 2023-02-08

## 2023-02-09 ENCOUNTER — NON-APPOINTMENT (OUTPATIENT)
Age: 78
End: 2023-02-09

## 2023-02-10 ENCOUNTER — NON-APPOINTMENT (OUTPATIENT)
Age: 78
End: 2023-02-10

## 2023-02-13 ENCOUNTER — NON-APPOINTMENT (OUTPATIENT)
Age: 78
End: 2023-02-13

## 2023-02-16 ENCOUNTER — NON-APPOINTMENT (OUTPATIENT)
Age: 78
End: 2023-02-16

## 2023-02-17 ENCOUNTER — NON-APPOINTMENT (OUTPATIENT)
Age: 78
End: 2023-02-17

## 2023-02-18 LAB
CORTICOSTEROID BINDING GLOBULIN RESULT: 1.5 MG/DL — LOW
CORTIS F/TOTAL MFR SERPL: 15 % — SIGNIFICANT CHANGE UP
CORTIS SERPL-MCNC: 9.7 UG/DL — SIGNIFICANT CHANGE UP
CORTISOL, FREE RESULT: 1.4 UG/DL — SIGNIFICANT CHANGE UP

## 2023-02-21 ENCOUNTER — APPOINTMENT (OUTPATIENT)
Dept: RHEUMATOLOGY | Facility: CLINIC | Age: 78
End: 2023-02-21

## 2023-02-22 ENCOUNTER — NON-APPOINTMENT (OUTPATIENT)
Age: 78
End: 2023-02-22

## 2023-02-24 ENCOUNTER — APPOINTMENT (OUTPATIENT)
Dept: HEMATOLOGY ONCOLOGY | Facility: CLINIC | Age: 78
End: 2023-02-24

## 2023-02-24 ENCOUNTER — APPOINTMENT (OUTPATIENT)
Age: 78
End: 2023-02-24

## 2023-02-28 ENCOUNTER — APPOINTMENT (OUTPATIENT)
Dept: OTOLARYNGOLOGY | Facility: CLINIC | Age: 78
End: 2023-02-28

## 2023-03-09 ENCOUNTER — NON-APPOINTMENT (OUTPATIENT)
Age: 78
End: 2023-03-09

## 2023-03-13 ENCOUNTER — APPOINTMENT (OUTPATIENT)
Dept: RHEUMATOLOGY | Facility: CLINIC | Age: 78
End: 2023-03-13

## 2023-03-15 ENCOUNTER — APPOINTMENT (OUTPATIENT)
Dept: HEMATOLOGY ONCOLOGY | Facility: CLINIC | Age: 78
End: 2023-03-15

## 2023-03-17 ENCOUNTER — APPOINTMENT (OUTPATIENT)
Age: 78
End: 2023-03-17

## 2023-03-17 ENCOUNTER — APPOINTMENT (OUTPATIENT)
Dept: COLORECTAL SURGERY | Facility: CLINIC | Age: 78
End: 2023-03-17

## 2023-04-11 ENCOUNTER — NON-APPOINTMENT (OUTPATIENT)
Age: 78
End: 2023-04-11

## 2023-04-21 ENCOUNTER — APPOINTMENT (OUTPATIENT)
Dept: RHEUMATOLOGY | Facility: CLINIC | Age: 78
End: 2023-04-21

## 2023-05-01 NOTE — ED ADULT NURSE NOTE - DRUG PRE-SCREENING (DAST -1)
Statement Selected Dutasteride Pregnancy And Lactation Text: This medication is absolutely contraindicated in women, especially during pregnancy and breast feeding. Feminization of male fetuses is possible if taking while pregnant.

## 2023-05-18 ENCOUNTER — APPOINTMENT (OUTPATIENT)
Dept: RHEUMATOLOGY | Facility: CLINIC | Age: 78
End: 2023-05-18

## 2023-05-26 ENCOUNTER — OUTPATIENT (OUTPATIENT)
Dept: OUTPATIENT SERVICES | Facility: HOSPITAL | Age: 78
LOS: 1 days | Discharge: HOME | End: 2023-05-26

## 2023-05-26 DIAGNOSIS — Z98.1 ARTHRODESIS STATUS: Chronic | ICD-10-CM

## 2023-05-26 DIAGNOSIS — Z90.49 ACQUIRED ABSENCE OF OTHER SPECIFIED PARTS OF DIGESTIVE TRACT: Chronic | ICD-10-CM

## 2023-05-26 DIAGNOSIS — Z95.5 PRESENCE OF CORONARY ANGIOPLASTY IMPLANT AND GRAFT: Chronic | ICD-10-CM

## 2023-05-26 DIAGNOSIS — C76.0 MALIGNANT NEOPLASM OF HEAD, FACE AND NECK: ICD-10-CM

## 2023-05-30 ENCOUNTER — APPOINTMENT (OUTPATIENT)
Dept: HEMATOLOGY ONCOLOGY | Facility: CLINIC | Age: 78
End: 2023-05-30

## 2023-05-30 ENCOUNTER — RESULT REVIEW (OUTPATIENT)
Age: 78
End: 2023-05-30

## 2023-05-30 ENCOUNTER — LABORATORY RESULT (OUTPATIENT)
Age: 78
End: 2023-05-30

## 2023-05-30 VITALS
HEART RATE: 73 BPM | WEIGHT: 159 LBS | BODY MASS INDEX: 21.54 KG/M2 | DIASTOLIC BLOOD PRESSURE: 85 MMHG | TEMPERATURE: 97.4 F | OXYGEN SATURATION: 99 % | HEIGHT: 72 IN | SYSTOLIC BLOOD PRESSURE: 160 MMHG

## 2023-05-30 LAB
BASOPHILS # BLD AUTO: 0 K/UL — SIGNIFICANT CHANGE UP (ref 0–0.2)
BASOPHILS NFR BLD AUTO: 0.4 % — SIGNIFICANT CHANGE UP (ref 0–2)
EOSINOPHIL # BLD AUTO: 0.1 K/UL — SIGNIFICANT CHANGE UP (ref 0–0.5)
EOSINOPHIL NFR BLD AUTO: 0.6 % — SIGNIFICANT CHANGE UP (ref 0–6)
HCT VFR BLD CALC: 37.6 % — LOW (ref 39–50)
HGB BLD-MCNC: 11.9 G/DL — LOW (ref 13–17)
LYMPHOCYTES # BLD AUTO: 0.4 K/UL — LOW (ref 1–3.3)
LYMPHOCYTES # BLD AUTO: 4.4 % — LOW (ref 13–44)
MCHC RBC-ENTMCNC: 29.4 PG — SIGNIFICANT CHANGE UP (ref 27–34)
MCHC RBC-ENTMCNC: 31.8 G/DL — LOW (ref 32–36)
MCV RBC AUTO: 92.7 FL — SIGNIFICANT CHANGE UP (ref 80–100)
MONOCYTES # BLD AUTO: 0.5 K/UL — SIGNIFICANT CHANGE UP (ref 0–0.9)
MONOCYTES NFR BLD AUTO: 5.4 % — SIGNIFICANT CHANGE UP (ref 2–14)
NEUTROPHILS # BLD AUTO: 8.5 K/UL — HIGH (ref 1.8–7.4)
NEUTROPHILS NFR BLD AUTO: 89.2 % — HIGH (ref 43–77)
PLATELET # BLD AUTO: 202 K/UL — SIGNIFICANT CHANGE UP (ref 150–400)
RBC # BLD: 4.06 M/UL — LOW (ref 4.2–5.8)
RBC # FLD: 16.5 % — HIGH (ref 10.3–14.5)
WBC # BLD: 9.5 K/UL — SIGNIFICANT CHANGE UP (ref 3.8–10.5)
WBC # FLD AUTO: 9.5 K/UL — SIGNIFICANT CHANGE UP (ref 3.8–10.5)

## 2023-05-30 NOTE — PHYSICAL EXAM
[Normal] : affect appropriate [de-identified] : Ambulates with walker.  [de-identified] : Left hip Keloid scar changes s/p surgery [de-identified] : 1-2 mm red lesion on face

## 2023-05-30 NOTE — ASSESSMENT
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 \par \par Foundation: TMB 99 Muts/Mb, MS- stable\par \par Patient presented to ENT, Dr. Alex Boston, on 1/18/22 c/o right neck swelling for 5 weeks. On exam, swelling in the right parotid gland approx. 3 x 3 cm firm, nontender was noted, no enlarged LN.  \par Subsequently MRI of neck on 1/24/22 revealed irregularly peripherally enhancing mass in the superficial lobe of the right parotid gland measuring 2.7 x 2.3 cm in AP.\par \par Right parotid FNA biopsy performed on 2/1/22 at  consistent with carcinoma with squamous differentiation,Cytology; clusters and single scattered atypical squamous cells with prominent nucleoli, irregular nuclear membranes, irregular chromatin patterns, anisonucleosis and keratinization. Primary versus metastatic carcinoma.\par \par Patient s/p Parotidectomy with facial nerve dissection and right neck dissection on 3/3/22 by Dr. Misbah Recio.\par Pathology showed 22 lymph nodes negative for metastatic carcinoma.\par \par Completed radiation on 6/1/22 with Dr. Rowe\par \par Bone Mets:\par -PET/CT on 10/14/22 visualized osseous structures reveal an intramedullary lytic 3 cm lesion along the left femoral intertrochanteric region max SUV 10.6. Biopsy of left proximal femur lesion on 10/27/2022. Pathology reported metastatic carcinoma,the tumor shows squamous differentiation on H T E. Tumor cells are positive for AE1/AE3, CK5/ and p40.\par \par -PET/CT 1/19/23\par New hypermetabolic soft tissue abutting the right masseter muscle anteriorly measuring 1.6 x 1.1 cm with SUV 13.7 (prev measured 1.3 x 0.7 cm on the 12/2022 CT head) Interval left femoral ORIF with intense activity SUV 9.3 as well as to the surrounding soft tissues/muscles. Soft tissue in the subcutaneous region of the left thigh laterally measuring 2.7 x 2.5 cm SUV 9.2. Heterogeneous changes in the left gluteal muscle showing foci of increased activity as high as SUV 7.2. New lytic lesions are also noted since prior seen at T8  SUV 10.9; T12 SUV 8.0; right side of sacrum SUV 9.6 and right acetabulum with cortical disruption and soft tissue component SUV 10.6.\par \par \par Plan: \par - Given the TMB is 99  treated with single agent pembro \par - Pembrolizumab 200mg q 3 weeks. received 1  dose of Keytruda on 2/2/23, went to rehab however was too weak for rehab and was discharged 2/10/23 (enrolled on 12/14/23), was in a lot of pain at home and enrolled in hospice on 2/17/23, then slowly started increasing appetite, started ambulating more with walker, continued left hip pain, diffused body pain started to subside. \par Continues on morphine 30 mg BID, and for breakthrough pain takes oxycodone prn - has not required since 3/2023 \par -Pain management:  on morphine 30 mg BID,  Oxycodone 10 mg q 4h Pain  PRN. \par -Discussed the use of Denosumab  AE include flu like reaction, achiness, stiffness, low calcium, osteonecrosis of jaw (dental clearance needed). \par - will plan for Xgeva and Keytruda \par -will send labs today \par -Restaging PET/CT ordered\par -Follow up with orthopaedist \par -RTO in 2 - 3 weeks to review labs, scans and finalize treatment plan \par \par \par \par \par

## 2023-05-30 NOTE — ADDENDUM
[FreeTextEntry1] : Documented by Zora Pérez acting as scribe for Dr. Orozco on 05/30/2023.\par \par All Medical record entries made by the Scribe were at my, Dr. Orozco, direction and personally dictated by me on 05/30/2023. I have reviewed the chart and agree that the record accurately reflects my personal performance of the history, physical exam, assessment and plan. I have also personally directed, reviewed, and agreed with the discharge instructions. \par

## 2023-05-30 NOTE — HISTORY OF PRESENT ILLNESS
[de-identified] : JOSIANE SHAW is a 77 year male with PMHX of CAD s/p stents HTN, GERD, fibromyalgia, h/o polymyalgia rheumatica, Parotid ca s/p dissection/RT, cigar smoker ( 1-4 cigars/day) presents for initial consultation for metastatic carcinoma \par \par Patient presented to ENT, Dr. Alex Boston, on 1/18/22 c/o right neck swelling for 5 weeks. On exam, swelling int he right parotid gland approx. 3 x 3 cm firm, nontender was noted, no enlarged LN.  \par MRI of neck was ordered and performed on 1/24/22. Scan revealed irregularly peripherally enhancing mass in the superficial lobe of the right parotid gland measuring 2.7 x 2.3 cm in AP . There is some strand-like internal enhancement as well. There is no left parotid mass.\par \par \par Right parotid FNA biopsy performed on 2/1/22 at . Pathology demonstrated cells positive for malignancy (Richard Class IV), Consistent with carcinoma with squamous differentiation,Cytology slide and cell block show clusters and single scattered atypical squamous cells with prominent nucleoli, irregular nuclear membranes, irregular chromatin patterns, anisonucleosis and keratinization. Primary versus metastatic carcinoma.\par \par Subsequent PET/CT on 2/13/22 reported a rim of FDG activity surrounding a right intraparotid lesion (SUV 6.3, 3.1 x 2.3 cm). Decreased FDG activity centrally may be secondary to tumor necrosis. There is no other focal abnormal  FDG activity identified in the head and neck. There are no lytic or blastic lesions.\par \par \par Patient referred to Head and Neck surgeon, Dr. Misbah Recio, on 2/16/22. Patient s/p Parotidectomy with facial nerve dissection and right neck dissection on 3/3/22. \par Pathology reported Squamous cell carcinoma, moderately to poorly differentiated (3.0 cm in greatest dimension) involving parotid gland and soft tissue, favor metastatic based on clinical information provided by surgeon. Carcinoma focally very close to (less than 0.1 mm) inked circumferential margin, 4 intraparotid lymph nodes negative for metastatic carcinoma, 3 Lymph nodes, right level 1B- negative for metastatic carcinoma, Submandibular gland negative for carcinoma\par Lymph nodes, right levels 2 and 3, neck dissection - 15 lymph nodes negative for metastatic carcinoma\par \par \par Patient referred to Dr. Richardson for post op RT. Patient completed radiation on 6/1/22. \par \par Patient presented to Orthopedist on 10/6/22 for ongoing left hip pain for the past 4 years. He has been following Rheumatology, Dr. Kleiner, for pain. Pain has been progressive affect quality of life. \par MRI of hip was performed  on 10/10/22 revealing  a fat-containing mass centered within the tensor fascia manjula muscle measuring 4 x 3.1 x 7.2 cm and a mass is seen centered within the lesser trochanter measuring 2.9 x 3.4 x 4.4 cm. There is overlying periosteal edema and cortical thinning\par \par Subsequent PET/CT on 10/14/22 visualized osseous structures reveal an intramedullary lytic 3 cm lesion along the left femoral intertrochanteric region max SUV 10.6.\par Right parotid gland demonstrates interval resection of the hypermetabolic mass as well as right neck prominent soft tissue postsurgical low level uptake. No evidence of atypical lymph node activity identified.\par \par Biopsy of left proximal femur lesion on 10/27/2022. Pathology reported metastatic carcinoma,the tumor shows squamous differentiation on H T E. Performed immunostains show that the tumor cells are positive for AE1/AE3, CK5/ and p40. 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 Ambulates with walker.\par S/p received 1 dose of Keytruda on 2/2/23, went to rehab however was too weak for rehab and was discharged 2/10/23 (enrolled on 12/14/23), was in a lot of pain at home and enrolled in hospice on 2/17/23, then slowly started increasing appetite, started ambulating more with walker, continued left hip pain, diffused body pain started to subside. \par Continues on morphine 30 mg BID, and for breakthrough pain takes oxycodone prn - has not required since 3/2023 \par States is feeling better every day, tries to walk outside everyday \par Reports continued swelling and redness on R cheek area of face, \par Pt/family planning on discharging from hospice \par \par 2/3/23 Ca+ 13.6

## 2023-05-31 LAB
ALBUMIN SERPL ELPH-MCNC: 4 G/DL
ALP BLD-CCNC: 99 U/L
ALT SERPL-CCNC: 10 U/L
ANION GAP SERPL CALC-SCNC: 11 MMOL/L
AST SERPL-CCNC: 16 U/L
BILIRUB SERPL-MCNC: 0.2 MG/DL
BUN SERPL-MCNC: 19 MG/DL
CALCIUM SERPL-MCNC: 9.4 MG/DL
CHLORIDE SERPL-SCNC: 106 MMOL/L
CO2 SERPL-SCNC: 29 MMOL/L
CREAT SERPL-MCNC: 1 MG/DL
EGFR: 78 ML/MIN/1.73M2
GLUCOSE SERPL-MCNC: 103 MG/DL
LDH SERPL-CCNC: 163 U/L
POTASSIUM SERPL-SCNC: 4.8 MMOL/L
PROT SERPL-MCNC: 6.3 G/DL
SODIUM SERPL-SCNC: 146 MMOL/L
TSH SERPL-ACNC: 5.61 UIU/ML

## 2023-06-09 ENCOUNTER — APPOINTMENT (OUTPATIENT)
Dept: NUCLEAR MEDICINE | Facility: CLINIC | Age: 78
End: 2023-06-09
Payer: MEDICARE

## 2023-06-09 ENCOUNTER — OUTPATIENT (OUTPATIENT)
Dept: OUTPATIENT SERVICES | Facility: HOSPITAL | Age: 78
LOS: 1 days | End: 2023-06-09

## 2023-06-09 DIAGNOSIS — Z98.1 ARTHRODESIS STATUS: Chronic | ICD-10-CM

## 2023-06-09 DIAGNOSIS — Z90.49 ACQUIRED ABSENCE OF OTHER SPECIFIED PARTS OF DIGESTIVE TRACT: Chronic | ICD-10-CM

## 2023-06-09 DIAGNOSIS — Z95.5 PRESENCE OF CORONARY ANGIOPLASTY IMPLANT AND GRAFT: Chronic | ICD-10-CM

## 2023-06-09 DIAGNOSIS — C79.51 SECONDARY MALIGNANT NEOPLASM OF BONE: ICD-10-CM

## 2023-06-09 PROCEDURE — 78815 PET IMAGE W/CT SKULL-THIGH: CPT | Mod: 26,PS

## 2023-06-14 ENCOUNTER — APPOINTMENT (OUTPATIENT)
Dept: HEMATOLOGY ONCOLOGY | Facility: CLINIC | Age: 78
End: 2023-06-14
Payer: MEDICARE

## 2023-06-14 VITALS
BODY MASS INDEX: 22.21 KG/M2 | TEMPERATURE: 97.3 F | HEIGHT: 72 IN | DIASTOLIC BLOOD PRESSURE: 88 MMHG | HEART RATE: 77 BPM | WEIGHT: 164 LBS | OXYGEN SATURATION: 96 % | SYSTOLIC BLOOD PRESSURE: 164 MMHG

## 2023-06-14 PROCEDURE — 99215 OFFICE O/P EST HI 40 MIN: CPT

## 2023-06-14 NOTE — RESULTS/DATA
[FreeTextEntry1] : \par PET 6/9/23: Utilized the dedicated head and neck series with image numbers from this series. Physiologic FDG activity in visualized brain.\par -Resolution of hypermetabolic soft tissue abutting the right masseter muscle present on the prior PET/CT.\par -S/P right parotidectomy with no focal abnormal activity within the surgical bed.\par -Foci of increased activity localizing to the the right ear showing SUV 3.6 at the top of its helix and SUV 4.7 within its inferior lobule; these are nonspecific activity but should be correlated with direct clinical examination.\par -Left ear shows focus in its posterior surface  showing SUV 3.7 that is difficult to correlate with on low-dose CT. Addition focus of cutaneous activity could also be seen behind the left ear  showing SUV 3.0. Please evaluate these areas.\par -Nonspecific activity in the thyroid gland showing SUV 5.4 in the right and 5.6 in the left.\par  Few muscles with increased activity around the neck and upper torso and pelvic region due to exertional strain.\par -Interval improvement in the left femoral bone currently smaller foci of intense activity with cortical bone reconstitution such as seen medially localizing to a not fully reconstituted lytic lesion (image 241) with SUV 10.0; same area previously had larger extent of lytic changes and activity with as high as SUV 9.3.  Similarly, soft tissue changes laterally also improved with smaller remaining tissue and less intense activity (image 212). Reassess on follow up for continued improvement or stability.\par -Interval resolution of FDG avid bone lesions with increased sclerosis in T8, T12, and in the right side of the sacrum. Marked improvement in the lytic lesion previously seen in the right acetabulum with reconstruction of some of its cortical defects currently with nonspecific patchy activity of SUV 2.4 (previously large lytic lesion with soft tissue component and SUV 7.2).\par \par -PET/CT 1/19/23\par New hypermetabolic soft tissue abutting the right masseter muscle anteriorly measuring 1.6 x 1.1 cm with SUV 13.7 (prev measured 1.3 x 0.7 cm on the 12/2022 CT head) Interval left femoral ORIF with intense activity SUV 9.3 as well as to the surrounding soft tissues/muscles. Soft tissue in the subcutaneous region of the left thigh laterally measuring 2.7 x 2.5 cm SUV 9.2. Heterogeneous changes in the left gluteal muscle showing foci of increased activity as high as SUV 7.2. New lytic lesions are also noted since prior seen at T8  SUV 10.9; T12 SUV 8.0; right side of sacrum SUV 9.6 and right acetabulum with cortical disruption and soft tissue component SUV 10.6.\par \par 11/11/22 MR neck and face: Post surgical changes seen in the right submandibular region and of right resected parotid mass. \par \par 10/27/2022 Surgical Pathology \par \par Final Diagnosis\par 1.  Left proximal femur lesion, biopsy:\par -  Metastatic carcinoma \par \par 2.  Left proximal femur lesion, biopsy:\par - Metastatic carcinoma \par \par -The tumor shows squamous differentiation on H T E. Performed immunostains show that the tumor cells are positive for AE1/AE3, CK5/ and p40. This supports the diagnosis of metastatic carcinoma with squamous differentiation.\par \par PD-L1 Immunohistochemistry\par Result: Combined Positive Score (CPS): <1, NEGATIVE\par Result: Tumor Proportion Score (TPS*)   <1%\par Interpretation: NEGATIVE\par \par PET/CT 10/14/22\par \par - Right parotid gland demonstrates interval resection of the hypermetabolic mass as well as right neck prominent soft tissue postsurgical low level uptake. No evidence of atypical lymph node activity identified.\par - Hepatosplenic uptake max SUV ratio is 2.9/2.2. Splenic activity within normal limits. Splenic cyst 5 cm.\par - No abnormal pelvic lymphadenopathy involving the iliac chains and inguinal regions. \par -Visualized osseous structures reveal an intramedullary lytic 3 cm lesion along the left femoral intertrochanteric region max SUV 10.6.\par \par \par \par \par 10/10/22 MRI Left Hip\par MUSCLE AND TENDONS: There is mild edema at the insertion the gluteus minimus tendon. The remaining tendons appear intact. There is a fat-containing mass centered within the tensor fascia manjula muscle measuring 4 x 3.1 x 7.2 cm.\par BONE MARROW: A mass is seen centered within the lesser trochanter measuring 2.9 x 3.4 x 4.4 cm. There is overlying periosteal edema and cortical thinning\par \par 9/9/22 MRI L-spine\par \par IMPRESSION:\par 1.  Moderately severe right-sided neural canal stenosis at L4-L5 without definite impingement of the exiting ipsilateral L4 nerve root. Nonetheless correlate for possible right-sided L4 radiculopathy.\par 2.  Otherwise no high-grade lumbar spinal canal or neural canal stenosis.\par 3.  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 Moderate to severe right worse than left facet arthrosis and mixed Modic type I/II endplate changes at L4-L5.\par \par \par 3/4/2022 Surgical Pathology \par \par 1. Lymph nodes, right level 1B, neck dissection\par - 3 lymph nodes negative for metastatic carcinoma\par - Submandibular gland negative for carcinoma\par \par 2. Parotid gland, right parotid gland with tumor, parotidectomy\par - Squamous cell carcinoma, moderately to poorly differentiated (3.0 cm in greatest dimension) involving parotid gland and soft tissue, favor metastatic based on clinical information provided by surgeon\par - Carcinoma focally very close to (less than 0.1 mm) inked circumferential margin\par - 4 intraparotid lymph nodes negative for metastatic carcinoma\par \par 3. Lymph nodes, right levels 2 and 3, neck dissection\par - 15 lymph nodes negative for metastatic carcinoma\par \par 4. Neck, right external jugular chain, excision\par - Neurovascular and adipose tissue, negative for carcinoma\par 2/13/22 Pet\par \par 2/13/22 PET/CT ( [facility])\par  -There is a rim of FDG activity surrounding a right intraparotid lesion (SUV 6.3, 3.1 x 2.3 cm). Decreased FDG activity centrally may be secondary to tumor necrosis. There is no other focal abnormal  FDG activity identified in the head and neck.\par -There are no lytic or blastic lesions.\par \par \par  2/1/2022\par PAROTID, RIGHT, US GUIDED FNA\par -POSITIVE FOR MALIGNANCY (CLAIRE CLASS VI)\par -Consistent with carcinoma with squamous differentiation, 2.6 cm \par -Cytology slide and cell block show clusters and single scattered atypical squamous cells with prominent nucleoli, irregular nuclear membranes, irregular chromatin patterns, anisonucleosis and keratinization.\par Note:\par Primary versus metastatic carcinoma.\par \par \par \par 1/24/22 MRI Neck\par - irregularly peripherally enhancing mass in the superficial lobe of the right parotid gland measuring 2.7 x 2.3 cm in AP. There is some strand-like internal enhancement as well. There is no left parotid mass.\par

## 2023-06-14 NOTE — HISTORY OF PRESENT ILLNESS
[de-identified] : JOSIANE SHAW is a 77 year male with PMHX of CAD s/p stents HTN, GERD, fibromyalgia, h/o polymyalgia rheumatica, Parotid ca s/p dissection/RT, cigar smoker ( 1-4 cigars/day) presents for initial consultation for metastatic carcinoma \par \par Patient presented to ENT, Dr. Alex Boston, on 1/18/22 c/o right neck swelling for 5 weeks. On exam, swelling int he right parotid gland approx. 3 x 3 cm firm, nontender was noted, no enlarged LN.  \par MRI of neck was ordered and performed on 1/24/22. Scan revealed irregularly peripherally enhancing mass in the superficial lobe of the right parotid gland measuring 2.7 x 2.3 cm in AP . There is some strand-like internal enhancement as well. There is no left parotid mass.\par \par \par Right parotid FNA biopsy performed on 2/1/22 at . Pathology demonstrated cells positive for malignancy (Richard Class IV), Consistent with carcinoma with squamous differentiation,Cytology slide and cell block show clusters and single scattered atypical squamous cells with prominent nucleoli, irregular nuclear membranes, irregular chromatin patterns, anisonucleosis and keratinization. Primary versus metastatic carcinoma.\par \par Subsequent PET/CT on 2/13/22 reported a rim of FDG activity surrounding a right intraparotid lesion (SUV 6.3, 3.1 x 2.3 cm). Decreased FDG activity centrally may be secondary to tumor necrosis. There is no other focal abnormal  FDG activity identified in the head and neck. There are no lytic or blastic lesions.\par \par \par Patient referred to Head and Neck surgeon, Dr. Misbah Recio, on 2/16/22. Patient s/p Parotidectomy with facial nerve dissection and right neck dissection on 3/3/22. \par Pathology reported Squamous cell carcinoma, moderately to poorly differentiated (3.0 cm in greatest dimension) involving parotid gland and soft tissue, favor metastatic based on clinical information provided by surgeon. Carcinoma focally very close to (less than 0.1 mm) inked circumferential margin, 4 intraparotid lymph nodes negative for metastatic carcinoma, 3 Lymph nodes, right level 1B- negative for metastatic carcinoma, Submandibular gland negative for carcinoma\par Lymph nodes, right levels 2 and 3, neck dissection - 15 lymph nodes negative for metastatic carcinoma\par \par \par Patient referred to Dr. Richardson for post op RT. Patient completed radiation on 6/1/22. \par \par Patient presented to Orthopedist on 10/6/22 for ongoing left hip pain for the past 4 years. He has been following Rheumatology, Dr. Kleiner, for pain. Pain has been progressive affect quality of life. \par MRI of hip was performed  on 10/10/22 revealing  a fat-containing mass centered within the tensor fascia manjula muscle measuring 4 x 3.1 x 7.2 cm and a mass is seen centered within the lesser trochanter measuring 2.9 x 3.4 x 4.4 cm. There is overlying periosteal edema and cortical thinning\par \par Subsequent PET/CT on 10/14/22 visualized osseous structures reveal an intramedullary lytic 3 cm lesion along the left femoral intertrochanteric region max SUV 10.6.\par Right parotid gland demonstrates interval resection of the hypermetabolic mass as well as right neck prominent soft tissue postsurgical low level uptake. No evidence of atypical lymph node activity identified.\par \par Biopsy of left proximal femur lesion on 10/27/2022. Pathology reported metastatic carcinoma,the tumor shows squamous differentiation on H T E. Performed immunostains show that the tumor cells are positive for AE1/AE3, CK5/ and p40. 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 Ambulates with cane. \par S/p received 1 dose of Keytruda on 2/2/23, went to rehab however was too weak for rehab and was discharged 2/10/23 (enrolled on 12/14/23), was in a lot of pain at home and enrolled in hospice on 2/17/23, then slowly started increasing appetite, started ambulating more with walker, continued left hip pain, diffused body pain started to subside. \par Continues on morphine 30 mg BID, and for breakthrough pain takes oxycodone prn - has not required since 3/2023 \par States is feeling better every day, tries to walk outside everyday \par Reports continued swelling and redness on R cheek area of face, \par Pt/family planning on discharging from hospice \par \par 2/3/23 Ca+ 13.6 \par \par 6/14/23: Patient here for follow up \par Had recent PET scan on 6/9/23 \par Patient discharged from hospice 6/12/23 \par Has dentures both upper and lower \par Has Dermatologist, Dr. Wilkersno  \par \par \par PET 6/9/23: Utilized the dedicated head and neck series with image numbers from this series. Physiologic FDG activity in visualized brain.\par -Resolution of hypermetabolic soft tissue abutting the right masseter muscle present on the prior PET/CT.\par -S/P right parotidectomy with no focal abnormal activity within the surgical bed.\par -Foci of increased activity localizing to the the right ear showing SUV 3.6 at the top of its helix and SUV 4.7 within its inferior lobule; these are nonspecific activity but should be correlated with direct clinical examination.\par -Left ear shows focus in its posterior surface  showing SUV 3.7 that is difficult to correlate with on low-dose CT. Addition focus of cutaneous activity could also be seen behind the left ear  showing SUV 3.0. Please evaluate these areas.\par -Nonspecific activity in the thyroid gland showing SUV 5.4 in the right and 5.6 in the left.\par  Few muscles with increased activity around the neck and upper torso and pelvic region due to exertional strain.\par -Interval improvement in the left femoral bone currently smaller foci of intense activity with cortical bone reconstitution such as seen medially localizing to a not fully reconstituted lytic lesion (image 241) with SUV 10.0; same area previously had larger extent of lytic changes and activity with as high as SUV 9.3.  Similarly, soft tissue changes laterally also improved with smaller remaining tissue and less intense activity (image 212). Reassess on follow up for continued improvement or stability.\par -Interval resolution of FDG avid bone lesions with increased sclerosis in T8, T12, and in the right side of the sacrum. Marked improvement in the lytic lesion previously seen in the right acetabulum with reconstruction of some of its cortical defects currently with nonspecific patchy activity of SUV 2.4 (previously large lytic lesion with soft tissue component and SUV 7.2).\par  \par

## 2023-06-14 NOTE — ASSESSMENT
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 \par \par Foundation: TMB 99 Muts/Mb, MS- stable\par \par Patient presented to ENT, Dr. Alex Boston, on 1/18/22 c/o right neck swelling for 5 weeks. On exam, swelling in the right parotid gland approx. 3 x 3 cm firm, nontender was noted, no enlarged LN.  \par Subsequently MRI of neck on 1/24/22 revealed irregularly peripherally enhancing mass in the superficial lobe of the right parotid gland measuring 2.7 x 2.3 cm in AP.\par \par Right parotid FNA biopsy performed on 2/1/22 at  consistent with carcinoma with squamous differentiation,Cytology; clusters and single scattered atypical squamous cells with prominent nucleoli, irregular nuclear membranes, irregular chromatin patterns, anisonucleosis and keratinization. Primary versus metastatic carcinoma.\par \par Patient s/p Parotidectomy with facial nerve dissection and right neck dissection on 3/3/22 by Dr. Misbah Recio.\par Pathology showed 22 lymph nodes negative for metastatic carcinoma.\par \par Completed radiation on 6/1/22 with Dr. Rowe\par \par Bone Mets:\par -PET/CT on 10/14/22 visualized osseous structures reveal an intramedullary lytic 3 cm lesion along the left femoral intertrochanteric region max SUV 10.6. Biopsy of left proximal femur lesion on 10/27/2022. Pathology reported metastatic carcinoma,the tumor shows squamous differentiation on H T E. Tumor cells are positive for AE1/AE3, CK5/ and p40.\par \par PET 6/9/23: Utilized the dedicated head and neck series with image numbers from this series. Physiologic FDG activity in visualized brain.\par -Resolution of hypermetabolic soft tissue abutting the right masseter muscle present on the prior PET/CT.\par -S/P right parotidectomy with no focal abnormal activity within the surgical bed.\par -Foci of increased activity localizing to the the right ear showing SUV 3.6 at the top of its helix and SUV 4.7 within its inferior lobule; these are nonspecific activity but should be correlated with direct clinical examination.\par -Left ear shows focus in its posterior surface  showing SUV 3.7 that is difficult to correlate with on low-dose CT. Addition focus of cutaneous activity could also be seen behind the left ear  showing SUV 3.0. Please evaluate these areas.\par -Nonspecific activity in the thyroid gland showing SUV 5.4 in the right and 5.6 in the left.\par  Few muscles with increased activity around the neck and upper torso and pelvic region due to exertional strain.\par -Interval improvement in the left femoral bone currently smaller foci of intense activity with cortical bone reconstitution such as seen medially localizing to a not fully reconstituted lytic lesion (image 241) with SUV 10.0; same area previously had larger extent of lytic changes and activity with as high as SUV 9.3.  Similarly, soft tissue changes laterally also improved with smaller remaining tissue and less intense activity (image 212). Reassess on follow up for continued improvement or stability.\par -Interval resolution of FDG avid bone lesions with increased sclerosis in T8, T12, and in the right side of the sacrum. Marked improvement in the lytic lesion previously seen in the right acetabulum with reconstruction of some of its cortical defects currently with nonspecific patchy activity of SUV 2.4 (previously large lytic lesion with soft tissue component and SUV 7.2).\par \par Plan: \par - Given the TMB is 99  treated with single agent pembro \par - Pembrolizumab 200mg q 3 weeks. received 1  dose of Keytruda on 2/2/23, went to rehab however was too weak for rehab and was discharged 2/10/23 (enrolled on 12/14/23), was in a lot of pain at home and enrolled in hospice on 2/17/23, then slowly started increasing appetite, started ambulating more with walker, continued left hip pain, diffused body pain started to subside. Discharged from hospice on 6/12/23. \par  -Repeat PET SCAN On 6/9/23 with improvement in Disease ( SEE ABOVE) \par - Will restart keytruda 200 q 3 weeks \par -Continues on morphine 30 mg BID, and for breakthrough pain takes oxycodone prn - has not required since 3/2023 \par -Pain management:  on morphine 30 mg BID,  Oxycodone 10 mg q 4h Pain  PRN. \par -Discussed the use of Denosumab  AE include flu like reaction, achiness, stiffness, low calcium, osteonecrosis of jaw (dental clearance unrequired - has upper and lower dentures). \par -Left ear shows focus in its posterior surface SUV 3.7 - Advised prompt follow up with Dermatologist, Dr. Wilkerson  \par \par - will plan for Xgeva and Keytruda \par \par -Follow up with orthopedist \par - f/u with dermatologist \par PA f/u in 3 weeks \par \par

## 2023-06-14 NOTE — PHYSICAL EXAM
[Normal] : affect appropriate [de-identified] : Ambulates with cane [de-identified] : Has dentures both upper and lower  [de-identified] : Left hip Keloid scar changes s/p surgery [de-identified] : 1-2 mm red lesion on face

## 2023-06-17 NOTE — HISTORY OF PRESENT ILLNESS
[FreeTextEntry1] : JOSIANE SHAW is a 77 year old man who presents for follow up office visit for further evaluation of joint symptoms and rheumatic diseases including Rheumatoid Arthritis, osteoarthritis, fibromyalgia and Sjogren's Syndrome.\par \par Patient has pain "all over" including bilateral shoulders, MCP/PIP/DIPs, hips, and knees. Swelling bilateral PIPs and knees. Morning stiffness 30 minutes.  He feels miserable.  He is taking the Celebrex but has not been taking sulfasalazine since his surgery/RT.  He also has been continuing his duloxetine.  Denies recent headache, jaw claudication, or visual symptoms. Persistent paresthesias bilateral feet and toes. Some dry eyes and dry mouth, using artificial tears and biotene mouthwash with relief. Patient denies rash or side effects with current medications. Patient is content with current medication regimen. \par \par PMH:\niru Completed Radiation Therapy to his left femur where he is felt to have a metastatic lesion 
The patient is a 7y11m Female complaining of vomiting.

## 2023-06-19 ENCOUNTER — RESULT REVIEW (OUTPATIENT)
Age: 78
End: 2023-06-19

## 2023-06-19 ENCOUNTER — APPOINTMENT (OUTPATIENT)
Age: 78
End: 2023-06-19

## 2023-06-19 LAB
BASOPHILS # BLD AUTO: 0 K/UL — SIGNIFICANT CHANGE UP (ref 0–0.2)
BASOPHILS NFR BLD AUTO: 0.4 % — SIGNIFICANT CHANGE UP (ref 0–2)
EOSINOPHIL # BLD AUTO: 0 K/UL — SIGNIFICANT CHANGE UP (ref 0–0.5)
EOSINOPHIL NFR BLD AUTO: 0.4 % — SIGNIFICANT CHANGE UP (ref 0–6)
HCT VFR BLD CALC: 36.8 % — LOW (ref 39–50)
HGB BLD-MCNC: 11.8 G/DL — LOW (ref 13–17)
LYMPHOCYTES # BLD AUTO: 0.4 K/UL — LOW (ref 1–3.3)
LYMPHOCYTES # BLD AUTO: 4.4 % — LOW (ref 13–44)
MCHC RBC-ENTMCNC: 30.5 PG — SIGNIFICANT CHANGE UP (ref 27–34)
MCHC RBC-ENTMCNC: 32 G/DL — SIGNIFICANT CHANGE UP (ref 32–36)
MCV RBC AUTO: 95 FL — SIGNIFICANT CHANGE UP (ref 80–100)
MONOCYTES # BLD AUTO: 0.5 K/UL — SIGNIFICANT CHANGE UP (ref 0–0.9)
MONOCYTES NFR BLD AUTO: 5.3 % — SIGNIFICANT CHANGE UP (ref 2–14)
NEUTROPHILS # BLD AUTO: 7.9 K/UL — HIGH (ref 1.8–7.4)
NEUTROPHILS NFR BLD AUTO: 89.4 % — HIGH (ref 43–77)
PLATELET # BLD AUTO: 168 K/UL — SIGNIFICANT CHANGE UP (ref 150–400)
RBC # BLD: 3.87 M/UL — LOW (ref 4.2–5.8)
RBC # FLD: 15.8 % — HIGH (ref 10.3–14.5)
T4 FREE SERPL-MCNC: 0.8 NG/DL — LOW (ref 0.9–1.8)
T4 FREE+ TSH PNL SERPL: 11.9 UIU/ML — HIGH (ref 0.27–4.2)
WBC # BLD: 8.8 K/UL — SIGNIFICANT CHANGE UP (ref 3.8–10.5)
WBC # FLD AUTO: 8.8 K/UL — SIGNIFICANT CHANGE UP (ref 3.8–10.5)

## 2023-06-20 DIAGNOSIS — C77.0 SECONDARY AND UNSPECIFIED MALIGNANT NEOPLASM OF LYMPH NODES OF HEAD, FACE AND NECK: ICD-10-CM

## 2023-06-20 DIAGNOSIS — C80.1 MALIGNANT (PRIMARY) NEOPLASM, UNSPECIFIED: ICD-10-CM

## 2023-06-20 DIAGNOSIS — C79.51 SECONDARY MALIGNANT NEOPLASM OF BONE: ICD-10-CM

## 2023-06-20 DIAGNOSIS — Z51.11 ENCOUNTER FOR ANTINEOPLASTIC CHEMOTHERAPY: ICD-10-CM

## 2023-06-20 LAB
ALBUMIN SERPL ELPH-MCNC: 3.8 G/DL — SIGNIFICANT CHANGE UP (ref 3.3–5)
ALP SERPL-CCNC: 90 U/L — SIGNIFICANT CHANGE UP (ref 40–120)
ALT FLD-CCNC: 16 U/L — SIGNIFICANT CHANGE UP (ref 10–45)
ANION GAP SERPL CALC-SCNC: 11 MMOL/L — SIGNIFICANT CHANGE UP (ref 5–17)
AST SERPL-CCNC: 22 U/L — SIGNIFICANT CHANGE UP (ref 10–40)
BILIRUB SERPL-MCNC: <0.2 MG/DL — SIGNIFICANT CHANGE UP (ref 0.2–1.2)
BUN SERPL-MCNC: 20 MG/DL — SIGNIFICANT CHANGE UP (ref 7–23)
CALCIUM SERPL-MCNC: 8.8 MG/DL — SIGNIFICANT CHANGE UP (ref 8.4–10.5)
CHLORIDE SERPL-SCNC: 108 MMOL/L — SIGNIFICANT CHANGE UP (ref 96–108)
CO2 SERPL-SCNC: 25 MMOL/L — SIGNIFICANT CHANGE UP (ref 22–31)
CREAT SERPL-MCNC: 0.91 MG/DL — SIGNIFICANT CHANGE UP (ref 0.5–1.3)
EGFR: 88 ML/MIN/1.73M2 — SIGNIFICANT CHANGE UP
GLUCOSE SERPL-MCNC: 132 MG/DL — HIGH (ref 70–99)
POTASSIUM SERPL-MCNC: 4.4 MMOL/L — SIGNIFICANT CHANGE UP (ref 3.5–5.3)
POTASSIUM SERPL-SCNC: 4.4 MMOL/L — SIGNIFICANT CHANGE UP (ref 3.5–5.3)
PROT SERPL-MCNC: 6.3 G/DL — SIGNIFICANT CHANGE UP (ref 6–8.3)
SODIUM SERPL-SCNC: 144 MMOL/L — SIGNIFICANT CHANGE UP (ref 135–145)

## 2023-06-22 ENCOUNTER — NON-APPOINTMENT (OUTPATIENT)
Age: 78
End: 2023-06-22

## 2023-06-29 LAB
CORTICOSTEROID BINDING GLOBULIN RESULT: 1.5 MG/DL — LOW
CORTIS F/TOTAL MFR SERPL: <3.7 % — SIGNIFICANT CHANGE UP
CORTIS SERPL-MCNC: <1 UG/DL — LOW
CORTISOL, FREE RESULT: <0.04 UG/DL — LOW

## 2023-07-10 ENCOUNTER — APPOINTMENT (OUTPATIENT)
Age: 78
End: 2023-07-10

## 2023-07-10 ENCOUNTER — RESULT REVIEW (OUTPATIENT)
Age: 78
End: 2023-07-10

## 2023-07-10 LAB
ALBUMIN SERPL ELPH-MCNC: 3.6 G/DL — SIGNIFICANT CHANGE UP (ref 3.3–5)
ALP SERPL-CCNC: 79 U/L — SIGNIFICANT CHANGE UP (ref 40–120)
ALT FLD-CCNC: 11 U/L — SIGNIFICANT CHANGE UP (ref 10–45)
ANION GAP SERPL CALC-SCNC: 9 MMOL/L — SIGNIFICANT CHANGE UP (ref 5–17)
AST SERPL-CCNC: 21 U/L — SIGNIFICANT CHANGE UP (ref 10–40)
BASOPHILS # BLD AUTO: 0.1 K/UL — SIGNIFICANT CHANGE UP (ref 0–0.2)
BASOPHILS NFR BLD AUTO: 0.8 % — SIGNIFICANT CHANGE UP (ref 0–2)
BILIRUB SERPL-MCNC: 0.2 MG/DL — SIGNIFICANT CHANGE UP (ref 0.2–1.2)
BUN SERPL-MCNC: 18 MG/DL — SIGNIFICANT CHANGE UP (ref 7–23)
CALCIUM SERPL-MCNC: 8.9 MG/DL — SIGNIFICANT CHANGE UP (ref 8.4–10.5)
CHLORIDE SERPL-SCNC: 105 MMOL/L — SIGNIFICANT CHANGE UP (ref 96–108)
CO2 SERPL-SCNC: 29 MMOL/L — SIGNIFICANT CHANGE UP (ref 22–31)
CREAT SERPL-MCNC: 0.96 MG/DL — SIGNIFICANT CHANGE UP (ref 0.5–1.3)
EGFR: 82 ML/MIN/1.73M2 — SIGNIFICANT CHANGE UP
EOSINOPHIL # BLD AUTO: 0.1 K/UL — SIGNIFICANT CHANGE UP (ref 0–0.5)
EOSINOPHIL NFR BLD AUTO: 1.8 % — SIGNIFICANT CHANGE UP (ref 0–6)
GLUCOSE SERPL-MCNC: 96 MG/DL — SIGNIFICANT CHANGE UP (ref 70–99)
HCT VFR BLD CALC: 36.4 % — LOW (ref 39–50)
HGB BLD-MCNC: 12.4 G/DL — LOW (ref 13–17)
LYMPHOCYTES # BLD AUTO: 0.6 K/UL — LOW (ref 1–3.3)
LYMPHOCYTES # BLD AUTO: 9 % — LOW (ref 13–44)
MCHC RBC-ENTMCNC: 30.6 PG — SIGNIFICANT CHANGE UP (ref 27–34)
MCHC RBC-ENTMCNC: 33.9 G/DL — SIGNIFICANT CHANGE UP (ref 32–36)
MCV RBC AUTO: 90.2 FL — SIGNIFICANT CHANGE UP (ref 80–100)
MONOCYTES # BLD AUTO: 0.6 K/UL — SIGNIFICANT CHANGE UP (ref 0–0.9)
MONOCYTES NFR BLD AUTO: 8.7 % — SIGNIFICANT CHANGE UP (ref 2–14)
NEUTROPHILS # BLD AUTO: 5.3 K/UL — SIGNIFICANT CHANGE UP (ref 1.8–7.4)
NEUTROPHILS NFR BLD AUTO: 79.7 % — HIGH (ref 43–77)
PLATELET # BLD AUTO: 176 K/UL — SIGNIFICANT CHANGE UP (ref 150–400)
POTASSIUM SERPL-MCNC: 4 MMOL/L — SIGNIFICANT CHANGE UP (ref 3.5–5.3)
POTASSIUM SERPL-SCNC: 4 MMOL/L — SIGNIFICANT CHANGE UP (ref 3.5–5.3)
PROT SERPL-MCNC: 6.1 G/DL — SIGNIFICANT CHANGE UP (ref 6–8.3)
RBC # BLD: 4.04 M/UL — LOW (ref 4.2–5.8)
RBC # FLD: 14.4 % — SIGNIFICANT CHANGE UP (ref 10.3–14.5)
SODIUM SERPL-SCNC: 143 MMOL/L — SIGNIFICANT CHANGE UP (ref 135–145)
T4 FREE SERPL-MCNC: 1.1 NG/DL — SIGNIFICANT CHANGE UP (ref 0.9–1.8)
T4 FREE+ TSH PNL SERPL: 10.6 UIU/ML — HIGH (ref 0.27–4.2)
WBC # BLD: 6.6 K/UL — SIGNIFICANT CHANGE UP (ref 3.8–10.5)
WBC # FLD AUTO: 6.6 K/UL — SIGNIFICANT CHANGE UP (ref 3.8–10.5)

## 2023-07-17 ENCOUNTER — APPOINTMENT (OUTPATIENT)
Dept: HEMATOLOGY ONCOLOGY | Facility: CLINIC | Age: 78
End: 2023-07-17
Payer: MEDICARE

## 2023-07-17 VITALS
HEART RATE: 62 BPM | HEIGHT: 72 IN | WEIGHT: 176.15 LBS | OXYGEN SATURATION: 99 % | DIASTOLIC BLOOD PRESSURE: 82 MMHG | SYSTOLIC BLOOD PRESSURE: 164 MMHG | BODY MASS INDEX: 23.86 KG/M2

## 2023-07-17 PROCEDURE — 99214 OFFICE O/P EST MOD 30 MIN: CPT

## 2023-07-19 ENCOUNTER — APPOINTMENT (OUTPATIENT)
Dept: ULTRASOUND IMAGING | Facility: CLINIC | Age: 78
End: 2023-07-19
Payer: MEDICARE

## 2023-07-19 ENCOUNTER — OUTPATIENT (OUTPATIENT)
Dept: OUTPATIENT SERVICES | Facility: HOSPITAL | Age: 78
LOS: 1 days | End: 2023-07-19

## 2023-07-19 DIAGNOSIS — Z95.5 PRESENCE OF CORONARY ANGIOPLASTY IMPLANT AND GRAFT: Chronic | ICD-10-CM

## 2023-07-19 DIAGNOSIS — Z90.49 ACQUIRED ABSENCE OF OTHER SPECIFIED PARTS OF DIGESTIVE TRACT: Chronic | ICD-10-CM

## 2023-07-19 DIAGNOSIS — C79.51 SECONDARY MALIGNANT NEOPLASM OF BONE: ICD-10-CM

## 2023-07-19 DIAGNOSIS — Z98.1 ARTHRODESIS STATUS: Chronic | ICD-10-CM

## 2023-07-19 PROCEDURE — 76536 US EXAM OF HEAD AND NECK: CPT | Mod: 26

## 2023-07-21 NOTE — ASSESSMENT
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 \par \par Foundation: TMB 99 Muts/Mb, MS- stable\par \par Patient presented to ENT, Dr. Alex Boston, on 1/18/22 c/o right neck swelling for 5 weeks. On exam, swelling in the right parotid gland approx. 3 x 3 cm firm, nontender was noted, no enlarged LN.  \par Subsequently MRI of neck on 1/24/22 revealed irregularly peripherally enhancing mass in the superficial lobe of the right parotid gland measuring 2.7 x 2.3 cm in AP.\par \par Right parotid FNA biopsy performed on 2/1/22 at  consistent with carcinoma with squamous differentiation,Cytology; clusters and single scattered atypical squamous cells with prominent nucleoli, irregular nuclear membranes, irregular chromatin patterns, anisonucleosis and keratinization. Primary versus metastatic carcinoma.\par \par Patient s/p Parotidectomy with facial nerve dissection and right neck dissection on 3/3/22 by Dr. Misbah Recio.\par Pathology showed 22 lymph nodes negative for metastatic carcinoma.\par \par Completed radiation on 6/1/22 with Dr. Rowe\par \par Bone Mets:\par -PET/CT on 10/14/22 visualized osseous structures reveal an intramedullary lytic 3 cm lesion along the left femoral intertrochanteric region max SUV 10.6. Biopsy of left proximal femur lesion on 10/27/2022. Pathology reported metastatic carcinoma,the tumor shows squamous differentiation on H T E. Tumor cells are positive for AE1/AE3, CK5/ and p40.\par \par PET 6/9/23: Utilized the dedicated head and neck series with image numbers from this series. Physiologic FDG activity in visualized brain.\par -Resolution of hypermetabolic soft tissue abutting the right masseter muscle present on the prior PET/CT.\par -S/P right parotidectomy with no focal abnormal activity within the surgical bed.\par -Foci of increased activity localizing to the the right ear showing SUV 3.6 at the top of its helix and SUV 4.7 within its inferior lobule; these are nonspecific activity but should be correlated with direct clinical examination.\par -Left ear shows focus in its posterior surface  showing SUV 3.7 that is difficult to correlate with on low-dose CT. Addition focus of cutaneous activity could also be seen behind the left ear  showing SUV 3.0. Please evaluate these areas.\par -Nonspecific activity in the thyroid gland showing SUV 5.4 in the right and 5.6 in the left.\par  Few muscles with increased activity around the neck and upper torso and pelvic region due to exertional strain.\par -Interval improvement in the left femoral bone currently smaller foci of intense activity with cortical bone reconstitution such as seen medially localizing to a not fully reconstituted lytic lesion (image 241) with SUV 10.0; same area previously had larger extent of lytic changes and activity with as high as SUV 9.3.  Similarly, soft tissue changes laterally also improved with smaller remaining tissue and less intense activity (image 212). Reassess on follow up for continued improvement or stability.\par -Interval resolution of FDG avid bone lesions with increased sclerosis in T8, T12, and in the right side of the sacrum. Marked improvement in the lytic lesion previously seen in the right acetabulum with reconstruction of some of its cortical defects currently with nonspecific patchy activity of SUV 2.4 (previously large lytic lesion with soft tissue component and SUV 7.2).\par \par Plan: \par - Given the TMB is 99  treated with single agent pembro \par - Pembrolizumab 200mg q 3 weeks. received 1  dose of Keytruda on 2/2/23, went to rehab however was too weak for rehab and was discharged 2/10/23 (enrolled on 12/14/23), was in a lot of pain at home and enrolled in hospice on 2/17/23, then slowly started increasing appetite, started ambulating more with walker, continued left hip pain, diffused body pain started to subside. Discharged from hospice on 6/12/23. \par  -Repeat PET SCAN On 6/9/23 with improvement in Disease ( SEE ABOVE) \par - Will restart keytruda 200 q 3 weeks \par -Continues on morphine 30 mg BID, and for breakthrough pain takes oxycodone prn - has not required since 3/2023 \par -Pain management:  on morphine 30 mg BID,  Oxycodone 10 mg q 4h Pain  PRN. \par -Discussed the use of Denosumab  AE include flu like reaction, achiness, stiffness, low calcium, osteonecrosis of jaw (dental clearance unrequired - has upper and lower dentures). \par -Left ear shows focus in its posterior surface SUV 3.7 - Advised prompt follow up with Dermatologist, Dr. Wilkerson  \par \par - will plan for Xgeva and Keytruda \par \par -Follow up with orthopedist \par - F/u with dermatologist \par - F/u with Dr. Boston this afternoon, advised to have him evaluate jaw swelling. Also ordered US to further evaluate. Patient may need follow up with Dr. Kidd pending US results \par - Repeat PET/CT mid Sept \par - F/U with PA in 4 weeks\par - F/U with MD 8 weeks\par \par

## 2023-07-21 NOTE — HISTORY OF PRESENT ILLNESS
[de-identified] : JOSIANE SHAW is a 77 year male with PMHX of CAD s/p stents HTN, GERD, fibromyalgia, h/o polymyalgia rheumatica, Parotid ca s/p dissection/RT, cigar smoker ( 1-4 cigars/day) presents for initial consultation for metastatic carcinoma \par \par Patient presented to ENT, Dr. Alex Boston, on 1/18/22 c/o right neck swelling for 5 weeks. On exam, swelling int he right parotid gland approx. 3 x 3 cm firm, nontender was noted, no enlarged LN.  \par MRI of neck was ordered and performed on 1/24/22. Scan revealed irregularly peripherally enhancing mass in the superficial lobe of the right parotid gland measuring 2.7 x 2.3 cm in AP . There is some strand-like internal enhancement as well. There is no left parotid mass.\par \par \par Right parotid FNA biopsy performed on 2/1/22 at . Pathology demonstrated cells positive for malignancy ([place] Class IV), Consistent with carcinoma with squamous differentiation,Cytology slide and cell block show clusters and single scattered atypical squamous cells with prominent nucleoli, irregular nuclear membranes, irregular chromatin patterns, anisonucleosis and keratinization. Primary versus metastatic carcinoma.\par \par Subsequent PET/CT on 2/13/22 reported a rim of FDG activity surrounding a right intraparotid lesion (SUV 6.3, 3.1 x 2.3 cm). Decreased FDG activity centrally may be secondary to tumor necrosis. There is no other focal abnormal  FDG activity identified in the head and neck. There are no lytic or blastic lesions.\par \par \par Patient referred to Head and Neck surgeon, Dr. Misbah Recio, on 2/16/22. Patient s/p Parotidectomy with facial nerve dissection and right neck dissection on 3/3/22. \par Pathology reported Squamous cell carcinoma, moderately to poorly differentiated (3.0 cm in greatest dimension) involving parotid gland and soft tissue, favor metastatic based on clinical information provided by surgeon. Carcinoma focally very close to (less than 0.1 mm) inked circumferential margin, 4 intraparotid lymph nodes negative for metastatic carcinoma, 3 Lymph nodes, right level 1B- negative for metastatic carcinoma, Submandibular gland negative for carcinoma\par Lymph nodes, right levels 2 and 3, neck dissection - 15 lymph nodes negative for metastatic carcinoma\par \par \par Patient referred to Dr. Richardson for post op RT. Patient completed radiation on 6/1/22. \par \par Patient presented to Orthopedist on 10/6/22 for ongoing left hip pain for the past 4 years. He has been following Rheumatology, Dr. Kleiner, for pain. Pain has been progressive affect quality of life. \par MRI of hip was performed  on 10/10/22 revealing  a fat-containing mass centered within the tensor fascia manjula muscle measuring 4 x 3.1 x 7.2 cm and a mass is seen centered within the lesser trochanter measuring 2.9 x 3.4 x 4.4 cm. There is overlying periosteal edema and cortical thinning\par \par Subsequent PET/CT on 10/14/22 visualized osseous structures reveal an intramedullary lytic 3 cm lesion along the left femoral intertrochanteric region max SUV 10.6.\par Right parotid gland demonstrates interval resection of the hypermetabolic mass as well as right neck prominent soft tissue postsurgical low level uptake. No evidence of atypical lymph node activity identified.\par \par Biopsy of left proximal femur lesion on 10/27/2022. Pathology reported metastatic carcinoma,the tumor shows squamous differentiation on H T E. Performed immunostains show that the tumor cells are positive for AE1/AE3, CK5/ and p40. 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 Ambulates with cane. \par S/p received 1 dose of Keytruda on 2/2/23, went to rehab however was too weak for rehab and was discharged 2/10/23 (enrolled on 12/14/23), was in a lot of pain at home and enrolled in hospice on 2/17/23, then slowly started increasing appetite, started ambulating more with walker, continued left hip pain, diffused body pain started to subside. \par Continues on morphine 30 mg BID, and for breakthrough pain takes oxycodone prn - has not required since 3/2023 \par States is feeling better every day, tries to walk outside everyday \par Reports continued swelling and redness on R cheek area of face, \par Pt/family planning on discharging from hospice \par \par 2/3/23 Ca+ 13.6 \par \par 6/14/23: Patient here for follow up \par Had recent PET scan on 6/9/23 \par Patient discharged from hospice 6/12/23 \par Has dentures both upper and lower \par Has Dermatologist, Dr. Wilkerson  \par \par \par PET 6/9/23: Utilized the dedicated head and neck series with image numbers from this series. Physiologic FDG activity in visualized brain.\par -Resolution of hypermetabolic soft tissue abutting the right masseter muscle present on the prior PET/CT.\par -S/P right parotidectomy with no focal abnormal activity within the surgical bed.\par -Foci of increased activity localizing to the the right ear showing SUV 3.6 at the top of its helix and SUV 4.7 within its inferior lobule; these are nonspecific activity but should be correlated with direct clinical examination.\par -Left ear shows focus in its posterior surface  showing SUV 3.7 that is difficult to correlate with on low-dose CT. Addition focus of cutaneous activity could also be seen behind the left ear  showing SUV 3.0. Please evaluate these areas.\par -Nonspecific activity in the thyroid gland showing SUV 5.4 in the right and 5.6 in the left.\par  Few muscles with increased activity around the neck and upper torso and pelvic region due to exertional strain.\par -Interval improvement in the left femoral bone currently smaller foci of intense activity with cortical bone reconstitution such as seen medially localizing to a not fully reconstituted lytic lesion (image 241) with SUV 10.0; same area previously had larger extent of lytic changes and activity with as high as SUV 9.3.  Similarly, soft tissue changes laterally also improved with smaller remaining tissue and less intense activity (image 212). Reassess on follow up for continued improvement or stability.\par -Interval resolution of FDG avid bone lesions with increased sclerosis in T8, T12, and in the right side of the sacrum. Marked improvement in the lytic lesion previously seen in the right acetabulum with reconstruction of some of its cortical defects currently with nonspecific patchy activity of SUV 2.4 (previously large lytic lesion with soft tissue component and SUV 7.2).\par  \par 7/17/23: Patient here for follow up \par Resumed Keytruda on 6/19/23\par Admits Fatigue\par Patient right sided jaw swelling, no pain. Has an apt with  Dr. Boston this afternoon \par Reports mild nausea\par Reports weight gain \par States left hip pain stable , on Morphine ER  30 mg BID. Patient hopes to be weaned off medication \par Denies fever/chills, rash, mouth sores, nausea, vomiting, diarrhea,constipation,  abdominal pain, bleeding, easy bruising or visual changes, chest pain, cough, SOB or NOONAN,  neuropathy, LE edema.\par

## 2023-07-21 NOTE — PHYSICAL EXAM
[Normal] : affect appropriate [de-identified] : Ambulates with cane [de-identified] : Has dentures both upper and lower , right sided jaw swelling -non tender [de-identified] : Left hip Keloid scar changes s/p surgery [de-identified] : 1-2 mm red lesion on face

## 2023-07-26 LAB
CORTICOSTEROID BINDING GLOBULIN RESULT: 1.3 MG/DL — LOW
CORTIS F/TOTAL MFR SERPL: 4.6 % — SIGNIFICANT CHANGE UP
CORTIS SERPL-MCNC: 1.5 UG/DL — LOW
CORTISOL, FREE RESULT: 0.07 UG/DL — LOW

## 2023-07-27 ENCOUNTER — NON-APPOINTMENT (OUTPATIENT)
Age: 78
End: 2023-07-27

## 2023-07-28 ENCOUNTER — APPOINTMENT (OUTPATIENT)
Dept: OTOLARYNGOLOGY | Facility: CLINIC | Age: 78
End: 2023-07-28
Payer: MEDICARE

## 2023-07-28 ENCOUNTER — NON-APPOINTMENT (OUTPATIENT)
Age: 78
End: 2023-07-28

## 2023-07-28 VITALS
SYSTOLIC BLOOD PRESSURE: 156 MMHG | DIASTOLIC BLOOD PRESSURE: 92 MMHG | HEART RATE: 80 BPM | BODY MASS INDEX: 23.84 KG/M2 | HEIGHT: 72 IN | WEIGHT: 176 LBS

## 2023-07-28 PROCEDURE — 99214 OFFICE O/P EST MOD 30 MIN: CPT

## 2023-07-28 RX ORDER — OXYCODONE HYDROCHLORIDE 5 MG/1
5 CAPSULE ORAL
Qty: 30 | Refills: 0 | Status: COMPLETED | COMMUNITY
Start: 2022-11-28 | End: 2023-07-28

## 2023-07-28 RX ORDER — FOLIC ACID 1 MG/1
1 TABLET ORAL DAILY
Qty: 90 | Refills: 0 | Status: COMPLETED | COMMUNITY
Start: 2022-12-04 | End: 2023-07-28

## 2023-07-28 RX ORDER — PREDNISONE 5 MG/1
5 TABLET ORAL
Qty: 120 | Refills: 0 | Status: COMPLETED | COMMUNITY
Start: 2022-12-04 | End: 2023-07-28

## 2023-07-28 RX ORDER — CALCIUM 500 MG
500 TABLET ORAL
Qty: 100 | Refills: 0 | Status: COMPLETED | COMMUNITY
Start: 2022-09-02 | End: 2023-07-28

## 2023-07-28 RX ORDER — SULFASALAZINE 500 MG/1
500 TABLET ORAL
Qty: 360 | Refills: 0 | Status: COMPLETED | COMMUNITY
Start: 2022-12-04 | End: 2023-07-28

## 2023-07-28 RX ORDER — HYDROCHLOROTHIAZIDE 25 MG/1
25 TABLET ORAL
Refills: 0 | Status: COMPLETED | COMMUNITY
End: 2023-07-28

## 2023-07-28 NOTE — HISTORY OF PRESENT ILLNESS
[de-identified] : Referred by Dr. Orozco for Right cheek swelling.  Hx of R parotid cancer and parotidectomy on 3/3/22 by Dr. Recio. Dr. Recio was okay with pt seeing Dr. Dumont today.  Pt completed RT with Dr. Richardson in June 2022. Pt had mets to the L femur. Pt went to hospice but started getting stronger so he got discharged from hospice.  Pt is on Keytruda since June 2023.  Next jennifer in 7/31/23. Pt had US on 7/19/23: IMPRESSION: No definable abnormality can be seen involving the area of swelling involving the right cheek.\par \par Pt denies pain.  Pt c/o difficulty with opening the mouth.  Eating well. \par Bilateral thyroid nodules the largest being a left 1.3 x 0.9 x 1.2 cm nodule., Amenable to follow-up\par \par PET 6/9/23:\par IMPRESSION: Overall, interval improvement:\par 1. Interval resolution of hypermetabolic soft tissue abutting the right masseter. No focal abnormal activity in the right parotidectomy surgical bed that is concerning.\par \par 2. Nonspecific foci of increased activity localizing to both ears for which correlation with clinical examination is recommended.\par \par 3. Interval resolution of FDG avid bone lesions with sclerosis in T8, T12, right side of the sacrum and right acetabulum.\par \par 3. Marked improvement in the left femur with smaller lytic changes with residual activity at sites of incomplete bone reconstitution and smaller soft tissue densities with less intense activity laterally. These findings will be reassessed on follow-up for evaluation of continued improvement or stability.\par \par Complete review of systems which was performed during a previous encounter was reviewed with the patient and there are no changes except as stated in the HPI section.\par \par \par \par

## 2023-07-28 NOTE — PHYSICAL EXAM
[Midline] : trachea located in midline position [Normal] : no rashes [FreeTextEntry2] : Post surgical changes with mild swelling of the R lower face and no signs of infection or masses on palpation

## 2023-07-30 ENCOUNTER — OUTPATIENT (OUTPATIENT)
Dept: OUTPATIENT SERVICES | Facility: HOSPITAL | Age: 78
LOS: 1 days | Discharge: HOME | End: 2023-07-30

## 2023-07-30 DIAGNOSIS — Z90.49 ACQUIRED ABSENCE OF OTHER SPECIFIED PARTS OF DIGESTIVE TRACT: Chronic | ICD-10-CM

## 2023-07-30 DIAGNOSIS — C77.0 SECONDARY AND UNSPECIFIED MALIGNANT NEOPLASM OF LYMPH NODES OF HEAD, FACE AND NECK: ICD-10-CM

## 2023-07-30 DIAGNOSIS — C80.1 MALIGNANT (PRIMARY) NEOPLASM, UNSPECIFIED: ICD-10-CM

## 2023-07-30 DIAGNOSIS — Z95.5 PRESENCE OF CORONARY ANGIOPLASTY IMPLANT AND GRAFT: Chronic | ICD-10-CM

## 2023-07-30 DIAGNOSIS — Z98.1 ARTHRODESIS STATUS: Chronic | ICD-10-CM

## 2023-07-31 ENCOUNTER — RESULT REVIEW (OUTPATIENT)
Age: 78
End: 2023-07-31

## 2023-07-31 ENCOUNTER — APPOINTMENT (OUTPATIENT)
Age: 78
End: 2023-07-31

## 2023-07-31 LAB
ALBUMIN SERPL ELPH-MCNC: 3.8 G/DL — SIGNIFICANT CHANGE UP (ref 3.3–5)
ALP SERPL-CCNC: 88 U/L — SIGNIFICANT CHANGE UP (ref 40–120)
ALT FLD-CCNC: 7 U/L — LOW (ref 10–45)
ANION GAP SERPL CALC-SCNC: 12 MMOL/L — SIGNIFICANT CHANGE UP (ref 5–17)
AST SERPL-CCNC: 15 U/L — SIGNIFICANT CHANGE UP (ref 10–40)
BASOPHILS # BLD AUTO: 0 K/UL — SIGNIFICANT CHANGE UP (ref 0–0.2)
BASOPHILS NFR BLD AUTO: 0.8 % — SIGNIFICANT CHANGE UP (ref 0–2)
BILIRUB SERPL-MCNC: 0.2 MG/DL — SIGNIFICANT CHANGE UP (ref 0.2–1.2)
BUN SERPL-MCNC: 17 MG/DL — SIGNIFICANT CHANGE UP (ref 7–23)
CALCIUM SERPL-MCNC: 9.2 MG/DL — SIGNIFICANT CHANGE UP (ref 8.4–10.5)
CHLORIDE SERPL-SCNC: 106 MMOL/L — SIGNIFICANT CHANGE UP (ref 96–108)
CO2 SERPL-SCNC: 25 MMOL/L — SIGNIFICANT CHANGE UP (ref 22–31)
CREAT SERPL-MCNC: 0.84 MG/DL — SIGNIFICANT CHANGE UP (ref 0.5–1.3)
EGFR: 91 ML/MIN/1.73M2 — SIGNIFICANT CHANGE UP
EOSINOPHIL # BLD AUTO: 0.1 K/UL — SIGNIFICANT CHANGE UP (ref 0–0.5)
EOSINOPHIL NFR BLD AUTO: 1.7 % — SIGNIFICANT CHANGE UP (ref 0–6)
GLUCOSE SERPL-MCNC: 102 MG/DL — HIGH (ref 70–99)
HCT VFR BLD CALC: 38.2 % — LOW (ref 39–50)
HGB BLD-MCNC: 12.6 G/DL — LOW (ref 13–17)
LYMPHOCYTES # BLD AUTO: 0.6 K/UL — LOW (ref 1–3.3)
LYMPHOCYTES # BLD AUTO: 10.4 % — LOW (ref 13–44)
MCHC RBC-ENTMCNC: 31.4 PG — SIGNIFICANT CHANGE UP (ref 27–34)
MCHC RBC-ENTMCNC: 33 G/DL — SIGNIFICANT CHANGE UP (ref 32–36)
MCV RBC AUTO: 95.1 FL — SIGNIFICANT CHANGE UP (ref 80–100)
MONOCYTES # BLD AUTO: 0.6 K/UL — SIGNIFICANT CHANGE UP (ref 0–0.9)
MONOCYTES NFR BLD AUTO: 10.4 % — SIGNIFICANT CHANGE UP (ref 2–14)
NEUTROPHILS # BLD AUTO: 4.5 K/UL — SIGNIFICANT CHANGE UP (ref 1.8–7.4)
NEUTROPHILS NFR BLD AUTO: 76.8 % — SIGNIFICANT CHANGE UP (ref 43–77)
PLATELET # BLD AUTO: 158 K/UL — SIGNIFICANT CHANGE UP (ref 150–400)
POTASSIUM SERPL-MCNC: 3.9 MMOL/L — SIGNIFICANT CHANGE UP (ref 3.5–5.3)
POTASSIUM SERPL-SCNC: 3.9 MMOL/L — SIGNIFICANT CHANGE UP (ref 3.5–5.3)
PROT SERPL-MCNC: 6.2 G/DL — SIGNIFICANT CHANGE UP (ref 6–8.3)
RBC # BLD: 4.02 M/UL — LOW (ref 4.2–5.8)
RBC # FLD: 13.4 % — SIGNIFICANT CHANGE UP (ref 10.3–14.5)
SODIUM SERPL-SCNC: 143 MMOL/L — SIGNIFICANT CHANGE UP (ref 135–145)
T4 FREE SERPL-MCNC: 1 NG/DL — SIGNIFICANT CHANGE UP (ref 0.9–1.8)
T4 FREE+ TSH PNL SERPL: 17.4 UIU/ML — HIGH (ref 0.27–4.2)
WBC # BLD: 5.8 K/UL — SIGNIFICANT CHANGE UP (ref 3.8–10.5)
WBC # FLD AUTO: 5.8 K/UL — SIGNIFICANT CHANGE UP (ref 3.8–10.5)

## 2023-08-01 ENCOUNTER — NON-APPOINTMENT (OUTPATIENT)
Age: 78
End: 2023-08-01

## 2023-08-01 DIAGNOSIS — C79.51 SECONDARY MALIGNANT NEOPLASM OF BONE: ICD-10-CM

## 2023-08-01 DIAGNOSIS — Z51.11 ENCOUNTER FOR ANTINEOPLASTIC CHEMOTHERAPY: ICD-10-CM

## 2023-08-01 DIAGNOSIS — C07 MALIGNANT NEOPLASM OF PAROTID GLAND: ICD-10-CM

## 2023-08-06 LAB
CORTICOSTEROID BINDING GLOBULIN RESULT: 1.3 MG/DL — LOW
CORTIS F/TOTAL MFR SERPL: 5 % — SIGNIFICANT CHANGE UP
CORTIS SERPL-MCNC: 2.2 UG/DL — LOW
CORTISOL, FREE RESULT: 0.11 UG/DL — LOW

## 2023-08-18 ENCOUNTER — APPOINTMENT (OUTPATIENT)
Dept: HEMATOLOGY ONCOLOGY | Facility: CLINIC | Age: 78
End: 2023-08-18
Payer: MEDICARE

## 2023-08-18 VITALS
HEIGHT: 72 IN | HEART RATE: 64 BPM | SYSTOLIC BLOOD PRESSURE: 157 MMHG | BODY MASS INDEX: 23.72 KG/M2 | WEIGHT: 175.15 LBS | DIASTOLIC BLOOD PRESSURE: 89 MMHG | OXYGEN SATURATION: 95 %

## 2023-08-18 DIAGNOSIS — E03.9 HYPOTHYROIDISM, UNSPECIFIED: ICD-10-CM

## 2023-08-18 PROCEDURE — 99214 OFFICE O/P EST MOD 30 MIN: CPT

## 2023-08-18 NOTE — HISTORY OF PRESENT ILLNESS
[de-identified] : JOSIANE SHAW is a 77 year male with PMHX of CAD s/p stents HTN, GERD, fibromyalgia, h/o polymyalgia rheumatica, Parotid ca s/p dissection/RT, cigar smoker ( 1-4 cigars/day) presents for initial consultation for metastatic carcinoma \par  \par  Patient presented to ENT, Dr. Alex Boston, on 1/18/22 c/o right neck swelling for 5 weeks. On exam, swelling int he right parotid gland approx. 3 x 3 cm firm, nontender was noted, no enlarged LN.  \par  MRI of neck was ordered and performed on 1/24/22. Scan revealed irregularly peripherally enhancing mass in the superficial lobe of the right parotid gland measuring 2.7 x 2.3 cm in AP . There is some strand-like internal enhancement as well. There is no left parotid mass.\par  \par  \par  Right parotid FNA biopsy performed on 2/1/22 at . Pathology demonstrated cells positive for malignancy ([place] Class IV), Consistent with carcinoma with squamous differentiation,Cytology slide and cell block show clusters and single scattered atypical squamous cells with prominent nucleoli, irregular nuclear membranes, irregular chromatin patterns, anisonucleosis and keratinization. Primary versus metastatic carcinoma.\par  \par  Subsequent PET/CT on 2/13/22 reported a rim of FDG activity surrounding a right intraparotid lesion (SUV 6.3, 3.1 x 2.3 cm). Decreased FDG activity centrally may be secondary to tumor necrosis. There is no other focal abnormal  FDG activity identified in the head and neck. There are no lytic or blastic lesions.\par  \par  \par  Patient referred to Head and Neck surgeon, Dr. Misbah Recio, on 2/16/22. Patient s/p Parotidectomy with facial nerve dissection and right neck dissection on 3/3/22. 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 Carcinoma focally very close to (less than 0.1 mm) inked circumferential margin, 4 intraparotid lymph nodes negative for metastatic carcinoma, 3 Lymph nodes, right level 1B- negative for metastatic carcinoma, Submandibular gland negative for carcinoma\par  Lymph nodes, right levels 2 and 3, neck dissection - 15 lymph nodes negative for metastatic carcinoma\par  \par  \par  Patient referred to Dr. Richardson for post op RT. Patient completed radiation on 6/1/22. \par  \par  Patient presented to Orthopedist on 10/6/22 for ongoing left hip pain for the past 4 years. He has been following Rheumatology, Dr. Kleiner, for pain. Pain has been progressive affect quality of life. \par  MRI of hip was performed  on 10/10/22 revealing  a fat-containing mass centered within the tensor fascia manjula muscle measuring 4 x 3.1 x 7.2 cm and a mass is seen centered within the lesser trochanter measuring 2.9 x 3.4 x 4.4 cm. There is overlying periosteal edema and cortical thinning\par  \par  Subsequent PET/CT on 10/14/22 visualized osseous structures reveal an intramedullary lytic 3 cm lesion along the left femoral intertrochanteric region max SUV 10.6.\par  Right parotid gland demonstrates interval resection of the hypermetabolic mass as well as right neck prominent soft tissue postsurgical low level uptake. No evidence of atypical lymph node activity identified.\par  \par  Biopsy of left proximal femur lesion on 10/27/2022. Pathology reported metastatic carcinoma,the tumor shows squamous differentiation on H T E. Performed immunostains show that the tumor cells are positive for AE1/AE3, CK5/ and p40. 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 Ambulates with cane.  S/p received 1 dose of Keytruda on 2/2/23, went to rehab however was too weak for rehab and was discharged 2/10/23 (enrolled on 12/14/23), was in a lot of pain at home and enrolled in hospice on 2/17/23, then slowly started increasing appetite, started ambulating more with walker, continued left hip pain, diffused body pain started to subside.  Continues on morphine 30 mg BID, and for breakthrough pain takes oxycodone prn - has not required since 3/2023  States is feeling better every day, tries to walk outside everyday  Reports continued swelling and redness on R cheek area of face,  Pt/family planning on discharging from hospice   2/3/23 Ca+ 13.6   6/14/23: Patient here for follow up  Had recent PET scan on 6/9/23  Patient discharged from hospice 6/12/23  Has dentures both upper and lower  Has Dermatologist, Dr. Wilkerson     PET 6/9/23: Utilized the dedicated head and neck series with image numbers from this series. Physiologic FDG activity in visualized brain. -Resolution of hypermetabolic soft tissue abutting the right masseter muscle present on the prior PET/CT. -S/P right parotidectomy with no focal abnormal activity within the surgical bed. -Foci of increased activity localizing to the the right ear showing SUV 3.6 at the top of its helix and SUV 4.7 within its inferior lobule; these are nonspecific activity but should be correlated with direct clinical examination. -Left ear shows focus in its posterior surface  showing SUV 3.7 that is difficult to correlate with on low-dose CT. Addition focus of cutaneous activity could also be seen behind the left ear  showing SUV 3.0. Please evaluate these areas. -Nonspecific activity in the thyroid gland showing SUV 5.4 in the right and 5.6 in the left.  Few muscles with increased activity around the neck and upper torso and pelvic region due to exertional strain. -Interval improvement in the left femoral bone currently smaller foci of intense activity with cortical bone reconstitution such as seen medially localizing to a not fully reconstituted lytic lesion (image 241) with SUV 10.0; same area previously had larger extent of lytic changes and activity with as high as SUV 9.3.  Similarly, soft tissue changes laterally also improved with smaller remaining tissue and less intense activity (image 212). Reassess on follow up for continued improvement or stability. -Interval resolution of FDG avid bone lesions with increased sclerosis in T8, T12, and in the right side of the sacrum. Marked improvement in the lytic lesion previously seen in the right acetabulum with reconstruction of some of its cortical defects currently with nonspecific patchy activity of SUV 2.4 (previously large lytic lesion with soft tissue component and SUV 7.2).   7/17/23: Patient here for follow up  Resumed Keytruda on 6/19/23 Admits Fatigue Patient right sided jaw swelling, no pain. Has an apt with  Dr. Boston this afternoon  Reports mild nausea Reports weight gain  States left hip pain stable , on Morphine ER  30 mg BID. Patient hopes to be weaned off medication  Denies fever/chills, rash, mouth sores, nausea, vomiting, diarrhea,constipation,  abdominal pain, bleeding, easy bruising or visual changes, chest pain, cough, SOB or NOONAN,  neuropathy, LE edema.  8/18/23:  Patient here for follow up  Resumed Keytruda on 6/19/23, next tx scheduled 8/21/23 Admits Fatigue Admits chills/ feeling cold once a week, likely from hypothyroid.   Admits intermittent decreased appetite, weight is stable  Patient still with right sided jaw swelling- improving, no pain. Evaluated by Dr. Boston, no indication for further work up- continue to observe. US on 7/19/23 reported No definable abnormality can be seen involving the area of swelling involving the right cheek. Reports nausea, not taking antinausea med States left hip pain stable , on Morphine ER  30 mg BID. Patient hopes to be weaned off medication  Dr. Kleiner has him on Prednisone 10 mg daily  Denies fever, rash, mouth sores, vomiting, diarrhea,constipation, abdominal pain, bleeding, easy bruising or visual changes, chest pain, cough, SOB or NOONAN,  neuropathy, LE edema.

## 2023-08-18 NOTE — ASSESSMENT
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   Foundation: TMB 99 Muts/Mb, MS- stable  Patient presented to ENT, Dr. Alex Boston, on 1/18/22 c/o right neck swelling for 5 weeks. On exam, swelling in the right parotid gland approx. 3 x 3 cm firm, nontender was noted, no enlarged LN.   Subsequently MRI of neck on 1/24/22 revealed irregularly peripherally enhancing mass in the superficial lobe of the right parotid gland measuring 2.7 x 2.3 cm in AP.  Right parotid FNA biopsy performed on 2/1/22 at  consistent with carcinoma with squamous differentiation,Cytology; clusters and single scattered atypical squamous cells with prominent nucleoli, irregular nuclear membranes, irregular chromatin patterns, anisonucleosis and keratinization. Primary versus metastatic carcinoma.  Patient s/p Parotidectomy with facial nerve dissection and right neck dissection on 3/3/22 by Dr. Misbah Recio. Pathology showed 22 lymph nodes negative for metastatic carcinoma.  Completed radiation on 6/1/22 with Dr. Rowe  Bone Mets: -PET/CT on 10/14/22 visualized osseous structures reveal an intramedullary lytic 3 cm lesion along the left femoral intertrochanteric region max SUV 10.6. Biopsy of left proximal femur lesion on 10/27/2022. Pathology reported metastatic carcinoma,the tumor shows squamous differentiation on H T E. Tumor cells are positive for AE1/AE3, CK5/ and p40.  PET 6/9/23: Utilized the dedicated head and neck series with image numbers from this series. Physiologic FDG activity in visualized brain. -Resolution of hypermetabolic soft tissue abutting the right masseter muscle present on the prior PET/CT. -S/P right parotidectomy with no focal abnormal activity within the surgical bed. -Foci of increased activity localizing to the the right ear showing SUV 3.6 at the top of its helix and SUV 4.7 within its inferior lobule; these are nonspecific activity but should be correlated with direct clinical examination. -Left ear shows focus in its posterior surface  showing SUV 3.7 that is difficult to correlate with on low-dose CT. Addition focus of cutaneous activity could also be seen behind the left ear  showing SUV 3.0. Please evaluate these areas. -Nonspecific activity in the thyroid gland showing SUV 5.4 in the right and 5.6 in the left.  Few muscles with increased activity around the neck and upper torso and pelvic region due to exertional strain. -Interval improvement in the left femoral bone currently smaller foci of intense activity with cortical bone reconstitution such as seen medially localizing to a not fully reconstituted lytic lesion (image 241) with SUV 10.0; same area previously had larger extent of lytic changes and activity with as high as SUV 9.3.  Similarly, soft tissue changes laterally also improved with smaller remaining tissue and less intense activity (image 212). Reassess on follow up for continued improvement or stability. -Interval resolution of FDG avid bone lesions with increased sclerosis in T8, T12, and in the right side of the sacrum. Marked improvement in the lytic lesion previously seen in the right acetabulum with reconstruction of some of its cortical defects currently with nonspecific patchy activity of SUV 2.4 (previously large lytic lesion with soft tissue component and SUV 7.2).  Plan:  - Given the TMB is 99  treated with single agent pembro  - Pembrolizumab 200mg q 3 weeks. received 1  dose of Keytruda on 2/2/23, went to rehab however was too weak for rehab and was discharged 2/10/23 (enrolled on 12/14/23), was in a lot of pain at home and enrolled in hospice on 2/17/23, then slowly started increasing appetite, started ambulating more with walker, continued left hip pain, diffused body pain started to subside. Discharged from hospice on 6/12/23.   -Repeat PET SCAN On 6/9/23 with improvement in Disease ( SEE ABOVE)  - Will restart keytruda 200 q 3 weeks  -Continues on morphine 30 mg BID, and for breakthrough pain takes oxycodone prn - has not required since 3/2023  -Pain management:  on morphine 30 mg BID,  Oxycodone 10 mg q 4h Pain  PRN.  -Discussed the use of Denosumab  AE include flu like reaction, achiness, stiffness, low calcium, osteonecrosis of jaw (dental clearance unrequired - has upper and lower dentures).  -Left ear shows focus in its posterior surface SUV 3.7 - Advised prompt follow up with Dermatologist, Dr. Wilkerson    - will plan for Xgeva and Keytruda  - Resumed Keytruda on 6/19/23, next tx scheduled 8/21/23 - Xgeva q 6 weeks given on 6/19/23, last given 7/31/23 -Follow up with orthopedist  - F/u with dermatologist  -Patient still with right sided jaw swelling- improving, no pain. Evaluated by Dr. Boston, no indication for further work up- continue to observe. US on 7/19/23 reported No definable abnormality can be seen involving the area of swelling involving the right cheek. - TSH  on 6/19/23 was 11.90, started patient on Levothyroxine 25mch on 6/20, recently increased to 50 mcg on 8/4/23. Patient is compliant  - Repeat PET/CT mid Sept  - F/U in 4 -6 weeks

## 2023-08-18 NOTE — PHYSICAL EXAM
[Normal] : affect appropriate [de-identified] : Ambulates with cane [de-identified] : Has dentures both upper and lower , right sided jaw swelling -non tender [de-identified] : Left hip Keloid scar changes s/p surgery [de-identified] : 1-2 mm red lesion on face

## 2023-08-21 ENCOUNTER — RESULT REVIEW (OUTPATIENT)
Age: 78
End: 2023-08-21

## 2023-08-21 ENCOUNTER — APPOINTMENT (OUTPATIENT)
Age: 78
End: 2023-08-21

## 2023-08-21 LAB
ALBUMIN SERPL ELPH-MCNC: 3.6 G/DL — SIGNIFICANT CHANGE UP (ref 3.3–5)
ALP SERPL-CCNC: 84 U/L — SIGNIFICANT CHANGE UP (ref 40–120)
ALT FLD-CCNC: 10 U/L — SIGNIFICANT CHANGE UP (ref 10–45)
ANION GAP SERPL CALC-SCNC: 10 MMOL/L — SIGNIFICANT CHANGE UP (ref 5–17)
AST SERPL-CCNC: 21 U/L — SIGNIFICANT CHANGE UP (ref 10–40)
BASOPHILS # BLD AUTO: 0 K/UL — SIGNIFICANT CHANGE UP (ref 0–0.2)
BASOPHILS NFR BLD AUTO: 0.8 % — SIGNIFICANT CHANGE UP (ref 0–2)
BILIRUB SERPL-MCNC: 0.3 MG/DL — SIGNIFICANT CHANGE UP (ref 0.2–1.2)
BUN SERPL-MCNC: 18 MG/DL — SIGNIFICANT CHANGE UP (ref 7–23)
CALCIUM SERPL-MCNC: 9 MG/DL — SIGNIFICANT CHANGE UP (ref 8.4–10.5)
CHLORIDE SERPL-SCNC: 109 MMOL/L — HIGH (ref 96–108)
CO2 SERPL-SCNC: 23 MMOL/L — SIGNIFICANT CHANGE UP (ref 22–31)
CREAT SERPL-MCNC: 0.9 MG/DL — SIGNIFICANT CHANGE UP (ref 0.5–1.3)
EGFR: 89 ML/MIN/1.73M2 — SIGNIFICANT CHANGE UP
EOSINOPHIL # BLD AUTO: 0.1 K/UL — SIGNIFICANT CHANGE UP (ref 0–0.5)
EOSINOPHIL NFR BLD AUTO: 1.7 % — SIGNIFICANT CHANGE UP (ref 0–6)
GLUCOSE SERPL-MCNC: 108 MG/DL — HIGH (ref 70–99)
HCT VFR BLD CALC: 37.4 % — LOW (ref 39–50)
HGB BLD-MCNC: 12.8 G/DL — LOW (ref 13–17)
LYMPHOCYTES # BLD AUTO: 0.6 K/UL — LOW (ref 1–3.3)
LYMPHOCYTES # BLD AUTO: 9.7 % — LOW (ref 13–44)
MCHC RBC-ENTMCNC: 32.1 PG — SIGNIFICANT CHANGE UP (ref 27–34)
MCHC RBC-ENTMCNC: 34.3 G/DL — SIGNIFICANT CHANGE UP (ref 32–36)
MCV RBC AUTO: 93.7 FL — SIGNIFICANT CHANGE UP (ref 80–100)
MONOCYTES # BLD AUTO: 0.6 K/UL — SIGNIFICANT CHANGE UP (ref 0–0.9)
MONOCYTES NFR BLD AUTO: 9.7 % — SIGNIFICANT CHANGE UP (ref 2–14)
NEUTROPHILS # BLD AUTO: 4.4 K/UL — SIGNIFICANT CHANGE UP (ref 1.8–7.4)
NEUTROPHILS NFR BLD AUTO: 78 % — HIGH (ref 43–77)
PLATELET # BLD AUTO: 153 K/UL — SIGNIFICANT CHANGE UP (ref 150–400)
POTASSIUM SERPL-MCNC: 3.9 MMOL/L — SIGNIFICANT CHANGE UP (ref 3.5–5.3)
POTASSIUM SERPL-SCNC: 3.9 MMOL/L — SIGNIFICANT CHANGE UP (ref 3.5–5.3)
PROT SERPL-MCNC: 6 G/DL — SIGNIFICANT CHANGE UP (ref 6–8.3)
RBC # BLD: 4 M/UL — LOW (ref 4.2–5.8)
RBC # FLD: 13.3 % — SIGNIFICANT CHANGE UP (ref 10.3–14.5)
SODIUM SERPL-SCNC: 142 MMOL/L — SIGNIFICANT CHANGE UP (ref 135–145)
T4 FREE SERPL-MCNC: 1.1 NG/DL — SIGNIFICANT CHANGE UP (ref 0.9–1.8)
T4 FREE+ TSH PNL SERPL: 20 UIU/ML — HIGH (ref 0.27–4.2)
WBC # BLD: 5.7 K/UL — SIGNIFICANT CHANGE UP (ref 3.8–10.5)
WBC # FLD AUTO: 5.7 K/UL — SIGNIFICANT CHANGE UP (ref 3.8–10.5)

## 2023-08-21 RX ORDER — SULFASALAZINE 500 MG
2 TABLET ORAL
Qty: 0 | Refills: 0 | DISCHARGE

## 2023-08-30 LAB
CORTICOSTEROID BINDING GLOBULIN RESULT: 1.5 MG/DL — LOW
CORTIS F/TOTAL MFR SERPL: 4 % — SIGNIFICANT CHANGE UP
CORTIS SERPL-MCNC: 1.6 UG/DL — LOW
CORTISOL, FREE RESULT: 0.06 UG/DL — LOW

## 2023-09-11 ENCOUNTER — RESULT REVIEW (OUTPATIENT)
Age: 78
End: 2023-09-11

## 2023-09-11 ENCOUNTER — APPOINTMENT (OUTPATIENT)
Age: 78
End: 2023-09-11

## 2023-09-11 LAB
BASOPHILS # BLD AUTO: 0 K/UL — SIGNIFICANT CHANGE UP (ref 0–0.2)
BASOPHILS NFR BLD AUTO: 0.5 % — SIGNIFICANT CHANGE UP (ref 0–2)
EOSINOPHIL # BLD AUTO: 0 K/UL — SIGNIFICANT CHANGE UP (ref 0–0.5)
EOSINOPHIL NFR BLD AUTO: 0.1 % — SIGNIFICANT CHANGE UP (ref 0–6)
HCT VFR BLD CALC: 39.1 % — SIGNIFICANT CHANGE UP (ref 39–50)
HGB BLD-MCNC: 13.3 G/DL — SIGNIFICANT CHANGE UP (ref 13–17)
LYMPHOCYTES # BLD AUTO: 0.3 K/UL — LOW (ref 1–3.3)
LYMPHOCYTES # BLD AUTO: 4 % — LOW (ref 13–44)
MCHC RBC-ENTMCNC: 30.8 PG — SIGNIFICANT CHANGE UP (ref 27–34)
MCHC RBC-ENTMCNC: 34 G/DL — SIGNIFICANT CHANGE UP (ref 32–36)
MCV RBC AUTO: 90.4 FL — SIGNIFICANT CHANGE UP (ref 80–100)
MONOCYTES # BLD AUTO: 0.2 K/UL — SIGNIFICANT CHANGE UP (ref 0–0.9)
MONOCYTES NFR BLD AUTO: 3.2 % — SIGNIFICANT CHANGE UP (ref 2–14)
NEUTROPHILS # BLD AUTO: 7.1 K/UL — SIGNIFICANT CHANGE UP (ref 1.8–7.4)
NEUTROPHILS NFR BLD AUTO: 92.3 % — HIGH (ref 43–77)
PLATELET # BLD AUTO: 188 K/UL — SIGNIFICANT CHANGE UP (ref 150–400)
RBC # BLD: 4.33 M/UL — SIGNIFICANT CHANGE UP (ref 4.2–5.8)
RBC # FLD: 12.3 % — SIGNIFICANT CHANGE UP (ref 10.3–14.5)
WBC # BLD: 7.7 K/UL — SIGNIFICANT CHANGE UP (ref 3.8–10.5)
WBC # FLD AUTO: 7.7 K/UL — SIGNIFICANT CHANGE UP (ref 3.8–10.5)

## 2023-09-12 LAB
ALBUMIN SERPL ELPH-MCNC: 3.9 G/DL — SIGNIFICANT CHANGE UP (ref 3.3–5)
ALP SERPL-CCNC: 81 U/L — SIGNIFICANT CHANGE UP (ref 40–120)
ALT FLD-CCNC: 17 U/L — SIGNIFICANT CHANGE UP (ref 10–45)
ANION GAP SERPL CALC-SCNC: 11 MMOL/L — SIGNIFICANT CHANGE UP (ref 5–17)
AST SERPL-CCNC: 25 U/L — SIGNIFICANT CHANGE UP (ref 10–40)
BILIRUB SERPL-MCNC: 0.3 MG/DL — SIGNIFICANT CHANGE UP (ref 0.2–1.2)
BUN SERPL-MCNC: 23 MG/DL — SIGNIFICANT CHANGE UP (ref 7–23)
CALCIUM SERPL-MCNC: 9.2 MG/DL — SIGNIFICANT CHANGE UP (ref 8.4–10.5)
CHLORIDE SERPL-SCNC: 107 MMOL/L — SIGNIFICANT CHANGE UP (ref 96–108)
CO2 SERPL-SCNC: 24 MMOL/L — SIGNIFICANT CHANGE UP (ref 22–31)
CREAT SERPL-MCNC: 1.05 MG/DL — SIGNIFICANT CHANGE UP (ref 0.5–1.3)
EGFR: 74 ML/MIN/1.73M2 — SIGNIFICANT CHANGE UP
GLUCOSE SERPL-MCNC: 127 MG/DL — HIGH (ref 70–99)
POTASSIUM SERPL-MCNC: 4.8 MMOL/L — SIGNIFICANT CHANGE UP (ref 3.5–5.3)
POTASSIUM SERPL-SCNC: 4.8 MMOL/L — SIGNIFICANT CHANGE UP (ref 3.5–5.3)
PROT SERPL-MCNC: 6.7 G/DL — SIGNIFICANT CHANGE UP (ref 6–8.3)
SODIUM SERPL-SCNC: 141 MMOL/L — SIGNIFICANT CHANGE UP (ref 135–145)
T4 FREE SERPL-MCNC: 1 NG/DL — SIGNIFICANT CHANGE UP (ref 0.9–1.8)
T4 FREE+ TSH PNL SERPL: 13.6 UIU/ML — HIGH (ref 0.27–4.2)

## 2023-09-15 ENCOUNTER — APPOINTMENT (OUTPATIENT)
Dept: NUCLEAR MEDICINE | Facility: CLINIC | Age: 78
End: 2023-09-15

## 2023-09-15 ENCOUNTER — OUTPATIENT (OUTPATIENT)
Dept: OUTPATIENT SERVICES | Facility: HOSPITAL | Age: 78
LOS: 1 days | End: 2023-09-15
Payer: MEDICARE

## 2023-09-15 DIAGNOSIS — Z90.49 ACQUIRED ABSENCE OF OTHER SPECIFIED PARTS OF DIGESTIVE TRACT: Chronic | ICD-10-CM

## 2023-09-15 DIAGNOSIS — C79.51 SECONDARY MALIGNANT NEOPLASM OF BONE: ICD-10-CM

## 2023-09-15 DIAGNOSIS — Z95.5 PRESENCE OF CORONARY ANGIOPLASTY IMPLANT AND GRAFT: Chronic | ICD-10-CM

## 2023-09-15 DIAGNOSIS — Z98.1 ARTHRODESIS STATUS: Chronic | ICD-10-CM

## 2023-09-15 PROCEDURE — 78815 PET IMAGE W/CT SKULL-THIGH: CPT | Mod: 26,PS

## 2023-09-18 ENCOUNTER — RX RENEWAL (OUTPATIENT)
Age: 78
End: 2023-09-18

## 2023-09-26 ENCOUNTER — OUTPATIENT (OUTPATIENT)
Dept: OUTPATIENT SERVICES | Facility: HOSPITAL | Age: 78
LOS: 1 days | Discharge: HOME | End: 2023-09-26

## 2023-09-26 DIAGNOSIS — C07 MALIGNANT NEOPLASM OF PAROTID GLAND: ICD-10-CM

## 2023-09-26 DIAGNOSIS — Z98.1 ARTHRODESIS STATUS: Chronic | ICD-10-CM

## 2023-09-26 DIAGNOSIS — Z90.49 ACQUIRED ABSENCE OF OTHER SPECIFIED PARTS OF DIGESTIVE TRACT: Chronic | ICD-10-CM

## 2023-09-26 DIAGNOSIS — Z95.5 PRESENCE OF CORONARY ANGIOPLASTY IMPLANT AND GRAFT: Chronic | ICD-10-CM

## 2023-09-29 ENCOUNTER — APPOINTMENT (OUTPATIENT)
Dept: HEMATOLOGY ONCOLOGY | Facility: CLINIC | Age: 78
End: 2023-09-29
Payer: MEDICARE

## 2023-09-29 VITALS
HEIGHT: 72 IN | BODY MASS INDEX: 24.64 KG/M2 | SYSTOLIC BLOOD PRESSURE: 188 MMHG | TEMPERATURE: 98.1 F | DIASTOLIC BLOOD PRESSURE: 93 MMHG | HEART RATE: 64 BPM | OXYGEN SATURATION: 95 % | WEIGHT: 181.9 LBS

## 2023-09-29 DIAGNOSIS — R60.0 LOCALIZED EDEMA: ICD-10-CM

## 2023-09-29 PROCEDURE — 99215 OFFICE O/P EST HI 40 MIN: CPT

## 2023-10-02 ENCOUNTER — RESULT REVIEW (OUTPATIENT)
Age: 78
End: 2023-10-02

## 2023-10-02 ENCOUNTER — APPOINTMENT (OUTPATIENT)
Age: 78
End: 2023-10-02

## 2023-10-02 LAB
ALBUMIN SERPL ELPH-MCNC: 3.8 G/DL — SIGNIFICANT CHANGE UP (ref 3.3–5)
ALP SERPL-CCNC: 78 U/L — SIGNIFICANT CHANGE UP (ref 40–120)
ALT FLD-CCNC: 11 U/L — SIGNIFICANT CHANGE UP (ref 10–45)
ANION GAP SERPL CALC-SCNC: 10 MMOL/L — SIGNIFICANT CHANGE UP (ref 5–17)
AST SERPL-CCNC: 21 U/L — SIGNIFICANT CHANGE UP (ref 10–40)
BASOPHILS # BLD AUTO: 0.1 K/UL — SIGNIFICANT CHANGE UP (ref 0–0.2)
BASOPHILS NFR BLD AUTO: 1.2 % — SIGNIFICANT CHANGE UP (ref 0–2)
BILIRUB SERPL-MCNC: 0.3 MG/DL — SIGNIFICANT CHANGE UP (ref 0.2–1.2)
BUN SERPL-MCNC: 14 MG/DL — SIGNIFICANT CHANGE UP (ref 7–23)
CALCIUM SERPL-MCNC: 9.3 MG/DL — SIGNIFICANT CHANGE UP (ref 8.4–10.5)
CHLORIDE SERPL-SCNC: 105 MMOL/L — SIGNIFICANT CHANGE UP (ref 96–108)
CO2 SERPL-SCNC: 28 MMOL/L — SIGNIFICANT CHANGE UP (ref 22–31)
CREAT SERPL-MCNC: 1.01 MG/DL — SIGNIFICANT CHANGE UP (ref 0.5–1.3)
EGFR: 77 ML/MIN/1.73M2 — SIGNIFICANT CHANGE UP
EOSINOPHIL # BLD AUTO: 0.1 K/UL — SIGNIFICANT CHANGE UP (ref 0–0.5)
EOSINOPHIL NFR BLD AUTO: 2.2 % — SIGNIFICANT CHANGE UP (ref 0–6)
GLUCOSE SERPL-MCNC: 85 MG/DL — SIGNIFICANT CHANGE UP (ref 70–99)
HCT VFR BLD CALC: 39.8 % — SIGNIFICANT CHANGE UP (ref 39–50)
HGB BLD-MCNC: 12.9 G/DL — LOW (ref 13–17)
LYMPHOCYTES # BLD AUTO: 0.7 K/UL — LOW (ref 1–3.3)
LYMPHOCYTES # BLD AUTO: 11.3 % — LOW (ref 13–44)
MCHC RBC-ENTMCNC: 31.3 PG — SIGNIFICANT CHANGE UP (ref 27–34)
MCHC RBC-ENTMCNC: 32.4 G/DL — SIGNIFICANT CHANGE UP (ref 32–36)
MCV RBC AUTO: 96.6 FL — SIGNIFICANT CHANGE UP (ref 80–100)
MONOCYTES # BLD AUTO: 0.6 K/UL — SIGNIFICANT CHANGE UP (ref 0–0.9)
MONOCYTES NFR BLD AUTO: 10.2 % — SIGNIFICANT CHANGE UP (ref 2–14)
NEUTROPHILS # BLD AUTO: 4.8 K/UL — SIGNIFICANT CHANGE UP (ref 1.8–7.4)
NEUTROPHILS NFR BLD AUTO: 75.2 % — SIGNIFICANT CHANGE UP (ref 43–77)
PLATELET # BLD AUTO: 183 K/UL — SIGNIFICANT CHANGE UP (ref 150–400)
POTASSIUM SERPL-MCNC: 4.3 MMOL/L — SIGNIFICANT CHANGE UP (ref 3.5–5.3)
POTASSIUM SERPL-SCNC: 4.3 MMOL/L — SIGNIFICANT CHANGE UP (ref 3.5–5.3)
PROT SERPL-MCNC: 6.6 G/DL — SIGNIFICANT CHANGE UP (ref 6–8.3)
RBC # BLD: 4.12 M/UL — LOW (ref 4.2–5.8)
RBC # FLD: 12.7 % — SIGNIFICANT CHANGE UP (ref 10.3–14.5)
SODIUM SERPL-SCNC: 143 MMOL/L — SIGNIFICANT CHANGE UP (ref 135–145)
T4 FREE SERPL-MCNC: 0.8 NG/DL — LOW (ref 0.9–1.8)
T4 FREE+ TSH PNL SERPL: 39.8 UIU/ML — HIGH (ref 0.27–4.2)
WBC # BLD: 6.4 K/UL — SIGNIFICANT CHANGE UP (ref 3.8–10.5)
WBC # FLD AUTO: 6.4 K/UL — SIGNIFICANT CHANGE UP (ref 3.8–10.5)

## 2023-10-03 DIAGNOSIS — Z51.11 ENCOUNTER FOR ANTINEOPLASTIC CHEMOTHERAPY: ICD-10-CM

## 2023-10-03 DIAGNOSIS — C79.51 SECONDARY MALIGNANT NEOPLASM OF BONE: ICD-10-CM

## 2023-10-03 DIAGNOSIS — C77.0 SECONDARY AND UNSPECIFIED MALIGNANT NEOPLASM OF LYMPH NODES OF HEAD, FACE AND NECK: ICD-10-CM

## 2023-10-03 DIAGNOSIS — C80.1 MALIGNANT (PRIMARY) NEOPLASM, UNSPECIFIED: ICD-10-CM

## 2023-10-11 PROBLEM — Z82.49 FAMILY HISTORY OF PREMATURE CAD: Status: ACTIVE | Noted: 2023-10-11

## 2023-10-11 PROBLEM — Z87.898 HISTORY OF CHEST PAIN: Status: RESOLVED | Noted: 2023-10-11 | Resolved: 2023-10-11

## 2023-10-11 PROBLEM — I65.29 OCCLUSION AND STENOSIS OF UNSPECIFIED CAROTID ARTERY: Status: ACTIVE | Noted: 2023-10-11

## 2023-10-11 PROBLEM — Z92.89 H/O ECHOCARDIOGRAM: Status: RESOLVED | Noted: 2023-10-11 | Resolved: 2023-10-11

## 2023-10-11 PROBLEM — Z86.73 PRSNL HX OF TIA (TIA), AND CEREB INFRC W/O RESID DEFICITS: Status: ACTIVE | Noted: 2023-10-11

## 2023-10-11 PROBLEM — I25.10 ATHEROSCLEROTIC HEART DISEASE OF NATIVE CORONARY ARTERY WITHOUT ANGINA PECTORIS: Status: ACTIVE | Noted: 2023-10-11

## 2023-10-11 RX ORDER — PANTOPRAZOLE 40 MG/1
40 TABLET, DELAYED RELEASE ORAL
Refills: 0 | Status: ACTIVE | COMMUNITY

## 2023-10-12 ENCOUNTER — NON-APPOINTMENT (OUTPATIENT)
Age: 78
End: 2023-10-12

## 2023-10-12 DIAGNOSIS — Z82.49 FAMILY HISTORY OF ISCHEMIC HEART DISEASE AND OTHER DISEASES OF THE CIRCULATORY SYSTEM: ICD-10-CM

## 2023-10-12 DIAGNOSIS — I65.29 OCCLUSION AND STENOSIS OF UNSPECIFIED CAROTID ARTERY: ICD-10-CM

## 2023-10-12 DIAGNOSIS — I25.10 ATHEROSCLEROTIC HEART DISEASE OF NATIVE CORONARY ARTERY W/OUT ANGINA PECTORIS: ICD-10-CM

## 2023-10-12 DIAGNOSIS — Z86.73 PERSONAL HISTORY OF TRANSIENT ISCHEMIC ATTACK (TIA), AND CEREBRAL INFARCTION W/OUT RESIDUAL DEFICITS: ICD-10-CM

## 2023-10-12 DIAGNOSIS — I10 ESSENTIAL (PRIMARY) HYPERTENSION: ICD-10-CM

## 2023-10-12 DIAGNOSIS — Z92.89 PERSONAL HISTORY OF OTHER MEDICAL TREATMENT: ICD-10-CM

## 2023-10-12 DIAGNOSIS — Z87.898 PERSONAL HISTORY OF OTHER SPECIFIED CONDITIONS: ICD-10-CM

## 2023-10-12 LAB
CORTICOSTEROID BINDING GLOBULIN RESULT: 1.8 MG/DL — SIGNIFICANT CHANGE UP
CORTIS F/TOTAL MFR SERPL: 4 % — SIGNIFICANT CHANGE UP
CORTIS SERPL-MCNC: 3.9 UG/DL — LOW
CORTISOL, FREE RESULT: 0.16 UG/DL — LOW

## 2023-10-13 ENCOUNTER — APPOINTMENT (OUTPATIENT)
Dept: OTOLARYNGOLOGY | Facility: CLINIC | Age: 78
End: 2023-10-13
Payer: MEDICARE

## 2023-10-13 VITALS
WEIGHT: 181 LBS | HEART RATE: 70 BPM | SYSTOLIC BLOOD PRESSURE: 122 MMHG | BODY MASS INDEX: 24.52 KG/M2 | HEIGHT: 72 IN | DIASTOLIC BLOOD PRESSURE: 74 MMHG

## 2023-10-13 PROCEDURE — 99214 OFFICE O/P EST MOD 30 MIN: CPT

## 2023-10-13 RX ORDER — MORPHINE SULFATE 30 MG/1
30 TABLET ORAL
Refills: 0 | Status: COMPLETED | COMMUNITY
End: 2023-10-13

## 2023-10-13 RX ORDER — ASPIRIN ENTERIC COATED TABLETS 81 MG 81 MG/1
81 TABLET, DELAYED RELEASE ORAL DAILY
Refills: 0 | Status: COMPLETED | COMMUNITY
End: 2023-10-13

## 2023-10-13 RX ORDER — ERGOCALCIFEROL 1.25 MG/1
1.25 MG CAPSULE, LIQUID FILLED ORAL
Qty: 12 | Refills: 0 | Status: COMPLETED | COMMUNITY
Start: 2022-12-04 | End: 2023-10-13

## 2023-10-13 RX ORDER — HYDROCHLOROTHIAZIDE 25 MG/1
25 TABLET ORAL DAILY
Refills: 0 | Status: COMPLETED | COMMUNITY
End: 2023-10-13

## 2023-10-23 ENCOUNTER — APPOINTMENT (OUTPATIENT)
Age: 78
End: 2023-10-23

## 2023-10-23 ENCOUNTER — RESULT REVIEW (OUTPATIENT)
Age: 78
End: 2023-10-23

## 2023-10-23 LAB
ALBUMIN SERPL ELPH-MCNC: 3.6 G/DL — SIGNIFICANT CHANGE UP (ref 3.3–5)
ALBUMIN SERPL ELPH-MCNC: 3.6 G/DL — SIGNIFICANT CHANGE UP (ref 3.3–5)
ALP SERPL-CCNC: 81 U/L — SIGNIFICANT CHANGE UP (ref 40–120)
ALP SERPL-CCNC: 81 U/L — SIGNIFICANT CHANGE UP (ref 40–120)
ALT FLD-CCNC: 13 U/L — SIGNIFICANT CHANGE UP (ref 10–45)
ALT FLD-CCNC: 13 U/L — SIGNIFICANT CHANGE UP (ref 10–45)
ANION GAP SERPL CALC-SCNC: 10 MMOL/L — SIGNIFICANT CHANGE UP (ref 5–17)
ANION GAP SERPL CALC-SCNC: 10 MMOL/L — SIGNIFICANT CHANGE UP (ref 5–17)
AST SERPL-CCNC: 19 U/L — SIGNIFICANT CHANGE UP (ref 10–40)
AST SERPL-CCNC: 19 U/L — SIGNIFICANT CHANGE UP (ref 10–40)
BASOPHILS # BLD AUTO: 0.1 K/UL — SIGNIFICANT CHANGE UP (ref 0–0.2)
BASOPHILS # BLD AUTO: 0.1 K/UL — SIGNIFICANT CHANGE UP (ref 0–0.2)
BASOPHILS NFR BLD AUTO: 0.6 % — SIGNIFICANT CHANGE UP (ref 0–2)
BASOPHILS NFR BLD AUTO: 0.6 % — SIGNIFICANT CHANGE UP (ref 0–2)
BILIRUB SERPL-MCNC: 0.3 MG/DL — SIGNIFICANT CHANGE UP (ref 0.2–1.2)
BILIRUB SERPL-MCNC: 0.3 MG/DL — SIGNIFICANT CHANGE UP (ref 0.2–1.2)
BUN SERPL-MCNC: 18 MG/DL — SIGNIFICANT CHANGE UP (ref 7–23)
BUN SERPL-MCNC: 18 MG/DL — SIGNIFICANT CHANGE UP (ref 7–23)
CALCIUM SERPL-MCNC: 9.4 MG/DL — SIGNIFICANT CHANGE UP (ref 8.4–10.5)
CALCIUM SERPL-MCNC: 9.4 MG/DL — SIGNIFICANT CHANGE UP (ref 8.4–10.5)
CHLORIDE SERPL-SCNC: 105 MMOL/L — SIGNIFICANT CHANGE UP (ref 96–108)
CHLORIDE SERPL-SCNC: 105 MMOL/L — SIGNIFICANT CHANGE UP (ref 96–108)
CO2 SERPL-SCNC: 28 MMOL/L — SIGNIFICANT CHANGE UP (ref 22–31)
CO2 SERPL-SCNC: 28 MMOL/L — SIGNIFICANT CHANGE UP (ref 22–31)
CREAT SERPL-MCNC: 1.06 MG/DL — SIGNIFICANT CHANGE UP (ref 0.5–1.3)
CREAT SERPL-MCNC: 1.06 MG/DL — SIGNIFICANT CHANGE UP (ref 0.5–1.3)
EGFR: 73 ML/MIN/1.73M2 — SIGNIFICANT CHANGE UP
EGFR: 73 ML/MIN/1.73M2 — SIGNIFICANT CHANGE UP
EOSINOPHIL # BLD AUTO: 0.1 K/UL — SIGNIFICANT CHANGE UP (ref 0–0.5)
EOSINOPHIL # BLD AUTO: 0.1 K/UL — SIGNIFICANT CHANGE UP (ref 0–0.5)
EOSINOPHIL NFR BLD AUTO: 1.4 % — SIGNIFICANT CHANGE UP (ref 0–6)
EOSINOPHIL NFR BLD AUTO: 1.4 % — SIGNIFICANT CHANGE UP (ref 0–6)
GLUCOSE SERPL-MCNC: 105 MG/DL — HIGH (ref 70–99)
GLUCOSE SERPL-MCNC: 105 MG/DL — HIGH (ref 70–99)
HCT VFR BLD CALC: 36.8 % — LOW (ref 39–50)
HCT VFR BLD CALC: 36.8 % — LOW (ref 39–50)
HGB BLD-MCNC: 12.2 G/DL — LOW (ref 13–17)
HGB BLD-MCNC: 12.2 G/DL — LOW (ref 13–17)
LYMPHOCYTES # BLD AUTO: 0.5 K/UL — LOW (ref 1–3.3)
LYMPHOCYTES # BLD AUTO: 0.5 K/UL — LOW (ref 1–3.3)
LYMPHOCYTES # BLD AUTO: 5.4 % — LOW (ref 13–44)
LYMPHOCYTES # BLD AUTO: 5.4 % — LOW (ref 13–44)
MCHC RBC-ENTMCNC: 32 PG — SIGNIFICANT CHANGE UP (ref 27–34)
MCHC RBC-ENTMCNC: 32 PG — SIGNIFICANT CHANGE UP (ref 27–34)
MCHC RBC-ENTMCNC: 33.1 G/DL — SIGNIFICANT CHANGE UP (ref 32–36)
MCHC RBC-ENTMCNC: 33.1 G/DL — SIGNIFICANT CHANGE UP (ref 32–36)
MCV RBC AUTO: 96.7 FL — SIGNIFICANT CHANGE UP (ref 80–100)
MCV RBC AUTO: 96.7 FL — SIGNIFICANT CHANGE UP (ref 80–100)
MONOCYTES # BLD AUTO: 0.7 K/UL — SIGNIFICANT CHANGE UP (ref 0–0.9)
MONOCYTES # BLD AUTO: 0.7 K/UL — SIGNIFICANT CHANGE UP (ref 0–0.9)
MONOCYTES NFR BLD AUTO: 7.9 % — SIGNIFICANT CHANGE UP (ref 2–14)
MONOCYTES NFR BLD AUTO: 7.9 % — SIGNIFICANT CHANGE UP (ref 2–14)
NEUTROPHILS # BLD AUTO: 7.7 K/UL — HIGH (ref 1.8–7.4)
NEUTROPHILS # BLD AUTO: 7.7 K/UL — HIGH (ref 1.8–7.4)
NEUTROPHILS NFR BLD AUTO: 84.7 % — HIGH (ref 43–77)
NEUTROPHILS NFR BLD AUTO: 84.7 % — HIGH (ref 43–77)
PLATELET # BLD AUTO: 170 K/UL — SIGNIFICANT CHANGE UP (ref 150–400)
PLATELET # BLD AUTO: 170 K/UL — SIGNIFICANT CHANGE UP (ref 150–400)
POTASSIUM SERPL-MCNC: 4.1 MMOL/L — SIGNIFICANT CHANGE UP (ref 3.5–5.3)
POTASSIUM SERPL-MCNC: 4.1 MMOL/L — SIGNIFICANT CHANGE UP (ref 3.5–5.3)
POTASSIUM SERPL-SCNC: 4.1 MMOL/L — SIGNIFICANT CHANGE UP (ref 3.5–5.3)
POTASSIUM SERPL-SCNC: 4.1 MMOL/L — SIGNIFICANT CHANGE UP (ref 3.5–5.3)
PROT SERPL-MCNC: 6.1 G/DL — SIGNIFICANT CHANGE UP (ref 6–8.3)
PROT SERPL-MCNC: 6.1 G/DL — SIGNIFICANT CHANGE UP (ref 6–8.3)
RBC # BLD: 3.81 M/UL — LOW (ref 4.2–5.8)
RBC # BLD: 3.81 M/UL — LOW (ref 4.2–5.8)
RBC # FLD: 12.8 % — SIGNIFICANT CHANGE UP (ref 10.3–14.5)
RBC # FLD: 12.8 % — SIGNIFICANT CHANGE UP (ref 10.3–14.5)
SODIUM SERPL-SCNC: 143 MMOL/L — SIGNIFICANT CHANGE UP (ref 135–145)
SODIUM SERPL-SCNC: 143 MMOL/L — SIGNIFICANT CHANGE UP (ref 135–145)
T4 FREE SERPL-MCNC: 1.2 NG/DL — SIGNIFICANT CHANGE UP (ref 0.9–1.8)
T4 FREE SERPL-MCNC: 1.2 NG/DL — SIGNIFICANT CHANGE UP (ref 0.9–1.8)
T4 FREE+ TSH PNL SERPL: 25.3 UIU/ML — HIGH (ref 0.27–4.2)
T4 FREE+ TSH PNL SERPL: 25.3 UIU/ML — HIGH (ref 0.27–4.2)
WBC # BLD: 9 K/UL — SIGNIFICANT CHANGE UP (ref 3.8–10.5)
WBC # BLD: 9 K/UL — SIGNIFICANT CHANGE UP (ref 3.8–10.5)
WBC # FLD AUTO: 9 K/UL — SIGNIFICANT CHANGE UP (ref 3.8–10.5)
WBC # FLD AUTO: 9 K/UL — SIGNIFICANT CHANGE UP (ref 3.8–10.5)

## 2023-10-31 ENCOUNTER — APPOINTMENT (OUTPATIENT)
Dept: PHYSICAL MEDICINE AND REHAB | Facility: CLINIC | Age: 78
End: 2023-10-31
Payer: MEDICARE

## 2023-10-31 DIAGNOSIS — I89.0 LYMPHEDEMA, NOT ELSEWHERE CLASSIFIED: ICD-10-CM

## 2023-10-31 PROCEDURE — 99204 OFFICE O/P NEW MOD 45 MIN: CPT

## 2023-11-04 LAB
CORTICOSTEROID BINDING GLOBULIN RESULT: 1.9 MG/DL — SIGNIFICANT CHANGE UP
CORTICOSTEROID BINDING GLOBULIN RESULT: 1.9 MG/DL — SIGNIFICANT CHANGE UP
CORTIS F/TOTAL MFR SERPL: 2.9 % — SIGNIFICANT CHANGE UP
CORTIS F/TOTAL MFR SERPL: 2.9 % — SIGNIFICANT CHANGE UP
CORTIS SERPL-MCNC: 1 UG/DL — LOW
CORTIS SERPL-MCNC: 1 UG/DL — LOW
CORTISOL, FREE RESULT: 0.03 UG/DL — LOW
CORTISOL, FREE RESULT: 0.03 UG/DL — LOW

## 2023-11-13 ENCOUNTER — RESULT REVIEW (OUTPATIENT)
Age: 78
End: 2023-11-13

## 2023-11-13 ENCOUNTER — APPOINTMENT (OUTPATIENT)
Age: 78
End: 2023-11-13

## 2023-11-13 LAB
ALBUMIN SERPL ELPH-MCNC: 3.6 G/DL — SIGNIFICANT CHANGE UP (ref 3.3–5)
ALBUMIN SERPL ELPH-MCNC: 3.6 G/DL — SIGNIFICANT CHANGE UP (ref 3.3–5)
ALP SERPL-CCNC: 71 U/L — SIGNIFICANT CHANGE UP (ref 40–120)
ALP SERPL-CCNC: 71 U/L — SIGNIFICANT CHANGE UP (ref 40–120)
ALT FLD-CCNC: 11 U/L — SIGNIFICANT CHANGE UP (ref 10–45)
ALT FLD-CCNC: 11 U/L — SIGNIFICANT CHANGE UP (ref 10–45)
ANION GAP SERPL CALC-SCNC: 14 MMOL/L — SIGNIFICANT CHANGE UP (ref 5–17)
ANION GAP SERPL CALC-SCNC: 14 MMOL/L — SIGNIFICANT CHANGE UP (ref 5–17)
AST SERPL-CCNC: 21 U/L — SIGNIFICANT CHANGE UP (ref 10–40)
AST SERPL-CCNC: 21 U/L — SIGNIFICANT CHANGE UP (ref 10–40)
BASOPHILS # BLD AUTO: 0.1 K/UL — SIGNIFICANT CHANGE UP (ref 0–0.2)
BASOPHILS # BLD AUTO: 0.1 K/UL — SIGNIFICANT CHANGE UP (ref 0–0.2)
BASOPHILS NFR BLD AUTO: 1 % — SIGNIFICANT CHANGE UP (ref 0–2)
BASOPHILS NFR BLD AUTO: 1 % — SIGNIFICANT CHANGE UP (ref 0–2)
BILIRUB SERPL-MCNC: 0.4 MG/DL — SIGNIFICANT CHANGE UP (ref 0.2–1.2)
BILIRUB SERPL-MCNC: 0.4 MG/DL — SIGNIFICANT CHANGE UP (ref 0.2–1.2)
BUN SERPL-MCNC: 16 MG/DL — SIGNIFICANT CHANGE UP (ref 7–23)
BUN SERPL-MCNC: 16 MG/DL — SIGNIFICANT CHANGE UP (ref 7–23)
CALCIUM SERPL-MCNC: 9 MG/DL — SIGNIFICANT CHANGE UP (ref 8.4–10.5)
CALCIUM SERPL-MCNC: 9 MG/DL — SIGNIFICANT CHANGE UP (ref 8.4–10.5)
CHLORIDE SERPL-SCNC: 105 MMOL/L — SIGNIFICANT CHANGE UP (ref 96–108)
CHLORIDE SERPL-SCNC: 105 MMOL/L — SIGNIFICANT CHANGE UP (ref 96–108)
CO2 SERPL-SCNC: 23 MMOL/L — SIGNIFICANT CHANGE UP (ref 22–31)
CO2 SERPL-SCNC: 23 MMOL/L — SIGNIFICANT CHANGE UP (ref 22–31)
CREAT SERPL-MCNC: 0.96 MG/DL — SIGNIFICANT CHANGE UP (ref 0.5–1.3)
CREAT SERPL-MCNC: 0.96 MG/DL — SIGNIFICANT CHANGE UP (ref 0.5–1.3)
EGFR: 82 ML/MIN/1.73M2 — SIGNIFICANT CHANGE UP
EGFR: 82 ML/MIN/1.73M2 — SIGNIFICANT CHANGE UP
EOSINOPHIL # BLD AUTO: 0.1 K/UL — SIGNIFICANT CHANGE UP (ref 0–0.5)
EOSINOPHIL # BLD AUTO: 0.1 K/UL — SIGNIFICANT CHANGE UP (ref 0–0.5)
EOSINOPHIL NFR BLD AUTO: 2.2 % — SIGNIFICANT CHANGE UP (ref 0–6)
EOSINOPHIL NFR BLD AUTO: 2.2 % — SIGNIFICANT CHANGE UP (ref 0–6)
GLUCOSE SERPL-MCNC: 118 MG/DL — HIGH (ref 70–99)
GLUCOSE SERPL-MCNC: 118 MG/DL — HIGH (ref 70–99)
HCT VFR BLD CALC: 37.6 % — LOW (ref 39–50)
HCT VFR BLD CALC: 37.6 % — LOW (ref 39–50)
HGB BLD-MCNC: 12.5 G/DL — LOW (ref 13–17)
HGB BLD-MCNC: 12.5 G/DL — LOW (ref 13–17)
LYMPHOCYTES # BLD AUTO: 0.7 K/UL — LOW (ref 1–3.3)
LYMPHOCYTES # BLD AUTO: 0.7 K/UL — LOW (ref 1–3.3)
LYMPHOCYTES # BLD AUTO: 10.3 % — LOW (ref 13–44)
LYMPHOCYTES # BLD AUTO: 10.3 % — LOW (ref 13–44)
MCHC RBC-ENTMCNC: 32.1 PG — SIGNIFICANT CHANGE UP (ref 27–34)
MCHC RBC-ENTMCNC: 32.1 PG — SIGNIFICANT CHANGE UP (ref 27–34)
MCHC RBC-ENTMCNC: 33.2 G/DL — SIGNIFICANT CHANGE UP (ref 32–36)
MCHC RBC-ENTMCNC: 33.2 G/DL — SIGNIFICANT CHANGE UP (ref 32–36)
MCV RBC AUTO: 96.4 FL — SIGNIFICANT CHANGE UP (ref 80–100)
MCV RBC AUTO: 96.4 FL — SIGNIFICANT CHANGE UP (ref 80–100)
MONOCYTES # BLD AUTO: 0.5 K/UL — SIGNIFICANT CHANGE UP (ref 0–0.9)
MONOCYTES # BLD AUTO: 0.5 K/UL — SIGNIFICANT CHANGE UP (ref 0–0.9)
MONOCYTES NFR BLD AUTO: 7.9 % — SIGNIFICANT CHANGE UP (ref 2–14)
MONOCYTES NFR BLD AUTO: 7.9 % — SIGNIFICANT CHANGE UP (ref 2–14)
NEUTROPHILS # BLD AUTO: 5.2 K/UL — SIGNIFICANT CHANGE UP (ref 1.8–7.4)
NEUTROPHILS # BLD AUTO: 5.2 K/UL — SIGNIFICANT CHANGE UP (ref 1.8–7.4)
NEUTROPHILS NFR BLD AUTO: 78.6 % — HIGH (ref 43–77)
NEUTROPHILS NFR BLD AUTO: 78.6 % — HIGH (ref 43–77)
PLATELET # BLD AUTO: 213 K/UL — SIGNIFICANT CHANGE UP (ref 150–400)
PLATELET # BLD AUTO: 213 K/UL — SIGNIFICANT CHANGE UP (ref 150–400)
POTASSIUM SERPL-MCNC: 4 MMOL/L — SIGNIFICANT CHANGE UP (ref 3.5–5.3)
POTASSIUM SERPL-MCNC: 4 MMOL/L — SIGNIFICANT CHANGE UP (ref 3.5–5.3)
POTASSIUM SERPL-SCNC: 4 MMOL/L — SIGNIFICANT CHANGE UP (ref 3.5–5.3)
POTASSIUM SERPL-SCNC: 4 MMOL/L — SIGNIFICANT CHANGE UP (ref 3.5–5.3)
PROT SERPL-MCNC: 6.2 G/DL — SIGNIFICANT CHANGE UP (ref 6–8.3)
PROT SERPL-MCNC: 6.2 G/DL — SIGNIFICANT CHANGE UP (ref 6–8.3)
RBC # BLD: 3.9 M/UL — LOW (ref 4.2–5.8)
RBC # BLD: 3.9 M/UL — LOW (ref 4.2–5.8)
RBC # FLD: 12.8 % — SIGNIFICANT CHANGE UP (ref 10.3–14.5)
RBC # FLD: 12.8 % — SIGNIFICANT CHANGE UP (ref 10.3–14.5)
SODIUM SERPL-SCNC: 142 MMOL/L — SIGNIFICANT CHANGE UP (ref 135–145)
SODIUM SERPL-SCNC: 142 MMOL/L — SIGNIFICANT CHANGE UP (ref 135–145)
T4 FREE SERPL-MCNC: 1.2 NG/DL — SIGNIFICANT CHANGE UP (ref 0.9–1.8)
T4 FREE SERPL-MCNC: 1.2 NG/DL — SIGNIFICANT CHANGE UP (ref 0.9–1.8)
T4 FREE+ TSH PNL SERPL: 28.3 UIU/ML — HIGH (ref 0.27–4.2)
T4 FREE+ TSH PNL SERPL: 28.3 UIU/ML — HIGH (ref 0.27–4.2)
WBC # BLD: 6.6 K/UL — SIGNIFICANT CHANGE UP (ref 3.8–10.5)
WBC # BLD: 6.6 K/UL — SIGNIFICANT CHANGE UP (ref 3.8–10.5)
WBC # FLD AUTO: 6.6 K/UL — SIGNIFICANT CHANGE UP (ref 3.8–10.5)
WBC # FLD AUTO: 6.6 K/UL — SIGNIFICANT CHANGE UP (ref 3.8–10.5)

## 2023-11-22 LAB
CORTICOSTEROID BINDING GLOBULIN RESULT: 1.9 MG/DL — SIGNIFICANT CHANGE UP
CORTICOSTEROID BINDING GLOBULIN RESULT: 1.9 MG/DL — SIGNIFICANT CHANGE UP
CORTIS F/TOTAL MFR SERPL: <2.9 % — SIGNIFICANT CHANGE UP
CORTIS F/TOTAL MFR SERPL: <2.9 % — SIGNIFICANT CHANGE UP
CORTIS SERPL-MCNC: <1 UG/DL — LOW
CORTIS SERPL-MCNC: <1 UG/DL — LOW
CORTISOL, FREE RESULT: <0.03 UG/DL — LOW
CORTISOL, FREE RESULT: <0.03 UG/DL — LOW

## 2023-11-27 ENCOUNTER — OUTPATIENT (OUTPATIENT)
Dept: OUTPATIENT SERVICES | Facility: HOSPITAL | Age: 78
LOS: 1 days | Discharge: HOME | End: 2023-11-27

## 2023-11-27 DIAGNOSIS — C07 MALIGNANT NEOPLASM OF PAROTID GLAND: ICD-10-CM

## 2023-11-27 DIAGNOSIS — Z98.1 ARTHRODESIS STATUS: Chronic | ICD-10-CM

## 2023-11-27 DIAGNOSIS — Z90.49 ACQUIRED ABSENCE OF OTHER SPECIFIED PARTS OF DIGESTIVE TRACT: Chronic | ICD-10-CM

## 2023-11-27 DIAGNOSIS — C79.51 SECONDARY MALIGNANT NEOPLASM OF BONE: ICD-10-CM

## 2023-11-27 DIAGNOSIS — Z95.5 PRESENCE OF CORONARY ANGIOPLASTY IMPLANT AND GRAFT: Chronic | ICD-10-CM

## 2023-12-04 ENCOUNTER — RESULT REVIEW (OUTPATIENT)
Age: 78
End: 2023-12-04

## 2023-12-04 ENCOUNTER — APPOINTMENT (OUTPATIENT)
Age: 78
End: 2023-12-04

## 2023-12-04 LAB
ALBUMIN SERPL ELPH-MCNC: 3.8 G/DL — SIGNIFICANT CHANGE UP (ref 3.3–5)
ALBUMIN SERPL ELPH-MCNC: 3.8 G/DL — SIGNIFICANT CHANGE UP (ref 3.3–5)
ALP SERPL-CCNC: 80 U/L — SIGNIFICANT CHANGE UP (ref 40–120)
ALP SERPL-CCNC: 80 U/L — SIGNIFICANT CHANGE UP (ref 40–120)
ALT FLD-CCNC: 12 U/L — SIGNIFICANT CHANGE UP (ref 10–45)
ALT FLD-CCNC: 12 U/L — SIGNIFICANT CHANGE UP (ref 10–45)
ANION GAP SERPL CALC-SCNC: 13 MMOL/L — SIGNIFICANT CHANGE UP (ref 5–17)
ANION GAP SERPL CALC-SCNC: 13 MMOL/L — SIGNIFICANT CHANGE UP (ref 5–17)
AST SERPL-CCNC: 29 U/L — SIGNIFICANT CHANGE UP (ref 10–40)
AST SERPL-CCNC: 29 U/L — SIGNIFICANT CHANGE UP (ref 10–40)
BASOPHILS # BLD AUTO: 0.1 K/UL — SIGNIFICANT CHANGE UP (ref 0–0.2)
BASOPHILS # BLD AUTO: 0.1 K/UL — SIGNIFICANT CHANGE UP (ref 0–0.2)
BASOPHILS NFR BLD AUTO: 1.6 % — SIGNIFICANT CHANGE UP (ref 0–2)
BASOPHILS NFR BLD AUTO: 1.6 % — SIGNIFICANT CHANGE UP (ref 0–2)
BILIRUB SERPL-MCNC: 0.3 MG/DL — SIGNIFICANT CHANGE UP (ref 0.2–1.2)
BILIRUB SERPL-MCNC: 0.3 MG/DL — SIGNIFICANT CHANGE UP (ref 0.2–1.2)
BUN SERPL-MCNC: 21 MG/DL — SIGNIFICANT CHANGE UP (ref 7–23)
BUN SERPL-MCNC: 21 MG/DL — SIGNIFICANT CHANGE UP (ref 7–23)
CALCIUM SERPL-MCNC: 9.9 MG/DL — SIGNIFICANT CHANGE UP (ref 8.4–10.5)
CALCIUM SERPL-MCNC: 9.9 MG/DL — SIGNIFICANT CHANGE UP (ref 8.4–10.5)
CHLORIDE SERPL-SCNC: 105 MMOL/L — SIGNIFICANT CHANGE UP (ref 96–108)
CHLORIDE SERPL-SCNC: 105 MMOL/L — SIGNIFICANT CHANGE UP (ref 96–108)
CO2 SERPL-SCNC: 23 MMOL/L — SIGNIFICANT CHANGE UP (ref 22–31)
CO2 SERPL-SCNC: 23 MMOL/L — SIGNIFICANT CHANGE UP (ref 22–31)
CREAT SERPL-MCNC: 1 MG/DL — SIGNIFICANT CHANGE UP (ref 0.5–1.3)
CREAT SERPL-MCNC: 1 MG/DL — SIGNIFICANT CHANGE UP (ref 0.5–1.3)
EGFR: 78 ML/MIN/1.73M2 — SIGNIFICANT CHANGE UP
EGFR: 78 ML/MIN/1.73M2 — SIGNIFICANT CHANGE UP
EOSINOPHIL # BLD AUTO: 0.1 K/UL — SIGNIFICANT CHANGE UP (ref 0–0.5)
EOSINOPHIL # BLD AUTO: 0.1 K/UL — SIGNIFICANT CHANGE UP (ref 0–0.5)
EOSINOPHIL NFR BLD AUTO: 2.2 % — SIGNIFICANT CHANGE UP (ref 0–6)
EOSINOPHIL NFR BLD AUTO: 2.2 % — SIGNIFICANT CHANGE UP (ref 0–6)
GLUCOSE SERPL-MCNC: 110 MG/DL — HIGH (ref 70–99)
GLUCOSE SERPL-MCNC: 110 MG/DL — HIGH (ref 70–99)
HCT VFR BLD CALC: 41 % — SIGNIFICANT CHANGE UP (ref 39–50)
HCT VFR BLD CALC: 41 % — SIGNIFICANT CHANGE UP (ref 39–50)
HGB BLD-MCNC: 13.1 G/DL — SIGNIFICANT CHANGE UP (ref 13–17)
HGB BLD-MCNC: 13.1 G/DL — SIGNIFICANT CHANGE UP (ref 13–17)
LYMPHOCYTES # BLD AUTO: 0.8 K/UL — LOW (ref 1–3.3)
LYMPHOCYTES # BLD AUTO: 0.8 K/UL — LOW (ref 1–3.3)
LYMPHOCYTES # BLD AUTO: 12.3 % — LOW (ref 13–44)
LYMPHOCYTES # BLD AUTO: 12.3 % — LOW (ref 13–44)
MCHC RBC-ENTMCNC: 31.9 G/DL — LOW (ref 32–36)
MCHC RBC-ENTMCNC: 31.9 G/DL — LOW (ref 32–36)
MCHC RBC-ENTMCNC: 32 PG — SIGNIFICANT CHANGE UP (ref 27–34)
MCHC RBC-ENTMCNC: 32 PG — SIGNIFICANT CHANGE UP (ref 27–34)
MCV RBC AUTO: 100.2 FL — HIGH (ref 80–100)
MCV RBC AUTO: 100.2 FL — HIGH (ref 80–100)
MONOCYTES # BLD AUTO: 0.6 K/UL — SIGNIFICANT CHANGE UP (ref 0–0.9)
MONOCYTES # BLD AUTO: 0.6 K/UL — SIGNIFICANT CHANGE UP (ref 0–0.9)
MONOCYTES NFR BLD AUTO: 8.2 % — SIGNIFICANT CHANGE UP (ref 2–14)
MONOCYTES NFR BLD AUTO: 8.2 % — SIGNIFICANT CHANGE UP (ref 2–14)
NEUTROPHILS # BLD AUTO: 5.1 K/UL — SIGNIFICANT CHANGE UP (ref 1.8–7.4)
NEUTROPHILS # BLD AUTO: 5.1 K/UL — SIGNIFICANT CHANGE UP (ref 1.8–7.4)
NEUTROPHILS NFR BLD AUTO: 75.7 % — SIGNIFICANT CHANGE UP (ref 43–77)
NEUTROPHILS NFR BLD AUTO: 75.7 % — SIGNIFICANT CHANGE UP (ref 43–77)
PLATELET # BLD AUTO: 212 K/UL — SIGNIFICANT CHANGE UP (ref 150–400)
PLATELET # BLD AUTO: 212 K/UL — SIGNIFICANT CHANGE UP (ref 150–400)
POTASSIUM SERPL-MCNC: 4.3 MMOL/L — SIGNIFICANT CHANGE UP (ref 3.5–5.3)
POTASSIUM SERPL-MCNC: 4.3 MMOL/L — SIGNIFICANT CHANGE UP (ref 3.5–5.3)
POTASSIUM SERPL-SCNC: 4.3 MMOL/L — SIGNIFICANT CHANGE UP (ref 3.5–5.3)
POTASSIUM SERPL-SCNC: 4.3 MMOL/L — SIGNIFICANT CHANGE UP (ref 3.5–5.3)
PROT SERPL-MCNC: 6.7 G/DL — SIGNIFICANT CHANGE UP (ref 6–8.3)
PROT SERPL-MCNC: 6.7 G/DL — SIGNIFICANT CHANGE UP (ref 6–8.3)
RBC # BLD: 4.1 M/UL — LOW (ref 4.2–5.8)
RBC # BLD: 4.1 M/UL — LOW (ref 4.2–5.8)
RBC # FLD: 12.9 % — SIGNIFICANT CHANGE UP (ref 10.3–14.5)
RBC # FLD: 12.9 % — SIGNIFICANT CHANGE UP (ref 10.3–14.5)
SODIUM SERPL-SCNC: 141 MMOL/L — SIGNIFICANT CHANGE UP (ref 135–145)
SODIUM SERPL-SCNC: 141 MMOL/L — SIGNIFICANT CHANGE UP (ref 135–145)
T4 FREE SERPL-MCNC: 1 NG/DL — SIGNIFICANT CHANGE UP (ref 0.9–1.8)
T4 FREE SERPL-MCNC: 1 NG/DL — SIGNIFICANT CHANGE UP (ref 0.9–1.8)
T4 FREE+ TSH PNL SERPL: 29.3 UIU/ML — HIGH (ref 0.27–4.2)
T4 FREE+ TSH PNL SERPL: 29.3 UIU/ML — HIGH (ref 0.27–4.2)
WBC # BLD: 6.7 K/UL — SIGNIFICANT CHANGE UP (ref 3.8–10.5)
WBC # BLD: 6.7 K/UL — SIGNIFICANT CHANGE UP (ref 3.8–10.5)
WBC # FLD AUTO: 6.7 K/UL — SIGNIFICANT CHANGE UP (ref 3.8–10.5)
WBC # FLD AUTO: 6.7 K/UL — SIGNIFICANT CHANGE UP (ref 3.8–10.5)

## 2023-12-05 DIAGNOSIS — C77.0 SECONDARY AND UNSPECIFIED MALIGNANT NEOPLASM OF LYMPH NODES OF HEAD, FACE AND NECK: ICD-10-CM

## 2023-12-05 DIAGNOSIS — C80.1 MALIGNANT (PRIMARY) NEOPLASM, UNSPECIFIED: ICD-10-CM

## 2023-12-05 DIAGNOSIS — Z51.11 ENCOUNTER FOR ANTINEOPLASTIC CHEMOTHERAPY: ICD-10-CM

## 2023-12-11 LAB
CORTICOSTEROID BINDING GLOBULIN RESULT: 1.9 MG/DL — SIGNIFICANT CHANGE UP
CORTICOSTEROID BINDING GLOBULIN RESULT: 1.9 MG/DL — SIGNIFICANT CHANGE UP
CORTIS F/TOTAL MFR SERPL: 3 % — SIGNIFICANT CHANGE UP
CORTIS F/TOTAL MFR SERPL: 3 % — SIGNIFICANT CHANGE UP
CORTIS SERPL-MCNC: 1.4 UG/DL — LOW
CORTIS SERPL-MCNC: 1.4 UG/DL — LOW
CORTISOL, FREE RESULT: 0.04 UG/DL — LOW
CORTISOL, FREE RESULT: 0.04 UG/DL — LOW

## 2023-12-26 ENCOUNTER — RESULT REVIEW (OUTPATIENT)
Age: 78
End: 2023-12-26

## 2023-12-26 ENCOUNTER — APPOINTMENT (OUTPATIENT)
Age: 78
End: 2023-12-26

## 2023-12-26 LAB
ALBUMIN SERPL ELPH-MCNC: 3.7 G/DL — SIGNIFICANT CHANGE UP (ref 3.3–5)
ALBUMIN SERPL ELPH-MCNC: 3.7 G/DL — SIGNIFICANT CHANGE UP (ref 3.3–5)
ALP SERPL-CCNC: 73 U/L — SIGNIFICANT CHANGE UP (ref 40–120)
ALP SERPL-CCNC: 73 U/L — SIGNIFICANT CHANGE UP (ref 40–120)
ALT FLD-CCNC: 12 U/L — SIGNIFICANT CHANGE UP (ref 10–45)
ALT FLD-CCNC: 12 U/L — SIGNIFICANT CHANGE UP (ref 10–45)
ANION GAP SERPL CALC-SCNC: 13 MMOL/L — SIGNIFICANT CHANGE UP (ref 5–17)
ANION GAP SERPL CALC-SCNC: 13 MMOL/L — SIGNIFICANT CHANGE UP (ref 5–17)
AST SERPL-CCNC: 21 U/L — SIGNIFICANT CHANGE UP (ref 10–40)
AST SERPL-CCNC: 21 U/L — SIGNIFICANT CHANGE UP (ref 10–40)
BASOPHILS # BLD AUTO: 0.1 K/UL — SIGNIFICANT CHANGE UP (ref 0–0.2)
BASOPHILS # BLD AUTO: 0.1 K/UL — SIGNIFICANT CHANGE UP (ref 0–0.2)
BASOPHILS NFR BLD AUTO: 1.2 % — SIGNIFICANT CHANGE UP (ref 0–2)
BASOPHILS NFR BLD AUTO: 1.2 % — SIGNIFICANT CHANGE UP (ref 0–2)
BILIRUB SERPL-MCNC: 0.3 MG/DL — SIGNIFICANT CHANGE UP (ref 0.2–1.2)
BILIRUB SERPL-MCNC: 0.3 MG/DL — SIGNIFICANT CHANGE UP (ref 0.2–1.2)
BUN SERPL-MCNC: 17 MG/DL — SIGNIFICANT CHANGE UP (ref 7–23)
BUN SERPL-MCNC: 17 MG/DL — SIGNIFICANT CHANGE UP (ref 7–23)
CALCIUM SERPL-MCNC: 9.1 MG/DL — SIGNIFICANT CHANGE UP (ref 8.4–10.5)
CALCIUM SERPL-MCNC: 9.1 MG/DL — SIGNIFICANT CHANGE UP (ref 8.4–10.5)
CHLORIDE SERPL-SCNC: 103 MMOL/L — SIGNIFICANT CHANGE UP (ref 96–108)
CHLORIDE SERPL-SCNC: 103 MMOL/L — SIGNIFICANT CHANGE UP (ref 96–108)
CO2 SERPL-SCNC: 26 MMOL/L — SIGNIFICANT CHANGE UP (ref 22–31)
CO2 SERPL-SCNC: 26 MMOL/L — SIGNIFICANT CHANGE UP (ref 22–31)
CREAT SERPL-MCNC: 1.08 MG/DL — SIGNIFICANT CHANGE UP (ref 0.5–1.3)
CREAT SERPL-MCNC: 1.08 MG/DL — SIGNIFICANT CHANGE UP (ref 0.5–1.3)
EGFR: 71 ML/MIN/1.73M2 — SIGNIFICANT CHANGE UP
EGFR: 71 ML/MIN/1.73M2 — SIGNIFICANT CHANGE UP
EOSINOPHIL # BLD AUTO: 0.1 K/UL — SIGNIFICANT CHANGE UP (ref 0–0.5)
EOSINOPHIL # BLD AUTO: 0.1 K/UL — SIGNIFICANT CHANGE UP (ref 0–0.5)
EOSINOPHIL NFR BLD AUTO: 1.7 % — SIGNIFICANT CHANGE UP (ref 0–6)
EOSINOPHIL NFR BLD AUTO: 1.7 % — SIGNIFICANT CHANGE UP (ref 0–6)
GLUCOSE SERPL-MCNC: 100 MG/DL — HIGH (ref 70–99)
GLUCOSE SERPL-MCNC: 100 MG/DL — HIGH (ref 70–99)
HCT VFR BLD CALC: 37 % — LOW (ref 39–50)
HCT VFR BLD CALC: 37 % — LOW (ref 39–50)
HGB BLD-MCNC: 12 G/DL — LOW (ref 13–17)
HGB BLD-MCNC: 12 G/DL — LOW (ref 13–17)
LYMPHOCYTES # BLD AUTO: 0.8 K/UL — LOW (ref 1–3.3)
LYMPHOCYTES # BLD AUTO: 0.8 K/UL — LOW (ref 1–3.3)
LYMPHOCYTES # BLD AUTO: 11.2 % — LOW (ref 13–44)
LYMPHOCYTES # BLD AUTO: 11.2 % — LOW (ref 13–44)
MCHC RBC-ENTMCNC: 32 PG — SIGNIFICANT CHANGE UP (ref 27–34)
MCHC RBC-ENTMCNC: 32 PG — SIGNIFICANT CHANGE UP (ref 27–34)
MCHC RBC-ENTMCNC: 32.5 G/DL — SIGNIFICANT CHANGE UP (ref 32–36)
MCHC RBC-ENTMCNC: 32.5 G/DL — SIGNIFICANT CHANGE UP (ref 32–36)
MCV RBC AUTO: 98.4 FL — SIGNIFICANT CHANGE UP (ref 80–100)
MCV RBC AUTO: 98.4 FL — SIGNIFICANT CHANGE UP (ref 80–100)
MONOCYTES # BLD AUTO: 0.7 K/UL — SIGNIFICANT CHANGE UP (ref 0–0.9)
MONOCYTES # BLD AUTO: 0.7 K/UL — SIGNIFICANT CHANGE UP (ref 0–0.9)
MONOCYTES NFR BLD AUTO: 10.4 % — SIGNIFICANT CHANGE UP (ref 2–14)
MONOCYTES NFR BLD AUTO: 10.4 % — SIGNIFICANT CHANGE UP (ref 2–14)
NEUTROPHILS # BLD AUTO: 5.2 K/UL — SIGNIFICANT CHANGE UP (ref 1.8–7.4)
NEUTROPHILS # BLD AUTO: 5.2 K/UL — SIGNIFICANT CHANGE UP (ref 1.8–7.4)
NEUTROPHILS NFR BLD AUTO: 75.5 % — SIGNIFICANT CHANGE UP (ref 43–77)
NEUTROPHILS NFR BLD AUTO: 75.5 % — SIGNIFICANT CHANGE UP (ref 43–77)
PLATELET # BLD AUTO: 185 K/UL — SIGNIFICANT CHANGE UP (ref 150–400)
PLATELET # BLD AUTO: 185 K/UL — SIGNIFICANT CHANGE UP (ref 150–400)
POTASSIUM SERPL-MCNC: 3.9 MMOL/L — SIGNIFICANT CHANGE UP (ref 3.5–5.3)
POTASSIUM SERPL-MCNC: 3.9 MMOL/L — SIGNIFICANT CHANGE UP (ref 3.5–5.3)
POTASSIUM SERPL-SCNC: 3.9 MMOL/L — SIGNIFICANT CHANGE UP (ref 3.5–5.3)
POTASSIUM SERPL-SCNC: 3.9 MMOL/L — SIGNIFICANT CHANGE UP (ref 3.5–5.3)
PROT SERPL-MCNC: 6.4 G/DL — SIGNIFICANT CHANGE UP (ref 6–8.3)
PROT SERPL-MCNC: 6.4 G/DL — SIGNIFICANT CHANGE UP (ref 6–8.3)
RBC # BLD: 3.76 M/UL — LOW (ref 4.2–5.8)
RBC # BLD: 3.76 M/UL — LOW (ref 4.2–5.8)
RBC # FLD: 12 % — SIGNIFICANT CHANGE UP (ref 10.3–14.5)
RBC # FLD: 12 % — SIGNIFICANT CHANGE UP (ref 10.3–14.5)
SODIUM SERPL-SCNC: 142 MMOL/L — SIGNIFICANT CHANGE UP (ref 135–145)
SODIUM SERPL-SCNC: 142 MMOL/L — SIGNIFICANT CHANGE UP (ref 135–145)
T4 FREE SERPL-MCNC: 1.4 NG/DL — SIGNIFICANT CHANGE UP (ref 0.9–1.8)
T4 FREE SERPL-MCNC: 1.4 NG/DL — SIGNIFICANT CHANGE UP (ref 0.9–1.8)
T4 FREE+ TSH PNL SERPL: 13.6 UIU/ML — HIGH (ref 0.27–4.2)
T4 FREE+ TSH PNL SERPL: 13.6 UIU/ML — HIGH (ref 0.27–4.2)
WBC # BLD: 6.9 K/UL — SIGNIFICANT CHANGE UP (ref 3.8–10.5)
WBC # BLD: 6.9 K/UL — SIGNIFICANT CHANGE UP (ref 3.8–10.5)
WBC # FLD AUTO: 6.9 K/UL — SIGNIFICANT CHANGE UP (ref 3.8–10.5)
WBC # FLD AUTO: 6.9 K/UL — SIGNIFICANT CHANGE UP (ref 3.8–10.5)

## 2023-12-29 ENCOUNTER — APPOINTMENT (OUTPATIENT)
Dept: HEMATOLOGY ONCOLOGY | Facility: CLINIC | Age: 78
End: 2023-12-29
Payer: MEDICARE

## 2023-12-29 VITALS
BODY MASS INDEX: 25.33 KG/M2 | HEART RATE: 80 BPM | OXYGEN SATURATION: 97 % | SYSTOLIC BLOOD PRESSURE: 154 MMHG | HEIGHT: 72 IN | DIASTOLIC BLOOD PRESSURE: 88 MMHG | TEMPERATURE: 98.2 F | WEIGHT: 187 LBS

## 2023-12-29 PROCEDURE — 99215 OFFICE O/P EST HI 40 MIN: CPT

## 2023-12-29 NOTE — PHYSICAL EXAM
[Normal] : affect appropriate [de-identified] : Ambulates with cane [de-identified] : Has dentures both upper and lower , right sided jaw swelling -non tender [de-identified] : Left hip Keloid scar changes s/p surgery [de-identified] : 1-2 mm red lesion on face

## 2023-12-29 NOTE — HISTORY OF PRESENT ILLNESS
[de-identified] : JOSIANE SHAW is a 77 year male with PMHX of CAD s/p stents HTN, GERD, fibromyalgia, h/o polymyalgia rheumatica, Parotid ca s/p dissection/RT, cigar smoker ( 1-4 cigars/day) presents for initial consultation for metastatic carcinoma \par  \par  Patient presented to ENT, Dr. Alex Boston, on 1/18/22 c/o right neck swelling for 5 weeks. On exam, swelling int he right parotid gland approx. 3 x 3 cm firm, nontender was noted, no enlarged LN.  \par  MRI of neck was ordered and performed on 1/24/22. Scan revealed irregularly peripherally enhancing mass in the superficial lobe of the right parotid gland measuring 2.7 x 2.3 cm in AP . There is some strand-like internal enhancement as well. There is no left parotid mass.\par  \par  \par  Right parotid FNA biopsy performed on 2/1/22 at . Pathology demonstrated cells positive for malignancy ([place] Class IV), Consistent with carcinoma with squamous differentiation,Cytology slide and cell block show clusters and single scattered atypical squamous cells with prominent nucleoli, irregular nuclear membranes, irregular chromatin patterns, anisonucleosis and keratinization. Primary versus metastatic carcinoma.\par  \par  Subsequent PET/CT on 2/13/22 reported a rim of FDG activity surrounding a right intraparotid lesion (SUV 6.3, 3.1 x 2.3 cm). Decreased FDG activity centrally may be secondary to tumor necrosis. There is no other focal abnormal  FDG activity identified in the head and neck. There are no lytic or blastic lesions.\par  \par  \par  Patient referred to Head and Neck surgeon, Dr. Misbah Recio, on 2/16/22. Patient s/p Parotidectomy with facial nerve dissection and right neck dissection on 3/3/22. 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 Carcinoma focally very close to (less than 0.1 mm) inked circumferential margin, 4 intraparotid lymph nodes negative for metastatic carcinoma, 3 Lymph nodes, right level 1B- negative for metastatic carcinoma, Submandibular gland negative for carcinoma\par  Lymph nodes, right levels 2 and 3, neck dissection - 15 lymph nodes negative for metastatic carcinoma\par  \par  \par  Patient referred to Dr. Richardson for post op RT. Patient completed radiation on 6/1/22. \par  \par  Patient presented to Orthopedist on 10/6/22 for ongoing left hip pain for the past 4 years. He has been following Rheumatology, Dr. Kleiner, for pain. Pain has been progressive affect quality of life. \par  MRI of hip was performed  on 10/10/22 revealing  a fat-containing mass centered within the tensor fascia manjula muscle measuring 4 x 3.1 x 7.2 cm and a mass is seen centered within the lesser trochanter measuring 2.9 x 3.4 x 4.4 cm. There is overlying periosteal edema and cortical thinning\par  \par  Subsequent PET/CT on 10/14/22 visualized osseous structures reveal an intramedullary lytic 3 cm lesion along the left femoral intertrochanteric region max SUV 10.6.\par  Right parotid gland demonstrates interval resection of the hypermetabolic mass as well as right neck prominent soft tissue postsurgical low level uptake. No evidence of atypical lymph node activity identified.\par  \par  Biopsy of left proximal femur lesion on 10/27/2022. Pathology reported metastatic carcinoma,the tumor shows squamous differentiation on H T E. Performed immunostains show that the tumor cells are positive for AE1/AE3, CK5/ and p40. 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 Ambulates with cane.  S/p received 1 dose of Keytruda on 2/2/23, went to rehab however was too weak for rehab and was discharged 2/10/23 (enrolled on 12/14/23), was in a lot of pain at home and enrolled in hospice on 2/17/23, then slowly started increasing appetite, started ambulating more with walker, continued left hip pain, diffused body pain started to subside.  Continues on morphine 30 mg BID, and for breakthrough pain takes oxycodone prn - has not required since 3/2023  States is feeling better every day, tries to walk outside everyday  Reports continued swelling and redness on R cheek area of face,  Pt/family planning on discharging from hospice   2/3/23 Ca+ 13.6   6/14/23: Patient here for follow up  Had recent PET scan on 6/9/23  Patient discharged from hospice 6/12/23  Has dentures both upper and lower  Has Dermatologist, Dr. Wilkerson     PET 6/9/23: Utilized the dedicated head and neck series with image numbers from this series. Physiologic FDG activity in visualized brain. -Resolution of hypermetabolic soft tissue abutting the right masseter muscle present on the prior PET/CT. -S/P right parotidectomy with no focal abnormal activity within the surgical bed. -Foci of increased activity localizing to the the right ear showing SUV 3.6 at the top of its helix and SUV 4.7 within its inferior lobule; these are nonspecific activity but should be correlated with direct clinical examination. -Left ear shows focus in its posterior surface  showing SUV 3.7 that is difficult to correlate with on low-dose CT. Addition focus of cutaneous activity could also be seen behind the left ear  showing SUV 3.0. Please evaluate these areas. -Nonspecific activity in the thyroid gland showing SUV 5.4 in the right and 5.6 in the left.  Few muscles with increased activity around the neck and upper torso and pelvic region due to exertional strain. -Interval improvement in the left femoral bone currently smaller foci of intense activity with cortical bone reconstitution such as seen medially localizing to a not fully reconstituted lytic lesion (image 241) with SUV 10.0; same area previously had larger extent of lytic changes and activity with as high as SUV 9.3.  Similarly, soft tissue changes laterally also improved with smaller remaining tissue and less intense activity (image 212). Reassess on follow up for continued improvement or stability. -Interval resolution of FDG avid bone lesions with increased sclerosis in T8, T12, and in the right side of the sacrum. Marked improvement in the lytic lesion previously seen in the right acetabulum with reconstruction of some of its cortical defects currently with nonspecific patchy activity of SUV 2.4 (previously large lytic lesion with soft tissue component and SUV 7.2).   7/17/23: Patient here for follow up  Resumed Keytruda on 6/19/23 Admits Fatigue Patient right sided jaw swelling, no pain. Has an apt with  Dr. Boston this afternoon  Reports mild nausea Reports weight gain  States left hip pain stable , on Morphine ER  30 mg BID. Patient hopes to be weaned off medication  Denies fever/chills, rash, mouth sores, nausea, vomiting, diarrhea,constipation,  abdominal pain, bleeding, easy bruising or visual changes, chest pain, cough, SOB or NOONAN,  neuropathy, LE edema.  8/18/23:  Patient here for follow up  Resumed Keytruda on 6/19/23, next tx scheduled 8/21/23 Admits Fatigue Admits chills/ feeling cold once a week, likely from hypothyroid.   Admits intermittent decreased appetite, weight is stable  Patient still with right sided jaw swelling- improving, no pain. Evaluated by Dr. Boston, no indication for further work up- continue to observe. US on 7/19/23 reported No definable abnormality can be seen involving the area of swelling involving the right cheek. Reports nausea, not taking antinausea med States left hip pain stable , on Morphine ER  30 mg BID. Patient hopes to be weaned off medication  Dr. Kleiner has him on Prednisone 10 mg daily  Denies fever, rash, mouth sores, vomiting, diarrhea,constipation, abdominal pain, bleeding, easy bruising or visual changes, chest pain, cough, SOB or NOONAN,  neuropathy, LE edema.  9/29/23: Patient is here for a f/u with his wife He has been on Keytruda therapy, next scheduled 10/2/23 He endorses GI disturbance from therapy and increased skin rash on his hands He experienced left-sided hip pain after playing golf on Labor Day, with no improvement. He ambulates today with a cane Patient sleeps well and hasn't felt tired He takes Morphine 2x/day  PETCT scans show improved response to immunotherapy  12/29/23:  Patient here for f/u Next due for Keytruda on 1/16/23 On levothyroxine TSH high  T 4 WNL   Due for PET scan WIll order now Has some nausea  in AM Takes Ondansetron for nausea

## 2023-12-29 NOTE — ASSESSMENT
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 Foundation: TMB 99 Muts/Mb, MS- stable  Patient presented to ENT, Dr. Alex Boston, on 1/18/22 c/o right neck swelling for 5 weeks. On exam, swelling in the right parotid gland approx. 3 x 3 cm firm, nontender was noted, no enlarged LN. Subsequently MRI of neck on 1/24/22 revealed irregularly peripherally enhancing mass in the superficial lobe of the right parotid gland measuring 2.7 x 2.3 cm in AP.  Right parotid FNA biopsy performed on 2/1/22 at  consistent with carcinoma with squamous differentiation,Cytology; clusters and single scattered atypical squamous cells with prominent nucleoli, irregular nuclear membranes, irregular chromatin patterns, anisonucleosis and keratinization. Primary versus metastatic carcinoma.  Patient s/p Parotidectomy with facial nerve dissection and right neck dissection on 3/3/22 by Dr. Misbah Recio. Pathology showed 22 lymph nodes negative for metastatic carcinoma.  Completed radiation on 6/1/22 with Dr. Rowe  Bone Mets: -PET/CT on 10/14/22 visualized osseous structures reveal an intramedullary lytic 3 cm lesion along the left femoral intertrochanteric region max SUV 10.6. Biopsy of left proximal femur lesion on 10/27/2022. Pathology reported metastatic carcinoma,the tumor shows squamous differentiation on H T E. Tumor cells are positive for AE1/AE3, CK5/ and p40.  Plan: - Given the TMB is 99 treated with single agent pembro - Pembrolizumab 200mg q 3 weeks. received 1 dose of Keytruda on 2/2/23, went to rehab however was too weak for rehab and was discharged 2/10/23 (enrolled on 12/14/23), was in a lot of pain at home and enrolled in hospice on 2/17/23, then slowly started increasing appetite, started ambulating more with walker, continued left hip pain, diffused body pain started to subside. Discharged from hospice on 6/12/23.  -Repeat PET SCAN On 6/9/23 with improvement in Disease ( SEE ABOVE) - Restarted Keytruda 200 q 3 weeks on 6/19/23 with denosumab -Continues on morphine 30 mg BID,  -Patient still with right sided jaw swelling- improving, no pain. Evaluated by Dr. Boston, no indication for further work up- continue to observe. US on 7/19/23 reported No definable abnormality can be seen involving the area of swelling involving the right cheek. - TSH on 6/19/23 was 11.90, started patient on Levothyroxine 25mch on 6/20, recently increased to 50 mcg on 8/4/23. Patient is compliant.  9/15/23 PETCT: 1. Overall improved sclerosis and foci of increased activity around the left femur with similar focal activity medially localizing to an area of persistent cortical defect. This area may represent a combination of post therapy and continuing bone remodeling; reassess on follow-up. No new FDG avid bone lesion. 2. Improvement in foci of increased activity in the right ear; persistent focus remaining in the left ear. 3. Stable postsurgical changes in the right parotid bed without abnormal activity. No new FDG avid malignant lesion in the head and neck.  - Patient's scans have improved and he is responding well to treatment - The patient should f/u with his dermatologist about his skin lesions. No Steroids while on immunotherapy. Reports that he has had skin lesions his entire life  - Patient's cortisol level is low. No associated complaints. Cortisol levels declined in June prior to re-initiation of immunotherapy. - Continue keytruda  - PEt SCan due now  - f/u with me in 4 weeks

## 2024-01-01 ENCOUNTER — EMERGENCY (EMERGENCY)
Facility: HOSPITAL | Age: 79
LOS: 1 days | Discharge: HOME | End: 2024-01-01
Attending: STUDENT IN AN ORGANIZED HEALTH CARE EDUCATION/TRAINING PROGRAM
Payer: MEDICARE

## 2024-01-01 VITALS
SYSTOLIC BLOOD PRESSURE: 155 MMHG | HEART RATE: 78 BPM | OXYGEN SATURATION: 98 % | DIASTOLIC BLOOD PRESSURE: 96 MMHG | HEIGHT: 71 IN | RESPIRATION RATE: 20 BRPM | TEMPERATURE: 99 F | WEIGHT: 184.97 LBS

## 2024-01-01 VITALS
RESPIRATION RATE: 17 BRPM | OXYGEN SATURATION: 99 % | TEMPERATURE: 98 F | SYSTOLIC BLOOD PRESSURE: 146 MMHG | DIASTOLIC BLOOD PRESSURE: 93 MMHG | HEART RATE: 82 BPM

## 2024-01-01 DIAGNOSIS — Z95.5 PRESENCE OF CORONARY ANGIOPLASTY IMPLANT AND GRAFT: Chronic | ICD-10-CM

## 2024-01-01 DIAGNOSIS — Z98.1 ARTHRODESIS STATUS: Chronic | ICD-10-CM

## 2024-01-01 DIAGNOSIS — Z90.49 ACQUIRED ABSENCE OF OTHER SPECIFIED PARTS OF DIGESTIVE TRACT: Chronic | ICD-10-CM

## 2024-01-01 LAB
ALBUMIN SERPL ELPH-MCNC: 2.7 G/DL — LOW (ref 3.3–5.2)
ALBUMIN SERPL ELPH-MCNC: 2.8 G/DL — LOW (ref 3.3–5.2)
ALBUMIN SERPL ELPH-MCNC: 2.9 G/DL — LOW (ref 3.3–5.2)
ALP SERPL-CCNC: 148 U/L — HIGH (ref 40–120)
ALP SERPL-CCNC: 148 U/L — HIGH (ref 40–120)
ALP SERPL-CCNC: 149 U/L — HIGH (ref 40–120)
ALT FLD-CCNC: 10 U/L — SIGNIFICANT CHANGE UP
ALT FLD-CCNC: 12 U/L — SIGNIFICANT CHANGE UP
ALT FLD-CCNC: 12 U/L — SIGNIFICANT CHANGE UP
ANION GAP SERPL CALC-SCNC: 12 MMOL/L — SIGNIFICANT CHANGE UP (ref 5–17)
ANION GAP SERPL CALC-SCNC: 12 MMOL/L — SIGNIFICANT CHANGE UP (ref 5–17)
ANION GAP SERPL CALC-SCNC: 13 MMOL/L — SIGNIFICANT CHANGE UP (ref 5–17)
APPEARANCE UR: CLEAR — SIGNIFICANT CHANGE UP
APTT BLD: 39.1 SEC — HIGH (ref 24.5–35.6)
AST SERPL-CCNC: 20 U/L — SIGNIFICANT CHANGE UP
AST SERPL-CCNC: 23 U/L — SIGNIFICANT CHANGE UP
AST SERPL-CCNC: 24 U/L — SIGNIFICANT CHANGE UP
BACTERIA # UR AUTO: NEGATIVE /HPF — SIGNIFICANT CHANGE UP
BASOPHILS # BLD AUTO: 0 K/UL — SIGNIFICANT CHANGE UP (ref 0–0.2)
BASOPHILS # BLD AUTO: 0.05 K/UL — SIGNIFICANT CHANGE UP (ref 0–0.2)
BASOPHILS # BLD AUTO: 0.06 K/UL — SIGNIFICANT CHANGE UP (ref 0–0.2)
BASOPHILS NFR BLD AUTO: 0 % — SIGNIFICANT CHANGE UP (ref 0–2)
BASOPHILS NFR BLD AUTO: 0.3 % — SIGNIFICANT CHANGE UP (ref 0–2)
BASOPHILS NFR BLD AUTO: 1 % — SIGNIFICANT CHANGE UP (ref 0–2)
BILIRUB SERPL-MCNC: 0.4 MG/DL — SIGNIFICANT CHANGE UP (ref 0.4–2)
BILIRUB SERPL-MCNC: 0.4 MG/DL — SIGNIFICANT CHANGE UP (ref 0.4–2)
BILIRUB SERPL-MCNC: 0.5 MG/DL — SIGNIFICANT CHANGE UP (ref 0.4–2)
BILIRUB UR-MCNC: NEGATIVE — SIGNIFICANT CHANGE UP
BUN SERPL-MCNC: 10.1 MG/DL — SIGNIFICANT CHANGE UP (ref 8–20)
BUN SERPL-MCNC: 12.8 MG/DL — SIGNIFICANT CHANGE UP (ref 8–20)
BUN SERPL-MCNC: 9.9 MG/DL — SIGNIFICANT CHANGE UP (ref 8–20)
C DIFF BY PCR RESULT: SIGNIFICANT CHANGE UP
CALCIUM SERPL-MCNC: 7.3 MG/DL — LOW (ref 8.4–10.5)
CALCIUM SERPL-MCNC: 7.6 MG/DL — LOW (ref 8.4–10.5)
CALCIUM SERPL-MCNC: 8.4 MG/DL — SIGNIFICANT CHANGE UP (ref 8.4–10.5)
CAST: 0 /LPF — SIGNIFICANT CHANGE UP (ref 0–4)
CHLORIDE SERPL-SCNC: 100 MMOL/L — SIGNIFICANT CHANGE UP (ref 96–108)
CHLORIDE SERPL-SCNC: 98 MMOL/L — SIGNIFICANT CHANGE UP (ref 96–108)
CHLORIDE SERPL-SCNC: 99 MMOL/L — SIGNIFICANT CHANGE UP (ref 96–108)
CO2 SERPL-SCNC: 23 MMOL/L — SIGNIFICANT CHANGE UP (ref 22–29)
CO2 SERPL-SCNC: 24 MMOL/L — SIGNIFICANT CHANGE UP (ref 22–29)
CO2 SERPL-SCNC: 26 MMOL/L — SIGNIFICANT CHANGE UP (ref 22–29)
COLOR SPEC: YELLOW — SIGNIFICANT CHANGE UP
CREAT SERPL-MCNC: 1.21 MG/DL — SIGNIFICANT CHANGE UP (ref 0.5–1.3)
CREAT SERPL-MCNC: 1.28 MG/DL — SIGNIFICANT CHANGE UP (ref 0.5–1.3)
CREAT SERPL-MCNC: 1.53 MG/DL — HIGH (ref 0.5–1.3)
CULTURE RESULTS: SIGNIFICANT CHANGE UP
DIFF PNL FLD: ABNORMAL
EGFR: 47 ML/MIN/1.73M2 — LOW
EGFR: 47 ML/MIN/1.73M2 — LOW
EGFR: 58 ML/MIN/1.73M2 — LOW
EGFR: 58 ML/MIN/1.73M2 — LOW
EGFR: 62 ML/MIN/1.73M2 — SIGNIFICANT CHANGE UP
EGFR: 62 ML/MIN/1.73M2 — SIGNIFICANT CHANGE UP
EOSINOPHIL # BLD AUTO: 0 K/UL — SIGNIFICANT CHANGE UP (ref 0–0.5)
EOSINOPHIL # BLD AUTO: 0.02 K/UL — SIGNIFICANT CHANGE UP (ref 0–0.5)
EOSINOPHIL # BLD AUTO: 0.07 K/UL — SIGNIFICANT CHANGE UP (ref 0–0.5)
EOSINOPHIL NFR BLD AUTO: 0 % — SIGNIFICANT CHANGE UP (ref 0–6)
EOSINOPHIL NFR BLD AUTO: 0.1 % — SIGNIFICANT CHANGE UP (ref 0–6)
EOSINOPHIL NFR BLD AUTO: 1.3 % — SIGNIFICANT CHANGE UP (ref 0–6)
FLUAV AG NPH QL: SIGNIFICANT CHANGE UP
FLUBV AG NPH QL: SIGNIFICANT CHANGE UP
GI PCR PANEL: SIGNIFICANT CHANGE UP
GIANT PLATELETS BLD QL SMEAR: PRESENT — SIGNIFICANT CHANGE UP
GLUCOSE SERPL-MCNC: 122 MG/DL — HIGH (ref 70–99)
GLUCOSE SERPL-MCNC: 82 MG/DL — SIGNIFICANT CHANGE UP (ref 70–99)
GLUCOSE SERPL-MCNC: 90 MG/DL — SIGNIFICANT CHANGE UP (ref 70–99)
GLUCOSE UR QL: NEGATIVE MG/DL — SIGNIFICANT CHANGE UP
HCT VFR BLD CALC: 32.3 % — LOW (ref 39–50)
HCT VFR BLD CALC: 33.5 % — LOW (ref 39–50)
HCT VFR BLD CALC: 34.9 % — LOW (ref 39–50)
HGB BLD-MCNC: 10.5 G/DL — LOW (ref 13–17)
HGB BLD-MCNC: 11.2 G/DL — LOW (ref 13–17)
HGB BLD-MCNC: 11.5 G/DL — LOW (ref 13–17)
IMM GRANULOCYTES NFR BLD AUTO: 0.4 % — SIGNIFICANT CHANGE UP (ref 0–0.9)
IMM GRANULOCYTES NFR BLD AUTO: 0.7 % — SIGNIFICANT CHANGE UP (ref 0–0.9)
INR BLD: 1.38 RATIO — HIGH (ref 0.85–1.16)
KETONES UR-MCNC: 15 MG/DL
LEUKOCYTE ESTERASE UR-ACNC: NEGATIVE — SIGNIFICANT CHANGE UP
LYMPHOCYTES # BLD AUTO: 0.57 K/UL — LOW (ref 1–3.3)
LYMPHOCYTES # BLD AUTO: 0.71 K/UL — LOW (ref 1–3.3)
LYMPHOCYTES # BLD AUTO: 1.25 K/UL — SIGNIFICANT CHANGE UP (ref 1–3.3)
LYMPHOCYTES # BLD AUTO: 13.5 % — SIGNIFICANT CHANGE UP (ref 13–44)
LYMPHOCYTES # BLD AUTO: 2.6 % — LOW (ref 13–44)
LYMPHOCYTES # BLD AUTO: 7 % — LOW (ref 13–44)
MAGNESIUM SERPL-MCNC: 1.7 MG/DL — LOW (ref 1.8–2.6)
MAGNESIUM SERPL-MCNC: 2.1 MG/DL — SIGNIFICANT CHANGE UP (ref 1.6–2.6)
MANUAL SMEAR VERIFICATION: SIGNIFICANT CHANGE UP
MCHC RBC-ENTMCNC: 29.2 PG — SIGNIFICANT CHANGE UP (ref 27–34)
MCHC RBC-ENTMCNC: 29.6 PG — SIGNIFICANT CHANGE UP (ref 27–34)
MCHC RBC-ENTMCNC: 30.1 PG — SIGNIFICANT CHANGE UP (ref 27–34)
MCHC RBC-ENTMCNC: 32.5 G/DL — SIGNIFICANT CHANGE UP (ref 32–36)
MCHC RBC-ENTMCNC: 33 G/DL — SIGNIFICANT CHANGE UP (ref 32–36)
MCHC RBC-ENTMCNC: 33.4 G/DL — SIGNIFICANT CHANGE UP (ref 32–36)
MCV RBC AUTO: 89.7 FL — SIGNIFICANT CHANGE UP (ref 80–100)
MCV RBC AUTO: 90 FL — SIGNIFICANT CHANGE UP (ref 80–100)
MCV RBC AUTO: 90.1 FL — SIGNIFICANT CHANGE UP (ref 80–100)
MONOCYTES # BLD AUTO: 0.46 K/UL — SIGNIFICANT CHANGE UP (ref 0–0.9)
MONOCYTES # BLD AUTO: 0.81 K/UL — SIGNIFICANT CHANGE UP (ref 0–0.9)
MONOCYTES # BLD AUTO: 1.43 K/UL — HIGH (ref 0–0.9)
MONOCYTES NFR BLD AUTO: 15.4 % — HIGH (ref 2–14)
MONOCYTES NFR BLD AUTO: 2.6 % — SIGNIFICANT CHANGE UP (ref 2–14)
MONOCYTES NFR BLD AUTO: 6.5 % — SIGNIFICANT CHANGE UP (ref 2–14)
NEUTROPHILS # BLD AUTO: 16.09 K/UL — HIGH (ref 1.8–7.4)
NEUTROPHILS # BLD AUTO: 19.69 K/UL — HIGH (ref 1.8–7.4)
NEUTROPHILS # BLD AUTO: 3.59 K/UL — SIGNIFICANT CHANGE UP (ref 1.8–7.4)
NEUTROPHILS NFR BLD AUTO: 68.4 % — SIGNIFICANT CHANGE UP (ref 43–77)
NEUTROPHILS NFR BLD AUTO: 89.8 % — HIGH (ref 43–77)
NEUTROPHILS NFR BLD AUTO: 90.4 % — HIGH (ref 43–77)
NITRITE UR-MCNC: NEGATIVE — SIGNIFICANT CHANGE UP
PH UR: 8.5 (ref 5–8)
PLAT MORPH BLD: NORMAL — SIGNIFICANT CHANGE UP
PLATELET # BLD AUTO: 362 K/UL — SIGNIFICANT CHANGE UP (ref 150–400)
PLATELET # BLD AUTO: 390 K/UL — SIGNIFICANT CHANGE UP (ref 150–400)
PLATELET # BLD AUTO: 417 K/UL — HIGH (ref 150–400)
POIKILOCYTOSIS BLD QL AUTO: SLIGHT — SIGNIFICANT CHANGE UP
POTASSIUM SERPL-MCNC: 3.9 MMOL/L — SIGNIFICANT CHANGE UP (ref 3.5–5.3)
POTASSIUM SERPL-MCNC: 4.2 MMOL/L — SIGNIFICANT CHANGE UP (ref 3.5–5.3)
POTASSIUM SERPL-MCNC: 4.2 MMOL/L — SIGNIFICANT CHANGE UP (ref 3.5–5.3)
POTASSIUM SERPL-SCNC: 3.9 MMOL/L — SIGNIFICANT CHANGE UP (ref 3.5–5.3)
POTASSIUM SERPL-SCNC: 4.2 MMOL/L — SIGNIFICANT CHANGE UP (ref 3.5–5.3)
POTASSIUM SERPL-SCNC: 4.2 MMOL/L — SIGNIFICANT CHANGE UP (ref 3.5–5.3)
PROT SERPL-MCNC: 5.3 G/DL — LOW (ref 6.6–8.7)
PROT SERPL-MCNC: 5.4 G/DL — LOW (ref 6.6–8.7)
PROT SERPL-MCNC: 5.7 G/DL — LOW (ref 6.6–8.7)
PROT UR-MCNC: SIGNIFICANT CHANGE UP MG/DL
PROTHROM AB SERPL-ACNC: 15.5 SEC — HIGH (ref 9.9–13.4)
RBC # BLD: 3.59 M/UL — LOW (ref 4.2–5.8)
RBC # BLD: 3.72 M/UL — LOW (ref 4.2–5.8)
RBC # BLD: 3.89 M/UL — LOW (ref 4.2–5.8)
RBC # FLD: 14.3 % — SIGNIFICANT CHANGE UP (ref 10.3–14.5)
RBC # FLD: 14.3 % — SIGNIFICANT CHANGE UP (ref 10.3–14.5)
RBC # FLD: 14.4 % — SIGNIFICANT CHANGE UP (ref 10.3–14.5)
RBC BLD AUTO: ABNORMAL
RBC CASTS # UR COMP ASSIST: 6 /HPF — HIGH (ref 0–4)
RSV RNA NPH QL NAA+NON-PROBE: SIGNIFICANT CHANGE UP
SARS-COV-2 RNA SPEC QL NAA+PROBE: SIGNIFICANT CHANGE UP
SMUDGE CELLS # BLD: PRESENT — SIGNIFICANT CHANGE UP
SODIUM SERPL-SCNC: 134 MMOL/L — LOW (ref 135–145)
SODIUM SERPL-SCNC: 136 MMOL/L — SIGNIFICANT CHANGE UP (ref 135–145)
SODIUM SERPL-SCNC: 136 MMOL/L — SIGNIFICANT CHANGE UP (ref 135–145)
SP GR SPEC: 1.02 — SIGNIFICANT CHANGE UP (ref 1–1.03)
SPECIMEN SOURCE: SIGNIFICANT CHANGE UP
SQUAMOUS # UR AUTO: 0 /HPF — SIGNIFICANT CHANGE UP (ref 0–5)
UROBILINOGEN FLD QL: 0.2 MG/DL — SIGNIFICANT CHANGE UP (ref 0.2–1)
WBC # BLD: 17.8 K/UL — HIGH (ref 3.8–10.5)
WBC # BLD: 21.93 K/UL — HIGH (ref 3.8–10.5)
WBC # BLD: 5.25 K/UL — SIGNIFICANT CHANGE UP (ref 3.8–10.5)
WBC # FLD AUTO: 17.8 K/UL — HIGH (ref 3.8–10.5)
WBC # FLD AUTO: 21.93 K/UL — HIGH (ref 3.8–10.5)
WBC # FLD AUTO: 5.25 K/UL — SIGNIFICANT CHANGE UP (ref 3.8–10.5)
WBC UR QL: 4 /HPF — SIGNIFICANT CHANGE UP (ref 0–5)

## 2024-01-01 PROCEDURE — 74177 CT ABD & PELVIS W/CONTRAST: CPT | Mod: MC

## 2024-01-01 PROCEDURE — 87086 URINE CULTURE/COLONY COUNT: CPT

## 2024-01-01 PROCEDURE — 93010 ELECTROCARDIOGRAM REPORT: CPT

## 2024-01-01 PROCEDURE — 87637 SARSCOV2&INF A&B&RSV AMP PRB: CPT

## 2024-01-01 PROCEDURE — 80053 COMPREHEN METABOLIC PANEL: CPT

## 2024-01-01 PROCEDURE — G0378: CPT

## 2024-01-01 PROCEDURE — 93880 EXTRACRANIAL BILAT STUDY: CPT

## 2024-01-01 PROCEDURE — 84436 ASSAY OF TOTAL THYROXINE: CPT

## 2024-01-01 PROCEDURE — 71045 X-RAY EXAM CHEST 1 VIEW: CPT

## 2024-01-01 PROCEDURE — 84480 ASSAY TRIIODOTHYRONINE (T3): CPT

## 2024-01-01 PROCEDURE — 93005 ELECTROCARDIOGRAM TRACING: CPT

## 2024-01-01 PROCEDURE — 71045 X-RAY EXAM CHEST 1 VIEW: CPT | Mod: 26

## 2024-01-01 PROCEDURE — 99285 EMERGENCY DEPT VISIT HI MDM: CPT | Mod: 25

## 2024-01-01 PROCEDURE — 96374 THER/PROPH/DIAG INJ IV PUSH: CPT

## 2024-01-01 PROCEDURE — 70551 MRI BRAIN STEM W/O DYE: CPT | Mod: 26,MC

## 2024-01-01 PROCEDURE — 93880 EXTRACRANIAL BILAT STUDY: CPT | Mod: 26

## 2024-01-01 PROCEDURE — 99223 1ST HOSP IP/OBS HIGH 75: CPT

## 2024-01-01 PROCEDURE — 99239 HOSP IP/OBS DSCHRG MGMT >30: CPT

## 2024-01-01 PROCEDURE — 70551 MRI BRAIN STEM W/O DYE: CPT

## 2024-01-01 PROCEDURE — 84443 ASSAY THYROID STIM HORMONE: CPT

## 2024-01-01 PROCEDURE — 85730 THROMBOPLASTIN TIME PARTIAL: CPT

## 2024-01-01 PROCEDURE — 85025 COMPLETE CBC W/AUTO DIFF WBC: CPT

## 2024-01-01 PROCEDURE — 99232 SBSQ HOSP IP/OBS MODERATE 35: CPT

## 2024-01-01 PROCEDURE — 99233 SBSQ HOSP IP/OBS HIGH 50: CPT

## 2024-01-01 PROCEDURE — 70450 CT HEAD/BRAIN W/O DYE: CPT | Mod: 26,MC

## 2024-01-01 PROCEDURE — 74177 CT ABD & PELVIS W/CONTRAST: CPT | Mod: 26,MC

## 2024-01-01 PROCEDURE — 87507 IADNA-DNA/RNA PROBE TQ 12-25: CPT

## 2024-01-01 PROCEDURE — 70450 CT HEAD/BRAIN W/O DYE: CPT | Mod: MC

## 2024-01-01 PROCEDURE — 83735 ASSAY OF MAGNESIUM: CPT

## 2024-01-01 PROCEDURE — 81001 URINALYSIS AUTO W/SCOPE: CPT

## 2024-01-01 PROCEDURE — 87493 C DIFF AMPLIFIED PROBE: CPT

## 2024-01-01 PROCEDURE — 36415 COLL VENOUS BLD VENIPUNCTURE: CPT

## 2024-01-01 PROCEDURE — 85610 PROTHROMBIN TIME: CPT

## 2024-01-01 RX ORDER — LOPERAMIDE HCL 1 MG/7.5ML
1 SOLUTION ORAL
Refills: 0 | DISCHARGE

## 2024-01-01 RX ORDER — PREDNISOLONE 5 MG
1 TABLET ORAL
Refills: 0 | DISCHARGE

## 2024-01-01 RX ORDER — PROCHLORPERAZINE 25 MG
1 SUPPOSITORY, RECTAL RECTAL
Refills: 0 | DISCHARGE

## 2024-01-01 RX ORDER — GABAPENTIN 400 MG/1
600 CAPSULE ORAL
Refills: 0 | Status: DISCONTINUED | OUTPATIENT
Start: 2024-01-01 | End: 2024-01-01

## 2024-01-01 RX ORDER — SULFASALAZINE 500 MG/1
2 TABLET ORAL
Refills: 0 | DISCHARGE

## 2024-01-01 RX ORDER — DULOXETINE 20 MG/1
40 CAPSULE, DELAYED RELEASE ORAL
Refills: 0 | Status: DISCONTINUED | OUTPATIENT
Start: 2024-01-01 | End: 2024-01-01

## 2024-01-01 RX ORDER — RAMIPRIL 2.5 MG/1
2 CAPSULE ORAL
Refills: 0 | DISCHARGE

## 2024-01-01 RX ORDER — SULFASALAZINE 500 MG/1
1000 TABLET ORAL
Refills: 0 | Status: DISCONTINUED | OUTPATIENT
Start: 2024-01-01 | End: 2024-01-01

## 2024-01-01 RX ORDER — MAGNESIUM SULFATE 500 MG/ML
2 SYRINGE (ML) INJECTION ONCE
Refills: 0 | Status: COMPLETED | OUTPATIENT
Start: 2024-01-01 | End: 2024-01-01

## 2024-01-01 RX ORDER — ATORVASTATIN CALCIUM 80 MG/1
1 TABLET, FILM COATED ORAL
Refills: 0 | DISCHARGE

## 2024-01-01 RX ORDER — FOLIC ACID 1 MG/1
1 TABLET ORAL
Refills: 0 | DISCHARGE

## 2024-01-01 RX ORDER — SODIUM CHLORIDE 9 G/1000ML
1000 INJECTION, SOLUTION INTRAVENOUS ONCE
Refills: 0 | Status: COMPLETED | OUTPATIENT
Start: 2024-01-01 | End: 2024-01-01

## 2024-01-01 RX ORDER — AMLODIPINE BESYLATE 10 MG/1
2.5 TABLET ORAL DAILY
Refills: 0 | Status: DISCONTINUED | OUTPATIENT
Start: 2024-01-01 | End: 2024-01-01

## 2024-01-01 RX ORDER — PREDNISONE 20 MG/1
5 TABLET ORAL DAILY
Refills: 0 | Status: DISCONTINUED | OUTPATIENT
Start: 2024-01-01 | End: 2024-01-01

## 2024-01-01 RX ORDER — OXYCODONE HYDROCHLORIDE 30 MG/1
1 TABLET ORAL
Refills: 0 | DISCHARGE

## 2024-01-01 RX ORDER — PREDNISONE 20 MG/1
1 TABLET ORAL
Refills: 0 | DISCHARGE

## 2024-01-01 RX ORDER — ISOSORBIDE MONONITRATE 60 MG/1
30 TABLET, EXTENDED RELEASE ORAL DAILY
Refills: 0 | Status: DISCONTINUED | OUTPATIENT
Start: 2024-01-01 | End: 2024-01-01

## 2024-01-01 RX ORDER — FLUOROURACIL 5 MG/G
1 CREAM TOPICAL
Refills: 0 | DISCHARGE

## 2024-01-01 RX ORDER — ATORVASTATIN CALCIUM 80 MG/1
40 TABLET, FILM COATED ORAL AT BEDTIME
Refills: 0 | Status: DISCONTINUED | OUTPATIENT
Start: 2024-01-01 | End: 2024-01-01

## 2024-01-01 RX ORDER — PILOCARPINE HYDROCHLORIDE 5 MG/1
1 TABLET, FILM COATED ORAL
Refills: 0 | DISCHARGE

## 2024-01-01 RX ORDER — PROCHLORPERAZINE 25 MG
10 SUPPOSITORY, RECTAL RECTAL EVERY 6 HOURS
Refills: 0 | Status: DISCONTINUED | OUTPATIENT
Start: 2024-01-01 | End: 2024-01-01

## 2024-01-01 RX ORDER — ISOSORBIDE MONONITRATE 60 MG/1
1 TABLET, EXTENDED RELEASE ORAL
Refills: 0 | DISCHARGE

## 2024-01-01 RX ORDER — GABAPENTIN 400 MG/1
1 CAPSULE ORAL
Refills: 0 | DISCHARGE

## 2024-01-01 RX ORDER — DULOXETINE 20 MG/1
1 CAPSULE, DELAYED RELEASE ORAL
Refills: 0 | DISCHARGE

## 2024-01-01 RX ORDER — APIXABAN 2.5 MG/1
5 TABLET, FILM COATED ORAL
Refills: 0 | Status: DISCONTINUED | OUTPATIENT
Start: 2024-01-01 | End: 2024-01-01

## 2024-01-01 RX ORDER — LISINOPRIL 5 MG/1
40 TABLET ORAL DAILY
Refills: 0 | Status: DISCONTINUED | OUTPATIENT
Start: 2024-01-01 | End: 2024-01-01

## 2024-01-01 RX ORDER — ERGOCALCIFEROL 1.25 MG/1
1 CAPSULE ORAL
Refills: 0 | DISCHARGE

## 2024-01-01 RX ORDER — LEVOTHYROXINE SODIUM 300 MCG
100 TABLET ORAL DAILY
Refills: 0 | Status: DISCONTINUED | OUTPATIENT
Start: 2024-01-01 | End: 2024-01-01

## 2024-01-01 RX ADMIN — GABAPENTIN 600 MILLIGRAM(S): 400 CAPSULE ORAL at 06:23

## 2024-01-01 RX ADMIN — LISINOPRIL 40 MILLIGRAM(S): 5 TABLET ORAL at 06:23

## 2024-01-01 RX ADMIN — DULOXETINE 40 MILLIGRAM(S): 20 CAPSULE, DELAYED RELEASE ORAL at 06:23

## 2024-01-01 RX ADMIN — PREDNISONE 5 MILLIGRAM(S): 20 TABLET ORAL at 06:22

## 2024-01-01 RX ADMIN — Medication 100 MICROGRAM(S): at 06:12

## 2024-01-01 RX ADMIN — Medication 10 MILLIGRAM(S): at 06:22

## 2024-01-01 RX ADMIN — APIXABAN 5 MILLIGRAM(S): 2.5 TABLET, FILM COATED ORAL at 06:12

## 2024-01-01 RX ADMIN — APIXABAN 5 MILLIGRAM(S): 2.5 TABLET, FILM COATED ORAL at 19:17

## 2024-01-01 RX ADMIN — AMLODIPINE BESYLATE 2.5 MILLIGRAM(S): 10 TABLET ORAL at 05:24

## 2024-01-01 RX ADMIN — SULFASALAZINE 1000 MILLIGRAM(S): 500 TABLET ORAL at 06:52

## 2024-01-01 RX ADMIN — APIXABAN 5 MILLIGRAM(S): 2.5 TABLET, FILM COATED ORAL at 21:06

## 2024-01-01 RX ADMIN — AMLODIPINE BESYLATE 2.5 MILLIGRAM(S): 10 TABLET ORAL at 06:23

## 2024-01-01 RX ADMIN — Medication 25 GRAM(S): at 21:07

## 2024-01-01 RX ADMIN — SODIUM CHLORIDE 1000 MILLILITER(S): 9 INJECTION, SOLUTION INTRAVENOUS at 17:02

## 2024-01-01 RX ADMIN — AMLODIPINE BESYLATE 2.5 MILLIGRAM(S): 10 TABLET ORAL at 06:12

## 2024-01-01 RX ADMIN — Medication 100 MICROGRAM(S): at 06:23

## 2024-01-01 RX ADMIN — Medication 40 MILLIGRAM(S): at 06:57

## 2024-01-01 RX ADMIN — Medication 100 MICROGRAM(S): at 05:24

## 2024-01-01 RX ADMIN — APIXABAN 5 MILLIGRAM(S): 2.5 TABLET, FILM COATED ORAL at 06:23

## 2024-01-01 RX ADMIN — APIXABAN 5 MILLIGRAM(S): 2.5 TABLET, FILM COATED ORAL at 05:24

## 2024-01-02 ENCOUNTER — NON-APPOINTMENT (OUTPATIENT)
Age: 79
End: 2024-01-02

## 2024-01-02 ENCOUNTER — APPOINTMENT (OUTPATIENT)
Dept: OTOLARYNGOLOGY | Facility: CLINIC | Age: 79
End: 2024-01-02

## 2024-01-02 ENCOUNTER — RX RENEWAL (OUTPATIENT)
Age: 79
End: 2024-01-02

## 2024-01-07 LAB
CORTICOSTEROID BINDING GLOBULIN RESULT: 1.6 MG/DL — LOW
CORTICOSTEROID BINDING GLOBULIN RESULT: 1.6 MG/DL — LOW
CORTIS F/TOTAL MFR SERPL: <3.5 % — SIGNIFICANT CHANGE UP
CORTIS F/TOTAL MFR SERPL: <3.5 % — SIGNIFICANT CHANGE UP
CORTIS SERPL-MCNC: <1 UG/DL — LOW
CORTIS SERPL-MCNC: <1 UG/DL — LOW
CORTISOL, FREE RESULT: <0.03 UG/DL — LOW
CORTISOL, FREE RESULT: <0.03 UG/DL — LOW

## 2024-01-08 ENCOUNTER — RESULT REVIEW (OUTPATIENT)
Age: 79
End: 2024-01-08

## 2024-01-10 ENCOUNTER — OUTPATIENT (OUTPATIENT)
Dept: OUTPATIENT SERVICES | Facility: HOSPITAL | Age: 79
LOS: 1 days | End: 2024-01-10

## 2024-01-10 ENCOUNTER — APPOINTMENT (OUTPATIENT)
Dept: NUCLEAR MEDICINE | Facility: CLINIC | Age: 79
End: 2024-01-10
Payer: MEDICARE

## 2024-01-10 DIAGNOSIS — Z90.49 ACQUIRED ABSENCE OF OTHER SPECIFIED PARTS OF DIGESTIVE TRACT: Chronic | ICD-10-CM

## 2024-01-10 DIAGNOSIS — Z98.1 ARTHRODESIS STATUS: Chronic | ICD-10-CM

## 2024-01-10 DIAGNOSIS — C79.51 SECONDARY MALIGNANT NEOPLASM OF BONE: ICD-10-CM

## 2024-01-10 DIAGNOSIS — Z95.5 PRESENCE OF CORONARY ANGIOPLASTY IMPLANT AND GRAFT: Chronic | ICD-10-CM

## 2024-01-10 PROCEDURE — 78815 PET IMAGE W/CT SKULL-THIGH: CPT | Mod: 26,PS,KX

## 2024-01-12 ENCOUNTER — NON-APPOINTMENT (OUTPATIENT)
Age: 79
End: 2024-01-12

## 2024-01-16 ENCOUNTER — RESULT REVIEW (OUTPATIENT)
Age: 79
End: 2024-01-16

## 2024-01-16 ENCOUNTER — APPOINTMENT (OUTPATIENT)
Age: 79
End: 2024-01-16

## 2024-01-16 LAB
ALBUMIN SERPL ELPH-MCNC: 4 G/DL — SIGNIFICANT CHANGE UP (ref 3.3–5)
ALP SERPL-CCNC: 67 U/L — SIGNIFICANT CHANGE UP (ref 40–120)
ALT FLD-CCNC: 31 U/L — SIGNIFICANT CHANGE UP (ref 10–45)
ANION GAP SERPL CALC-SCNC: 11 MMOL/L — SIGNIFICANT CHANGE UP (ref 5–17)
AST SERPL-CCNC: 25 U/L — SIGNIFICANT CHANGE UP (ref 10–40)
BASOPHILS # BLD AUTO: 0.1 K/UL — SIGNIFICANT CHANGE UP (ref 0–0.2)
BASOPHILS NFR BLD AUTO: 0.8 % — SIGNIFICANT CHANGE UP (ref 0–2)
BILIRUB SERPL-MCNC: 0.4 MG/DL — SIGNIFICANT CHANGE UP (ref 0.2–1.2)
BUN SERPL-MCNC: 25 MG/DL — HIGH (ref 7–23)
CALCIUM SERPL-MCNC: 9.2 MG/DL — SIGNIFICANT CHANGE UP (ref 8.4–10.5)
CHLORIDE SERPL-SCNC: 105 MMOL/L — SIGNIFICANT CHANGE UP (ref 96–108)
CO2 SERPL-SCNC: 26 MMOL/L — SIGNIFICANT CHANGE UP (ref 22–31)
CREAT SERPL-MCNC: 1.13 MG/DL — SIGNIFICANT CHANGE UP (ref 0.5–1.3)
EGFR: 67 ML/MIN/1.73M2 — SIGNIFICANT CHANGE UP
EOSINOPHIL # BLD AUTO: 0.1 K/UL — SIGNIFICANT CHANGE UP (ref 0–0.5)
EOSINOPHIL NFR BLD AUTO: 1.8 % — SIGNIFICANT CHANGE UP (ref 0–6)
GLUCOSE SERPL-MCNC: 98 MG/DL — SIGNIFICANT CHANGE UP (ref 70–99)
HCT VFR BLD CALC: 39.6 % — SIGNIFICANT CHANGE UP (ref 39–50)
HGB BLD-MCNC: 12.3 G/DL — LOW (ref 13–17)
LYMPHOCYTES # BLD AUTO: 0.7 K/UL — LOW (ref 1–3.3)
LYMPHOCYTES # BLD AUTO: 10.9 % — LOW (ref 13–44)
MCHC RBC-ENTMCNC: 30.2 PG — SIGNIFICANT CHANGE UP (ref 27–34)
MCHC RBC-ENTMCNC: 31.1 G/DL — LOW (ref 32–36)
MCV RBC AUTO: 97.2 FL — SIGNIFICANT CHANGE UP (ref 80–100)
MONOCYTES # BLD AUTO: 0.4 K/UL — SIGNIFICANT CHANGE UP (ref 0–0.9)
MONOCYTES NFR BLD AUTO: 6.5 % — SIGNIFICANT CHANGE UP (ref 2–14)
NEUTROPHILS # BLD AUTO: 5.4 K/UL — SIGNIFICANT CHANGE UP (ref 1.8–7.4)
NEUTROPHILS NFR BLD AUTO: 80 % — HIGH (ref 43–77)
PLATELET # BLD AUTO: 242 K/UL — SIGNIFICANT CHANGE UP (ref 150–400)
POTASSIUM SERPL-MCNC: 3.9 MMOL/L — SIGNIFICANT CHANGE UP (ref 3.5–5.3)
POTASSIUM SERPL-SCNC: 3.9 MMOL/L — SIGNIFICANT CHANGE UP (ref 3.5–5.3)
PROT SERPL-MCNC: 6.5 G/DL — SIGNIFICANT CHANGE UP (ref 6–8.3)
RBC # BLD: 4.07 M/UL — LOW (ref 4.2–5.8)
RBC # FLD: 12.3 % — SIGNIFICANT CHANGE UP (ref 10.3–14.5)
SODIUM SERPL-SCNC: 142 MMOL/L — SIGNIFICANT CHANGE UP (ref 135–145)
T4 FREE SERPL-MCNC: 1.4 NG/DL — SIGNIFICANT CHANGE UP (ref 0.9–1.8)
T4 FREE+ TSH PNL SERPL: 16.7 UIU/ML — HIGH (ref 0.27–4.2)
WBC # BLD: 6.7 K/UL — SIGNIFICANT CHANGE UP (ref 3.8–10.5)
WBC # FLD AUTO: 6.7 K/UL — SIGNIFICANT CHANGE UP (ref 3.8–10.5)

## 2024-01-17 ENCOUNTER — APPOINTMENT (OUTPATIENT)
Dept: RADIATION ONCOLOGY | Facility: CLINIC | Age: 79
End: 2024-01-17
Payer: MEDICARE

## 2024-01-17 ENCOUNTER — OUTPATIENT (OUTPATIENT)
Dept: OUTPATIENT SERVICES | Facility: HOSPITAL | Age: 79
LOS: 1 days | Discharge: HOME | End: 2024-01-17
Payer: MEDICARE

## 2024-01-17 VITALS
RESPIRATION RATE: 16 BRPM | BODY MASS INDEX: 24.79 KG/M2 | DIASTOLIC BLOOD PRESSURE: 66 MMHG | SYSTOLIC BLOOD PRESSURE: 119 MMHG | HEIGHT: 72 IN | HEART RATE: 93 BPM | OXYGEN SATURATION: 96 % | WEIGHT: 183 LBS

## 2024-01-17 DIAGNOSIS — Z95.5 PRESENCE OF CORONARY ANGIOPLASTY IMPLANT AND GRAFT: Chronic | ICD-10-CM

## 2024-01-17 DIAGNOSIS — Z98.1 ARTHRODESIS STATUS: Chronic | ICD-10-CM

## 2024-01-17 DIAGNOSIS — Z90.49 ACQUIRED ABSENCE OF OTHER SPECIFIED PARTS OF DIGESTIVE TRACT: Chronic | ICD-10-CM

## 2024-01-17 PROCEDURE — 77470 SPECIAL RADIATION TREATMENT: CPT | Mod: 26

## 2024-01-17 PROCEDURE — 99214 OFFICE O/P EST MOD 30 MIN: CPT | Mod: 25

## 2024-01-17 NOTE — VITALS
[Least Pain Intensity: 1/10] : 1/10 [Pain Location: ___] : Pain Location: [unfilled] [Pain Interferes with ADLs] : Pain interferes with activities of daily living. [Opioid] : opioid [Adjuvant (neuroleptics)] : adjuvant (neuroleptics) [Maximal Pain Intensity: 6/10] : 6/10 [Pain Description/Quality: ___] : Pain description/quality: [unfilled] [Pain Duration: ___] : Pain duration: [unfilled] [70: Cares for self; unalbe to carry on normal activity or do active work.] : 70: Cares for self; unable to carry on normal activity or do active work. [ECOG Performance Status: 2 - Ambulatory and capable of all self care but unable to carry out any work activities] : Performance Status: 2 - Ambulatory and capable of all self care but unable to carry out any work activities. Up and about more than 50% of waking hours

## 2024-01-18 DIAGNOSIS — C79.51 SECONDARY MALIGNANT NEOPLASM OF BONE: ICD-10-CM

## 2024-01-18 NOTE — LETTER CLOSING
[Sincerely yours,] : Sincerely yours, [FreeTextEntry3] : Frantz Richardson MD\par  Physician in Chief\par  Department of Radiation Medicine\par  [facility]\par  [facility] [place]\par  \par   of Radiation Medicine\par  [facility]\par  at  [facility]\par  \par  Radiation \par  [facility]/\par  [facility] [place]\par  [address]\par  [place], [place] [address]\par  \par  Tel: [phone]\par  Fax: [phone]\par

## 2024-01-18 NOTE — LETTER GREETING
[Dear Doctor] : Dear Doctor, [Follow-Up] : Your patient, [unfilled] was seen in my office today for follow-up [Please see my note below.] : Please see my note below. [FreeTextEntry2] : MD Meredith Stewart MD

## 2024-01-18 NOTE — PHYSICAL EXAM
[Normal] : oriented to person, place and time, the affect was normal, the mood was normal and not anxious [de-identified] : modest oral dryness. [de-identified] : Uses cane with ambulation.

## 2024-01-18 NOTE — HISTORY OF PRESENT ILLNESS
[FreeTextEntry1] : This 79 y/o man presents for urgent follow up referred by his medical oncologist Dr. Palmer.    PET/CT  1/10/2024 showed hypermetabolism associated with unchanged cortical destruction in left lesser trochanter is more extensive and increased in intensity, concerning for recurrent disease.  Mr. Daily is status post right parotidectomy and right neck dissection for probable metastatic squamous cell carcinoma. Margin of resection <0.1mm. He completed radiation therapy to the right cheek and neck on 6/1/2022. Interval history is remarkable for the development of pain and decreased range of motion in left hip. Work up with MRI revealed an erosive tumor involving the femoral neck. He completed radiation therapy to the left hip/femur on 12/2/2022 for metastatic disease.  Recently , patient has developed new pain in the area of the left hip previously treated.. PET / CT done Ethan 10, 2024 strongly suggests progression of metastatic malignancy in this area..

## 2024-01-18 NOTE — REVIEW OF SYSTEMS
[Negative] : Allergic/Immunologic [FreeTextEntry4] : notes mucous, oral dryness , altered taste [FreeTextEntry9] : pain left hip area; using a cane for ambulation, s/p surgical pinning left hip

## 2024-01-18 NOTE — DISEASE MANAGEMENT
[Pathological] : TNM Stage: p [II] : II [TTNM] : 2 [NTNM] : 0 [MTNM] : 0 [de-identified] : 6600 cGy [de-identified] : right parotid bed and neck

## 2024-01-19 PROCEDURE — 77333 RADIATION TREATMENT AID(S): CPT | Mod: 26

## 2024-01-19 PROCEDURE — 77263 THER RADIOLOGY TX PLNG CPLX: CPT

## 2024-01-19 PROCEDURE — 77290 THER RAD SIMULAJ FIELD CPLX: CPT | Mod: 26

## 2024-01-22 PROCEDURE — 77334 RADIATION TREATMENT AID(S): CPT | Mod: 26

## 2024-01-22 PROCEDURE — 77307 TELETHX ISODOSE PLAN CPLX: CPT | Mod: 26

## 2024-01-22 PROCEDURE — 77280 THER RAD SIMULAJ FIELD SMPL: CPT | Mod: 26

## 2024-01-23 PROCEDURE — 77427 RADIATION TX MANAGEMENT X5: CPT

## 2024-01-25 ENCOUNTER — OUTPATIENT (OUTPATIENT)
Dept: OUTPATIENT SERVICES | Facility: HOSPITAL | Age: 79
LOS: 1 days | Discharge: HOME | End: 2024-01-25

## 2024-01-25 DIAGNOSIS — Z90.49 ACQUIRED ABSENCE OF OTHER SPECIFIED PARTS OF DIGESTIVE TRACT: Chronic | ICD-10-CM

## 2024-01-25 DIAGNOSIS — C77.0 SECONDARY AND UNSPECIFIED MALIGNANT NEOPLASM OF LYMPH NODES OF HEAD, FACE AND NECK: ICD-10-CM

## 2024-01-25 DIAGNOSIS — C80.1 MALIGNANT (PRIMARY) NEOPLASM, UNSPECIFIED: ICD-10-CM

## 2024-01-25 DIAGNOSIS — Z95.5 PRESENCE OF CORONARY ANGIOPLASTY IMPLANT AND GRAFT: Chronic | ICD-10-CM

## 2024-01-25 DIAGNOSIS — Z98.1 ARTHRODESIS STATUS: Chronic | ICD-10-CM

## 2024-01-25 DIAGNOSIS — C79.51 SECONDARY MALIGNANT NEOPLASM OF BONE: ICD-10-CM

## 2024-01-25 LAB
CORTICOSTEROID BINDING GLOBULIN RESULT: 2 MG/DL — SIGNIFICANT CHANGE UP
CORTIS F/TOTAL MFR SERPL: 3.3 % — SIGNIFICANT CHANGE UP
CORTIS SERPL-MCNC: 3.2 UG/DL — LOW
CORTISOL, FREE RESULT: 0.11 UG/DL — LOW

## 2024-01-29 ENCOUNTER — NON-APPOINTMENT (OUTPATIENT)
Age: 79
End: 2024-01-29

## 2024-01-29 VITALS
HEIGHT: 72 IN | SYSTOLIC BLOOD PRESSURE: 122 MMHG | BODY MASS INDEX: 25.47 KG/M2 | WEIGHT: 188 LBS | DIASTOLIC BLOOD PRESSURE: 72 MMHG | HEART RATE: 81 BPM | RESPIRATION RATE: 16 BRPM | OXYGEN SATURATION: 95 %

## 2024-01-29 PROCEDURE — 77427 RADIATION TX MANAGEMENT X5: CPT

## 2024-01-29 NOTE — VITALS
[Maximal Pain Intensity: 5/10] : 5/10 [Least Pain Intensity: 1/10] : 1/10 [Pain Description/Quality: ___] : Pain description/quality: [unfilled] [Pain Duration: ___] : Pain duration: [unfilled] [Pain Location: ___] : Pain Location: [unfilled] [Pain Interferes with ADLs] : Pain interferes with activities of daily living. [OTC] : OTC [Opioid] : opioid

## 2024-01-30 ENCOUNTER — APPOINTMENT (OUTPATIENT)
Dept: HEMATOLOGY ONCOLOGY | Facility: CLINIC | Age: 79
End: 2024-01-30
Payer: MEDICARE

## 2024-01-30 VITALS
OXYGEN SATURATION: 97 % | SYSTOLIC BLOOD PRESSURE: 153 MMHG | HEIGHT: 72 IN | WEIGHT: 188 LBS | BODY MASS INDEX: 25.47 KG/M2 | DIASTOLIC BLOOD PRESSURE: 83 MMHG | HEART RATE: 86 BPM | TEMPERATURE: 97.9 F

## 2024-01-30 PROCEDURE — 99215 OFFICE O/P EST HI 40 MIN: CPT

## 2024-01-30 NOTE — PHYSICAL EXAM
[Normal] : affect appropriate [de-identified] : Ambulates with cane [de-identified] : Has dentures both upper and lower , right sided jaw swelling -non tender [de-identified] : Left hip Keloid scar changes s/p surgery [de-identified] : 1-2 mm red lesion on face

## 2024-01-30 NOTE — ASSESSMENT
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 Foundation: TMB 99 Muts/Mb, MS- stable  Patient presented to ENT, Dr. Alex Boston, on 1/18/22 c/o right neck swelling for 5 weeks. On exam, swelling in the right parotid gland approx. 3 x 3 cm firm, nontender was noted, no enlarged LN. Subsequently MRI of neck on 1/24/22 revealed irregularly peripherally enhancing mass in the superficial lobe of the right parotid gland measuring 2.7 x 2.3 cm in AP.  Right parotid FNA biopsy performed on 2/1/22 at  consistent with carcinoma with squamous differentiation,Cytology; clusters and single scattered atypical squamous cells with prominent nucleoli, irregular nuclear membranes, irregular chromatin patterns, anisonucleosis and keratinization. Primary versus metastatic carcinoma.  Patient s/p Parotidectomy with facial nerve dissection and right neck dissection on 3/3/22 by Dr. Misbah Recio. Pathology showed 22 lymph nodes negative for metastatic carcinoma. Completed radiation on 6/1/22 with Dr. Rowe  Plan: - Given the TMB is 99 treated with single agent pembro - Pembrolizumab 200mg q 3 weeks. received 1 dose of Keytruda on 2/2/23, went to rehab however was too weak for rehab and was discharged 2/10/23 (enrolled on 12/14/23), was in a lot of pain at home and enrolled in hospice on 2/17/23, then slowly started increasing appetite, started ambulating more with walker, continued left hip pain, diffused body pain started to subside. Discharged from hospice on 6/12/23.  -Repeat PET SCAN On 6/9/23 with improvement in Disease  - Restarted Keytruda 200 q 3 weeks on 6/19/23 with denosumab -Patient still with right sided jaw swelling- improving, no pain. Evaluated by Dr. Boston, no indication for further work up- continue to observe. US on 7/19/23 reported No definable abnormality can be seen involving the area of swelling involving the right cheek. - TSH on 6/19/23 was 11.90, started patient on Levothyroxine 25mch on 6/20,  increased to 50 mcg on 8/4/23. Patient is compliant.   - Patient's scans have improved and he is responding well to treatment - The patient should f/u with his dermatologist about his skin lesions. No Steroids while on immunotherapy. Reports that he has had skin lesions his entire life  - Patient's cortisol level is low. No associated complaints. Cortisol levels declined in June prior to re-initiation of immunotherapy.  -Recent PET Scan done on 1/10/24: 1. Hypermetabolism associated with unchanged cortical destruction in left lesser trochanter is more extensive and increased in intensity, concerning for recurrent disease. 2. The remainder of the study demonstrates no evidence of FDG-avid disease. No new lesion.  -Given the above scan, patient was recommended to see radiation oncologist s/p palliative re-radiation to left hip, proximal femur 5#  - completed on 1/29/30 with Dr. Richardson.  -Continue Keytruda. Last treatment on 1/16/24. Next treatment due on 2/6/24. - f/u with DR Turner (Orthopedics)  - On Oxycodone  [place] BID, 9 mg bid  per PAIN MANAGEMENT ( Dr Lowe) with short acting oxycoodne for break through pain, Patient reports that he is still in pain Seeing Pain management later today  -Repeat scan mid-April 2024 -F/u with Usha in 6 weeks

## 2024-01-30 NOTE — RESULTS/DATA
[FreeTextEntry1] :  9/15/23 PETCT: 1. Overall improved sclerosis and foci of increased activity around the left femur with similar focal activity medially localizing to an area of persistent cortical defect. This area may represent a combination of post therapy and continuing bone remodeling; reassess on follow-up. No new FDG avid bone lesion. 2. Improvement in foci of increased activity in the right ear; persistent focus remaining in the left ear. 3. Stable postsurgical changes in the right parotid bed without abnormal activity. No new FDG avid malignant lesion in the head and neck.  PET 6/9/23: Utilized the dedicated head and neck series with image numbers from this series. Physiologic FDG activity in visualized brain. -Resolution of hypermetabolic soft tissue abutting the right masseter muscle present on the prior PET/CT. -S/P right parotidectomy with no focal abnormal activity within the surgical bed. -Foci of increased activity localizing to the the right ear showing SUV 3.6 at the top of its helix and SUV 4.7 within its inferior lobule; these are nonspecific activity but should be correlated with direct clinical examination. -Left ear shows focus in its posterior surface  showing SUV 3.7 that is difficult to correlate with on low-dose CT. Addition focus of cutaneous activity could also be seen behind the left ear  showing SUV 3.0. Please evaluate these areas. -Nonspecific activity in the thyroid gland showing SUV 5.4 in the right and 5.6 in the left.  Few muscles with increased activity around the neck and upper torso and pelvic region due to exertional strain. -Interval improvement in the left femoral bone currently smaller foci of intense activity with cortical bone reconstitution such as seen medially localizing to a not fully reconstituted lytic lesion (image 241) with SUV 10.0; same area previously had larger extent of lytic changes and activity with as high as SUV 9.3.  Similarly, soft tissue changes laterally also improved with smaller remaining tissue and less intense activity (image 212). Reassess on follow up for continued improvement or stability. -Interval resolution of FDG avid bone lesions with increased sclerosis in T8, T12, and in the right side of the sacrum. Marked improvement in the lytic lesion previously seen in the right acetabulum with reconstruction of some of its cortical defects currently with nonspecific patchy activity of SUV 2.4 (previously large lytic lesion with soft tissue component and SUV 7.2).  -PET/CT 1/19/23 New hypermetabolic soft tissue abutting the right masseter muscle anteriorly measuring 1.6 x 1.1 cm with SUV 13.7 (prev measured 1.3 x 0.7 cm on the 12/2022 CT head) Interval left femoral ORIF with intense activity SUV 9.3 as well as to the surrounding soft tissues/muscles. Soft tissue in the subcutaneous region of the left thigh laterally measuring 2.7 x 2.5 cm SUV 9.2. Heterogeneous changes in the left gluteal muscle showing foci of increased activity as high as SUV 7.2. New lytic lesions are also noted since prior seen at T8  SUV 10.9; T12 SUV 8.0; right side of sacrum SUV 9.6 and right acetabulum with cortical disruption and soft tissue component SUV 10.6.  11/11/22 MR neck and face: Post surgical changes seen in the right submandibular region and of right resected parotid mass.   10/27/2022 Surgical Pathology   Final Diagnosis 1.  Left proximal femur lesion, biopsy: -  Metastatic carcinoma   2.  Left proximal femur lesion, biopsy: - Metastatic carcinoma   -The tumor shows squamous differentiation on H T E. Performed immunostains show that the tumor cells are positive for AE1/AE3, CK5/ and p40. This supports the diagnosis of metastatic carcinoma with squamous differentiation.  PD-L1 Immunohistochemistry Result: Combined Positive Score (CPS): <1, NEGATIVE Result: Tumor Proportion Score (TPS*)   <1% Interpretation: NEGATIVE  PET/CT 10/14/22  - Right parotid gland demonstrates interval resection of the hypermetabolic mass as well as right neck prominent soft tissue postsurgical low level uptake. No evidence of atypical lymph node activity identified. - Hepatosplenic uptake max SUV ratio is 2.9/2.2. Splenic activity within normal limits. Splenic cyst 5 cm. - No abnormal pelvic lymphadenopathy involving the iliac chains and inguinal regions.  -Visualized osseous structures reveal an intramedullary lytic 3 cm lesion along the left femoral intertrochanteric region max SUV 10.6.     10/10/22 MRI Left Hip MUSCLE AND TENDONS: There is mild edema at the insertion the gluteus minimus tendon. The remaining tendons appear intact. There is a fat-containing mass centered within the tensor fascia manjula muscle measuring 4 x 3.1 x 7.2 cm. BONE MARROW: A mass is seen centered within the lesser trochanter measuring 2.9 x 3.4 x 4.4 cm. There is overlying periosteal edema and cortical thinning  9/9/22 MRI L-spine  IMPRESSION: 1.  Moderately severe right-sided neural canal stenosis at L4-L5 without definite impingement of the exiting ipsilateral L4 nerve root. Nonetheless correlate for possible right-sided L4 radiculopathy. 2.  Otherwise no high-grade lumbar spinal canal or neural canal stenosis. 3.  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 Moderate to severe right worse than left facet arthrosis and mixed Modic type I/II endplate changes at L4-L5.   3/4/2022 Surgical Pathology   1. Lymph nodes, right level 1B, neck dissection - 3 lymph nodes negative for metastatic carcinoma - Submandibular gland negative for carcinoma  2. Parotid gland, right parotid gland with tumor, parotidectomy - Squamous cell carcinoma, moderately to poorly differentiated (3.0 cm in greatest dimension) involving parotid gland and soft tissue, favor metastatic based on clinical information provided by surgeon - Carcinoma focally very close to (less than 0.1 mm) inked circumferential margin - 4 intraparotid lymph nodes negative for metastatic carcinoma  3. Lymph nodes, right levels 2 and 3, neck dissection - 15 lymph nodes negative for metastatic carcinoma  4. Neck, right external jugular chain, excision - Neurovascular and adipose tissue, negative for carcinoma 2/13/22 Pet  2/13/22 PET/CT ( [facility])  -There is a rim of FDG activity surrounding a right intraparotid lesion (SUV 6.3, 3.1 x 2.3 cm). Decreased FDG activity centrally may be secondary to tumor necrosis. There is no other focal abnormal  FDG activity identified in the head and neck. -There are no lytic or blastic lesions.    2/1/2022 PAROTID, RIGHT, US GUIDED FNA -POSITIVE FOR MALIGNANCY (CLAIRE CLASS VI) -Consistent with carcinoma with squamous differentiation, 2.6 cm  -Cytology slide and cell block show clusters and single scattered atypical squamous cells with prominent nucleoli, irregular nuclear membranes, irregular chromatin patterns, anisonucleosis and keratinization. Note: Primary versus metastatic carcinoma.    1/24/22 MRI Neck - irregularly peripherally enhancing mass in the superficial lobe of the right parotid gland measuring 2.7 x 2.3 cm in AP. There is some strand-like internal enhancement as well. There is no left parotid mass.

## 2024-01-30 NOTE — HISTORY OF PRESENT ILLNESS
[de-identified] : JOSIANE SHAW is a 77 year male with PMHX of CAD s/p stents HTN, GERD, fibromyalgia, h/o polymyalgia rheumatica, Parotid ca s/p dissection/RT, cigar smoker ( 1-4 cigars/day) presents for initial consultation for metastatic carcinoma \par  \par  Patient presented to ENT, Dr. Alex Boston, on 1/18/22 c/o right neck swelling for 5 weeks. On exam, swelling int he right parotid gland approx. 3 x 3 cm firm, nontender was noted, no enlarged LN.  \par  MRI of neck was ordered and performed on 1/24/22. Scan revealed irregularly peripherally enhancing mass in the superficial lobe of the right parotid gland measuring 2.7 x 2.3 cm in AP . There is some strand-like internal enhancement as well. There is no left parotid mass.\par  \par  \par  Right parotid FNA biopsy performed on 2/1/22 at . Pathology demonstrated cells positive for malignancy (Rice Class IV), Consistent with carcinoma with squamous differentiation,Cytology slide and cell block show clusters and single scattered atypical squamous cells with prominent nucleoli, irregular nuclear membranes, irregular chromatin patterns, anisonucleosis and keratinization. Primary versus metastatic carcinoma.\par  \par  Subsequent PET/CT on 2/13/22 reported a rim of FDG activity surrounding a right intraparotid lesion (SUV 6.3, 3.1 x 2.3 cm). Decreased FDG activity centrally may be secondary to tumor necrosis. There is no other focal abnormal  FDG activity identified in the head and neck. There are no lytic or blastic lesions.\par  \par  \par  Patient referred to Head and Neck surgeon, Dr. Misbah Recio, on 2/16/22. Patient s/p Parotidectomy with facial nerve dissection and right neck dissection on 3/3/22. 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 Carcinoma focally very close to (less than 0.1 mm) inked circumferential margin, 4 intraparotid lymph nodes negative for metastatic carcinoma, 3 Lymph nodes, right level 1B- negative for metastatic carcinoma, Submandibular gland negative for carcinoma\par  Lymph nodes, right levels 2 and 3, neck dissection - 15 lymph nodes negative for metastatic carcinoma\par  \par  \par  Patient referred to Dr. Richardson for post op RT. Patient completed radiation on 6/1/22. \par  \par  Patient presented to Orthopedist on 10/6/22 for ongoing left hip pain for the past 4 years. He has been following Rheumatology, Dr. Kleiner, for pain. Pain has been progressive affect quality of life. \par  MRI of hip was performed  on 10/10/22 revealing  a fat-containing mass centered within the tensor fascia manjula muscle measuring 4 x 3.1 x 7.2 cm and a mass is seen centered within the lesser trochanter measuring 2.9 x 3.4 x 4.4 cm. There is overlying periosteal edema and cortical thinning\par  \par  Subsequent PET/CT on 10/14/22 visualized osseous structures reveal an intramedullary lytic 3 cm lesion along the left femoral intertrochanteric region max SUV 10.6.\par  Right parotid gland demonstrates interval resection of the hypermetabolic mass as well as right neck prominent soft tissue postsurgical low level uptake. No evidence of atypical lymph node activity identified.\par  \par  Biopsy of left proximal femur lesion on 10/27/2022. Pathology reported metastatic carcinoma,the tumor shows squamous differentiation on H T E. Performed immunostains show that the tumor cells are positive for AE1/AE3, CK5/ and p40. 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 Ambulates with cane.  S/p received 1 dose of Keytruda on 2/2/23, went to rehab however was too weak for rehab and was discharged 2/10/23 (enrolled on 12/14/23), was in a lot of pain at home and enrolled in hospice on 2/17/23, then slowly started increasing appetite, started ambulating more with walker, continued left hip pain, diffused body pain started to subside.  Continues on morphine 30 mg BID, and for breakthrough pain takes oxycodone prn - has not required since 3/2023  States is feeling better every day, tries to walk outside everyday  Reports continued swelling and redness on R cheek area of face,  Pt/family planning on discharging from hospice   2/3/23 Ca+ 13.6   6/14/23: Patient here for follow up  Had recent PET scan on 6/9/23  Patient discharged from hospice 6/12/23  Has dentures both upper and lower  Has Dermatologist, Dr. Wilkerson     PET 6/9/23: Utilized the dedicated head and neck series with image numbers from this series. Physiologic FDG activity in visualized brain. -Resolution of hypermetabolic soft tissue abutting the right masseter muscle present on the prior PET/CT. -S/P right parotidectomy with no focal abnormal activity within the surgical bed. -Foci of increased activity localizing to the the right ear showing SUV 3.6 at the top of its helix and SUV 4.7 within its inferior lobule; these are nonspecific activity but should be correlated with direct clinical examination. -Left ear shows focus in its posterior surface  showing SUV 3.7 that is difficult to correlate with on low-dose CT. Addition focus of cutaneous activity could also be seen behind the left ear  showing SUV 3.0. Please evaluate these areas. -Nonspecific activity in the thyroid gland showing SUV 5.4 in the right and 5.6 in the left.  Few muscles with increased activity around the neck and upper torso and pelvic region due to exertional strain. -Interval improvement in the left femoral bone currently smaller foci of intense activity with cortical bone reconstitution such as seen medially localizing to a not fully reconstituted lytic lesion (image 241) with SUV 10.0; same area previously had larger extent of lytic changes and activity with as high as SUV 9.3.  Similarly, soft tissue changes laterally also improved with smaller remaining tissue and less intense activity (image 212). Reassess on follow up for continued improvement or stability. -Interval resolution of FDG avid bone lesions with increased sclerosis in T8, T12, and in the right side of the sacrum. Marked improvement in the lytic lesion previously seen in the right acetabulum with reconstruction of some of its cortical defects currently with nonspecific patchy activity of SUV 2.4 (previously large lytic lesion with soft tissue component and SUV 7.2).   7/17/23: Patient here for follow up  Resumed Keytruda on 6/19/23 Admits Fatigue Patient right sided jaw swelling, no pain. Has an apt with  Dr. Boston this afternoon  Reports mild nausea Reports weight gain  States left hip pain stable , on Morphine ER  30 mg BID. Patient hopes to be weaned off medication  Denies fever/chills, rash, mouth sores, nausea, vomiting, diarrhea,constipation,  abdominal pain, bleeding, easy bruising or visual changes, chest pain, cough, SOB or NOONAN,  neuropathy, LE edema.  8/18/23:  Patient here for follow up  Resumed Keytruda on 6/19/23, next tx scheduled 8/21/23 Admits Fatigue Admits chills/ feeling cold once a week, likely from hypothyroid.   Admits intermittent decreased appetite, weight is stable  Patient still with right sided jaw swelling- improving, no pain. Evaluated by Dr. Boston, no indication for further work up- continue to observe. US on 7/19/23 reported No definable abnormality can be seen involving the area of swelling involving the right cheek. Reports nausea, not taking antinausea med States left hip pain stable , on Morphine ER  30 mg BID. Patient hopes to be weaned off medication  Dr. Kleiner has him on Prednisone 10 mg daily  Denies fever, rash, mouth sores, vomiting, diarrhea,constipation, abdominal pain, bleeding, easy bruising or visual changes, chest pain, cough, SOB or NOONAN,  neuropathy, LE edema.  9/29/23: Patient is here for a f/u with his wife He has been on Keytruda therapy, next scheduled 10/2/23 He endorses GI disturbance from therapy and increased skin rash on his hands He experienced left-sided hip pain after playing golf on Labor Day, with no improvement. He ambulates today with a cane Patient sleeps well and hasn't felt tired He takes Morphine 2x/day  PETCT scans show improved response to immunotherapy  12/29/23:  Patient here for f/u Next due for Keytruda on 1/16/23 On levothyroxine TSH high  T 4 WNL   Due for PET scan WIll order now Has some nausea  in AM Takes Ondansetron for nausea  1/30/24: Patient here for f/u s/p Keytruda on 1/16/24. Next treatment on 2/6/24. Patient reports b/l shoulder pain, constant fatigue, and LE swelling. Patient has 24-hour aids at home. He likes to go on walks on his own without the aids.  Wife expresses her concern for prolonged weight bearing and exercise.  He is currently taking long acting Oxycodone  Extampa BID, 9 mg per PAIN MANAGEMENT ( Dr Lowe) . States diffuse pain is more severe after dose reduction a few weeks ago.  Patient waiting for follow up with Dr. Lora Turner, orthopedic surgery, for hardware complications from previous hip replacement.   Reviewed PET Scan from 1/10/24: Head/Neck: -Postsurgical changes of right parotidectomy, unchanged.  -No abnormal radiotracer activity in the surgical bed. -No enlarged or FDG-avid cervical lymph ode.  BONES/SOFT TISSUES:  -Patient is status post left femur ORIF with transcervical screw and intramedullary yaron, as seen on prior studies. There is cortical destruction involving the left proximal femur and lesser trochanter, similar in appearance on CT, and demonstrating interval increase in extent and intensity of hypermetabolism in the left lesser trochanter (SUV 15.4; image 248; previous SUV 12.9). A photopenic lucent lesion in the anterior aspect of the T8 vertebral body is unchanged in (image 109). Physiologic FDG activity in remainder of visualized osseous structures.

## 2024-01-30 NOTE — ADDENDUM
[FreeTextEntry1] : Documented by Saarvanan Lopez acting as scribe for Dr. Orozco  01/30/2024.   All Medical record entries made by the Scribe were at my, Dr. Orozco's, direction and personally dictated by me on  01/30/2024. I have reviewed the chart and agree that the record accurately reflects my personal performance of the history, physical exam, assessment and plan. I have also personally directed, reviewed, and agreed with the discharge instructions.

## 2024-01-30 NOTE — CONSULT LETTER
[Dear  ___] : Dear  [unfilled], [Consult Letter:] : I had the pleasure of evaluating your patient, [unfilled]. [Consult Closing:] : Thank you very much for allowing me to participate in the care of this patient.  If you have any questions, please do not hesitate to contact me. [Sincerely,] : Sincerely, [FreeTextEntry3] : Meredith Orozco MD [facility] Hematologist/ Medical Oncologist [address] [place] [address]  [phone](ph)  [phone] (fax)

## 2024-02-06 ENCOUNTER — RESULT REVIEW (OUTPATIENT)
Age: 79
End: 2024-02-06

## 2024-02-06 ENCOUNTER — APPOINTMENT (OUTPATIENT)
Age: 79
End: 2024-02-06

## 2024-02-06 LAB
BASOPHILS # BLD AUTO: 0.1 K/UL — SIGNIFICANT CHANGE UP (ref 0–0.2)
BASOPHILS NFR BLD AUTO: 1 % — SIGNIFICANT CHANGE UP (ref 0–2)
EOSINOPHIL # BLD AUTO: 0.1 K/UL — SIGNIFICANT CHANGE UP (ref 0–0.5)
EOSINOPHIL NFR BLD AUTO: 1.2 % — SIGNIFICANT CHANGE UP (ref 0–6)
HCT VFR BLD CALC: 35.7 % — LOW (ref 39–50)
HGB BLD-MCNC: 11.5 G/DL — LOW (ref 13–17)
LYMPHOCYTES # BLD AUTO: 0.3 K/UL — LOW (ref 1–3.3)
LYMPHOCYTES # BLD AUTO: 3.6 % — LOW (ref 13–44)
MCHC RBC-ENTMCNC: 30.9 PG — SIGNIFICANT CHANGE UP (ref 27–34)
MCHC RBC-ENTMCNC: 32.1 G/DL — SIGNIFICANT CHANGE UP (ref 32–36)
MCV RBC AUTO: 96.2 FL — SIGNIFICANT CHANGE UP (ref 80–100)
MONOCYTES # BLD AUTO: 0.5 K/UL — SIGNIFICANT CHANGE UP (ref 0–0.9)
MONOCYTES NFR BLD AUTO: 6.4 % — SIGNIFICANT CHANGE UP (ref 2–14)
NEUTROPHILS # BLD AUTO: 7.4 K/UL — SIGNIFICANT CHANGE UP (ref 1.8–7.4)
NEUTROPHILS NFR BLD AUTO: 87.8 % — HIGH (ref 43–77)
PLATELET # BLD AUTO: 232 K/UL — SIGNIFICANT CHANGE UP (ref 150–400)
RBC # BLD: 3.71 M/UL — LOW (ref 4.2–5.8)
RBC # FLD: 12.7 % — SIGNIFICANT CHANGE UP (ref 10.3–14.5)
WBC # BLD: 8.4 K/UL — SIGNIFICANT CHANGE UP (ref 3.8–10.5)
WBC # FLD AUTO: 8.4 K/UL — SIGNIFICANT CHANGE UP (ref 3.8–10.5)

## 2024-02-07 DIAGNOSIS — Z51.11 ENCOUNTER FOR ANTINEOPLASTIC CHEMOTHERAPY: ICD-10-CM

## 2024-02-07 LAB
ALBUMIN SERPL ELPH-MCNC: 3.6 G/DL — SIGNIFICANT CHANGE UP (ref 3.3–5)
ALP SERPL-CCNC: 60 U/L — SIGNIFICANT CHANGE UP (ref 40–120)
ALT FLD-CCNC: 10 U/L — SIGNIFICANT CHANGE UP (ref 10–45)
ANION GAP SERPL CALC-SCNC: 13 MMOL/L — SIGNIFICANT CHANGE UP (ref 5–17)
AST SERPL-CCNC: 23 U/L — SIGNIFICANT CHANGE UP (ref 10–40)
BILIRUB SERPL-MCNC: 0.4 MG/DL — SIGNIFICANT CHANGE UP (ref 0.2–1.2)
BUN SERPL-MCNC: 19 MG/DL — SIGNIFICANT CHANGE UP (ref 7–23)
CALCIUM SERPL-MCNC: 9.6 MG/DL — SIGNIFICANT CHANGE UP (ref 8.4–10.5)
CHLORIDE SERPL-SCNC: 103 MMOL/L — SIGNIFICANT CHANGE UP (ref 96–108)
CO2 SERPL-SCNC: 23 MMOL/L — SIGNIFICANT CHANGE UP (ref 22–31)
CREAT SERPL-MCNC: 1.05 MG/DL — SIGNIFICANT CHANGE UP (ref 0.5–1.3)
EGFR: 74 ML/MIN/1.73M2 — SIGNIFICANT CHANGE UP
GLUCOSE SERPL-MCNC: 84 MG/DL — SIGNIFICANT CHANGE UP (ref 70–99)
POTASSIUM SERPL-MCNC: 4.4 MMOL/L — SIGNIFICANT CHANGE UP (ref 3.5–5.3)
POTASSIUM SERPL-SCNC: 4.4 MMOL/L — SIGNIFICANT CHANGE UP (ref 3.5–5.3)
PROT SERPL-MCNC: 6.6 G/DL — SIGNIFICANT CHANGE UP (ref 6–8.3)
SODIUM SERPL-SCNC: 140 MMOL/L — SIGNIFICANT CHANGE UP (ref 135–145)
T4 FREE SERPL-MCNC: 1.5 NG/DL — SIGNIFICANT CHANGE UP (ref 0.9–1.8)
T4 FREE+ TSH PNL SERPL: 6.28 UIU/ML — HIGH (ref 0.27–4.2)

## 2024-02-16 LAB
CORTICOSTEROID BINDING GLOBULIN RESULT: 2.2 MG/DL — SIGNIFICANT CHANGE UP
CORTIS F/TOTAL MFR SERPL: 2.6 % — SIGNIFICANT CHANGE UP
CORTIS SERPL-MCNC: 1.4 UG/DL — LOW
CORTISOL, FREE RESULT: 0.04 UG/DL — LOW

## 2024-02-21 DIAGNOSIS — M25.551 PAIN IN RIGHT HIP: ICD-10-CM

## 2024-02-21 DIAGNOSIS — M25.552 PAIN IN RIGHT HIP: ICD-10-CM

## 2024-02-22 ENCOUNTER — APPOINTMENT (OUTPATIENT)
Dept: ORTHOPEDIC SURGERY | Facility: CLINIC | Age: 79
End: 2024-02-22
Payer: MEDICARE

## 2024-02-22 VITALS
SYSTOLIC BLOOD PRESSURE: 149 MMHG | BODY MASS INDEX: 25.47 KG/M2 | DIASTOLIC BLOOD PRESSURE: 89 MMHG | WEIGHT: 188 LBS | HEART RATE: 75 BPM | HEIGHT: 72 IN

## 2024-02-22 DIAGNOSIS — C80.1 MALIGNANT (PRIMARY) NEOPLASM, UNSPECIFIED: ICD-10-CM

## 2024-02-22 PROCEDURE — 99215 OFFICE O/P EST HI 40 MIN: CPT

## 2024-02-22 PROCEDURE — 73552 X-RAY EXAM OF FEMUR 2/>: CPT | Mod: LT

## 2024-02-22 PROCEDURE — 73522 X-RAY EXAM HIPS BI 3-4 VIEWS: CPT

## 2024-02-22 NOTE — HISTORY OF PRESENT ILLNESS
[Pain Location] : pain [Worsening] : worsening [] : left hip [___ mths] : [unfilled] month(s) ago [Standing] : standing [Lifting] : lifting [Constant] : ~He/She~ states the symptoms seem to be constant [Bending] : worsened by bending [Hip Movement] : worsened by hip movement [Sitting] : worsened by sitting [Walking] : worsened by walking [Knee Flexion] : worsened with knee flexion [Knee Extension] : worsened with knee extension [Opioid Analgesics] : relieved by opioid analgesics [de-identified] : 78 year old male presents today with bilateral, left more than right hip pain status post left hip hardware problem.  Patient states that he is having increasing pain in his left leg.  He is previously diagnosed by me with a metastatic lesion was sent to the orthopedic oncology service.  They performed a biopsy that showed malignant squamous cell carcinoma.  He underwent a gamma nailing.  He did not follow-up with the orthopedic oncology service.  He been following up with another trauma surgeon.  However he began to develop increasing pain in his hip.  He states he is no longer able to golf or go about his activities due to the pain.  He feels like it is in his anterior thigh and radiates down the leg.  Denies any recent falls or trauma.  Had a PET scan that showed increased uptake in his left femur.  Is currently undergoing care with oncologist.  Completed radiation about a month ago.  Is here today to discuss further treatment options initially referred back to me by his trauma surgeon for further evaluation [de-identified] : Balancing on hip, sitting from standing, standing from seated position, getting in and out of car. [de-identified] : Oxycodone, Xtampza ER

## 2024-02-22 NOTE — REVIEW OF SYSTEMS
[Joint Pain] : joint pain [Joint Stiffness] : joint stiffness [Negative] : Heme/Lymph [FreeTextEntry9] : imbalance and pain to the left hip joint status post hardware movement

## 2024-02-22 NOTE — PHYSICAL EXAM
[Walker] : ambulates with walker [Cane] : ambulates with cane [de-identified] : Musculoskeletal: ambulates with cane. Left hip exam showed no groin pain with SLR, ROM is full flexion with 20 degrees external and 10 degrees internal with pain, GRANT negative, FADIR positive. Well healed surgical incisions.  5/5 motor strength in bilateral lower extremities. Sensory: Intact in bilateral lower extremities. DTRs: Biceps, brachioradialis, triceps, patellar, ankle and plantar 2+ and symmetric bilaterally. Pulses: dorsalis pedis, posterior tibial, femoral, popliteal, and radial 2+ and symmetric bilaterally. [de-identified] : GENERAL APPEARANCE: Well nourished and hydrated, pleasant, alert, and oriented x 3. Appears their stated age.  HEENT: Normocephalic, extraocular eye motion intact. Nasal septum midline. Oral cavity clear. External auditory canal clear.  RESPIRATORY: Breath sounds clear and audible in all lobes. No wheezing, No accessory muscle use. CARDIOVASCULAR: No apparent abnormalities. No lower leg edema. No varicosities. Pedal pulses are palpable. NEUROLOGIC: Sensation is normal, no muscle weakness in the upper or lower extremities. DERMATOLOGIC: No apparent skin lesions, moist, warm, no rash. SPINE: Cervical spine appears normal and moves freely; thoracic spine appears normal and moves freely; lumbosacral spine appears normal and moves freely, normal, nontender. MUSCULOSKELETAL: Hands, wrists, and elbows are normal and move freely, shoulders are normal and move freely.  PSYCHIATRIC: Oriented to person, place, and time, insight and judgement were intact and the affect was normal. [de-identified] : 12/12/2022 [facility]: 1 View X-Ray of the Left Femur - IMPRESSION: * Suspect pathologic fracture of the lesser trochanter. Osteolysis of the intertrochanteric ridge which may indicate osteolysis secondary to osteomyelitis. * Surgical hardware transfixing prior hip fracture. Distal femur intact.  12/12/2022 [facility]: 1 View X-Ray of the Left Hip - IMPRESSION: * Prosthetic fixation hardware intact. Age-indeterminate fracture of the lesser trochanter.  12/13/2022 [facility]: CT Pelvis BONY ONLY - IMPRESSION: (1) Status post ORIF of the left hip with metallic hardware extending through a known left proximal femoral lesion. (2) Avulsion of the lesser trochanter suspicious for an acute superimposed pathologic avulsion fracture. (3) Degenerative changes. (4) Left tensor fascia manjula muscle lipoma.  AP pelvis and 2 views of the left hip obtained the office today show no acute fracture or dislocation.  There is a chronic deformity of the proximal femur with evidence of lytic lesions.  There is evidence of bending versus breakage of the femoral nail.  AP and lateral of the left femur obtained the office today show no acute fracture or dislocation.  There is evidence of hardware failure of the femoral nail

## 2024-02-22 NOTE — ADDENDUM
[FreeTextEntry1] : This note was written by Mary Ware, acting as the  for Dr. Blackwell. This note accurately reflects the work and decisions made by Dr. Blackwell.

## 2024-02-22 NOTE — DISCUSSION/SUMMARY
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  He is not interested in following up with the orthopedic oncology service due to his social situation and wishes to keep his care closer to home in the area.  I did discuss with him that the yaron is currently breaking and the increasing pain in his hip is likely due to his impending pathologic fracture due to his metastatic squamous cell carcinoma.  We have thorough discussion about conservative surgical treatment options.  At this time I do not think that continued conservative treatment is warranted due to the fact that he has an impending pathologic fracture.  We did discuss that would require a CT scan as well as an MRI in order to rule out any further metastatic lesions versus skip lesions in his leg.  We discussed a CT scan for Xavier planning.  We discussed he will likely require a proximal femur replacement.  Due to his high activity level we discussed hemiarthroplasty versus total hip arthroplasty he may benefit from a total hip arthroplasty due to his avid golfing and physical activity.  We discussed risk benefits alternatives common to blood loss infection damage neurovascular structures risk need for revision surgery risk of periprosthetic fracture high risk of dislocation as well as infection and complications due to his malignancy as well as his radiation treatment.  Patient understands and agreement.  He will undergo an aspiration to rule out infection.  I will see him back preoperatively for repeat evaluation.  All questions were asked and answered

## 2024-02-26 ENCOUNTER — APPOINTMENT (OUTPATIENT)
Dept: CARDIOLOGY | Facility: CLINIC | Age: 79
End: 2024-02-26
Payer: MEDICARE

## 2024-02-26 VITALS
HEIGHT: 72 IN | WEIGHT: 188 LBS | DIASTOLIC BLOOD PRESSURE: 74 MMHG | SYSTOLIC BLOOD PRESSURE: 122 MMHG | OXYGEN SATURATION: 96 % | HEART RATE: 83 BPM | BODY MASS INDEX: 25.47 KG/M2

## 2024-02-26 DIAGNOSIS — R42 DIZZINESS AND GIDDINESS: ICD-10-CM

## 2024-02-26 DIAGNOSIS — I77.9 DISORDER OF ARTERIES AND ARTERIOLES, UNSPECIFIED: ICD-10-CM

## 2024-02-26 DIAGNOSIS — R94.31 ABNORMAL ELECTROCARDIOGRAM [ECG] [EKG]: ICD-10-CM

## 2024-02-26 DIAGNOSIS — Z86.73 PERSONAL HISTORY OF TRANSIENT ISCHEMIC ATTACK (TIA), AND CEREBRAL INFARCTION W/OUT RESIDUAL DEFICITS: ICD-10-CM

## 2024-02-26 DIAGNOSIS — I47.10 SUPRAVENTRICULAR TACHYCARDIA, UNSPECIFIED: ICD-10-CM

## 2024-02-26 PROCEDURE — 99214 OFFICE O/P EST MOD 30 MIN: CPT

## 2024-02-26 PROCEDURE — 99204 OFFICE O/P NEW MOD 45 MIN: CPT

## 2024-02-26 PROCEDURE — 93000 ELECTROCARDIOGRAM COMPLETE: CPT

## 2024-02-26 RX ORDER — LEVOTHYROXINE SODIUM 0.05 MG/1
50 TABLET ORAL DAILY
Qty: 30 | Refills: 1 | Status: DISCONTINUED | COMMUNITY
Start: 2023-06-20 | End: 2024-02-26

## 2024-02-26 RX ORDER — MORPHINE SULFATE 30 MG/1
30 TABLET ORAL
Refills: 0 | Status: DISCONTINUED | COMMUNITY
End: 2024-02-26

## 2024-02-26 RX ORDER — LEVOTHYROXINE SODIUM 0.03 MG/1
25 TABLET ORAL
Qty: 30 | Refills: 2 | Status: DISCONTINUED | COMMUNITY
Start: 2024-01-02 | End: 2024-02-26

## 2024-02-27 ENCOUNTER — APPOINTMENT (OUTPATIENT)
Age: 79
End: 2024-02-27

## 2024-02-27 ENCOUNTER — RESULT REVIEW (OUTPATIENT)
Age: 79
End: 2024-02-27

## 2024-02-27 LAB
ALBUMIN SERPL ELPH-MCNC: 3.8 G/DL — SIGNIFICANT CHANGE UP (ref 3.3–5)
ALP SERPL-CCNC: 71 U/L — SIGNIFICANT CHANGE UP (ref 40–120)
ALT FLD-CCNC: 16 U/L — SIGNIFICANT CHANGE UP (ref 10–45)
ANION GAP SERPL CALC-SCNC: 9 MMOL/L — SIGNIFICANT CHANGE UP (ref 5–17)
AST SERPL-CCNC: 21 U/L — SIGNIFICANT CHANGE UP (ref 10–40)
BASOPHILS # BLD AUTO: 0.1 K/UL — SIGNIFICANT CHANGE UP (ref 0–0.2)
BASOPHILS NFR BLD AUTO: 1 % — SIGNIFICANT CHANGE UP (ref 0–2)
BILIRUB SERPL-MCNC: 0.3 MG/DL — SIGNIFICANT CHANGE UP (ref 0.2–1.2)
BUN SERPL-MCNC: 16 MG/DL — SIGNIFICANT CHANGE UP (ref 7–23)
CALCIUM SERPL-MCNC: 9 MG/DL — SIGNIFICANT CHANGE UP (ref 8.4–10.5)
CHLORIDE SERPL-SCNC: 104 MMOL/L — SIGNIFICANT CHANGE UP (ref 96–108)
CO2 SERPL-SCNC: 24 MMOL/L — SIGNIFICANT CHANGE UP (ref 22–31)
CREAT SERPL-MCNC: 0.93 MG/DL — SIGNIFICANT CHANGE UP (ref 0.5–1.3)
EGFR: 85 ML/MIN/1.73M2 — SIGNIFICANT CHANGE UP
EOSINOPHIL # BLD AUTO: 0.2 K/UL — SIGNIFICANT CHANGE UP (ref 0–0.5)
EOSINOPHIL NFR BLD AUTO: 2.4 % — SIGNIFICANT CHANGE UP (ref 0–6)
GLUCOSE SERPL-MCNC: 112 MG/DL — HIGH (ref 70–99)
HCT VFR BLD CALC: 38.6 % — LOW (ref 39–50)
HGB BLD-MCNC: 12.8 G/DL — LOW (ref 13–17)
LYMPHOCYTES # BLD AUTO: 0.6 K/UL — LOW (ref 1–3.3)
LYMPHOCYTES # BLD AUTO: 8.3 % — LOW (ref 13–44)
MCHC RBC-ENTMCNC: 30 PG — SIGNIFICANT CHANGE UP (ref 27–34)
MCHC RBC-ENTMCNC: 33.2 G/DL — SIGNIFICANT CHANGE UP (ref 32–36)
MCV RBC AUTO: 90.4 FL — SIGNIFICANT CHANGE UP (ref 80–100)
MONOCYTES # BLD AUTO: 0.6 K/UL — SIGNIFICANT CHANGE UP (ref 0–0.9)
MONOCYTES NFR BLD AUTO: 8.1 % — SIGNIFICANT CHANGE UP (ref 2–14)
NEUTROPHILS # BLD AUTO: 6.2 K/UL — SIGNIFICANT CHANGE UP (ref 1.8–7.4)
NEUTROPHILS NFR BLD AUTO: 80.3 % — HIGH (ref 43–77)
PLATELET # BLD AUTO: 250 K/UL — SIGNIFICANT CHANGE UP (ref 150–400)
POTASSIUM SERPL-MCNC: 4.1 MMOL/L — SIGNIFICANT CHANGE UP (ref 3.5–5.3)
POTASSIUM SERPL-SCNC: 4.1 MMOL/L — SIGNIFICANT CHANGE UP (ref 3.5–5.3)
PROT SERPL-MCNC: 6.8 G/DL — SIGNIFICANT CHANGE UP (ref 6–8.3)
RBC # BLD: 4.27 M/UL — SIGNIFICANT CHANGE UP (ref 4.2–5.8)
RBC # FLD: 13.3 % — SIGNIFICANT CHANGE UP (ref 10.3–14.5)
SODIUM SERPL-SCNC: 138 MMOL/L — SIGNIFICANT CHANGE UP (ref 135–145)
T4 FREE SERPL-MCNC: 1.4 NG/DL — SIGNIFICANT CHANGE UP (ref 0.9–1.8)
T4 FREE+ TSH PNL SERPL: 12.3 UIU/ML — HIGH (ref 0.27–4.2)
WBC # BLD: 7.7 K/UL — SIGNIFICANT CHANGE UP (ref 3.8–10.5)
WBC # FLD AUTO: 7.7 K/UL — SIGNIFICANT CHANGE UP (ref 3.8–10.5)

## 2024-03-04 ENCOUNTER — RESULT REVIEW (OUTPATIENT)
Age: 79
End: 2024-03-04

## 2024-03-04 ENCOUNTER — OUTPATIENT (OUTPATIENT)
Dept: OUTPATIENT SERVICES | Facility: HOSPITAL | Age: 79
LOS: 1 days | End: 2024-03-04

## 2024-03-04 ENCOUNTER — APPOINTMENT (OUTPATIENT)
Dept: INTERVENTIONAL RADIOLOGY/VASCULAR | Facility: CLINIC | Age: 79
End: 2024-03-04
Payer: MEDICARE

## 2024-03-04 DIAGNOSIS — Z98.1 ARTHRODESIS STATUS: Chronic | ICD-10-CM

## 2024-03-04 DIAGNOSIS — Z90.49 ACQUIRED ABSENCE OF OTHER SPECIFIED PARTS OF DIGESTIVE TRACT: Chronic | ICD-10-CM

## 2024-03-04 DIAGNOSIS — Z95.5 PRESENCE OF CORONARY ANGIOPLASTY IMPLANT AND GRAFT: Chronic | ICD-10-CM

## 2024-03-04 DIAGNOSIS — C79.51 SECONDARY MALIGNANT NEOPLASM OF BONE: ICD-10-CM

## 2024-03-04 LAB
CORTICOSTEROID BINDING GLOBULIN RESULT: 1.9 MG/DL — SIGNIFICANT CHANGE UP
CORTIS F/TOTAL MFR SERPL: 3 % — SIGNIFICANT CHANGE UP
CORTIS SERPL-MCNC: 1.2 UG/DL — LOW
CORTISOL, FREE RESULT: 0.04 UG/DL — LOW

## 2024-03-04 PROCEDURE — 20610 DRAIN/INJ JOINT/BURSA W/O US: CPT | Mod: LT

## 2024-03-04 PROCEDURE — 77002 NEEDLE LOCALIZATION BY XRAY: CPT | Mod: 26

## 2024-03-06 ENCOUNTER — APPOINTMENT (OUTPATIENT)
Dept: HEMATOLOGY ONCOLOGY | Facility: CLINIC | Age: 79
End: 2024-03-06
Payer: MEDICARE

## 2024-03-06 ENCOUNTER — APPOINTMENT (OUTPATIENT)
Dept: RADIATION ONCOLOGY | Facility: CLINIC | Age: 79
End: 2024-03-06
Payer: MEDICARE

## 2024-03-06 VITALS
SYSTOLIC BLOOD PRESSURE: 131 MMHG | BODY MASS INDEX: 25.6 KG/M2 | OXYGEN SATURATION: 99 % | HEIGHT: 72 IN | DIASTOLIC BLOOD PRESSURE: 82 MMHG | HEART RATE: 91 BPM | RESPIRATION RATE: 16 BRPM | WEIGHT: 189 LBS

## 2024-03-06 VITALS
DIASTOLIC BLOOD PRESSURE: 77 MMHG | SYSTOLIC BLOOD PRESSURE: 118 MMHG | HEART RATE: 84 BPM | WEIGHT: 190 LBS | BODY MASS INDEX: 25.73 KG/M2 | HEIGHT: 72 IN | TEMPERATURE: 98.8 F | OXYGEN SATURATION: 98 %

## 2024-03-06 PROCEDURE — 99024 POSTOP FOLLOW-UP VISIT: CPT

## 2024-03-06 PROCEDURE — 99214 OFFICE O/P EST MOD 30 MIN: CPT

## 2024-03-06 NOTE — DISEASE MANAGEMENT
[Pathological] : TNM Stage: p [IV] : IV [FreeTextEntry4] : bone metastasis [NTNM] : 0 [MTNM] : 1 [TTNM] : 2 [de-identified] : left hip/femur [de-identified] : 2000 cGy

## 2024-03-06 NOTE — REASON FOR VISIT
[Post-Treatment Evaluation] : post-treatment evaluation for [Head and Neck Cancer] : head and neck cancer [Bone Metastasis] : bone metastasis [Spouse] : spouse

## 2024-03-06 NOTE — HISTORY OF PRESENT ILLNESS
[FreeTextEntry1] : This 78-year-old man is status post right parotidectomy and right neck dissection for probable metastatic squamous cell carcinoma. His interval history is remarkable for an erosive tumor involving the femoral neck. He completed radiation therapy to the left hip/femur on 12/2/2022 for metastatic disease. Today he presents for post-treatment evaluation.  Recently, the patient developed new pain and weakness in the area of the left hip previously treated.  PET/CT done Ethan 10, 2024 strongly suggested progression of metastatic malignancy in this area.  Apparent symptomatic progression of metastatic malignant neoplasm of the left hip / proximal femur.  Mr. Daily then completed repeat palliative radiotherapy to the left femur on 1/29/2024.     Reports he is scheduled for left total hip/proximal femur replacement on March 29, 20244 with Dr. Blackwell.  He will be receiving a physical therapy program after surgery.   Patient continues treatment with Keytruda at the direction of Dr. Palmer.

## 2024-03-06 NOTE — VITALS
[Least Pain Intensity: 2/10] : 2/10 [Maximal Pain Intensity: 6/10] : 6/10 [Pain Description/Quality: ___] : Pain description/quality: [unfilled] [Pain Duration: ___] : Pain duration: [unfilled] [Pain Location: ___] : Pain Location: [unfilled] [Pain Interferes with ADLs] : Pain interferes with activities of daily living. [Opioid] : opioid [ECOG Performance Status: 2 - Ambulatory and capable of all self care but unable to carry out any work activities] : Performance Status: 2 - Ambulatory and capable of all self care but unable to carry out any work activities. Up and about more than 50% of waking hours [70: Cares for self; unalbe to carry on normal activity or do active work.] : 70: Cares for self; unable to carry on normal activity or do active work.

## 2024-03-11 ENCOUNTER — APPOINTMENT (OUTPATIENT)
Dept: CT IMAGING | Facility: CLINIC | Age: 79
End: 2024-03-11

## 2024-03-11 ENCOUNTER — OUTPATIENT (OUTPATIENT)
Dept: OUTPATIENT SERVICES | Facility: HOSPITAL | Age: 79
LOS: 1 days | End: 2024-03-11
Payer: MEDICARE

## 2024-03-11 ENCOUNTER — APPOINTMENT (OUTPATIENT)
Dept: CT IMAGING | Facility: CLINIC | Age: 79
End: 2024-03-11
Payer: MEDICARE

## 2024-03-11 VITALS
RESPIRATION RATE: 20 BRPM | WEIGHT: 190.48 LBS | OXYGEN SATURATION: 98 % | SYSTOLIC BLOOD PRESSURE: 120 MMHG | TEMPERATURE: 98 F | HEIGHT: 72 IN | DIASTOLIC BLOOD PRESSURE: 70 MMHG | HEART RATE: 70 BPM

## 2024-03-11 DIAGNOSIS — Z01.818 ENCOUNTER FOR OTHER PREPROCEDURAL EXAMINATION: ICD-10-CM

## 2024-03-11 DIAGNOSIS — Z98.1 ARTHRODESIS STATUS: Chronic | ICD-10-CM

## 2024-03-11 DIAGNOSIS — I10 ESSENTIAL (PRIMARY) HYPERTENSION: ICD-10-CM

## 2024-03-11 DIAGNOSIS — Z95.5 PRESENCE OF CORONARY ANGIOPLASTY IMPLANT AND GRAFT: Chronic | ICD-10-CM

## 2024-03-11 DIAGNOSIS — T84.84XA PAIN DUE TO INTERNAL ORTHOPEDIC PROSTHETIC DEVICES, IMPLANTS AND GRAFTS, INITIAL ENCOUNTER: ICD-10-CM

## 2024-03-11 DIAGNOSIS — Z91.89 OTHER SPECIFIED PERSONAL RISK FACTORS, NOT ELSEWHERE CLASSIFIED: ICD-10-CM

## 2024-03-11 DIAGNOSIS — Z90.49 ACQUIRED ABSENCE OF OTHER SPECIFIED PARTS OF DIGESTIVE TRACT: Chronic | ICD-10-CM

## 2024-03-11 DIAGNOSIS — M25.552 PAIN IN LEFT HIP: ICD-10-CM

## 2024-03-11 DIAGNOSIS — Z29.9 ENCOUNTER FOR PROPHYLACTIC MEASURES, UNSPECIFIED: ICD-10-CM

## 2024-03-11 DIAGNOSIS — I25.10 ATHEROSCLEROTIC HEART DISEASE OF NATIVE CORONARY ARTERY WITHOUT ANGINA PECTORIS: ICD-10-CM

## 2024-03-11 DIAGNOSIS — C79.51 SECONDARY MALIGNANT NEOPLASM OF BONE: ICD-10-CM

## 2024-03-11 LAB
A1C WITH ESTIMATED AVERAGE GLUCOSE RESULT: 5.6 % — SIGNIFICANT CHANGE UP (ref 4–5.6)
ANION GAP SERPL CALC-SCNC: 9 MMOL/L — SIGNIFICANT CHANGE UP (ref 5–17)
APTT BLD: 29.6 SEC — SIGNIFICANT CHANGE UP (ref 24.5–35.6)
BASOPHILS # BLD AUTO: 0.03 K/UL — SIGNIFICANT CHANGE UP (ref 0–0.2)
BASOPHILS NFR BLD AUTO: 0.4 % — SIGNIFICANT CHANGE UP (ref 0–2)
BLD GP AB SCN SERPL QL: SIGNIFICANT CHANGE UP
BUN SERPL-MCNC: 18.8 MG/DL — SIGNIFICANT CHANGE UP (ref 8–20)
CALCIUM SERPL-MCNC: 9.1 MG/DL — SIGNIFICANT CHANGE UP (ref 8.4–10.5)
CHLORIDE SERPL-SCNC: 106 MMOL/L — SIGNIFICANT CHANGE UP (ref 96–108)
CO2 SERPL-SCNC: 30 MMOL/L — HIGH (ref 22–29)
CREAT SERPL-MCNC: 1.03 MG/DL — SIGNIFICANT CHANGE UP (ref 0.5–1.3)
EGFR: 75 ML/MIN/1.73M2 — SIGNIFICANT CHANGE UP
EOSINOPHIL # BLD AUTO: 0.17 K/UL — SIGNIFICANT CHANGE UP (ref 0–0.5)
EOSINOPHIL NFR BLD AUTO: 2.3 % — SIGNIFICANT CHANGE UP (ref 0–6)
ESTIMATED AVERAGE GLUCOSE: 114 MG/DL — SIGNIFICANT CHANGE UP (ref 68–114)
GLUCOSE SERPL-MCNC: 92 MG/DL — SIGNIFICANT CHANGE UP (ref 70–99)
HCT VFR BLD CALC: 40.4 % — SIGNIFICANT CHANGE UP (ref 39–50)
HGB BLD-MCNC: 12.9 G/DL — LOW (ref 13–17)
IMM GRANULOCYTES NFR BLD AUTO: 0.4 % — SIGNIFICANT CHANGE UP (ref 0–0.9)
INR BLD: 0.92 RATIO — SIGNIFICANT CHANGE UP (ref 0.85–1.18)
LYMPHOCYTES # BLD AUTO: 0.81 K/UL — LOW (ref 1–3.3)
LYMPHOCYTES # BLD AUTO: 10.8 % — LOW (ref 13–44)
MCHC RBC-ENTMCNC: 30.6 PG — SIGNIFICANT CHANGE UP (ref 27–34)
MCHC RBC-ENTMCNC: 31.9 GM/DL — LOW (ref 32–36)
MCV RBC AUTO: 95.7 FL — SIGNIFICANT CHANGE UP (ref 80–100)
MONOCYTES # BLD AUTO: 0.75 K/UL — SIGNIFICANT CHANGE UP (ref 0–0.9)
MONOCYTES NFR BLD AUTO: 10 % — SIGNIFICANT CHANGE UP (ref 2–14)
MRSA PCR RESULT.: SIGNIFICANT CHANGE UP
NEUTROPHILS # BLD AUTO: 5.69 K/UL — SIGNIFICANT CHANGE UP (ref 1.8–7.4)
NEUTROPHILS NFR BLD AUTO: 76.1 % — SIGNIFICANT CHANGE UP (ref 43–77)
PLATELET # BLD AUTO: 229 K/UL — SIGNIFICANT CHANGE UP (ref 150–400)
POTASSIUM SERPL-MCNC: 3.9 MMOL/L — SIGNIFICANT CHANGE UP (ref 3.5–5.3)
POTASSIUM SERPL-SCNC: 3.9 MMOL/L — SIGNIFICANT CHANGE UP (ref 3.5–5.3)
PROTHROM AB SERPL-ACNC: 10.2 SEC — SIGNIFICANT CHANGE UP (ref 9.5–13)
RBC # BLD: 4.22 M/UL — SIGNIFICANT CHANGE UP (ref 4.2–5.8)
RBC # FLD: 14.7 % — HIGH (ref 10.3–14.5)
S AUREUS DNA NOSE QL NAA+PROBE: DETECTED
SODIUM SERPL-SCNC: 145 MMOL/L — SIGNIFICANT CHANGE UP (ref 135–145)
WBC # BLD: 7.48 K/UL — SIGNIFICANT CHANGE UP (ref 3.8–10.5)
WBC # FLD AUTO: 7.48 K/UL — SIGNIFICANT CHANGE UP (ref 3.8–10.5)

## 2024-03-11 PROCEDURE — 87641 MR-STAPH DNA AMP PROBE: CPT

## 2024-03-11 PROCEDURE — 85610 PROTHROMBIN TIME: CPT

## 2024-03-11 PROCEDURE — 80048 BASIC METABOLIC PNL TOTAL CA: CPT

## 2024-03-11 PROCEDURE — 73700 CT LOWER EXTREMITY W/O DYE: CPT | Mod: 26,59,LT,MH

## 2024-03-11 PROCEDURE — 73700 CT LOWER EXTREMITY W/O DYE: CPT

## 2024-03-11 PROCEDURE — G0463: CPT

## 2024-03-11 PROCEDURE — 87640 STAPH A DNA AMP PROBE: CPT

## 2024-03-11 PROCEDURE — 86850 RBC ANTIBODY SCREEN: CPT

## 2024-03-11 PROCEDURE — 86900 BLOOD TYPING SEROLOGIC ABO: CPT

## 2024-03-11 PROCEDURE — 85730 THROMBOPLASTIN TIME PARTIAL: CPT

## 2024-03-11 PROCEDURE — 86901 BLOOD TYPING SEROLOGIC RH(D): CPT

## 2024-03-11 PROCEDURE — 83036 HEMOGLOBIN GLYCOSYLATED A1C: CPT

## 2024-03-11 PROCEDURE — 85025 COMPLETE CBC W/AUTO DIFF WBC: CPT

## 2024-03-11 PROCEDURE — 73702 CT LWR EXTREMITY W/O&W/DYE: CPT | Mod: 26,LT,MH

## 2024-03-11 PROCEDURE — 73702 CT LWR EXTREMITY W/O&W/DYE: CPT

## 2024-03-11 PROCEDURE — 36415 COLL VENOUS BLD VENIPUNCTURE: CPT

## 2024-03-11 RX ORDER — MUPIROCIN 20 MG/G
1 OINTMENT TOPICAL
Qty: 1 | Refills: 0
Start: 2024-03-11 | End: 2024-03-15

## 2024-03-11 RX ORDER — FOLIC ACID 0.8 MG
1 TABLET ORAL
Qty: 0 | Refills: 0 | DISCHARGE

## 2024-03-11 RX ORDER — HYDROCHLOROTHIAZIDE 25 MG
1 TABLET ORAL
Qty: 0 | Refills: 0 | DISCHARGE

## 2024-03-11 NOTE — H&P PST ADULT - PROBLEM SELECTOR PLAN 5
RT to neck region, limited neck ROM and limited mouth opening Caprini Score 13 High risk,  Surgical team should assess /Strongly recommend pharmacological and mechanical measures for VTE prophylaxis

## 2024-03-11 NOTE — H&P PST ADULT - PROBLEM SELECTOR PLAN 1
Patient scheduled for surgery on: 10/27/22  Patient provided with verbal and written presurgical instructions; verbalized understanding  with teach back on the following : take his protonix for GI prophylaxis;  and  Chlorhexidine wash  Preop COVID PCR completed yesterday    Cardiac evaluation requested by PST:  new pedal edema, will obtain  Echo and Stress test requested Patient is scheduled for left hip removal of hardware, conversion total hip replacement to pro femur replacement [facility] on 3/29/24 with Dr Blackwell.  Medical and cardiac evaluation pending

## 2024-03-11 NOTE — H&P PST ADULT - ASSESSMENT
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 Patient presents for chronic left hip pain, worse with walking, pain is dull, 7/10 in severity, using a cane, taking oxycodone and xtanpza daily.  AP pelvis and 2 views of the left hip revealed chronic deformity of the proximal femur with evidence of lytic lesions. There is evidence of bending versus breakage of the femoral nail. AP and lateral of the left femur obtained show evidence of hardware failure of the femoral nail. Patient is scheduled for left hip removal of hardware, conversion total hip replacement to pro femur replacement [facility] on 3/29/24 with Dr Blackwell. Patient educated on antimicoribal dial soap (chg contraindicated with skin abrasions) ERP, preadmission instructions, medical clearance and day of procedure medications, verbalizes understanding. Pt instructed to stop vitamins/supplements/herbal medications/NSAIDS for one week prior to surgery and discuss with PMD. Patient was given information on joint class, joint book provided, ERP protocol reviewed with patient, MSSA/MRSA swabbed in PST, results pending     CAPRINI SCORE    AGE RELATED RISK FACTORS                                                             [ ] Age 41-60 years                                            (1 Point)  [ ] Age: 61-74 years                                           (2 Points)                 [x ] Age= 75 years                                                (3 Points)             DISEASE RELATED RISK FACTORS                                                       [ x] Edema in the lower extremities                 (1 Point)                     [ ] Varicose veins                                               (1 Point)                                 [x ] BMI > 25 Kg/m2                                            (1 Point)                                  [ ] Serious infection (ie PNA)                            (1 Point)                     [ ] Lung disease ( COPD, Emphysema)            (1 Point)                                                                          [ ] Acute myocardial infarction                         (1 Point)                  [ ] Congestive heart failure (in the previous month)  (1 Point)         [ ] Inflammatory bowel disease                            (1 Point)                  [ ] Central venous access, PICC or Port               (2 points)       (within the last month)                                                                [ ] Stroke (in the previous month)                        (5 Points)    [ x] Previous or present malignancy                       (2 points)                                                                                                                                                         HEMATOLOGY RELATED FACTORS                                                         [ ] Prior episodes of VTE                                     (3 Points)                     [ ] Positive family history for VTE                      (3 Points)                  [ ] Prothrombin 22462 A                                     (3 Points)                     [ ] Factor V Leiden                                                (3 Points)                        [ ] Lupus anticoagulants                                      (3 Points)                                                           [ ] Anticardiolipin antibodies                              (3 Points)                                                       [ ] High homocysteine in the blood                   (3 Points)                                             [ ] Other congenital or acquired thrombophilia      (3 Points)                                                [ ] Heparin induced thrombocytopenia                  (3 Points)                                        MOBILITY RELATED FACTORS  [ ] Bed rest                                                         (1 Point)  [ ] Plaster cast                                                    (2 points)  [ ] Bed bound for more than 72 hours           (2 Points)    GENDER SPECIFIC FACTORS  [ ] Pregnancy or had a baby within the last month   (1 Point)  [ ] Post-partum < 6 weeks                                   (1 Point)  [ ] Hormonal therapy  or oral contraception   (1 Point)  [ ] History of pregnancy complications              (1 point)  [ ] Unexplained or recurrent              (1 Point)    OTHER RISK FACTORS                                           (1 Point)  x[ ] BMI >40, smoking, diabetes requiring insulin, chemotherapy  blood transfusions and length of surgery over 2 hours    SURGERY RELATED RISK FACTORS  [ ]  Section within the last month     (1 Point)  [ ] Minor surgery                                                  (1 Point)  [ ] Arthroscopic surgery                                       (2 Points)  [ ] Planned major surgery lasting more            (2 Points)      than 45 minutes     [x ] Elective hip or knee joint replacement       (5 points)       surgery                                                TRAUMA RELATED RISK FACTORS  [ ] Fracture of the hip, pelvis, or leg                       (5 Points)  [ ] Spinal cord injury resulting in paralysis             (5 points)       (in the previous month)    [ ] Paralysis  (less than 1 month)                             (5 Points)  [ ] Multiple Trauma within 1 month                        (5 Points)    Total Score [   13     ]    Caprini Score 0-2: Low Risk, NO VTE prophylaxis required for most patients, encourage ambulation  Caprini Score 3-6: Moderate Risk , pharmacologic VTE prophylaxis is indicated for most patients (in the absence of contraindications)  Caprini Score Greater than or =7: High risk, pharmocologic VTE prophylaxis indicated for most patients (in the absence of contraindications)    OPIOID RISK TOOL    SHANELLE EACH BOX THAT APPLIES AND ADD TOTALS AT THE END    FAMILY HISTORY OF SUBSTANCE ABUSE                 FEMALE         MALE                                                Alcohol                             [  ]1 pt          [  ]3pts                                               Illegal Durgs                     [  ]2 pts        [  ]3pts                                               Rx Drugs                           [  ]4 pts        [  ]4 pts    PERSONAL HISTORY OF SUBSTANCE ABUSE                                                                                          Alcohol                             [  ]3 pts       [  ]3 pts                                               Illegal Durgs                     [  ]4 pts        [  ]4 pts                                               Rx Drugs                           [  ]5 pts        [  ]5 pts    AGE BETWEEN 16-45 YEARS                                      [  ]1 pt         [  ]1 pt    HISTORY OF PREADOLESCENT   SEXUAL ABUSE                                                             [  ]3 pts        [  ]0pts    PSYCHOLOGICAL DISEASE                     ADD, OCD, Bipolar, Schizophrenia        [  ]2 pts         [  ]2 pts                      Depression                                               [  ]1 pt           [  ]1 pt           SCORING TOTAL   0                            A score of 3 or lower indicated LOW risk for future opiod abuse  A score of 4 to 7 indicated moderate risk for future opiod abuse  A score of 8 or higher indicates a high risk for opiod abuse

## 2024-03-11 NOTE — H&P PST ADULT - SKIN/BREAST
Likely multifactorial from restriction due to burdensome breast mass as well as associated lung disease.  -c/w Oxy IR 2.5mg q6h, encouraged patient to use for improved symptom control  -will likely be improved with mastectomy details…

## 2024-03-11 NOTE — H&P PST ADULT - NSICDXPASTMEDICALHX_GEN_ALL_CORE_FT
PAST MEDICAL HISTORY:  Arthritis     Basal cell carcinoma skin    CAD (coronary artery disease)     Cancer of salivary gland s/p 31 sessions of RT    Childhood asthma     Disorder of bone     GERD (gastroesophageal reflux disease)     H/O spinal stenosis     History of blood transfusion     HTN (hypertension)     Other chronic back pain     Pain due to internal orthopedic prosthetic device, implant, or graft     Pain in left hip     Rheumatoid arthritis     Squamous cell skin cancer

## 2024-03-11 NOTE — H&P PST ADULT - HISTORY OF PRESENT ILLNESS
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 Patient states that he is having increasing pain in his left leg. He is previously diagnosed by me with a metastatic lesion was sent to the orthopedic oncology service. They performed a biopsy that showed malignant squamous cell carcinoma. He underwent a gamma nailing. He did not follow-up with the orthopedic oncology service. He been following up with another trauma surgeon. However he began to develop increasing pain in his hip. He states he is no longer able to golf or go about his activities due to the pain. He feels like it is in his anterior thigh and radiates down the leg. Denies any recent falls or trauma. Had a PET scan that showed increased uptake in his left femur. Is currently undergoing care with oncologist. Completed radiation about a month ago. 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 Patient presents for chronic left hip pain, worse with walking, pain is dull, 7/10 in severity, using a cane, taking oxycodone and xtanpza daily.  AP pelvis and 2 views of the left hip revealed chronic deformity of the proximal femur with evidence of lytic lesions. There is evidence of bending versus breakage of the femoral nail. AP and lateral of the left femur obtained show evidence of hardware failure of the femoral nail. Patient is scheduled for left hip removal of hardware, conversion total hip replacement to pro femur replacement [facility] on 3/29/24 with Dr Blackwell.  Medical and cardiac pending

## 2024-03-11 NOTE — H&P PST ADULT - PROBLEM SELECTOR PLAN 3
Assessment and Plan: Patient instructed to take ramipril  on day of procedure, verbalized understanding.  Patient instructed to hold HCTZ on DOS, verbalized understanding CAD with PCI x6 stents, stress and echo pending, cardiac pending

## 2024-03-13 ENCOUNTER — APPOINTMENT (OUTPATIENT)
Dept: MRI IMAGING | Facility: CLINIC | Age: 79
End: 2024-03-13

## 2024-03-13 PROBLEM — M25.552 PAIN IN LEFT HIP: Chronic | Status: ACTIVE | Noted: 2024-03-11

## 2024-03-13 PROBLEM — T84.84XA PAIN DUE TO INTERNAL ORTHOPEDIC PROSTHETIC DEVICES, IMPLANTS AND GRAFTS, INITIAL ENCOUNTER: Chronic | Status: ACTIVE | Noted: 2024-03-11

## 2024-03-14 ENCOUNTER — APPOINTMENT (OUTPATIENT)
Dept: CARDIOLOGY | Facility: CLINIC | Age: 79
End: 2024-03-14
Payer: MEDICARE

## 2024-03-14 PROCEDURE — 93306 TTE W/DOPPLER COMPLETE: CPT

## 2024-03-15 ENCOUNTER — APPOINTMENT (OUTPATIENT)
Dept: CARDIOLOGY | Facility: CLINIC | Age: 79
End: 2024-03-15
Payer: MEDICARE

## 2024-03-15 PROCEDURE — 93015 CV STRESS TEST SUPVJ I&R: CPT

## 2024-03-15 PROCEDURE — 78452 HT MUSCLE IMAGE SPECT MULT: CPT

## 2024-03-15 PROCEDURE — A9502: CPT

## 2024-03-15 RX ORDER — ONDANSETRON 4 MG/1
4 TABLET ORAL
Refills: 0 | Status: DISCONTINUED | COMMUNITY

## 2024-03-16 ENCOUNTER — OUTPATIENT (OUTPATIENT)
Dept: OUTPATIENT SERVICES | Facility: HOSPITAL | Age: 79
LOS: 1 days | End: 2024-03-16

## 2024-03-16 ENCOUNTER — APPOINTMENT (OUTPATIENT)
Dept: MRI IMAGING | Facility: CLINIC | Age: 79
End: 2024-03-16
Payer: MEDICARE

## 2024-03-16 DIAGNOSIS — C79.51 SECONDARY MALIGNANT NEOPLASM OF BONE: ICD-10-CM

## 2024-03-16 DIAGNOSIS — Z90.49 ACQUIRED ABSENCE OF OTHER SPECIFIED PARTS OF DIGESTIVE TRACT: Chronic | ICD-10-CM

## 2024-03-16 DIAGNOSIS — Z98.1 ARTHRODESIS STATUS: Chronic | ICD-10-CM

## 2024-03-16 DIAGNOSIS — Z95.5 PRESENCE OF CORONARY ANGIOPLASTY IMPLANT AND GRAFT: Chronic | ICD-10-CM

## 2024-03-16 PROBLEM — M54.9 DORSALGIA, UNSPECIFIED: Chronic | Status: ACTIVE | Noted: 2024-03-11

## 2024-03-16 PROCEDURE — 73720 MRI LWR EXTREMITY W/O&W/DYE: CPT | Mod: 26,LT

## 2024-03-18 ENCOUNTER — APPOINTMENT (OUTPATIENT)
Dept: CARDIOLOGY | Facility: CLINIC | Age: 79
End: 2024-03-18
Payer: MEDICARE

## 2024-03-18 VITALS
HEIGHT: 72 IN | HEART RATE: 79 BPM | BODY MASS INDEX: 25.6 KG/M2 | DIASTOLIC BLOOD PRESSURE: 80 MMHG | SYSTOLIC BLOOD PRESSURE: 132 MMHG | WEIGHT: 189 LBS | OXYGEN SATURATION: 96 %

## 2024-03-18 PROCEDURE — 99215 OFFICE O/P EST HI 40 MIN: CPT

## 2024-03-18 RX ORDER — RAMIPRIL 10 MG/1
10 CAPSULE ORAL DAILY
Qty: 180 | Refills: 0 | Status: ACTIVE | COMMUNITY

## 2024-03-18 RX ORDER — OXYCODONE 10 MG/1
10 TABLET ORAL TWICE DAILY
Refills: 0 | Status: ACTIVE | COMMUNITY

## 2024-03-19 ENCOUNTER — APPOINTMENT (OUTPATIENT)
Age: 79
End: 2024-03-19

## 2024-03-19 ENCOUNTER — RESULT REVIEW (OUTPATIENT)
Age: 79
End: 2024-03-19

## 2024-03-19 LAB
ALBUMIN SERPL ELPH-MCNC: 4 G/DL — SIGNIFICANT CHANGE UP (ref 3.3–5)
ALP SERPL-CCNC: 71 U/L — SIGNIFICANT CHANGE UP (ref 40–120)
ALT FLD-CCNC: 12 U/L — SIGNIFICANT CHANGE UP (ref 10–45)
ANION GAP SERPL CALC-SCNC: 10 MMOL/L — SIGNIFICANT CHANGE UP (ref 5–17)
AST SERPL-CCNC: 26 U/L — SIGNIFICANT CHANGE UP (ref 10–40)
BASOPHILS # BLD AUTO: 0.1 K/UL — SIGNIFICANT CHANGE UP (ref 0–0.2)
BASOPHILS NFR BLD AUTO: 0.8 % — SIGNIFICANT CHANGE UP (ref 0–2)
BILIRUB SERPL-MCNC: 0.4 MG/DL — SIGNIFICANT CHANGE UP (ref 0.2–1.2)
BUN SERPL-MCNC: 14 MG/DL — SIGNIFICANT CHANGE UP (ref 7–23)
CALCIUM SERPL-MCNC: 9.6 MG/DL — SIGNIFICANT CHANGE UP (ref 8.4–10.5)
CHLORIDE SERPL-SCNC: 104 MMOL/L — SIGNIFICANT CHANGE UP (ref 96–108)
CO2 SERPL-SCNC: 28 MMOL/L — SIGNIFICANT CHANGE UP (ref 22–31)
CREAT SERPL-MCNC: 0.98 MG/DL — SIGNIFICANT CHANGE UP (ref 0.5–1.3)
EGFR: 80 ML/MIN/1.73M2 — SIGNIFICANT CHANGE UP
EOSINOPHIL # BLD AUTO: 0.2 K/UL — SIGNIFICANT CHANGE UP (ref 0–0.5)
EOSINOPHIL NFR BLD AUTO: 2.2 % — SIGNIFICANT CHANGE UP (ref 0–6)
GLUCOSE SERPL-MCNC: 83 MG/DL — SIGNIFICANT CHANGE UP (ref 70–99)
HCT VFR BLD CALC: 38.4 % — LOW (ref 39–50)
HGB BLD-MCNC: 12.7 G/DL — LOW (ref 13–17)
LYMPHOCYTES # BLD AUTO: 0.6 K/UL — LOW (ref 1–3.3)
LYMPHOCYTES # BLD AUTO: 7.4 % — LOW (ref 13–44)
MCHC RBC-ENTMCNC: 31 PG — SIGNIFICANT CHANGE UP (ref 27–34)
MCHC RBC-ENTMCNC: 33.1 G/DL — SIGNIFICANT CHANGE UP (ref 32–36)
MCV RBC AUTO: 93.7 FL — SIGNIFICANT CHANGE UP (ref 80–100)
MONOCYTES # BLD AUTO: 0.7 K/UL — SIGNIFICANT CHANGE UP (ref 0–0.9)
MONOCYTES NFR BLD AUTO: 8.6 % — SIGNIFICANT CHANGE UP (ref 2–14)
NEUTROPHILS # BLD AUTO: 6.6 K/UL — SIGNIFICANT CHANGE UP (ref 1.8–7.4)
NEUTROPHILS NFR BLD AUTO: 81 % — HIGH (ref 43–77)
PLATELET # BLD AUTO: 213 K/UL — SIGNIFICANT CHANGE UP (ref 150–400)
POTASSIUM SERPL-MCNC: 4.1 MMOL/L — SIGNIFICANT CHANGE UP (ref 3.5–5.3)
POTASSIUM SERPL-SCNC: 4.1 MMOL/L — SIGNIFICANT CHANGE UP (ref 3.5–5.3)
PROT SERPL-MCNC: 7 G/DL — SIGNIFICANT CHANGE UP (ref 6–8.3)
RBC # BLD: 4.1 M/UL — LOW (ref 4.2–5.8)
RBC # FLD: 14 % — SIGNIFICANT CHANGE UP (ref 10.3–14.5)
SODIUM SERPL-SCNC: 141 MMOL/L — SIGNIFICANT CHANGE UP (ref 135–145)
T4 FREE SERPL-MCNC: 1.5 NG/DL — SIGNIFICANT CHANGE UP (ref 0.9–1.8)
T4 FREE+ TSH PNL SERPL: 4.73 UIU/ML — HIGH (ref 0.27–4.2)
WBC # BLD: 8.1 K/UL — SIGNIFICANT CHANGE UP (ref 3.8–10.5)
WBC # FLD AUTO: 8.1 K/UL — SIGNIFICANT CHANGE UP (ref 3.8–10.5)

## 2024-03-19 RX ORDER — AMLODIPINE BESYLATE 5 MG/1
5 TABLET ORAL DAILY
Refills: 0 | Status: ACTIVE | COMMUNITY

## 2024-03-21 ENCOUNTER — APPOINTMENT (OUTPATIENT)
Dept: ORTHOPEDIC SURGERY | Facility: CLINIC | Age: 79
End: 2024-03-21
Payer: MEDICARE

## 2024-03-21 VITALS
BODY MASS INDEX: 25.6 KG/M2 | HEART RATE: 76 BPM | HEIGHT: 72 IN | SYSTOLIC BLOOD PRESSURE: 122 MMHG | WEIGHT: 189 LBS | DIASTOLIC BLOOD PRESSURE: 72 MMHG

## 2024-03-21 PROCEDURE — G2211 COMPLEX E/M VISIT ADD ON: CPT

## 2024-03-21 PROCEDURE — 99213 OFFICE O/P EST LOW 20 MIN: CPT

## 2024-03-21 NOTE — PHYSICAL EXAM
[Walker] : ambulates with walker [Cane] : ambulates with cane [de-identified] : Musculoskeletal: ambulates with cane. Left hip exam showed no groin pain with SLR, ROM is full flexion with 20 degrees external and 10 degrees internal with pain, GRANT negative, FADIR positive. Well healed surgical incisions.  5/5 motor strength in bilateral lower extremities. Sensory: Intact in bilateral lower extremities. DTRs: Biceps, brachioradialis, triceps, patellar, ankle and plantar 2+ and symmetric bilaterally. Pulses: dorsalis pedis, posterior tibial, femoral, popliteal, and radial 2+ and symmetric bilaterally. [de-identified] : INTERPRETATION:  CT OF THE LEFT HIP AND LEFT FEMUR CLINICAL INFORMATION: Pain. Pre-surgical evaluation for joint arthroplasty. TECHNIQUE: Initially, CT left hip was performed according to a BAKARI protocol. Multiplanar reformats were generated for review. Following this, CT left femur was performed before and after administration of 90 cc of Isovue 370 (10 cc discarded). COMPARISON: PET/CT 1/10/2024. FINDINGS: Patient is status post placement of intramedullary yaron within the left femur with a fixating screw traversing the left femoral head neck junction and additional fixation screws seen more distally along the inferior aspect of the yaron about the level of the knee. There is bending/breaking of the intramedullary femoral yaron at the level of the traversing femoral head neck fixating screw. Extensive lucency/sclerosis is also seen at this level about the proximal femur with extension/involvement of the femoral head neck junction, intertrochanteric region and lesser trochanter. Discernible lucent lines are present suggestive of periprosthetic fractures. Suspect a soft tissue component associated with the osseous metastatic disease; this is not well characterized on this study.  There is bilateral hip joint arthrosis. There is a left hip joint effusion present. Bilateral knee joint spaces are maintained. No evidence of advanced degenerative changes about the knees. Degenerative changes of the pubic symphysis and sacroiliac joints.  Intramuscular lipoma within the sartorius muscle. Colonic diverticulosis. Partially imaged lumbar spondylosis and postsurgical changes in the lower lumbar spine. Vascular calcification is present. IMPRESSION: Metastatic disease about the proximal left femur at the site of the ORIF with associated pathologic fracture and hardware failure as described. CT HIP ONLY LT  - ORDERED BY:  MARLYS CASPER PROCEDURE DATE:  03/11/2024 INTERPRETATION:  CT OF THE LEFT HIP AND LEFT FEMUR CLINICAL INFORMATION: Pain. Pre-surgical evaluation for joint arthroplasty. TECHNIQUE: Initially, CT left hip was performed according to a BAKARI protocol. Multiplanar reformats were generated for review. Following this, CT left femur was performed before and after administration of 90 cc of Isovue 370 (10 cc discarded). COMPARISON: PET/CT 1/10/2024.  FINDINGS: Patient is status post placement of intramedullary yaron within the left femur with a fixating screw traversing the left femoral head neck junction and additional fixation screws seen more distally along the inferior aspect of the yaron about the level of the knee. There is bending/breaking of the intramedullary femoral yaron at the level of the traversing femoral head neck fixating screw. Extensive lucency/sclerosis is also seen at this level about the proximal femur with extension/involvement of the femoral head neck junction, intertrochanteric region and lesser trochanter. Discernible lucent lines are present suggestive of periprosthetic fractures. Suspect a soft tissue component associated with the osseous metastatic disease; this is not well characterized on this study. There is bilateral hip joint arthrosis. There is a left hip joint effusion present. Bilateral knee joint spaces are maintained. No evidence of advanced degenerative changes about the knees. Degenerative changes of the pubic symphysis and sacroiliac joints. Intramuscular lipoma within the sartorius muscle. Colonic diverticulosis. Partially imaged lumbar spondylosis and postsurgical changes in the lower lumbar spine. Vascular calcification is present. IMPRESSION: Metastatic disease about the proximal left femur at the site of the ORIF with associated pathologic fracture and hardware failure as described.  MR FEMUR WAW IC LT  - ORDERED BY: MARLYS CASPER PROCEDURE DATE:  03/16/2024 INTERPRETATION:  MRI OF THE LEFT FEMUR CLINICAL INFORMATION: Pain. Metastatic squamous cell carcinoma.  COMPARISON: Left femur CT dated 3/11/2024. Multiple prior whole-body PET/CT examinations, most recently 1/10/2024. Presurgical left hip MRI dated 10/10/2022. TECHNIQUE: Multiplanar, multisequence imaging of the left femur both prior to and following administration of 90 cc Gadavist intravenous contrast. Additional 1 cc Gadavist contrast was discarded. FINDINGS: OSSEOUS: No acute displaced fracture. Status post open reduction and internal fixation of ill-defined comminuted pathologic left proximal femoral intertrochanteric fracture, occurring at site of ill-defined enhancing permeative osteolytic lesion measuring approximately 4.7 x 4.7 x 8.8 cm in AP, transverse, craniocaudal dimensions. Mild to moderate patchy perilesional soft tissue edema like signal, most notably at the femoral origin of the vastus lateralis, but without definite extraosseous extension. No other aggressive enhancing osseous neoplasm within the field of view. GENERAL: No mass lesion, hematoma, or drainable encapsulated fluid collection. Muscle bulk and signal intensity are within normal limits. IMPRESSION: Status post ORIF of chronic pathologic intertrochanteric left proximal femoral fracture, occurring at site of ill-defined approximately 4.7 x 4.7 x 8.8 cm aggressive osteolytic metastasis.

## 2024-03-21 NOTE — HISTORY OF PRESENT ILLNESS
[Pain Location] : pain [] : left hip [Worsening] : worsening [___ yrs] : [unfilled] year(s) ago [Constant] : ~He/She~ states the symptoms seem to be constant [Bending] : worsened by bending [Lifting] : worsened by lifting [Hip Movement] : worsened by hip movement [Sitting] : worsened by sitting [Running] : worsened by running [Walking] : worsened by walking [Rest] : relieved by rest [de-identified] : 78 year old male presents to the office today for a follow-up visit s/p left hip fracture; his total hip arthroplasty is scheduled within the next few weeks. Patient has spoken to his cardiologist, states there was a possible blockage on a previous stress test. Patient has Hx of murmur and 6 cardiac stents. Hip pain is still present and inhibiting daily life.  Patient states pain exacerbates when taking on and off shoes and socks and ambulating out of a car.  Is here today to discuss further treatment options prior to surgical invention [de-identified] : taking on and off shoes and socks and ambulating out of a car. Secondary Defect Width (In Cm): 1.7

## 2024-03-21 NOTE — REASON FOR VISIT
[Follow-Up Visit] : a follow-up visit for [Other: ____] : [unfilled] [Spouse] : spouse [FreeTextEntry2] : left hip

## 2024-03-21 NOTE — ADDENDUM
[FreeTextEntry1] :  This note was written by Joie Drake, acting as the  for Dr. Blackwell. This note accurately reflects the work and decisions made by Dr. Blackwell.

## 2024-03-21 NOTE — DISCUSSION/SUMMARY
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  He is not interested in following up with the orthopedic oncology service due to his social situation and wishes to keep his care closer to home in the area.  I did discuss with him that the yaron is currently breaking and the increasing pain in his hip is likely due to his impending pathologic fracture due to his metastatic squamous cell carcinoma.  We have thorough discussion about conservative surgical treatment options.  At this time I do not think that continued conservative treatment is warranted due to the fact that he has an impending pathologic fracture.    We discussed he will likely require a proximal femur replacement.  Due to his high activity level we discussed hemiarthroplasty versus total hip arthroplasty he may benefit from a total hip arthroplasty due to his avid golfing and physical activity.  We discussed risk benefits alternatives common to blood loss infection damage neurovascular structures risk need for revision surgery risk of periprosthetic fracture high risk of dislocation as well as infection and complications due to his malignancy as well as his radiation treatment.  Patient understands and agreement.  He will undergo an aspiration to rule out infection.  I will see him back preoperatively for repeat evaluation.  All questions were asked and answered

## 2024-03-25 ENCOUNTER — APPOINTMENT (OUTPATIENT)
Dept: CARDIOLOGY | Facility: CLINIC | Age: 79
End: 2024-03-25
Payer: MEDICARE

## 2024-03-25 VITALS
OXYGEN SATURATION: 97 % | WEIGHT: 193 LBS | DIASTOLIC BLOOD PRESSURE: 68 MMHG | HEIGHT: 72 IN | SYSTOLIC BLOOD PRESSURE: 110 MMHG | BODY MASS INDEX: 26.14 KG/M2 | HEART RATE: 85 BPM

## 2024-03-25 DIAGNOSIS — E78.5 HYPERLIPIDEMIA, UNSPECIFIED: ICD-10-CM

## 2024-03-25 DIAGNOSIS — I25.10 ATHEROSCLEROTIC HEART DISEASE OF NATIVE CORONARY ARTERY W/OUT ANGINA PECTORIS: ICD-10-CM

## 2024-03-25 DIAGNOSIS — Z01.810 ENCOUNTER FOR PREPROCEDURAL CARDIOVASCULAR EXAMINATION: ICD-10-CM

## 2024-03-25 DIAGNOSIS — R94.39 ABNORMAL RESULT OF OTHER CARDIOVASCULAR FUNCTION STUDY: ICD-10-CM

## 2024-03-25 DIAGNOSIS — I10 ESSENTIAL (PRIMARY) HYPERTENSION: ICD-10-CM

## 2024-03-25 PROCEDURE — 99215 OFFICE O/P EST HI 40 MIN: CPT

## 2024-03-25 RX ORDER — DULOXETINE HYDROCHLORIDE 20 MG/1
20 CAPSULE, DELAYED RELEASE PELLETS ORAL
Qty: 270 | Refills: 0 | Status: DISCONTINUED | COMMUNITY
Start: 2022-09-02 | End: 2024-03-25

## 2024-03-25 RX ORDER — DULOXETINE HYDROCHLORIDE 40 MG/1
40 CAPSULE, DELAYED RELEASE PELLETS ORAL TWICE DAILY
Refills: 0 | Status: ACTIVE | COMMUNITY

## 2024-03-25 NOTE — HISTORY OF PRESENT ILLNESS
[FreeTextEntry1] : The patient is a 78-year-old white male with a past medical history remarkable for coronary artery disease, hypertension, hypercholesterolemia, carotid artery disease, TIA, SVT, and an abnormal ECG who presents for evaluation prior to total hip replacement surgery. The patient has been noncompliant with cardiology follow-up since his diagnosis of parotid gland cancer with metastases to his hip.  The patient ambulates with a cane.  He continues to smoke cigars.  The patient has been noncompliant with aspirin and Praluent.

## 2024-03-25 NOTE — PHYSICAL EXAM
[Well Developed] : well developed [Well Nourished] : well nourished [No Acute Distress] : no acute distress [Normal Conjunctiva] : normal conjunctiva [Normal Venous Pressure] : normal venous pressure [No Carotid Bruit] : no carotid bruit [Normal S1, S2] : normal S1, S2 [No Rub] : no rub [No Gallop] : no gallop [Murmur] : murmur [No Respiratory Distress] : no respiratory distress  [Soft] : abdomen soft [Non Tender] : non-tender [No Masses/organomegaly] : no masses/organomegaly [Normal Bowel Sounds] : normal bowel sounds [No Edema] : no edema [Normal Gait] : normal gait [No Cyanosis] : no cyanosis [No Clubbing] : no clubbing [No Varicosities] : no varicosities [No Rash] : no rash [Moves all extremities] : moves all extremities [No Skin Lesions] : no skin lesions [No Focal Deficits] : no focal deficits [Normal Speech] : normal speech [Alert and Oriented] : alert and oriented [Normal memory] : normal memory [de-identified] : 2/6 systolic murmur at the base [de-identified] : Decreased breath sounds bilaterally

## 2024-03-25 NOTE — CARDIOLOGY SUMMARY
[de-identified] : Normal sinus rhythm, left axis deviation, left anterior fascicular block [de-identified] : - CAD: Intolerant of beta blockers, Plavix discontinued 8/15/2016, noncompliant with aspirin and Praluent, RCA SHELDON 3/2007, 6/2007, 3/2008; CARDIAC CATHETERIZATION: 5/2008, normal,  NUKE: 12/7/2010, Normal, CARDIAC CATHETERIZATION: 6/18/2012, EF55%, mLAD 50, mCX40, pRCA 95 ISS >SHELDON, CARDIAC CATHETERIZATION: 8/9/2012 mLAD 70 >SHELDON, CARDIAC CATHETERIZATION: 2/7/2013, patent stents, CARDIAC CATHETERIZATION: 11/24/2014, RCA PL 50, NUCLEAR STRESS TEST: 9/21/2017, inferior ischemia. EF61%, CARDIAC CATH: 9/29/2017, patent stents. OM1 40%, dRCA 50%, NUCLEAR STRESS TEST: 11/20/2018, normal, EF 60%, PHARMACOLOGIC NUCLEAR STRESS TEST: 10/19/2021, normal, EF >65%, CARDIAC CATH: 11/29/2021, Patent stents, dRCA 50% nml IFR, PHARMACOLOGIC NUCLEAR STRESS TEST: 3/15/2024, reported mildly abnormal with a suggestion of inferior wall ischemia from the base to mid cavity, EF 64% (personal review, small to moderate size zone of moderate inferior ischemia based in the cavity) - Hypercholesterolemia: intolerant of statins, zetia, and Vascepa. Noncompliant with Praluent - Abnormal ECG/BBB/LAFB - Hypertension: home cuff accurate, Secondary workup 1/13 normal - ECHOCARDIOGRAM: 3/14/2024, reported EF 55 to 60%, septal LVH, mild aortic stenosis, no PA systolic  (11/7/2022, TDS, EF>65%, trace mitral regurgitation and tricuspid regurgitation RVSP 21, aortic valve calcification without significant aortic stenosis, the aortic root and ascending aorta measured 4.1 cm which is normal for the patient's BSA ) - Cigar smoker - SVT: event 2/2008, EPS demonstrated MAT, Verapamil added and later discontinued due to intolerance, MCOT: 11/2021, 12 days, NSR 72 (), 1 sxm =SR, 8 beat asymptomatic VT - TIA: 9/2010, Pt had been non compliant with medical therapy prior to that episode, reported TIA 12/31/12 - CAROTID ULTRASOUND: 12/28/2020, nonobstructive disease

## 2024-03-25 NOTE — DISCUSSION/SUMMARY
[EKG obtained to assist in diagnosis and management of assessed problem(s)] : EKG obtained to assist in diagnosis and management of assessed problem(s) [FreeTextEntry1] : Coronary artery disease Noncompliant with aspirin.  Restart aspirin if safe from an oncologic standpoint. Noncompliant with Praluent.  The patient reports that he will consider restarting Praluent postoperatively.  Hypercholesterolemia Noncompliant with Praluent as noted above  Abnormal ECG An echocardiogram was ordered to rule out a cardiomyopathy or valvular abnormality as the etiology of his abnormal ECG, murmur of aortic stenosis, and peripheral edema Mr. JOSIANE SHAW was instructed to undergo pharmacologic nuclear stress testing to rule out myocardial ischemia as etiology of his abnormal ECG in view of his coronary artery disease, noncompliance with aspirin, noncompliance with Praluent, continued smoking, and limited activity.  Preoperative cardiovascular evaluation: Total hip replacement surgery Evaluation ordered as noted above. In view of the patient's markedly decreased breath sounds bilaterally, the patient will follow-up in your office for evaluation of probable COPD .  TTM

## 2024-03-25 NOTE — REVIEW OF SYSTEMS
[Lower Ext Edema] : lower extremity edema [Negative] : Heme/Lymph [FreeTextEntry9] : Left hip discomfort

## 2024-03-26 LAB
CORTICOSTEROID BINDING GLOBULIN RESULT: 2 MG/DL — SIGNIFICANT CHANGE UP
CORTIS F/TOTAL MFR SERPL: 3 % — SIGNIFICANT CHANGE UP
CORTIS SERPL-MCNC: 2.2 UG/DL — LOW
CORTISOL, FREE RESULT: 0.07 UG/DL — LOW

## 2024-03-27 ENCOUNTER — TRANSCRIPTION ENCOUNTER (OUTPATIENT)
Age: 79
End: 2024-03-27

## 2024-03-27 ENCOUNTER — APPOINTMENT (OUTPATIENT)
Dept: CARDIOLOGY | Facility: CLINIC | Age: 79
End: 2024-03-27

## 2024-03-27 ENCOUNTER — INPATIENT (INPATIENT)
Facility: HOSPITAL | Age: 79
LOS: 1 days | Discharge: HOME | DRG: 65 | End: 2024-03-29
Attending: STUDENT IN AN ORGANIZED HEALTH CARE EDUCATION/TRAINING PROGRAM | Admitting: STUDENT IN AN ORGANIZED HEALTH CARE EDUCATION/TRAINING PROGRAM
Payer: MEDICARE

## 2024-03-27 ENCOUNTER — RESULT REVIEW (OUTPATIENT)
Age: 79
End: 2024-03-27

## 2024-03-27 VITALS
SYSTOLIC BLOOD PRESSURE: 139 MMHG | TEMPERATURE: 98 F | HEART RATE: 68 BPM | DIASTOLIC BLOOD PRESSURE: 86 MMHG | OXYGEN SATURATION: 100 % | RESPIRATION RATE: 18 BRPM

## 2024-03-27 DIAGNOSIS — Z98.1 ARTHRODESIS STATUS: Chronic | ICD-10-CM

## 2024-03-27 DIAGNOSIS — Z95.5 PRESENCE OF CORONARY ANGIOPLASTY IMPLANT AND GRAFT: Chronic | ICD-10-CM

## 2024-03-27 DIAGNOSIS — I63.9 CEREBRAL INFARCTION, UNSPECIFIED: ICD-10-CM

## 2024-03-27 DIAGNOSIS — R09.89 OTHER SPECIFIED SYMPTOMS AND SIGNS INVOLVING THE CIRCULATORY AND RESPIRATORY SYSTEMS: ICD-10-CM

## 2024-03-27 DIAGNOSIS — Z90.49 ACQUIRED ABSENCE OF OTHER SPECIFIED PARTS OF DIGESTIVE TRACT: Chronic | ICD-10-CM

## 2024-03-27 LAB
ALBUMIN SERPL ELPH-MCNC: 3.8 G/DL — SIGNIFICANT CHANGE UP (ref 3.3–5.2)
ALP SERPL-CCNC: 67 U/L — SIGNIFICANT CHANGE UP (ref 40–120)
ALT FLD-CCNC: 12 U/L — SIGNIFICANT CHANGE UP
AMMONIA BLD-MCNC: 20 UMOL/L — SIGNIFICANT CHANGE UP (ref 11–55)
ANION GAP SERPL CALC-SCNC: 11 MMOL/L — SIGNIFICANT CHANGE UP (ref 5–17)
APPEARANCE UR: CLEAR — SIGNIFICANT CHANGE UP
APTT BLD: 29.2 SEC — SIGNIFICANT CHANGE UP (ref 24.5–35.6)
AST SERPL-CCNC: 17 U/L — SIGNIFICANT CHANGE UP
BASE EXCESS BLDV CALC-SCNC: 3.1 MMOL/L — HIGH (ref -2–3)
BASOPHILS # BLD AUTO: 0.03 K/UL — SIGNIFICANT CHANGE UP (ref 0–0.2)
BASOPHILS NFR BLD AUTO: 0.3 % — SIGNIFICANT CHANGE UP (ref 0–2)
BILIRUB SERPL-MCNC: 0.4 MG/DL — SIGNIFICANT CHANGE UP (ref 0.4–2)
BILIRUB UR-MCNC: NEGATIVE — SIGNIFICANT CHANGE UP
BUN SERPL-MCNC: 17 MG/DL — SIGNIFICANT CHANGE UP (ref 8–20)
CA-I SERPL-SCNC: 1.17 MMOL/L — SIGNIFICANT CHANGE UP (ref 1.15–1.33)
CALCIUM SERPL-MCNC: 9.3 MG/DL — SIGNIFICANT CHANGE UP (ref 8.4–10.5)
CHLORIDE BLDV-SCNC: 105 MMOL/L — SIGNIFICANT CHANGE UP (ref 96–108)
CHLORIDE SERPL-SCNC: 104 MMOL/L — SIGNIFICANT CHANGE UP (ref 96–108)
CO2 SERPL-SCNC: 26 MMOL/L — SIGNIFICANT CHANGE UP (ref 22–29)
COLOR SPEC: YELLOW — SIGNIFICANT CHANGE UP
CREAT SERPL-MCNC: 0.91 MG/DL — SIGNIFICANT CHANGE UP (ref 0.5–1.3)
DIFF PNL FLD: NEGATIVE — SIGNIFICANT CHANGE UP
EGFR: 87 ML/MIN/1.73M2 — SIGNIFICANT CHANGE UP
EOSINOPHIL # BLD AUTO: 0.22 K/UL — SIGNIFICANT CHANGE UP (ref 0–0.5)
EOSINOPHIL NFR BLD AUTO: 2.4 % — SIGNIFICANT CHANGE UP (ref 0–6)
ETHANOL SERPL-MCNC: <10 MG/DL — SIGNIFICANT CHANGE UP (ref 0–9)
GAS PNL BLDV: 138 MMOL/L — SIGNIFICANT CHANGE UP (ref 136–145)
GAS PNL BLDV: SIGNIFICANT CHANGE UP
GLUCOSE BLDV-MCNC: 104 MG/DL — HIGH (ref 70–99)
GLUCOSE SERPL-MCNC: 108 MG/DL — HIGH (ref 70–99)
GLUCOSE UR QL: NEGATIVE MG/DL — SIGNIFICANT CHANGE UP
HCO3 BLDV-SCNC: 28 MMOL/L — SIGNIFICANT CHANGE UP (ref 22–29)
HCT VFR BLD CALC: 38.5 % — LOW (ref 39–50)
HCT VFR BLDA CALC: 38 % — SIGNIFICANT CHANGE UP
HGB BLD CALC-MCNC: 12.6 G/DL — SIGNIFICANT CHANGE UP (ref 12.6–17.4)
HGB BLD-MCNC: 12.5 G/DL — LOW (ref 13–17)
IMM GRANULOCYTES NFR BLD AUTO: 0.3 % — SIGNIFICANT CHANGE UP (ref 0–0.9)
INR BLD: 0.98 RATIO — SIGNIFICANT CHANGE UP (ref 0.85–1.18)
KETONES UR-MCNC: NEGATIVE MG/DL — SIGNIFICANT CHANGE UP
LACTATE BLDV-MCNC: 1.3 MMOL/L — SIGNIFICANT CHANGE UP (ref 0.5–2)
LEUKOCYTE ESTERASE UR-ACNC: NEGATIVE — SIGNIFICANT CHANGE UP
LYMPHOCYTES # BLD AUTO: 0.63 K/UL — LOW (ref 1–3.3)
LYMPHOCYTES # BLD AUTO: 7 % — LOW (ref 13–44)
MCHC RBC-ENTMCNC: 30.6 PG — SIGNIFICANT CHANGE UP (ref 27–34)
MCHC RBC-ENTMCNC: 32.5 GM/DL — SIGNIFICANT CHANGE UP (ref 32–36)
MCV RBC AUTO: 94.4 FL — SIGNIFICANT CHANGE UP (ref 80–100)
MONOCYTES # BLD AUTO: 0.74 K/UL — SIGNIFICANT CHANGE UP (ref 0–0.9)
MONOCYTES NFR BLD AUTO: 8.2 % — SIGNIFICANT CHANGE UP (ref 2–14)
NEUTROPHILS # BLD AUTO: 7.4 K/UL — SIGNIFICANT CHANGE UP (ref 1.8–7.4)
NEUTROPHILS NFR BLD AUTO: 81.8 % — HIGH (ref 43–77)
NITRITE UR-MCNC: NEGATIVE — SIGNIFICANT CHANGE UP
PCO2 BLDV: 45 MMHG — SIGNIFICANT CHANGE UP (ref 42–55)
PH BLDV: 7.4 — SIGNIFICANT CHANGE UP (ref 7.32–7.43)
PH UR: 7 — SIGNIFICANT CHANGE UP (ref 5–8)
PLATELET # BLD AUTO: 217 K/UL — SIGNIFICANT CHANGE UP (ref 150–400)
PO2 BLDV: 83 MMHG — HIGH (ref 25–45)
POTASSIUM BLDV-SCNC: 4.4 MMOL/L — SIGNIFICANT CHANGE UP (ref 3.5–5.1)
POTASSIUM SERPL-MCNC: 4.5 MMOL/L — SIGNIFICANT CHANGE UP (ref 3.5–5.3)
POTASSIUM SERPL-SCNC: 4.5 MMOL/L — SIGNIFICANT CHANGE UP (ref 3.5–5.3)
PROT SERPL-MCNC: 6.3 G/DL — LOW (ref 6.6–8.7)
PROT UR-MCNC: NEGATIVE MG/DL — SIGNIFICANT CHANGE UP
PROTHROM AB SERPL-ACNC: 10.9 SEC — SIGNIFICANT CHANGE UP (ref 9.5–13)
RBC # BLD: 4.08 M/UL — LOW (ref 4.2–5.8)
RBC # FLD: 14.7 % — HIGH (ref 10.3–14.5)
SAO2 % BLDV: 98.8 % — SIGNIFICANT CHANGE UP
SODIUM SERPL-SCNC: 141 MMOL/L — SIGNIFICANT CHANGE UP (ref 135–145)
SP GR SPEC: >1.03 — HIGH (ref 1–1.03)
TROPONIN T, HIGH SENSITIVITY RESULT: 33 NG/L — SIGNIFICANT CHANGE UP (ref 0–51)
TROPONIN T, HIGH SENSITIVITY RESULT: 34 NG/L — SIGNIFICANT CHANGE UP (ref 0–51)
UROBILINOGEN FLD QL: 0.2 MG/DL — SIGNIFICANT CHANGE UP (ref 0.2–1)
WBC # BLD: 9.05 K/UL — SIGNIFICANT CHANGE UP (ref 3.8–10.5)
WBC # FLD AUTO: 9.05 K/UL — SIGNIFICANT CHANGE UP (ref 3.8–10.5)

## 2024-03-27 PROCEDURE — 70450 CT HEAD/BRAIN W/O DYE: CPT | Mod: 26,MC,XU

## 2024-03-27 PROCEDURE — 93970 EXTREMITY STUDY: CPT | Mod: 26

## 2024-03-27 PROCEDURE — 70498 CT ANGIOGRAPHY NECK: CPT | Mod: 26,MC

## 2024-03-27 PROCEDURE — 70553 MRI BRAIN STEM W/O & W/DYE: CPT | Mod: 26

## 2024-03-27 PROCEDURE — 70496 CT ANGIOGRAPHY HEAD: CPT | Mod: 26,MC

## 2024-03-27 PROCEDURE — 93306 TTE W/DOPPLER COMPLETE: CPT | Mod: 26

## 2024-03-27 PROCEDURE — 93010 ELECTROCARDIOGRAM REPORT: CPT

## 2024-03-27 PROCEDURE — 0042T: CPT | Mod: MC

## 2024-03-27 PROCEDURE — 99291 CRITICAL CARE FIRST HOUR: CPT

## 2024-03-27 PROCEDURE — 71045 X-RAY EXAM CHEST 1 VIEW: CPT | Mod: 26

## 2024-03-27 RX ORDER — ASPIRIN/CALCIUM CARB/MAGNESIUM 324 MG
81 TABLET ORAL DAILY
Refills: 0 | Status: DISCONTINUED | OUTPATIENT
Start: 2024-03-27 | End: 2024-03-29

## 2024-03-27 RX ORDER — ASPIRIN/CALCIUM CARB/MAGNESIUM 324 MG
300 TABLET ORAL DAILY
Refills: 0 | Status: DISCONTINUED | OUTPATIENT
Start: 2024-03-27 | End: 2024-03-27

## 2024-03-27 RX ORDER — ENOXAPARIN SODIUM 100 MG/ML
40 INJECTION SUBCUTANEOUS EVERY 24 HOURS
Refills: 0 | Status: DISCONTINUED | OUTPATIENT
Start: 2024-03-27 | End: 2024-03-28

## 2024-03-27 RX ORDER — ATORVASTATIN CALCIUM 80 MG/1
40 TABLET, FILM COATED ORAL AT BEDTIME
Refills: 0 | Status: DISCONTINUED | OUTPATIENT
Start: 2024-03-27 | End: 2024-03-29

## 2024-03-27 RX ADMIN — ENOXAPARIN SODIUM 40 MILLIGRAM(S): 100 INJECTION SUBCUTANEOUS at 14:47

## 2024-03-27 RX ADMIN — ATORVASTATIN CALCIUM 40 MILLIGRAM(S): 80 TABLET, FILM COATED ORAL at 22:41

## 2024-03-27 RX ADMIN — Medication 300 MILLIGRAM(S): at 14:48

## 2024-03-27 NOTE — DISCHARGE NOTE PROVIDER - NSDCHHHOMEBOUND_GEN_ALL_CORE
Stroke/TBI (neurological/cognitive impairment)/Fall risk/Other, specify.../Pain greater than 7 on scale of 10 on ambulation

## 2024-03-27 NOTE — PHYSICAL THERAPY INITIAL EVALUATION ADULT - ADDITIONAL COMMENTS
Pt states he lives at home alone with 24 hour aides. Pt has assist with ADL's, amb independent with SAC and owns RW and WC. Pt does not have steps to navigate a home.

## 2024-03-27 NOTE — SWALLOW BEDSIDE ASSESSMENT ADULT - SLP PERTINENT HISTORY OF CURRENT PROBLEM
As per MD note: "77yo M with history of HTN, CAD s/p stent placement x6, right parotid cancer s/p radiation therapy, squamous cell cancer with mets presenting to the ED with acute onset aphasia, dysarthria and right facial asymmetry with LKW time 11AM. Stroke code was activated for the patient, CTH, CTA H/N and CTP obtained with a finding of age indeterminate left frontal lobe and left parietal lobe infarcts. No LVO or perfusion deficit. TNK not administered given appearance of stroke on CT. Patient admitted to the Neurology service for further work up"

## 2024-03-27 NOTE — DISCHARGE NOTE PROVIDER - NSDCMRMEDTOKEN_GEN_ALL_CORE_FT
amLODIPine 5 mg oral tablet: 1 tab(s) orally once a day  aspirin 81 mg oral tablet: 1 tab(s) orally once a day  DULoxetine 20 mg oral delayed release capsule: 1 cap(s) orally once a day-morning  DULoxetine 20 mg oral delayed release capsule: 2 cap(s) orally once a day (at bedtime)  levothyroxine 100 mcg (0.1 mg) oral capsule: 1 cap(s) orally once a day  mupirocin 2% topical ointment: 1 application in each affected nostril 2 times a day Start 3/24 use twice daily for 5 days  oxyCODONE 10 mg oral tablet: 1 tab(s) orally 2 times a day  predniSONE 5 mg oral tablet: 1 tab(s) orally 2 times a day  ramipril 10 mg oral capsule: 2 cap(s) orally once a day-morning  Xtampza ER 18 mg oral capsule, extended release: 1 cap(s) orally 2 times a day   amLODIPine 5 mg oral tablet: 1 tab(s) orally once a day  apixaban 5 mg oral tablet: 1 tab(s) orally 2 times a day  aspirin 81 mg oral tablet: 1 tab(s) orally once a day  atorvastatin 40 mg oral tablet: 1 tab(s) orally once a day (at bedtime)  DULoxetine 40 mg oral delayed release capsule: 1 cap(s) orally 2 times a day  isosorbide mononitrate 30 mg oral tablet, extended release: 0.5 tab(s) orally once a day  levothyroxine 100 mcg (0.1 mg) oral capsule: 1 cap(s) orally once a day  ramipril 10 mg oral capsule: 2 cap(s) orally once a day-morning  Xtampza ER 18 mg oral capsule, extended release: 1 cap(s) orally 2 times a day  Zofran 4 mg oral tablet: 1 tab(s) orally every 6 hours as needed for  nausea

## 2024-03-27 NOTE — DISCHARGE NOTE PROVIDER - NSDCFUSCHEDAPPT_GEN_ALL_CORE_FT
Brannon Blackwell  [facility]  ORTHOSURG [address]  Scheduled Appointment: 03/29/2024    Brannon Blackwell  [facility]  Scheduled Appointment: 03/29/2024    Meredith Daniel  [facility]  [facility]  Scheduled Appointment: 04/17/2024    Brannon Blackwell  [facility]  ORTHOSURG [address]  Scheduled Appointment: 04/18/2024    [facility]  Scheduled Appointment: 04/19/2024    Brannon Blackwell  [facility]  ORTHOSURG [address]  Scheduled Appointment: 05/09/2024    [facility]  Scheduled Appointment: 05/10/2024    Tony Rowe  [facility]  RADMED [address]  Scheduled Appointment: 06/07/2024    Brannon Blackwell  [facility]  ORTHOSURG [address]  Scheduled Appointment: 06/20/2024     Meredith Daniel  [facility]  [facility]  Scheduled Appointment: 04/17/2024    [facility]  [facility]  Scheduled Appointment: 04/19/2024    [facility]  Scheduled Appointment: 05/10/2024    Tony Rowe  [facility]  [facility]  Scheduled Appointment: 06/12/2024

## 2024-03-27 NOTE — SWALLOW BEDSIDE ASSESSMENT ADULT - SLP GENERAL OBSERVATIONS
Pt recd a&ox3, cooperative, dysarthric, tolerating RA, NAD, 0/10 pain scale pre/post, spouse @ the bedside.

## 2024-03-27 NOTE — ED ADULT NURSE NOTE - OBJECTIVE STATEMENT
assumed  of pt at 1121 in CT, code stroke called, MD Lugo at bedside. pt reports sudden onset of slurred speech and inability to speak starting at 11 am. see NIH in flow sheet. left lower extremity is swollen, as per pt swelling as been present for last 2 days s/p "left hip fracture on friday". rr even and unlabored. anox3. placed on cardiac monitor and . pt educated on plan of care, pt able to successfully teach back plan of care to RN, RN will continue to reeducate pt during hospital stay.

## 2024-03-27 NOTE — H&P ADULT - ASSESSMENT
ASSESSMENT: 79 yo M with PMH of HTN, CAD, parotid tumor, skin cancer with mets presenting with acute onset aphasia, dysarthria and a facial found to have age indeterminate infarct in the left frontal and left parietal lobes.     NEURO:   -Neurologically patient is awake and alert, with dysarthria and expressive aphasia, moving all extremities    -Continue close monitoring for neurologic deterioration    - Stroke neuro checks q 2 hours   - Permissive HTN, avoid rapid fluctuations and hypotension   -ANTITHROMBOTIC THERAPY:  ASA 300mg per rectum  -titrate statin to LDL goal less than 70, will start once patient passes swallow eval  -MRI Brain with and without  -Dysphagia screen: fail, SLP consulted  -Physical therapy/OT/Speech eval/treatment.       -CARDIOVASCULAR: check TTE with bubble, cardiac monitoring w/ telemetry for now, further evaluation pending findings of noted workup                              -HEMATOLOGY: H/H 12.5/38.5, Platelets 217, patient should have all age and risk appropriate malignancy screenings with PCP or sooner if clinically suspected      DVT ppx: Heparin s.c [] LMWH [x]    Lower extremity dopplers     PULMONARY: CXR pending , protecting airway, saturating well     RENAL: BUN/Cr 17/0.91, monitor urine output, maintain adequate hydration       Na Goal:  135-145     Rod: none    ID: afebrile, no leukocytosis, monitor for si/sx of infection     OTHER:  condition and plan of care d/w patient, questions and concerns addressed.     DISPOSITION: Rehab or home depending on PT eval once stable and workup is complete      CORE MEASURES:        Admission NIHSS: 5     Tenecteplase : [] YES x[] NO      LDL/HDL/A1C: pending     Depression Screen- if depression hx and/or present      Statin Therapy: Lipitor 40 once patient is able to tolerate PO     Dysphagia Screen: [] PASS [X] FAIL     Smoking [X] YES CIGARS [] NO      Afib [] YES X[] NO     Stroke Education [X] YES [] NO    Obtain screening lower extremity venous ultrasound in patients who meet 1 or more of the following criteria as patient is high risk for DVT/PE on admission:   [] History of DVT/PE  X[]Hypercoagulable states (Factor V Leiden, Cancer, OCP, etc. )  []Prolonged immobility (hemiplegia/hemiparesis/post operative or any other extended immobilization)  [] Transferred from outside facility (Rehab or Long term care)  [] Age </= to 50  [X]Swollen left lower extremity

## 2024-03-27 NOTE — DISCHARGE NOTE PROVIDER - NSDCFUADDINST_GEN_ALL_CORE_FT
Orthopedic Recommendations:  * Surgery with Dr Blackwell canceled and will be rescheduled at a later date when cleared by neurosurgery   * Protective weight bearing with cane left lower extremity   * Pain control   * Case discussed with Dr Blackwell Orthopedic Recommendations:  * Surgery with Dr Blackwell canceled and will be rescheduled at a later date when cleared by neurology   * Protective weight bearing with cane left lower extremity   * Pain control   * Case discussed with Dr Blackwell    Please utilize your devices/instructions recommended by your rehab team

## 2024-03-27 NOTE — ED PROVIDER NOTE - CRANIAL NERVE AND PUPILLARY EXAM
cough reflex intact/corneal reflex intact/central and peripheral vision intact/extra-ocular movements intact/gag reflex intact/tongue is midline

## 2024-03-27 NOTE — DISCHARGE NOTE PROVIDER - NSDCQMSTROKERISK_NEU_ALL_CORE
History of a stroke or TIA normal... High blood pressure/High cholesterol/History of a stroke or TIA

## 2024-03-27 NOTE — ED PROVIDER NOTE - ENMT, MLM
Airway patent, Nasal mucosa clear. Mouth with normal mucosa. Throat has no vesicles, no oropharyngeal exudates and uvula is midline. rt parotid swelling , rt facial droop - lower

## 2024-03-27 NOTE — H&P ADULT - HISTORY OF PRESENT ILLNESS
77yo M with history of HTN, CAD s/p stent placement x6, right parotid cancer s/p radiation therapy, squamous cell cancer with mets presenting to the ED with acute onset aphasia, dysarthria and right facial asymmetry with LKW time 11AM. Stroke code was activated for the patient, CTH, CTA H/N and CTP obtained with a finding of age indeterminate left frontal lobe and left parietal lobe infarcts. No LVO or perfusion deficit. TNK not administered given appearance of stroke on CT. Patient admitted to the Neurology service for further work up

## 2024-03-27 NOTE — H&P ADULT - NSHPREVIEWOFSYSTEMS_GEN_ALL_CORE
REVIEW OF SYSTEMS       Constitutional:  No Weight Change, No Fever, No Chills, No Night Sweats, No Fatigue, No Malaise   ENT/Mouth:  No Hearing Changes, No Ear Pain, No Nasal Congestion, No  Sinus Pain, No Hoarseness, No sore throat, No Rhinorrhea, No Swallowing  Difficulty   Eyes:  No Eye Pain, No Swelling, No Redness, No Foreign Body, No Discharge, No Vision Changes   Cardiovascular:  No Chest Pain, No SOB, No PND, No Dyspnea on Exertion,  No Orthopnea, No Claudication, No Edema, No Palpitations   Respiratory:  No Cough, No Sputum, No Wheezing, No Smoke Exposure, No Dyspnea   Gastrointestinal:  No Nausea, No Vomiting, No Diarrhea, No  Constipation, No Pain, No Heartburn, No Anorexia, No Dysphagia, No  Hematochezia, No Melena, No Flatulence, No Jaundice   Genitourinary:  No DUB, No Dysuria, No  Urinary Frequency, No Hematuria, No Urinary Incontinence, No Urgency,  No Flank Pain, No Urinary Flow Changes, No Hesitancy   Musculoskeletal:  No Arthralgias, No Myalgias, No Joint Swelling, No  Joint Stiffness, No Back Pain, No Neck Pain, No Injury History   Skin: + skin lesions   Neuro: dysarthria and aphasia, No Weakness, No Numbness, No Paresthesias, No Loss of  Consciousness, No Syncope, No Dizziness, No Headache, No Coordination  Changes, No Recent Falls   Psych:  No Anxiety/Panic, No Depression, No Insomnia, No Personality  Changes, No Delusions, No Rumination, No SI/HI/AH/VH, No Social Issues,  No Memory Changes, No Violence/Abuse Hx., No Eating Concerns   Heme/Lymph:  No Bruising, No Bleeding,    Endocrine:  No Polyuria, No Polydipsia, No Temperature Intolerance

## 2024-03-27 NOTE — H&P ADULT - NSHPLABSRESULTS_GEN_ALL_CORE
12.5   9.05  )-----------( 217      ( 27 Mar 2024 11:20 )             38.5     03-27    141  |  104  |  17.0  ----------------------------<  108<H>  4.5   |  26.0  |  0.91    Ca    9.3      27 Mar 2024 11:20    TPro  6.3<L>  /  Alb  3.8  /  TBili  0.4  /  DBili  x   /  AST  17  /  ALT  12  /  AlkPhos  67  03-27    Troponin T, High Sensitivity (03.27.24 @ 12:40)   Troponin T, High Sensitivity Result: 33: *     CT Brain Stroke Protocol (03.27.24 @ 11:35)     IMPRESSION: Age-indeterminate, possibly acute, left frontal lobe and left   parietal lobe infarctions. No acute intracranial hemorrhage    CTA BRAIN:  Mild bilateral cavernous carotid artery calcifications. Mild right   vertebral artery calcifications.  The right vertebral artery is dominant. 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  The [place] and vertebrobasilar system are unremarkable without   evidence of stenosis, occlusion or saccular aneurysm dilation. No   evidence for arterial venous malformation.      CTA NECK:  Mild bilateral carotid bulb/proximal internal carotid artery   calcifications.  A left-sided aortic arch is demonstrated. There is normal relationship to   the great vessels. The common carotid arteries, internal carotid arteries   and vertebral arteries show no evidence of significant stenosis,   occlusion or saccular aneurysm dilation.    CT PERFUSION:  Patient has only had less than 2 hours of symptoms may influence the CT   perfusion.    No core infarct or evidence of delayed mean transit time is identified.

## 2024-03-27 NOTE — H&P ADULT - CRITICAL CARE ATTENDING COMMENT
78M with CAD, HTN, parotid tumor and skin cancer w/ mets on chemotherapy here with expressive aphasia, dysarthria and R facial palsy with L frontoparietal infarct on CTH. CTA without significant stenosis or LVO. CTP neg. Not tnk candidate given infarct seen on CTH.   Failed dysphagia  Will proceed with MRI brain ww/o to r/o underlying mass though looks more suspicious for stroke  Rest of stroke workup as above  q2h neurochecks for now

## 2024-03-27 NOTE — ED ADULT NURSE REASSESSMENT NOTE - NS ED NURSE REASSESS COMMENT FT1
pt AOx4, breathing even and unlabored. VSs. Report given to CDU RN [place] Pt transferred to CDU 10R on monitor w/RN. RN aware of transfer of care.

## 2024-03-27 NOTE — ED ADULT NURSE NOTE - NSFALLRISKASMTTYPE_ED_ALL_ED
Please call Dr Issa Correa about colonoscopy  Please do your Shingrix vaccine in the pharmacy at your convenience  Please go for blood work today  Use Flonase 2 sprays each nostril every night  Please do CT scan of your chest for the lung cancer screen  Consider to quit smoking completely  Medicare Preventive Visit Patient Instructions  Thank you for completing your Welcome to Medicare Visit or Medicare Annual Wellness Visit today  Your next wellness visit will be due in one year (4/25/2024)  The screening/preventive services that you may require over the next 5-10 years are detailed below  Some tests may not apply to you based off risk factors and/or age  Screening tests ordered at today's visit but not completed yet may show as past due  Also, please note that scanned in results may not display below  Preventive Screenings:  Service Recommendations Previous Testing/Comments   Colorectal Cancer Screening  Colonoscopy    Fecal Occult Blood Test (FOBT)/Fecal Immunochemical Test (FIT)  Fecal DNA/Cologuard Test  Flexible Sigmoidoscopy Age: 39-70 years old   Colonoscopy: every 10 years (May be performed more frequently if at higher risk)  OR  FOBT/FIT: every 1 year  OR  Cologuard: every 3 years  OR  Sigmoidoscopy: every 5 years  Screening may be recommended earlier than age 39 if at higher risk for colorectal cancer  Also, an individualized decision between you and your healthcare provider will decide whether screening between the ages of 74-80 would be appropriate   Colonoscopy: 03/24/2021  FOBT/FIT: Not on file  Cologuard: Not on file  Sigmoidoscopy: Not on file          Prostate Cancer Screening Individualized decision between patient and health care provider in men between ages of 53-78   Medicare will cover every 12 months beginning on the day after your 50th birthday PSA: No results in last 5 years     Screening Not Indicated     Hepatitis C Screening Once for adults born between [birth year] and 1963  More frequently in patients at high risk for Hepatitis C Hep C Antibody: Not on file        Diabetes Screening 1-2 times per year if you're at risk for diabetes or have pre-diabetes Fasting glucose: No results in last 5 years (No results in last 5 years)  A1C: No results in last 5 years (No results in last 5 years)      Cholesterol Screening Once every 5 years if you don't have a lipid disorder  May order more often based on risk factors  Lipid panel: Not on file  Screening Not Indicated  History Lipid Disorder      Other Preventive Screenings Covered by Medicare:  Abdominal Aortic Aneurysm (AAA) Screening: covered once if your at risk  You're considered to be at risk if you have a family history of AAA or a male between the age of 73-68 who smoking at least 100 cigarettes in your lifetime  Lung Cancer Screening: covers low dose CT scan once per year if you meet all of the following conditions: (1) Age 50-69; (2) No signs or symptoms of lung cancer; (3) Current smoker or have quit smoking within the last 15 years; (4) You have a tobacco smoking history of at least 20 pack years (packs per day x number of years you smoked); (5) You get a written order from a healthcare provider  Glaucoma Screening: covered annually if you're considered high risk: (1) You have diabetes OR (2) Family history of glaucoma OR (3)  aged 48 and older OR (3)  American aged 72 and older  Osteoporosis Screening: covered every 2 years if you meet one of the following conditions: (1) Have a vertebral abnormality; (2) On glucocorticoid therapy for more than 3 months; (3) Have primary hyperparathyroidism; (4) On osteoporosis medications and need to assess response to drug therapy  HIV Screening: covered annually if you're between the age of 12-76  Also covered annually if you are younger than 13 and older than 72 with risk factors for HIV infection   For pregnant patients, it is covered up to 3 times per pregnancy  Immunizations:  Immunization Recommendations   Influenza Vaccine Annual influenza vaccination during flu season is recommended for all persons aged >= 6 months who do not have contraindications   Pneumococcal Vaccine   * Pneumococcal conjugate vaccine = PCV13 (Prevnar 13), PCV15 (Vaxneuvance), PCV20 (Prevnar 20)  * Pneumococcal polysaccharide vaccine = PPSV23 (Pneumovax) Adults 25-60 years old: 1-3 doses may be recommended based on certain risk factors  Adults 72 years old: 1-2 doses may be recommended based off what pneumonia vaccine you previously received   Hepatitis B Vaccine 3 dose series if at intermediate or high risk (ex: diabetes, end stage renal disease, liver disease)   Tetanus (Td) Vaccine - COST NOT COVERED BY MEDICARE PART B Following completion of primary series, a booster dose should be given every 10 years to maintain immunity against tetanus  Td may also be given as tetanus wound prophylaxis  Tdap Vaccine - COST NOT COVERED BY MEDICARE PART B Recommended at least once for all adults  For pregnant patients, recommended with each pregnancy  Shingles Vaccine (Shingrix) - COST NOT COVERED BY MEDICARE PART B  2 shot series recommended in those aged 48 and above     Health Maintenance Due:      Topic Date Due    Hepatitis C Screening  Never done    Colorectal Cancer Screening  04/25/2024 (Originally 11/20/1989)    Lung Cancer Screening  Discontinued     Immunizations Due:  There are no preventive care reminders to display for this patient  Advance Directives   What are advance directives? Advance directives are legal documents that state your wishes and plans for medical care  These plans are made ahead of time in case you lose your ability to make decisions for yourself  Advance directives can apply to any medical decision, such as the treatments you want, and if you want to donate organs  What are the types of advance directives?   There are many types of advance directives, and each state has rules about how to use them  You may choose a combination of any of the following:  Living will: This is a written record of the treatment you want  You can also choose which treatments you do not want, which to limit, and which to stop at a certain time  This includes surgery, medicine, IV fluid, and tube feedings  Durable power of  for healthcare [facility]): This is a written record that states who you want to make healthcare choices for you when you are unable to make them for yourself  This person, called a proxy, is usually a family member or a friend  You may choose more than 1 proxy  Do not resuscitate (DNR) order:  A DNR order is used in case your heart stops beating or you stop breathing  It is a request not to have certain forms of treatment, such as CPR  A DNR order may be included in other types of advance directives  Medical directive: This covers the care that you want if you are in a coma, near death, or unable to make decisions for yourself  You can list the treatments you want for each condition  Treatment may include pain medicine, surgery, blood transfusions, dialysis, IV or tube feedings, and a ventilator (breathing machine)  Values history: This document has questions about your views, beliefs, and how you feel and think about life  This information can help others choose the care that you would choose  Why are advance directives important? An advance directive helps you control your care  Although spoken wishes may be used, it is better to have your wishes written down  Spoken wishes can be misunderstood, or not followed  Treatments may be given even if you do not want them  An advance directive may make it easier for your family to make difficult choices about your care  Cigarette Smoking and Your Health   Risks to your health if you smoke:  Nicotine and other chemicals found in tobacco damage every cell in your body   Even if you are a light smoker, you have an increased risk for cancer, heart disease, and lung disease  If you are pregnant or have diabetes, smoking increases your risk for complications  Benefits to your health if you stop smoking: You decrease respiratory symptoms such as coughing, wheezing, and shortness of breath  You reduce your risk for cancers of the lung, mouth, throat, kidney, bladder, pancreas, stomach, and cervix  If you already have cancer, you increase the benefits of chemotherapy  You also reduce your risk for cancer returning or a second cancer from developing  You reduce your risk for heart disease, blood clots, heart attack, and stroke  You reduce your risk for lung infections, and diseases such as pneumonia, asthma, chronic bronchitis, and emphysema  Your circulation improves  More oxygen can be delivered to your body  If you have diabetes, you lower your risk for complications, such as kidney, artery, and eye diseases  You also lower your risk for nerve damage  Nerve damage can lead to amputations, poor vision, and blindness  You improve your body's ability to heal and to fight infections  For more information and support to stop smoking:   [employer]  Phone: 7- 825 - 334-4459  Web Address: www [employer]   [employer] 2018 Information is for End User's use only and may not be sold, redistributed or otherwise used for commercial purposes   All illustrations and images included in CareNotes® are the copyrighted property of [employer] A M , [employer]  or [address] [place] [address] [facility] (On Arrival)

## 2024-03-27 NOTE — DISCHARGE NOTE PROVIDER - PROVIDER TOKENS
PROVIDER:[TOKEN:[[id number]]] PROVIDER:[TOKEN:[[id number]],FOLLOWUP:[1-3 days]],PROVIDER:[TOKEN:[[id number]],FOLLOWUP:[1 week]],FREE:[LAST:[Your Primary Care Provider],PHONE:[(   )    -],FAX:[(   )    -],FOLLOWUP:[1 week]],FREE:[LAST:[Your Hematologist/Oncologist],PHONE:[(   )    -],FAX:[(   )    -],FOLLOWUP:[1 week]]

## 2024-03-27 NOTE — ED ADULT NURSE NOTE - NSFALLTYPE_ED_ALL_ED
CSWD with curette to remove 0.5cm2 nonviable tissue from wound bed and 2cm2 of callous from periwound. Patient tolerated well.   
Multiple falls

## 2024-03-27 NOTE — DISCHARGE NOTE PROVIDER - NSDCCPCAREPLAN_GEN_ALL_CORE_FT
PRINCIPAL DISCHARGE DIAGNOSIS  Diagnosis: Stroke  Assessment and Plan of Treatment:      PRINCIPAL DISCHARGE DIAGNOSIS  Diagnosis: Ischemic stroke  Assessment and Plan of Treatment: You were admitted to the hospital because you had a ischemic stroke  You have these risks factors of strokes:  -high blood pressure (Hypertension),   -high cholesterol (also called hyperlipidemia)   -Hypercoagulable state - please follow up with your hematologist/oncologist  For secondary stroke prevention please take your medications as prescribed. If you run low on your medication, please contact your doctor before you run out.  You will follow up outpatient with the vascular neurology team, the office will call you within 3 days of discharge to schedule an appointment.  If you do not hear from the office please call the phone number provided.    Please see all regularly scheduled providers as prior to your admission. Please see your primary care doctor within one week of discharge.  In addition discuss with your primary care provider regarding candidacy for routine malignancy screenings.   Adjustments to your regular tasks may be difficult after you've had a stroke, but you can utilize  new ways/assistance as needed to manage your daily activities based on the recommendations of your physical, occupational, speech and language team if applicable.    Call 911 if you or someone you know experiences the following symptoms of stroke (can be remembered by BE FAST):  •Balance: Dizziness, loss of balance, or a sense of falling  •Eyes: Sudden double vision, loss of vision, or blurred vision  •Face: drooping of one side of the face  •Arm: arm weakness or drifting  •Speech:  Sudden trouble talking or slurred speech, trouble understanding others  •Time: Time to call for an ambulance fast!         SECONDARY DISCHARGE DIAGNOSES  Diagnosis: Hypertension  Assessment and Plan of Treatment: Maintain a journal record of your blood pressure twice a day to show your primary care/cardiologist .    Diagnosis: Hyperlipidemia  Assessment and Plan of Treatment: please see nutritionist and take your statin therapy if you were prescribed one.  Your liver function will need to be monitored.    Diagnosis: Suspected fracture of left hip  Assessment and Plan of Treatment: please follow up with your orthopedic surgeon    Diagnosis: Squamous cell carcinoma of other specified sites of skin  Assessment and Plan of Treatment: please follow up with your hematologist/oncologist

## 2024-03-27 NOTE — DISCHARGE NOTE PROVIDER - HOSPITAL COURSE
77yo M with history of HTN, CAD s/p stent placement x6, right parotid cancer s/p radiation therapy, squamous cell cancer with mets presenting to the ED with acute onset aphasia, dysarthria and right facial asymmetry with LKW time 11AM. Stroke code was activated for the patient, CTH, CTA H/N and CTP obtained with a finding of age indeterminate left frontal lobe and left parietal lobe infarcts. No LVO or perfusion deficit. TNK not administered given appearance of stroke on CT. Patient admitted to the Neurology service for further work up. Patient was noted to have swelling in the LLE, lower extremity dopplers found to be unremarkable. Patient failed bedside swallow eval, started on ASA 300mg per rectum    CT Brain Stroke Protocol (03.27.24 @ 11:35)     IMPRESSION: Age-indeterminate, possibly acute, left frontal lobe and left   parietal lobe infarctions. No acute intracranial hemorrhage.       CT Angio Brain Stroke Protocol  w/ IV Cont (03.27.24 @ 11:48)     CTA BRAIN:  Mild bilateral cavernous carotid artery calcifications. Mild right   vertebral artery calcifications.  The right vertebral artery is dominant. 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  The [place] of [place] and vertebrobasilar system are unremarkable without   evidence of stenosis, occlusion or saccular aneurysm dilation. No   evidence for arterial venous malformation.      CTA NECK:  Mild bilateral carotid bulb/proximal internal carotid artery   calcifications.  A left-sided aortic arch is demonstrated. There is normal relationship to   the great vessels. The common carotid arteries, internal carotid arteries   and vertebral arteries show no evidence of significant stenosis,   occlusion or saccular aneurysm dilation.    CT PERFUSION:  Patient has only had less than 2 hours of symptoms may influence the CT   perfusion.  No core infarct or evidence of delayed mean transit time is identified.    Xray Chest 1 View- PORTABLE-Urgent (03.27.24 @ 13:19)     IMPRESSION: Clear lungs at this time.      US Duplex Venous Lower Ext Complete, Bilateral (03.27.24 @ 16:13)     IMPRESSION:  No evidence of deep venous thrombosis in either lower extremity.       79yo M with history of HTN, CAD s/p stent placement x6, right parotid cancer s/p radiation therapy, squamous cell cancer with mets presenting to the ED with acute onset aphasia, dysarthria and right facial palsy with LKW time 11AM 11/27/24. Stroke code was activated for the patient, CTH, CTA H/N and CTP obtained with a finding of age indeterminate left frontal lobe and left parietal lobe infarcts. No LVO or perfusion deficit. Tenecteplase not administered given appearance of hypodensity concerning for stroke on CT.  CT angiogram demonstrated bilateral carotid and right vertebral artery calcifications with a hypoplastic left vertebral artery. No large vessel occlusion therefore not thrombectomy candidate. MRI demonstrated acute left posterior frontal lobe infarction. Carotid duplex revealed no significant hemodynamic stenosis of either carotid artery. Did note delayed upstroke of the left vertebral artery waveform may be due to congenitally hypoplastic vessel or stenosis. Stated to correlate with recent CTA which notes hypoplastic. Patient was planned for hip surgery this week prior to event leading to this admission. Orthopedics followed up who suggested protective weight bearing with LLE cane. Patient examined at bedside and seen with cane at bedside. From a neurological standpoint there is no neurological contraindication to patient having hip surgery, however should be discussed with hematology as well prior regarding AC plan while bridging surgically. Patient should follow up with orthopedic surgeon for time line. Patient to be discharged home with home PT, supervision for outdoor ambulation, use of SAC, +home with assist, and home OT.    Impression: left posterior frontal lobe cerebral infarction with underlying etiology concerning for hypercoagulable state in the setting of malignancy  After hematology consult patient was started on Eliquis 5mg BID.  Suggest outpatient hematologist/oncologist follow up for long term duration. Patient remains on ASA 81mg daily in setting of hx of CAD s/p stent placement.     MR Head w/wo IV Cont (03.27.24 @ 21:29)   IMPRESSION: Acute infarct in the left posterior frontal lobe.    TTE Limited W or WO Ultrasound Enhancing Agent (03.27.24 @ 15:04)   CONCLUSIONS:   1. Left ventricular cavity is normal in size. Left ventricular wall thickness is normal. Left ventricular systolic function is low normal with an ejection fraction of 55 % by Valdez's method of disks. There are no regional wall motion abnormalities seen.   2. There is mild (grade 1) left ventricular diastolic dysfunction, with normal filling pressure.   3. Normal right ventricular cavity size, with normal wall thickness, and normal systolic function.   4. There is moderate calcification of the aortic valve leaflets. mild to moderate aortic stenosis.   5. There is moderate calcification of the mitral valve annulus.   6. Mild mitral regurgitation.   7. No pericardial effusion seen.   8. No prior echocardiogram is available for comparison.   9. Pulmonary artery systolic pressure could not be estimated.    CT Brain Stroke Protocol (03.27.24 @ 11:35)   IMPRESSION: Age-indeterminate, possibly acute, left frontal lobe and left   parietal lobe infarctions. No acute intracranial hemorrhage.     CT Angio Brain Stroke Protocol  w/ IV Cont (03.27.24 @ 11:48)   CTA BRAIN:  Mild bilateral cavernous carotid artery calcifications. Mild right   vertebral artery calcifications.  The right vertebral artery is dominant. 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  The [place] of [place] and vertebrobasilar system are unremarkable without   evidence of stenosis, occlusion or saccular aneurysm dilation. No   evidence for arterial venous malformation.    CTA NECK:  Mild bilateral carotid bulb/proximal internal carotid artery   calcifications.  A left-sided aortic arch is demonstrated. There is normal relationship to   the great vessels. The common carotid arteries, internal carotid arteries   and vertebral arteries show no evidence of significant stenosis,   occlusion or saccular aneurysm dilation.    CT PERFUSION:  Patient has only had less than 2 hours of symptoms may influence the CT   perfusion.  No core infarct or evidence of delayed mean transit time is identified.    Xray Chest 1 View- PORTABLE-Urgent (03.27.24 @ 13:19)   IMPRESSION: Clear lungs at this time.    US Duplex Venous Lower Ext Complete, Bilateral (03.27.24 @ 16:13)   IMPRESSION:  No evidence of deep venous thrombosis in either lower extremity.    US Duplex Carotid Arteries Complete, Bilateral (03.28.24 @ 11:18)   IMPRESSION:  -No significant hemodynamic stenosis of either carotid artery.  -Delayed upstroke of the left vertebral artery waveform may be due to   congenitally hypoplastic vessel or stenosis. Correlate with recent CTA   neck.               77yo M with history of HTN, CAD s/p stent placement x6, right parotid cancer s/p radiation therapy, squamous cell cancer with mets presenting to the ED with acute onset aphasia, dysarthria and right facial palsy with LKW time 11AM 11/27/24. Stroke code was activated for the patient, CTH, CTA H/N and CTP obtained with a finding of age indeterminate left frontal lobe and left parietal lobe infarcts. No LVO or perfusion deficit. Tenecteplase not administered given appearance of hypodensity concerning for stroke on CT.  CT angiogram demonstrated bilateral carotid and right vertebral artery calcifications with a hypoplastic left vertebral artery. No large vessel occlusion therefore not thrombectomy candidate. MRI demonstrated acute left posterior frontal lobe infarction. Carotid duplex revealed no significant hemodynamic stenosis of either carotid artery. Did note delayed upstroke of the left vertebral artery waveform may be due to congenitally hypoplastic vessel or stenosis. Stated to correlate with recent CTA which notes hypoplastic. Patient was planned for hip surgery this week prior to event leading to this admission. Orthopedics followed up who suggested protective weight bearing with LLE cane. Patient examined at bedside and seen with cane at bedside. From a neurological standpoint there is no neurological contraindication to patient having hip surgery, however should be discussed with hematology as well prior regarding AC plan while bridging surgically. Patient was started on Atorvastain 40mg daily in setting of hyperlipidemia and should follow up with PCP for titration if needed and LFT monitoring. Patient should resume all home medications on discharge. Patient should follow up with orthopedic surgeon for time line. Patient to be discharged home with home PT, supervision for outdoor ambulation, use of SAC, +home with assist, and home OT.     Impression: left posterior frontal lobe cerebral infarction with underlying etiology concerning for hypercoagulable state in the setting of malignancy  After hematology consult patient was started on Eliquis 5mg BID.  Suggest outpatient hematologist/oncologist follow up for long term duration. Patient remains on ASA 81mg daily in setting of hx of CAD s/p stent placement.     MR Head w/wo IV Cont (03.27.24 @ 21:29)   IMPRESSION: Acute infarct in the left posterior frontal lobe.    TTE Limited W or WO Ultrasound Enhancing Agent (03.27.24 @ 15:04)   CONCLUSIONS:   1. Left ventricular cavity is normal in size. Left ventricular wall thickness is normal. Left ventricular systolic function is low normal with an ejection fraction of 55 % by Valdez's method of disks. There are no regional wall motion abnormalities seen.   2. There is mild (grade 1) left ventricular diastolic dysfunction, with normal filling pressure.   3. Normal right ventricular cavity size, with normal wall thickness, and normal systolic function.   4. There is moderate calcification of the aortic valve leaflets. mild to moderate aortic stenosis.   5. There is moderate calcification of the mitral valve annulus.   6. Mild mitral regurgitation.   7. No pericardial effusion seen.   8. No prior echocardiogram is available for comparison.   9. Pulmonary artery systolic pressure could not be estimated.    CT Brain Stroke Protocol (03.27.24 @ 11:35)   IMPRESSION: Age-indeterminate, possibly acute, left frontal lobe and left   parietal lobe infarctions. No acute intracranial hemorrhage.     CT Angio Brain Stroke Protocol  w/ IV Cont (03.27.24 @ 11:48)   CTA BRAIN:  Mild bilateral cavernous carotid artery calcifications. Mild right   vertebral artery calcifications.  The right vertebral artery is dominant. 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  The [place] of [place] and vertebrobasilar system are unremarkable without   evidence of stenosis, occlusion or saccular aneurysm dilation. No   evidence for arterial venous malformation.    CTA NECK:  Mild bilateral carotid bulb/proximal internal carotid artery   calcifications.  A left-sided aortic arch is demonstrated. There is normal relationship to   the great vessels. The common carotid arteries, internal carotid arteries   and vertebral arteries show no evidence of significant stenosis,   occlusion or saccular aneurysm dilation.    CT PERFUSION:  Patient has only had less than 2 hours of symptoms may influence the CT   perfusion.  No core infarct or evidence of delayed mean transit time is identified.    Xray Chest 1 View- PORTABLE-Urgent (03.27.24 @ 13:19)   IMPRESSION: Clear lungs at this time.    US Duplex Venous Lower Ext Complete, Bilateral (03.27.24 @ 16:13)   IMPRESSION:  No evidence of deep venous thrombosis in either lower extremity.    US Duplex Carotid Arteries Complete, Bilateral (03.28.24 @ 11:18)   IMPRESSION:  -No significant hemodynamic stenosis of either carotid artery.  -Delayed upstroke of the left vertebral artery waveform may be due to   congenitally hypoplastic vessel or stenosis. Correlate with recent CTA   neck.

## 2024-03-27 NOTE — H&P ADULT - NSHPPHYSICALEXAM_GEN_ALL_CORE
PHYSICAL EXAM:      Constitutional: patient in no apparent distress    Eyes: no swelling or discharge noted    ENMT: swelling noted over the right side of the face    Neck: no swelling noted    Respiratory: in no respiratory distress, lungs CTA     Cardiovascular: RRR, S1S2    Gastrointestinal: Abdomen not distended    Vascular: swelling noted in the left lower extremity    Neurological: patient awake and alert, oriented to self, place, following commands, speech with moderate dysarthria, moderate expressive aphasia, tongue deviation to the right, mild right facial asymmetry, EOMI, PERRL, antigravity with full strength in all extremities except for the left lower extremity strength limited secondary to recent ORIF, no dysmetria noted, sensation intact to light touch.     Skin: erythematous flaky patches noted over the face    Musculoskeletal: no atrophy noted    Psychiatric: appropriate mood and affect

## 2024-03-27 NOTE — ED ADULT NURSE NOTE - SUICIDE SCREENING DEPRESSION
Negative Skin Substitute Text: The defect edges were debeveled with a #15 scalpel blade.  Given the location of the defect, shape of the defect and the proximity to free margins a skin substitute graft was deemed most appropriate.  The graft material was trimmed to fit the size of the defect. The graft was then placed in the primary defect and oriented appropriately.

## 2024-03-27 NOTE — ED PROVIDER NOTE - MUSCULOSKELETAL, MLM
Spine appears normal, range of motion is not limited, no muscle or joint tenderness, chronci left hip pain and uses cane 2/2 mets

## 2024-03-27 NOTE — DISCHARGE NOTE PROVIDER - CARE PROVIDER_API CALL
Brannon Blackwell  Orthopaedic Surgery  [address] [place] [id number]  Phone: [phone]  Fax: [phone]  Follow Up Time:    Brannon Blackwell  Orthopaedic Surgery  [address] [place] [address]  Phone: [phone]  Fax: [phone]  Follow Up Time: 1-3 days    Angie Mercado  Neurology  [address] [place] [address]  Phone: [phone]  Fax: [phone]  Follow Up Time: 1 week    Your Primary Care Provider,   Phone: (   )    -  Fax: (   )    -  Follow Up Time: 1 week    Your Hematologist/Oncologist,   Phone: (   )    -  Fax: (   )    -  Follow Up Time: 1 week

## 2024-03-27 NOTE — SWALLOW BEDSIDE ASSESSMENT ADULT - SWALLOW EVAL: DIAGNOSIS
Oral phase of swallow functional for all consistencies provided. Suspect pharyngeal dysphagia w/ thin liquids marked by multiple swallows and cough post intake. No overt s/s of penetration/aspiration w/ puree, advanced solids or MILDLY thick liquids.

## 2024-03-27 NOTE — ED PROVIDER NOTE - OBJECTIVE STATEMENT
78-year-old male with history of hypertension right parotid tumor, skin cancer, on Keytruda and on radiation therapy for left hip metatarsals presents with sudden onset of slurred speech around 11 AM today.  Patient has no other weakness.  Patient denies any fall or trauma.  Patient is not on any blood thinners patient patient denies any major surgeries recently.  Patient was brought in by ambulance from home and the history was obtained from the family and patient is able to offer limited history

## 2024-03-27 NOTE — DISCHARGE NOTE PROVIDER - CARE PROVIDERS DIRECT ADDRESSES
,[email or web address],DirectAddress_Unknown,DirectAddress_Unknown

## 2024-03-27 NOTE — ED ADULT NURSE NOTE - NSFALLHARMRISKINTERV_ED_ALL_ED
Assistance OOB with selected safe patient handling equipment if applicable/Assistance with ambulation/Communicate risk of Fall with Harm to all staff, patient, and family/Monitor gait and stability/Provide visual cue: red socks, yellow wristband, yellow gown, etc/Reinforce activity limits and safety measures with patient and family/Bed in lowest position, wheels locked, appropriate side rails in place/Call bell, personal items and telephone in reach/Instruct patient to call for assistance before getting out of bed/chair/stretcher/Non-slip footwear applied when patient is off stretcher/[place] to call system/Physically safe environment - no spills, clutter or unnecessary equipment/Purposeful Proactive Rounding/Room/bathroom lighting operational, light cord in reach

## 2024-03-28 LAB
ANION GAP SERPL CALC-SCNC: 12 MMOL/L — SIGNIFICANT CHANGE UP (ref 5–17)
BASOPHILS # BLD AUTO: 0.03 K/UL — SIGNIFICANT CHANGE UP (ref 0–0.2)
BASOPHILS NFR BLD AUTO: 0.5 % — SIGNIFICANT CHANGE UP (ref 0–2)
BUN SERPL-MCNC: 12.9 MG/DL — SIGNIFICANT CHANGE UP (ref 8–20)
CALCIUM SERPL-MCNC: 9.4 MG/DL — SIGNIFICANT CHANGE UP (ref 8.4–10.5)
CHLORIDE SERPL-SCNC: 104 MMOL/L — SIGNIFICANT CHANGE UP (ref 96–108)
CHOLEST SERPL-MCNC: 220 MG/DL — HIGH
CO2 SERPL-SCNC: 26 MMOL/L — SIGNIFICANT CHANGE UP (ref 22–29)
CREAT SERPL-MCNC: 0.75 MG/DL — SIGNIFICANT CHANGE UP (ref 0.5–1.3)
EGFR: 94 ML/MIN/1.73M2 — SIGNIFICANT CHANGE UP
EOSINOPHIL # BLD AUTO: 0.25 K/UL — SIGNIFICANT CHANGE UP (ref 0–0.5)
EOSINOPHIL NFR BLD AUTO: 4.4 % — SIGNIFICANT CHANGE UP (ref 0–6)
GLUCOSE SERPL-MCNC: 82 MG/DL — SIGNIFICANT CHANGE UP (ref 70–99)
HCT VFR BLD CALC: 38.1 % — LOW (ref 39–50)
HDLC SERPL-MCNC: 59 MG/DL — SIGNIFICANT CHANGE UP
HGB BLD-MCNC: 12.4 G/DL — LOW (ref 13–17)
IMM GRANULOCYTES NFR BLD AUTO: 0.5 % — SIGNIFICANT CHANGE UP (ref 0–0.9)
LIPID PNL WITH DIRECT LDL SERPL: 125 MG/DL — HIGH
LYMPHOCYTES # BLD AUTO: 0.65 K/UL — LOW (ref 1–3.3)
LYMPHOCYTES # BLD AUTO: 11.3 % — LOW (ref 13–44)
MCHC RBC-ENTMCNC: 29.9 PG — SIGNIFICANT CHANGE UP (ref 27–34)
MCHC RBC-ENTMCNC: 32.5 GM/DL — SIGNIFICANT CHANGE UP (ref 32–36)
MCV RBC AUTO: 91.8 FL — SIGNIFICANT CHANGE UP (ref 80–100)
MONOCYTES # BLD AUTO: 0.62 K/UL — SIGNIFICANT CHANGE UP (ref 0–0.9)
MONOCYTES NFR BLD AUTO: 10.8 % — SIGNIFICANT CHANGE UP (ref 2–14)
NEUTROPHILS # BLD AUTO: 4.16 K/UL — SIGNIFICANT CHANGE UP (ref 1.8–7.4)
NEUTROPHILS NFR BLD AUTO: 72.5 % — SIGNIFICANT CHANGE UP (ref 43–77)
NON HDL CHOLESTEROL: 161 MG/DL — HIGH
PLATELET # BLD AUTO: 218 K/UL — SIGNIFICANT CHANGE UP (ref 150–400)
POTASSIUM SERPL-MCNC: 3.8 MMOL/L — SIGNIFICANT CHANGE UP (ref 3.5–5.3)
POTASSIUM SERPL-SCNC: 3.8 MMOL/L — SIGNIFICANT CHANGE UP (ref 3.5–5.3)
RBC # BLD: 4.15 M/UL — LOW (ref 4.2–5.8)
RBC # FLD: 14.6 % — HIGH (ref 10.3–14.5)
SODIUM SERPL-SCNC: 142 MMOL/L — SIGNIFICANT CHANGE UP (ref 135–145)
TRIGL SERPL-MCNC: 182 MG/DL — HIGH
WBC # BLD: 5.74 K/UL — SIGNIFICANT CHANGE UP (ref 3.8–10.5)
WBC # FLD AUTO: 5.74 K/UL — SIGNIFICANT CHANGE UP (ref 3.8–10.5)

## 2024-03-28 PROCEDURE — 93880 EXTRACRANIAL BILAT STUDY: CPT | Mod: 26

## 2024-03-28 PROCEDURE — 99223 1ST HOSP IP/OBS HIGH 75: CPT

## 2024-03-28 RX ORDER — DULOXETINE HYDROCHLORIDE 30 MG/1
1 CAPSULE, DELAYED RELEASE ORAL
Qty: 0 | Refills: 0 | DISCHARGE

## 2024-03-28 RX ORDER — OXYCODONE HYDROCHLORIDE 5 MG/1
1 TABLET ORAL
Refills: 0 | DISCHARGE

## 2024-03-28 RX ORDER — APIXABAN 2.5 MG/1
5 TABLET, FILM COATED ORAL
Refills: 0 | Status: DISCONTINUED | OUTPATIENT
Start: 2024-03-28 | End: 2024-03-29

## 2024-03-28 RX ORDER — DULOXETINE HYDROCHLORIDE 30 MG/1
2 CAPSULE, DELAYED RELEASE ORAL
Qty: 0 | Refills: 0 | DISCHARGE

## 2024-03-28 RX ADMIN — ATORVASTATIN CALCIUM 40 MILLIGRAM(S): 80 TABLET, FILM COATED ORAL at 22:21

## 2024-03-28 RX ADMIN — APIXABAN 5 MILLIGRAM(S): 2.5 TABLET, FILM COATED ORAL at 17:12

## 2024-03-28 RX ADMIN — Medication 81 MILLIGRAM(S): at 13:04

## 2024-03-28 RX ADMIN — ENOXAPARIN SODIUM 40 MILLIGRAM(S): 100 INJECTION SUBCUTANEOUS at 13:04

## 2024-03-28 NOTE — PROGRESS NOTE ADULT - SUBJECTIVE AND OBJECTIVE BOX
Preliminary note, official recommendations pending attending review/signature   Seen and examined by Stroke team attending/team, assessment/ plan as discussed with stroke team attending/team as noted.     [facility] Stroke Team  Progress Note     HPI:  77yo M with history of HTN, CAD s/p stent placement x6, right parotid cancer s/p radiation therapy, squamous cell cancer with mets presenting to the ED with acute onset aphasia, dysarthria and right facial asymmetry with LKW time 11AM. Stroke code was activated for the patient, CTH, CTA H/N and CTP obtained with a finding of age indeterminate left frontal lobe and left parietal lobe infarcts. No LVO or perfusion deficit. TNK not administered given appearance of stroke on CT. Patient admitted to the Neurology service for further work up    SUBJECTIVE: No events overnight.  No new neurologic complaints.  ROS reported negative unless otherwise noted.    aspirin  chewable 81 milliGRAM(s) Oral daily  atorvastatin 40 milliGRAM(s) Oral at bedtime  enoxaparin Injectable 40 milliGRAM(s) SubCutaneous every 24 hours      PHYSICAL EXAM:   Vital Signs Last 24 Hrs  T(C): 36.5 (28 Mar 2024 04:00), Max: 36.9 (28 Mar 2024 00:09)  T(F): 97.7 (28 Mar 2024 04:00), Max: 98.4 (28 Mar 2024 00:09)  HR: 68 (28 Mar 2024 04:00) (61 - 88)  BP: 157/80 (28 Mar 2024 04:00) (138/67 - 176/90)  BP(mean): --  RR: 20 (28 Mar 2024 04:00) (16 - 20)  SpO2: 98% (28 Mar 2024 04:00) (94% - 100%)    Parameters below as of 28 Mar 2024 04:00  Patient On (Oxygen Delivery Method): room air    EXAM PENDING     General: No acute distress.   HEENT: Head normocephalic, atraumatic. Conjunctivae clear w/o exudates or hemorrhage. Sclera non-icteric. Nares are patent bilaterally. Mucous membranes pink and moist.  No tonsillar swelling or exudates.    Neck: Supple, no adenopathy. Trachea midline. No JVD.  Cardiac: Normal rate and rhythm. S1, S2 auscultated. No murmurs, gallops, or rubs.     Respiratory: Lung sounds clear in all fields. Chest wall symmetric, nontender.   Abdominal: Soft, nondistended, nontender. Bowel sounds normoactive x 4 quadrants. No masses, hepatomegaly, or splenomegaly.   Skin: Skin is warm, dry and intact without rashes or lesions. Appropriate color for ethnicity. Nailbeds pink with no cyanosis or clubbing.   Extremities: No edema, 2+ peripheral pulses in b/l upper and b/l lower extremities. Full range of motion in all joints.     NEUROLOGICAL EXAM:  Mental status: Awake, alert, oriented x3, speech fluent, follows commands, no neglect, normal memory   Cranial Nerves: No facial asymmetry, no nystagmus, no dysarthria,  tongue midline  Motor exam: Normal tone, no drift, 5/5 RUE, 5/5 RLE, 5/5 LUE, 5/5 LLE, normal fine finger movements.  Sensation: Intact to light touch   Coordination/ Gait: No dysmetria, gait not tested    LABS:                        12.4   5.74  )-----------( 218      ( 28 Mar 2024 02:58 )             38.1    03-28    142  |  104  |  12.9  ----------------------------<  82  3.8   |  26.0  |  0.75    Ca    9.4      28 Mar 2024 02:58    TPro  6.3<L>  /  Alb  3.8  /  TBili  0.4  /  DBili  x   /  AST  17  /  ALT  12  /  AlkPhos  67  03-27  PT/INR - ( 27 Mar 2024 11:20 )   PT: 10.9 sec;   INR: 0.98 ratio         PTT - ( 27 Mar 2024 11:20 )  PTT:29.2 sec      03-28 Chol 220<H> LDL -- HDL 59 Trig 182<H>    A1C:    IMAGING: Reviewed by me.     Labs, Radiology, Cardiology, and Other Results: 12.5   9.05  )-----------( 217      ( 27 Mar 2024 11:20 )             38.5     03-27    141  |  104  |  17.0  ----------------------------<  108<H>  4.5   |  26.0  |  0.91    Ca    9.3      27 Mar 2024 11:20    TPro  6.3<L>  /  Alb  3.8  /  TBili  0.4  /  DBili  x   /  AST  17  /  ALT  12  /  AlkPhos  67  03-27    Troponin T, High Sensitivity (03.27.24 @ 12:40)   Troponin T, High Sensitivity Result: 33: *     CT Brain Stroke Protocol (03.27.24 @ 11:35)     IMPRESSION: Age-indeterminate, possibly acute, left frontal lobe and left   parietal lobe infarctions. No acute intracranial hemorrhage    CTA BRAIN:  Mild bilateral cavernous carotid artery calcifications. Mild right   vertebral artery calcifications.  The right vertebral artery is dominant. 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  The [place] and vertebrobasilar system are unremarkable without   evidence of stenosis, occlusion or saccular aneurysm dilation. No   evidence for arterial venous malformation.      CTA NECK:  Mild bilateral carotid bulb/proximal internal carotid artery   calcifications.  A left-sided aortic arch is demonstrated. There is normal relationship to   the great vessels. The common carotid arteries, internal carotid arteries   and vertebral arteries show no evidence of significant stenosis,   occlusion or saccular aneurysm dilation.    CT PERFUSION:  Patient has only had less than 2 hours of symptoms may influence the CT   perfusion.    No core infarct or evidence of delayed mean transit time is identified.   Preliminary note, official recommendations pending attending review/signature   Seen and examined by Stroke team attending/team, assessment/ plan as discussed with stroke team attending/team as noted.     [facility] Stroke Team  Progress Note     HPI:  79yo M with history of HTN, CAD s/p stent placement x6, right parotid cancer s/p radiation therapy, squamous cell cancer with mets presenting to the ED with acute onset aphasia, dysarthria and right facial asymmetry with LKW time 11AM. Stroke code was activated for the patient, CTH, CTA H/N and CTP obtained with a finding of age indeterminate left frontal lobe and left parietal lobe infarcts. No LVO or perfusion deficit. TNK not administered given appearance of stroke on CT. Patient admitted to the Neurology service for further work up    SUBJECTIVE: No events overnight.  No new neurologic complaints.  ROS reported negative unless otherwise noted.    aspirin  chewable 81 milliGRAM(s) Oral daily  atorvastatin 40 milliGRAM(s) Oral at bedtime  enoxaparin Injectable 40 milliGRAM(s) SubCutaneous every 24 hours      PHYSICAL EXAM:   Vital Signs Last 24 Hrs  T(C): 36.5 (28 Mar 2024 04:00), Max: 36.9 (28 Mar 2024 00:09)  T(F): 97.7 (28 Mar 2024 04:00), Max: 98.4 (28 Mar 2024 00:09)  HR: 68 (28 Mar 2024 04:00) (61 - 88)  BP: 157/80 (28 Mar 2024 04:00) (138/67 - 176/90)  BP(mean): --  RR: 20 (28 Mar 2024 04:00) (16 - 20)  SpO2: 98% (28 Mar 2024 04:00) (94% - 100%)    Parameters below as of 28 Mar 2024 04:00  Patient On (Oxygen Delivery Method): room air    General: No acute distress.   HEENT: Head normocephalic, atraumatic. Conjunctivae clear w/o exudates or hemorrhage. Sclera non-icteric. Nares are patent bilaterally. Mucous membranes pink and moist.  No tonsillar swelling or exudates.    Neck: Supple, no adenopathy. Trachea midline. No JVD.  Respiratory: Lung sounds clear in all fields. Chest wall symmetric, nontender.   Abdominal: Soft, nondistended, nontender. Bowel sounds normoactive x 4 quadrants.    Skin: Skin is warm,  multifocal lesions. Appropriate color for ethnicity. Nailbeds pink with no cyanosis or clubbing.   Extremities: No edema,  b/l LE mild erythema      NEUROLOGICAL EXAM:  Mental status: Awake, alert, oriented x3, speech fluent, follows commands, no neglect, normal memory   Cranial Nerves: slight dysarthria, mild right facial palsy, no nystagmus,  dysarthria,  tongue midline  Motor exam: Normal tone, no drift, 5-/5 RUE with subtle drift , 4/5 RLE, 5/5 LUE, 3/5 LLE (pain limited)  Sensation: Intact to light touch   Coordination/ Gait: No dysmetria, gait not tested    LABS:                        12.4   5.74  )-----------( 218      ( 28 Mar 2024 02:58 )             38.1    03-28    142  |  104  |  12.9  ----------------------------<  82  3.8   |  26.0  |  0.75    Ca    9.4      28 Mar 2024 02:58    TPro  6.3<L>  /  Alb  3.8  /  TBili  0.4  /  DBili  x   /  AST  17  /  ALT  12  /  AlkPhos  67  03-27  PT/INR - ( 27 Mar 2024 11:20 )   PT: 10.9 sec;   INR: 0.98 ratio         PTT - ( 27 Mar 2024 11:20 )  PTT:29.2 sec      03-28 Chol 220<H> LDL -125- HDL 59 Trig 182<H>    A1C:  5.6    IMAGING: Reviewed by me.     Labs, Radiology, Cardiology, and Other Results: 12.5   9.05  )-----------( 217      ( 27 Mar 2024 11:20 )             38.5     03-27    141  |  104  |  17.0  ----------------------------<  108<H>  4.5   |  26.0  |  0.91    Ca    9.3      27 Mar 2024 11:20    TPro  6.3<L>  /  Alb  3.8  /  TBili  0.4  /  DBili  x   /  AST  17  /  ALT  12  /  AlkPhos  67  03-27    Troponin T, High Sensitivity (03.27.24 @ 12:40)   Troponin T, High Sensitivity Result: 33: *     MR Head w/wo IV Cont (03.27.24 @ 21:29)   IMPRESSION: Acute infarct in the left posterior frontal lobe.    CT Brain Stroke Protocol (03.27.24 @ 11:35)     IMPRESSION: Age-indeterminate, possibly acute, left frontal lobe and left   parietal lobe infarctions. No acute intracranial hemorrhage    CTA BRAIN:  Mild bilateral cavernous carotid artery calcifications. Mild right   vertebral artery calcifications.  The right vertebral artery is dominant. 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  The [place] and vertebrobasilar system are unremarkable without   evidence of stenosis, occlusion or saccular aneurysm dilation. No   evidence for arterial venous malformation.      CTA NECK:  Mild bilateral carotid bulb/proximal internal carotid artery   calcifications.  A left-sided aortic arch is demonstrated. There is normal relationship to   the great vessels. The common carotid arteries, internal carotid arteries   and vertebral arteries show no evidence of significant stenosis,   occlusion or saccular aneurysm dilation.    CT PERFUSION:  Patient has only had less than 2 hours of symptoms may influence the CT   perfusion.    No core infarct or evidence of delayed mean transit time is identified.   [facility] Stroke Team  Progress Note     HPI:  77yo M with history of HTN, CAD s/p stent placement x6, right parotid cancer s/p radiation therapy, squamous cell cancer with mets presenting to the ED with acute onset aphasia, dysarthria and right facial asymmetry with LKW time 11AM. Stroke code was activated for the patient, CTH, CTA H/N and CTP obtained with a finding of age indeterminate left frontal lobe and left parietal lobe infarcts. No LVO or perfusion deficit. TNK not administered given appearance of stroke on CT. Patient admitted to the Neurology service for further work up    SUBJECTIVE: No events overnight.  No new neurologic complaints.  ROS reported negative unless otherwise noted.    aspirin  chewable 81 milliGRAM(s) Oral daily  atorvastatin 40 milliGRAM(s) Oral at bedtime  enoxaparin Injectable 40 milliGRAM(s) SubCutaneous every 24 hours      PHYSICAL EXAM:   Vital Signs Last 24 Hrs  T(C): 36.5 (28 Mar 2024 04:00), Max: 36.9 (28 Mar 2024 00:09)  T(F): 97.7 (28 Mar 2024 04:00), Max: 98.4 (28 Mar 2024 00:09)  HR: 68 (28 Mar 2024 04:00) (61 - 88)  BP: 157/80 (28 Mar 2024 04:00) (138/67 - 176/90)  BP(mean): --  RR: 20 (28 Mar 2024 04:00) (16 - 20)  SpO2: 98% (28 Mar 2024 04:00) (94% - 100%)    Parameters below as of 28 Mar 2024 04:00  Patient On (Oxygen Delivery Method): room air    General: No acute distress.   HEENT: Head normocephalic, atraumatic. Conjunctivae clear w/o exudates or hemorrhage. Sclera non-icteric. Nares are patent bilaterally. Mucous membranes pink and moist.  No tonsillar swelling or exudates.    Neck: Supple, no adenopathy. Trachea midline. No JVD.  Respiratory: Lung sounds clear in all fields. Chest wall symmetric, nontender.   Abdominal: Soft, nondistended, nontender. Bowel sounds normoactive x 4 quadrants.    Skin: Skin is warm,  multifocal lesions. Appropriate color for ethnicity. Nailbeds pink with no cyanosis or clubbing.   Extremities: No edema,  b/l LE mild erythema      NEUROLOGICAL EXAM:  Mental status: Awake, alert, oriented x3, speech fluent, follows commands, no neglect, normal memory   Cranial Nerves: slight dysarthria, mild right facial palsy, no nystagmus,  dysarthria,  tongue midline  Motor exam: Normal tone, no drift, 5-/5 RUE with subtle drift , 4/5 RLE, 5/5 LUE, 3/5 LLE (pain limited)  Sensation: Intact to light touch   Coordination/ Gait: No dysmetria, gait not tested    LABS:                        12.4   5.74  )-----------( 218      ( 28 Mar 2024 02:58 )             38.1    03-28    142  |  104  |  12.9  ----------------------------<  82  3.8   |  26.0  |  0.75    Ca    9.4      28 Mar 2024 02:58    TPro  6.3<L>  /  Alb  3.8  /  TBili  0.4  /  DBili  x   /  AST  17  /  ALT  12  /  AlkPhos  67  03-27  PT/INR - ( 27 Mar 2024 11:20 )   PT: 10.9 sec;   INR: 0.98 ratio         PTT - ( 27 Mar 2024 11:20 )  PTT:29.2 sec      03-28 Chol 220<H> LDL -125- HDL 59 Trig 182<H>    A1C:  5.6    IMAGING: Reviewed by me.     Labs, Radiology, Cardiology, and Other Results: 12.5   9.05  )-----------( 217      ( 27 Mar 2024 11:20 )             38.5     03-27    141  |  104  |  17.0  ----------------------------<  108<H>  4.5   |  26.0  |  0.91    Ca    9.3      27 Mar 2024 11:20    TPro  6.3<L>  /  Alb  3.8  /  TBili  0.4  /  DBili  x   /  AST  17  /  ALT  12  /  AlkPhos  67  03-27    Troponin T, High Sensitivity (03.27.24 @ 12:40)   Troponin T, High Sensitivity Result: 33: *     MR Head w/wo IV Cont (03.27.24 @ 21:29)   IMPRESSION: Acute infarct in the left posterior frontal lobe.    CT Brain Stroke Protocol (03.27.24 @ 11:35)     IMPRESSION: Age-indeterminate, possibly acute, left frontal lobe and left   parietal lobe infarctions. No acute intracranial hemorrhage    CTA BRAIN:  Mild bilateral cavernous carotid artery calcifications. Mild right   vertebral artery calcifications.  The right vertebral artery is dominant. 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  The [place] and vertebrobasilar system are unremarkable without   evidence of stenosis, occlusion or saccular aneurysm dilation. No   evidence for arterial venous malformation.      CTA NECK:  Mild bilateral carotid bulb/proximal internal carotid artery   calcifications.  A left-sided aortic arch is demonstrated. There is normal relationship to   the great vessels. The common carotid arteries, internal carotid arteries   and vertebral arteries show no evidence of significant stenosis,   occlusion or saccular aneurysm dilation.    CT PERFUSION:  Patient has only had less than 2 hours of symptoms may influence the CT   perfusion.    No core infarct or evidence of delayed mean transit time is identified.

## 2024-03-28 NOTE — PROGRESS NOTE ADULT - ASSESSMENT
ASSESSMENT: 79yo M with history of HTN, CAD s/p stent placement x6, right parotid cancer s/p radiation therapy, squamous cell cancer with mets presenting to the ED with acute onset aphasia, dysarthria and right facial palsy with LKW time 11AM 11/27/24. Stroke code was activated for the patient, CTH, CTA H/N and CTP obtained with a finding of age indeterminate left frontal lobe and left parietal lobe infarcts. No LVO or perfusion deficit. Tenecteplase not administered given appearance of hypodensity concerning for stroke on CT.  CT angiogram demonstrated bilateral carotid and right vertebral artery calcifications with a hypoplastic left vertebral artery.  No large vessel occlusion therefor not thrombectomy candidate. MRI demonstrated acute left posterior frontal lobe infarction.     Impression: left posterior frontal lobe cerebral infarction with underlying etiology concerning for hypoercoagulable state in the setting of malignancy     NEURO:   -Neurologically without acute change, appears gradually improving.   - Stroke neuro checks q 2 hours    - SBP goal 130-170mmHg for now    -ANTITHROMBOTIC THERAPY: on ASA 81mg daily, need to consult with ortho/heme re: possible initiation of anticoagulation in the setting of concern for hypercoagulable state with underlying malignancy.    -titrate statin to LDL goal less than 70  -MRI Brain w/o as noted   -Carotid duplex pending   -Dysphagia screen: fail,, SLP evaluation for easy to chew/mildly thick   -Physical therapy/OT/Speech eval/treatment.       -CARDIOVASCULAR: check TTE, cardiac monitoring w/ telemetry for now, further evaluation pending findings of noted workup                              -HEMATOLOGY: H/H with anemia, monitor for si/sx of bleeding, Platelets 218, patient should have all age and risk appropriate malignancy screenings with PCP or sooner if clinically suspected   -Heme onc consult for further advisement   -LE duplex w/out evidence of DVT in either lower extremity      DVT ppx: Heparin s.c [] LMWH [x]     PULMONARY: protecting airway, saturating well     RENAL: BUN/Cr within range, monitor urine output, maintain adequate hydration       Na Goal:  135-145    ID: afebrile, no leukocytosis, monitor for si/sx of infection     OTHER:  condition and plan of care d/w patient, questions and concerns addressed.   Orthopaedic follow up: protective weight bearing with LLE cane. pain control. pending clinical course will need to determine surgical timeline.     DISPOSITION: Rehab or home depending on PT eval once stable and workup is complete      CORE MEASURES:        Admission NIHSS: 5     Tenecteplase : [] YES x[] NO      LDL/HDL/A1C: 125/59/5.6     Depression Screen- if depression hx and/or present      Statin Therapy: Lipitor 40 once patient is able to tolerate PO     Dysphagia Screen: [] PASS [X] FAIL     Smoking [X] YES CIGARS [] NO      Afib [] YES X[] NO     Stroke Education [X] YES [] NO    Obtain screening lower extremity venous ultrasound in patients who meet 1 or more of the following criteria as patient is high risk for DVT/PE on admission:   [] History of DVT/PE  X[]Hypercoagulable states (Factor V Leiden, Cancer, OCP, etc. )  []Prolonged immobility (hemiplegia/hemiparesis/post operative or any other extended immobilization)  [] Transferred from outside facility (Rehab or Long term care)  [] Age </= to 50  [X]Swollen left lower extremity ASSESSMENT: 77yo M with history of HTN, CAD s/p stent placement x6, right parotid cancer s/p radiation therapy, squamous cell cancer with mets presenting to the ED with acute onset aphasia, dysarthria and right facial palsy with LKW time 11AM 11/27/24. Stroke code was activated for the patient, CTH, CTA H/N and CTP obtained with a finding of age indeterminate left frontal lobe and left parietal lobe infarcts. No LVO or perfusion deficit. Tenecteplase not administered given appearance of hypodensity concerning for stroke on CT.  CT angiogram demonstrated bilateral carotid and right vertebral artery calcifications with a hypoplastic left vertebral artery.  No large vessel occlusion therefor not thrombectomy candidate. MRI demonstrated acute left posterior frontal lobe infarction.     Impression: left posterior frontal lobe cerebral infarction with underlying etiology concerning for hypercoagulable state in the setting of malignancy     NEURO:   -Neurologically without acute change, appears gradually improving.   - Stroke neuro checks q 2 hours    - SBP goal 130-170mmHg for now    -ANTITHROMBOTIC THERAPY: on ASA 81mg daily, need to consult with ortho/heme re: possible initiation of anticoagulation in the setting of concern for hypercoagulable state with underlying malignancy.    -titrate statin to LDL goal less than 70  -MRI Brain w/o as noted   -Carotid duplex pending   -Dysphagia screen: fail,, SLP evaluation for easy to chew/mildly thick   -Physical therapy/OT/Speech eval/treatment.       -CARDIOVASCULAR: check TTE, cardiac monitoring w/ telemetry for now, further evaluation pending findings of noted workup                              -HEMATOLOGY: H/H with anemia, monitor for si/sx of bleeding, Platelets 218, patient should have all age and risk appropriate malignancy screenings with PCP or sooner if clinically suspected   -Heme onc consult for further advisement   -LE duplex w/out evidence of DVT in either lower extremity      DVT ppx: Heparin s.c [] LMWH [x]     PULMONARY: protecting airway, saturating well     RENAL: BUN/Cr within range, monitor urine output, maintain adequate hydration       Na Goal:  135-145    ID: afebrile, no leukocytosis, monitor for si/sx of infection     OTHER:  condition and plan of care d/w patient, questions and concerns addressed.   Orthopaedic follow up: protective weight bearing with LLE cane. pain control. pending clinical course will need to determine surgical timeline.     DISPOSITION: Rehab or home depending on PT eval once stable and workup is complete      CORE MEASURES:        Admission NIHSS: 5     Tenecteplase : [] YES x[] NO      LDL/HDL/A1C: 125/59/5.6     Depression Screen- if depression hx and/or present      Statin Therapy: Lipitor 40 once patient is able to tolerate PO     Dysphagia Screen: [] PASS [X] FAIL     Smoking [X] YES CIGARS [] NO      Afib [] YES X[] NO     Stroke Education [X] YES [] NO    Obtain screening lower extremity venous ultrasound in patients who meet 1 or more of the following criteria as patient is high risk for DVT/PE on admission:   [] History of DVT/PE  X[]Hypercoagulable states (Factor V Leiden, Cancer, OCP, etc. )  []Prolonged immobility (hemiplegia/hemiparesis/post operative or any other extended immobilization)  [] Transferred from outside facility (Rehab or Long term care)  [] Age </= to 50  [X]Swollen left lower extremity

## 2024-03-28 NOTE — CONSULT NOTE ADULT - SUBJECTIVE AND OBJECTIVE BOX
Hematology Consult Note    HPI:  77yo M with history of HTN, CAD s/p stent placement x6, right parotid cancer s/p radiation therapy, squamous cell cancer with mets presenting to the ED with acute onset aphasia, dysarthria and right facial asymmetry. Stroke code was activated for the patient, CTH, CTA H/N and CTP obtained with a finding of age indeterminate left frontal lobe and left parietal lobe infarcts. No LVO or perfusion deficit. TNK not administered given appearance of stroke on CT. 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      Disorder of bone      Basal cell carcinoma  skin      Squamous cell skin cancer      Rheumatoid arthritis      Other chronic back pain      Pain due to internal orthopedic prosthetic device, implant, or graft      Pain in left hip      S/P coronary artery stent placement  x 6 - last one 6 years ago      Status post cervical spinal fusion  1991      History of fusion of lumbar spine  1981      S/P cholecystectomy      H/O parotidectomy  right          FAMILY HISTORY:  FH: CAD (coronary artery disease) (Mother)        SOCIAL HISTORY: No EtOH, no tobacco    REVIEW OF SYSTEMS:    CONSTITUTIONAL: L hip pain.  EYES/ENT: No visual changes;  No vertigo or throat pain   NECK: No pain or stiffness  RESPIRATORY: No cough, wheezing, hemoptysis; No shortness of breath  CARDIOVASCULAR: No chest pain or palpitations  GASTROINTESTINAL: No abdominal or epigastric pain. No nausea, vomiting, or hematemesis; No diarrhea or constipation. No melena or hematochezia.  GENITOURINARY: No dysuria, frequency or hematuria  NEUROLOGICAL: slurred speech   SKIN: No itching, burning, rashes, or lesions   All other review of systems is negative unless indicated above.    Height (cm): 182.9 (03-27 @ 11:23)  Weight (kg): 88 (03-27 @ 11:23)  BMI (kg/m2): 26.3 (03-27 @ 11:23)  BSA (m2): 2.1 (03-27 @ 11:23)    T(F): 98.1 (03-28-24 @ 10:00), Max: 98.4 (03-28-24 @ 00:09)  HR: 77 (03-28-24 @ 10:00)  BP: 164/93 (03-28-24 @ 10:00)  RR: 20 (03-28-24 @ 10:00)  SpO2: 99% (03-28-24 @ 10:00)  Wt(kg): --    GENERAL: NAD,   HEAD:  Atraumatic, Normocephalic  EYES: EOMI, PERRLA, conjunctiva and sclera clear  NECK: Supple, No JVD  CHEST/LUNG: Clear to auscultation bilaterally; No wheeze  HEART: Regular rate and rhythm; No murmurs, rubs, or gallops  ABDOMEN: Soft, Nontender, Nondistended; Bowel sounds present  EXTREMITIES:  2+ Peripheral Pulses, No clubbing, cyanosis, or edema  NEUROLOGY: A/)x4, slurred speech   SKIN: No rashes or lesions                          12.4   5.74  )-----------( 218      ( 28 Mar 2024 02:58 )             38.1       03-28    142  |  104  |  12.9  ----------------------------<  82  3.8   |  26.0  |  0.75    Ca    9.4      28 Mar 2024 02:58    TPro  6.3<L>  /  Alb  3.8  /  TBili  0.4  /  DBili  x   /  AST  17  /  ALT  12  /  AlkPhos  67  03-27          < from: MR Head w/wo IV Cont (03.27.24 @ 21:29) >  ACC: [id number] EXAM:  MR BRAIN WAW IC   ORDERED BY: TITO SAMPSON     PROCEDURE DATE:  03/27/2024          INTERPRETATION:  CLINICAL INFORMATION: stroke. XMR. Admitting Dxs: I63.9   SLURRED SPEECH.    TECHNIQUE: Multiplanar, multisequence brain MRI was performed without and   with intravenous contrast.  CONTRAST: Gadavist: 8 cc administered, 2 cc discarded.    COMPARISON:  CT head, perfusion and CTA head and neck 3/27/2024.  MRI brain  12/4/2022.    FINDINGS:    Area of restricted diffusion in the left posterior frontal lobe   consistent with acute infarct. No evidence of hemorrhagic transformation.   No midline shift, hydrocephalus, or effacement of basal cisterns.    Chronic right cerebellar lacunar infarcts. There are no foci of   susceptibility artifact to suggest hemorrhage. Scattered foci of T2/FLAIR   hyperintensity in the bilateral hemispheric white matter are nonspecific   but likely related to chronic white matter microvascular ischemic   disease. The ventricles are normal in size for patient's age. No abnormal   intracranial enhancement.    Flow voids of the major intracranial vessels at the skull base follow   expected course and contour.    Bilateral maxillary sinus polyps or retention cysts. Small soft tissue in   the right mastoid tip. Status post left intraocular lens implant.      IMPRESSION: Acute infarct in the left posterior frontal lobe.    These findings were discussed with Dr. Fide Feldman at 3/28/2024   8:27 AM by Dr. Eh Rizvi with read back confirmation.    --- End of Report ---      < from: US Duplex Venous Lower Ext Complete, Bilateral (03.27.24 @ 16:13) >  ACC: [id number] EXAM:  US DPLX LWR EXT VEINS COMPL BI   ORDERED BY:   DANIE JACINTO     PROCEDURE DATE:  03/27/2024          INTERPRETATION:  CLINICAL INFORMATION: Left lower extremity swelling.    COMPARISON: None available.    TECHNIQUE: Duplex sonography of the BILATERAL LOWER extremity veins with   color and spectral Doppler, with and without compression.    FINDINGS:    RIGHT:  Normal compressibility of the RIGHT common femoral, femoral and popliteal   veins.  Doppler examination shows normal spontaneous and phasic flow.  No RIGHT calf vein thrombosis is detected.    LEFT:  Normal compressibility of the LEFT common femoral, femoral and popliteal   veins.  Doppler examination shows normal spontaneous and phasic flow.  No LEFT calf vein thrombosis is detected.    IMPRESSION:  No evidence of deep venous thrombosis in either lower extremity.            --- End of Report ---

## 2024-03-28 NOTE — CONSULT NOTE ADULT - ASSESSMENT
incomplete     77yo M with history of HTN, CAD s/p stent placement x6, right parotid cancer s/p radiation therapy, squamous cell cancer with mets presenting to the ED with acute onset aphasia, dysarthria and right facial asymmetry. Found to have Acute infarct in the left posterior frontal lobe.    3/27/24- MRI-H-Acute infarct in the left posterior frontal lobe.  3/27/24- b/l LE US-No evidence of deep venous thrombosis in either lower extremity    #Metastatic squamous cell carcinoma to bone   -seen by Dr. Orozco started on Keytruda 2/2/23 had increased weakness, transferred to hospice 2/17/23, slowly started to improve, graduated from hospice  6/12/23. PET scan from  6/9/23 with improvement in Disease. Now continued on Keytruda tx last given 3/19.  -s/p RT to L hip completed 1/29/24.  -presenting to [facility] w/ aphasia found to have Acute infarct in the left posterior frontal lobe.  -neurology following want to start patient on full AC  RECS        Thank you for the referral. Will continue to monitor the patient.  Please call with any questions [phone]  Above reviewed with Attending Dr. Orozco     [facility]  [address] [place] [address]  [phone]  *Note not finalized until signed by Attending Physician    79yo M with history of HTN, CAD s/p stent placement x6, right parotid cancer s/p radiation therapy, squamous cell cancer with mets presenting to the ED with acute onset aphasia, dysarthria and right facial asymmetry. Found to have Acute infarct in the left posterior frontal lobe.    3/27/24- MRI-H-Acute infarct in the left posterior frontal lobe.  3/27/24- b/l LE US-No evidence of deep venous thrombosis in either lower extremity    #Metastatic squamous cell carcinoma to bone   -seen by Dr. Orozco started on Keytruda 2/2/23 had increased weakness, transferred to hospice 2/17/23, slowly started to improve, graduated from hospice  6/12/23. PET scan from  6/9/23 with improvement in Disease. Now continued on Keytruda tx last given 3/19.  -s/p RT to L hip completed 1/29/24.  -presenting to [facility] w/ aphasia found to have Acute infarct in the left posterior frontal lobe.  -neurology following want to start patient on full AC  RECS        Thank you for the referral. Will continue to monitor the patient.  Please call with any questions [phone]  Above reviewed with Attending Dr. Orozco     [facility]  [address] [place] [address]  [phone]  *Note not finalized until signed by Attending Physician  incomplete   79yo M with history of HTN, CAD s/p stent placement x6, right parotid cancer s/p radiation therapy, squamous cell cancer with mets presenting to the ED with acute onset aphasia, dysarthria and right facial asymmetry. Found to have Acute infarct in the left posterior frontal lobe.    3/27/24- MRI-H-Acute infarct in the left posterior frontal lobe.  3/27/24- b/l LE US-No evidence of deep venous thrombosis in either lower extremity    #Metastatic squamous cell carcinoma to bone   -seen by Dr. Orozco started on Keytruda 2/2/23 had increased weakness, transferred to hospice 2/17/23, slowly started to improve, graduated from hospice  6/12/23. PET scan from  6/9/23 with improvement in Disease. Now continued on Keytruda tx last given 3/19.  -s/p RT to L hip completed 1/29/24.  -presenting to [facility] w/ aphasia found to have Acute infarct in the left posterior frontal lobe.  -neurology following want to start patient on full AC  RECS    Patient  can f/u OP with Heme/ONC Dr. Orozco  when deemed stable for d/c- [facility] [address] [place] [address] [phone]       Thank you for the referral. Will continue to monitor the patient.  Please call with any questions [phone]  Above reviewed with Attending Dr. Orozco     [facility]  [address] [place] [address] [phone]  *Note not finalized until signed by Attending Physician    77yo M with history of HTN, CAD s/p stent placement x6, right parotid cancer s/p radiation therapy, squamous cell cancer with mets presenting to the ED with acute onset aphasia, dysarthria and right facial asymmetry. Found to have Acute infarct in the left posterior frontal lobe.    3/27/24- MRI-H-Acute infarct in the left posterior frontal lobe.  3/27/24- b/l LE US-No evidence of deep venous thrombosis in either lower extremity    #Metastatic squamous cell carcinoma to bone   -seen by Dr. Orozco started on Keytruda 2/2/23 had increased weakness, transferred to hospice 2/17/23, slowly started to improve, graduated from hospice  6/12/23. PET scan from  6/9/23 with improvement in Disease. Now continued on Keytruda tx last given 3/19.  -s/p RT to L hip completed 1/29/24.  -presenting to [facility] w/ aphasia found to have Acute infarct in the left posterior frontal lobe.  -neurology following want to start patient on full AC  RECS  Patient  can f/u OP with Heme/ONC Dr. Orozco  when deemed stable for d/c- [facility] [address] [place] [address] [phone]     #Acute infarct in L posterior frontal lobe  -would defer tx of stoke in regards to AC to neurology team, no contraindications from a heme/onc standpoint        Thank you for the referral.   Please call with any questions [phone]  Above reviewed with Attending Dr. Orozco     [facility]  [address], [place] [id number] [phone]  *Note not finalized until signed by Attending Physician

## 2024-03-28 NOTE — CONSULT NOTE ADULT - SUBJECTIVE AND OBJECTIVE BOX
Pt Name: RASHAD SMITH    MRN: [id number]      Patient is a 83y male presenting to the emergency department with a chief complaint aphasia.  Patient found to have an acute infarct on brain MRI.  Patient was scheduled for removal of left hip hardware and conversion to left total hip arthroplasty tomorrow with Dr Blackwell.  Due to patient current condition patients surgery will be rescheduled when cleared by neurosurgery.  Patietn with impending left hip fracture.  Denies any other orthopedic complaints.       REVIEW OF SYSTEMS    General: Alert, responsive, in NAD    Skin: No rashes, no pruritis     Musculoskeletal: SEE HPI.    Neurological: No sensory or motor changes.         PAST MEDICAL & SURGICAL HISTORY:  PAST MEDICAL & SURGICAL HISTORY:  Diabetes      Hypothyroidism, unspecified type      Other hyperlipidemia      Cataract      Benign essential HTN      Chronic CHF      CAD (coronary artery disease)      Gastric antral vascular ectasia      S/P thyroid surgery          Allergies: No Known Allergies      Medications: aspirin  chewable 81 milliGRAM(s) Oral daily  atorvastatin 80 milliGRAM(s) Oral at bedtime  cyanocobalamin 1000 MICROGram(s) Oral daily  dextrose 5%. 1000 milliLiter(s) IV Continuous <Continuous>  dextrose 5%. 1000 milliLiter(s) IV Continuous <Continuous>  dextrose 50% Injectable 12.5 Gram(s) IV Push once  dextrose 50% Injectable 25 Gram(s) IV Push once  dextrose 50% Injectable 25 Gram(s) IV Push once  dextrose Oral Gel 15 Gram(s) Oral once PRN  glucagon  Injectable 1 milliGRAM(s) IntraMuscular once  guaiFENesin Oral Liquid (Sugar-Free) 200 milliGRAM(s) Oral every 6 hours PRN  insulin glargine Injectable (LANTUS) 24 Unit(s) SubCutaneous at bedtime  insulin lispro (ADMELOG) corrective regimen sliding scale   SubCutaneous three times a day before meals  levothyroxine 100 MICROGram(s) Oral daily  losartan 25 milliGRAM(s) Oral daily  metoprolol tartrate 25 milliGRAM(s) Oral two times a day  pantoprazole    Tablet 40 milliGRAM(s) Oral before breakfast      FAMILY HISTORY:  : non-contributory    Social History:                           11.6   6.88  )-----------( 238      ( 27 Mar 2024 12:17 )             37.0       03-27    139  |  107  |  15.5  ----------------------------<  123<H>  4.7   |  21.0<L>  |  1.13    Ca    9.6      27 Mar 2024 12:17    TPro  6.8  /  Alb  2.6<L>  /  TBili  0.7  /  DBili  x   /  AST  26  /  ALT  15  /  AlkPhos  121<H>  03-27      Vital Signs Last 24 Hrs  T(C): 36.6 (28 Mar 2024 08:00), Max: 36.9 (27 Mar 2024 14:18)  T(F): 97.8 (28 Mar 2024 08:00), Max: 98.5 (27 Mar 2024 15:49)  HR: 68 (28 Mar 2024 08:00) (68 - 85)  BP: 115/70 (28 Mar 2024 08:00) (106/56 - 146/65)  BP(mean): --  RR: 18 (28 Mar 2024 08:00) (15 - 18)  SpO2: 98% (28 Mar 2024 08:00) (93% - 100%)    Parameters below as of 28 Mar 2024 08:00  Patient On (Oxygen Delivery Method): room air        Daily     Daily       PHYSICAL EXAM:      Appearance: Alert, responsive, in no acute distress.    Neurological: Sensation is grossly intact to light touch. No focal deficits or weaknesses found.    Skin: no rash on visible skin. Skin is clean, dry and intact. No bleeding. No abrasions. No ulcerations.    Vascular: 2+ distal pulses. Cap refill < 2 sec. No signs of venous insufficiency or stasis. No extremity ulcerations. No cyanosis.    Musculoskeletal:         Left Upper Extremity: Clavicle: NTTP. Shoulder: NTTP, FROM. Abduction/adduction/flexion/extension intact. Elbow: NTTP, FROM. EE/EF intact. Wrist: NTTP, FROM. WE/WF intact. Hand: NTTP throughout, FROM. Abduction/adduction/flexion/extension of digits 1-5 intact. Compartments soft compressible. Sensation intact to light touch.        Right Upper Extremity: Clavicle: NTTP. Shoulder: NTTP, FROM. Abduction/adduction/flexion/extension intact. Elbow: NTTP, FROM. EE/EF intact. Wrist: NTTP, FROM. WE/WF intact. Hand: NTTP throughout, FROM. Abduction/adduction/flexion/extension of digits 1-5 intact. Compartments soft compressible. Sensation intact to light touch.        Left Lower Extremity: Hip: NTTP, FROM. HF/HE intact. Knee: NTTP, FROM. KE/KF intact. Ankle: NTTP, FROM. DF/PF/EHL/FHL intact. Compartments soft compressible. Sensation intact to light touch.        Right Lower Extremity: Hip: NTTP, FROM. HF/HE intact. Knee: NTTP, FROM. KE/KF intact. Ankle: NTTP, FROM. DF/PF/EHL/FHL intact. Compartments soft compressible. Sensation intact to light touch.     Imaging Studies:      A/P:  Pt is a  83y male with impending left hip fracture, acute CVA.    PLAN:   * Surgery with Dr Blackwell canceled and will be rescheduled at a later date when cleared by neurosurgery   * Protective weight bearing with cane left lower extremity   * Pain control   * Case discussed with Dr Blackwell   Pt Name: RASHAD SMITH    MRN: [id number]      Patient is a 83y male presenting to the emergency department with a chief complaint aphasia.  Patient found to have an acute infarct on brain MRI.  Patient was scheduled for removal of left hip hardware and conversion to left total hip arthroplasty tomorrow with Dr Blackwell.  Due to patient current condition patients surgery will be rescheduled when cleared by neurosurgery.  Patietn with impending left hip fracture.  Denies any other orthopedic complaints.       REVIEW OF SYSTEMS    General: Alert, responsive, in NAD    Skin: No rashes, no pruritis     Musculoskeletal: SEE HPI.    Neurological: No sensory or motor changes.         PAST MEDICAL & SURGICAL HISTORY:  PAST MEDICAL & SURGICAL HISTORY:  Diabetes      Hypothyroidism, unspecified type      Other hyperlipidemia      Cataract      Benign essential HTN      Chronic CHF      CAD (coronary artery disease)      Gastric antral vascular ectasia      S/P thyroid surgery          Allergies: No Known Allergies      Medications: aspirin  chewable 81 milliGRAM(s) Oral daily  atorvastatin 80 milliGRAM(s) Oral at bedtime  cyanocobalamin 1000 MICROGram(s) Oral daily  dextrose 5%. 1000 milliLiter(s) IV Continuous <Continuous>  dextrose 5%. 1000 milliLiter(s) IV Continuous <Continuous>  dextrose 50% Injectable 12.5 Gram(s) IV Push once  dextrose 50% Injectable 25 Gram(s) IV Push once  dextrose 50% Injectable 25 Gram(s) IV Push once  dextrose Oral Gel 15 Gram(s) Oral once PRN  glucagon  Injectable 1 milliGRAM(s) IntraMuscular once  guaiFENesin Oral Liquid (Sugar-Free) 200 milliGRAM(s) Oral every 6 hours PRN  insulin glargine Injectable (LANTUS) 24 Unit(s) SubCutaneous at bedtime  insulin lispro (ADMELOG) corrective regimen sliding scale   SubCutaneous three times a day before meals  levothyroxine 100 MICROGram(s) Oral daily  losartan 25 milliGRAM(s) Oral daily  metoprolol tartrate 25 milliGRAM(s) Oral two times a day  pantoprazole    Tablet 40 milliGRAM(s) Oral before breakfast      FAMILY HISTORY:  : non-contributory    Social History:                           11.6   6.88  )-----------( 238      ( 27 Mar 2024 12:17 )             37.0       03-27    139  |  107  |  15.5  ----------------------------<  123<H>  4.7   |  21.0<L>  |  1.13    Ca    9.6      27 Mar 2024 12:17    TPro  6.8  /  Alb  2.6<L>  /  TBili  0.7  /  DBili  x   /  AST  26  /  ALT  15  /  AlkPhos  121<H>  03-27      Vital Signs Last 24 Hrs  T(C): 36.6 (28 Mar 2024 08:00), Max: 36.9 (27 Mar 2024 14:18)  T(F): 97.8 (28 Mar 2024 08:00), Max: 98.5 (27 Mar 2024 15:49)  HR: 68 (28 Mar 2024 08:00) (68 - 85)  BP: 115/70 (28 Mar 2024 08:00) (106/56 - 146/65)  BP(mean): --  RR: 18 (28 Mar 2024 08:00) (15 - 18)  SpO2: 98% (28 Mar 2024 08:00) (93% - 100%)    Parameters below as of 28 Mar 2024 08:00  Patient On (Oxygen Delivery Method): room air        Daily     Daily       PHYSICAL EXAM:      Appearance: Alert, responsive, in no acute distress.    Neurological: Sensation is grossly intact to light touch. No focal deficits or weaknesses found.    Skin: no rash on visible skin. Skin is clean, dry and intact. No bleeding. No abrasions. No ulcerations.    Vascular: 2+ distal pulses. Cap refill < 2 sec. No signs of venous insufficiency or stasis. No extremity ulcerations. No cyanosis.    Musculoskeletal:         Left Upper Extremity: Clavicle: NTTP. Shoulder: NTTP, FROM. Abduction/adduction/flexion/extension intact. Elbow: NTTP, FROM. EE/EF intact. Wrist: NTTP, FROM. WE/WF intact. Hand: NTTP throughout, FROM. Abduction/adduction/flexion/extension of digits 1-5 intact. Compartments soft compressible. Sensation intact to light touch.        Right Upper Extremity: Clavicle: NTTP. Shoulder: NTTP, FROM. Abduction/adduction/flexion/extension intact. Elbow: NTTP, FROM. EE/EF intact. Wrist: NTTP, FROM. WE/WF intact. Hand: NTTP throughout, FROM. Abduction/adduction/flexion/extension of digits 1-5 intact. Compartments soft compressible. Sensation intact to light touch.        Left Lower Extremity: Hip: NTTP, FROM. HF/HE intact. Knee: NTTP, FROM. KE/KF intact. Ankle: NTTP, FROM. DF/PF/EHL/FHL intact. Compartments soft compressible. Sensation intact to light touch.        Right Lower Extremity: Hip: NTTP, FROM. HF/HE intact. Knee: NTTP, FROM. KE/KF intact. Ankle: NTTP, FROM. DF/PF/EHL/FHL intact. Compartments soft compressible. Sensation intact to light touch.     Imaging Studies:      A/P:  Pt is a  83y male with impending left hip fracture, acute CVA.    PLAN:   * Surgery with Dr Blackwell canceled and will be rescheduled at a later date when cleared by neurosurgery   * Protective weight bearing with cane left lower extremity   * Pain control   * Case discussed with Dr Blackwell  * Patient can follow up outpatient, reconsult if needed.

## 2024-03-28 NOTE — CHART NOTE - NSCHARTNOTEFT_GEN_A_CORE
Medicine PA-  Cd to see pt with ? increase in LLE weakness and drift. Also concern regarding pt's pupils unequal, OS 6mm, OD 5mm per nurse.  Pt is 77 yo M with PMH of HTN, CAD, parotid tumor, skin cancer with mets presenting with acute onset aphasia, dysarthria and facial droop found to have age indeterminate infarct in the left frontal and left parietal lobes.   Pt has been NIH-4 and notes have referenced pt's previous ORIF L. hip with resultant LE weakness.  Pt has known dysarthria but I could understand him, he denied visual changes and has no hx opthalmic surgery.  He also denied new/ worsening of ext weakness, said L. leg feels weak from hip surgery since last year.  MRI head was performed tonight and he feels tired, but otherwise "ok".    Vital Signs Last 24 Hrs  T(C): 36.9 (28 Mar 2024 00:09), Max: 36.9 (28 Mar 2024 00:09)  T(F): 98.4 (28 Mar 2024 00:09), Max: 98.4 (28 Mar 2024 00:09)  HR: 64 (28 Mar 2024 00:09) (61 - 88)  BP: 144/80 (28 Mar 2024 00:09) (138/67 - 176/90)  BP(mean): --  RR: 18 (28 Mar 2024 00:09) (16 - 19)  SpO2: 99% (28 Mar 2024 00:09) (94% - 100%) RA    PE:  Gen- Pt was asleep, but easily awakened, A+O x3  HEENT- Pupils 4mm OU (room is dim) equal and reactive OU  R. facial droop same  S1S2 ausc  Lungs- clear b/l  Abd- soft, nontender  Ext- LLE- no drift now, pt able to hold leg up steadily,  otherwise exam baseline  Neuro- no acute changes, appears baseline    >Indeterminate CVA left frontal and left parietal lobes  -MRI results pending  -continue neuro checks q2 hr  -neuro exam discussed with nurse Sung  -call PA with any questions or concerns  -neuro following, will s/o in AM

## 2024-03-28 NOTE — PROVIDER CONTACT NOTE (OTHER) - ASSESSMENT
Upon 0:00 NIH assessment, new onset L leg drift was noted. Pt stating "my left leg feels more weak". Symptom was not present in previous assessments.  New symptom of uneven pupil size. Left pupil 6mm, right pupil 5mm. Brisk and equal reaction to light present in both eyes.

## 2024-03-28 NOTE — CONSULT NOTE ADULT - NS ATTEND AMEND GEN_ALL_CORE FT
pt /w cva. will need to postpone l ruth. will f/u outpt. care as per primary team
I attest my time as attending is greater than 50% of the total combined time spent on qualifying patient care activities by the PA/NP and attending.   I have made amendments to the documentation where necessary.

## 2024-03-28 NOTE — PATIENT PROFILE ADULT - FUNCTIONAL ASSESSMENT - BASIC MOBILITY 4.
Chart reviewed.  Robotic hyst in May 2019 with Dr Goodman.  Last seen by Dr Gorman on 12/27/18.      Response to patient.    
From: Gloria Combs  To: Kali Gorman  Sent: 10/9/2020 1:45 PM CDT  Subject: Other    Hello. If I come in for my yearly exam, could I also have a biometric screening done for my health insurance requirements? They want my height, weight, waist size, blood pressure, total cholesterol, HDL, LDL, Triglycerides, and glucose. My primary HCP is no longer in practice and the deadline for my biometric screening is 10/31/20. Please let me know if you'd be able to help me with this and if I could just do it during my yearly exam. Thank you.     Gloria   
3 = A little assistance

## 2024-03-28 NOTE — PROVIDER CONTACT NOTE (OTHER) - BACKGROUND
Pt admitted following new onset aphasia, dysarthria, and R facial droop. CTH positive for left frontal and parietal lobe infarcts. SDU, q2h vital and NIH neuro checks. Previous NIH score of 4. VSS.
Spontaneous, unlabored and symmetrical

## 2024-03-29 ENCOUNTER — TRANSCRIPTION ENCOUNTER (OUTPATIENT)
Age: 79
End: 2024-03-29

## 2024-03-29 ENCOUNTER — APPOINTMENT (OUTPATIENT)
Dept: ORTHOPEDIC SURGERY | Facility: HOSPITAL | Age: 79
End: 2024-03-29

## 2024-03-29 VITALS
TEMPERATURE: 99 F | OXYGEN SATURATION: 98 % | DIASTOLIC BLOOD PRESSURE: 95 MMHG | HEART RATE: 89 BPM | SYSTOLIC BLOOD PRESSURE: 139 MMHG | RESPIRATION RATE: 19 BRPM

## 2024-03-29 LAB
ANION GAP SERPL CALC-SCNC: 16 MMOL/L — SIGNIFICANT CHANGE UP (ref 5–17)
BUN SERPL-MCNC: 14.9 MG/DL — SIGNIFICANT CHANGE UP (ref 8–20)
CALCIUM SERPL-MCNC: 9.1 MG/DL — SIGNIFICANT CHANGE UP (ref 8.4–10.5)
CHLORIDE SERPL-SCNC: 102 MMOL/L — SIGNIFICANT CHANGE UP (ref 96–108)
CO2 SERPL-SCNC: 23 MMOL/L — SIGNIFICANT CHANGE UP (ref 22–29)
CREAT SERPL-MCNC: 0.85 MG/DL — SIGNIFICANT CHANGE UP (ref 0.5–1.3)
EGFR: 90 ML/MIN/1.73M2 — SIGNIFICANT CHANGE UP
GLUCOSE SERPL-MCNC: 78 MG/DL — SIGNIFICANT CHANGE UP (ref 70–99)
HCT VFR BLD CALC: 42 % — SIGNIFICANT CHANGE UP (ref 39–50)
HGB BLD-MCNC: 13.5 G/DL — SIGNIFICANT CHANGE UP (ref 13–17)
MAGNESIUM SERPL-MCNC: 1.9 MG/DL — SIGNIFICANT CHANGE UP (ref 1.6–2.6)
MCHC RBC-ENTMCNC: 29.8 PG — SIGNIFICANT CHANGE UP (ref 27–34)
MCHC RBC-ENTMCNC: 32.1 GM/DL — SIGNIFICANT CHANGE UP (ref 32–36)
MCV RBC AUTO: 92.7 FL — SIGNIFICANT CHANGE UP (ref 80–100)
PHOSPHATE SERPL-MCNC: 3.5 MG/DL — SIGNIFICANT CHANGE UP (ref 2.4–4.7)
PLATELET # BLD AUTO: 213 K/UL — SIGNIFICANT CHANGE UP (ref 150–400)
POTASSIUM SERPL-MCNC: 3.9 MMOL/L — SIGNIFICANT CHANGE UP (ref 3.5–5.3)
POTASSIUM SERPL-SCNC: 3.9 MMOL/L — SIGNIFICANT CHANGE UP (ref 3.5–5.3)
RBC # BLD: 4.53 M/UL — SIGNIFICANT CHANGE UP (ref 4.2–5.8)
RBC # FLD: 14.6 % — HIGH (ref 10.3–14.5)
SODIUM SERPL-SCNC: 141 MMOL/L — SIGNIFICANT CHANGE UP (ref 135–145)
WBC # BLD: 7.01 K/UL — SIGNIFICANT CHANGE UP (ref 3.8–10.5)
WBC # FLD AUTO: 7.01 K/UL — SIGNIFICANT CHANGE UP (ref 3.8–10.5)

## 2024-03-29 PROCEDURE — 99291 CRITICAL CARE FIRST HOUR: CPT | Mod: 25

## 2024-03-29 PROCEDURE — 84100 ASSAY OF PHOSPHORUS: CPT

## 2024-03-29 PROCEDURE — 93970 EXTREMITY STUDY: CPT

## 2024-03-29 PROCEDURE — 93005 ELECTROCARDIOGRAM TRACING: CPT

## 2024-03-29 PROCEDURE — 92610 EVALUATE SWALLOWING FUNCTION: CPT

## 2024-03-29 PROCEDURE — 70553 MRI BRAIN STEM W/O & W/DYE: CPT | Mod: MC

## 2024-03-29 PROCEDURE — 93880 EXTRACRANIAL BILAT STUDY: CPT

## 2024-03-29 PROCEDURE — A9579: CPT

## 2024-03-29 PROCEDURE — 70496 CT ANGIOGRAPHY HEAD: CPT | Mod: MC

## 2024-03-29 PROCEDURE — 83605 ASSAY OF LACTIC ACID: CPT

## 2024-03-29 PROCEDURE — 97163 PT EVAL HIGH COMPLEX 45 MIN: CPT

## 2024-03-29 PROCEDURE — 97167 OT EVAL HIGH COMPLEX 60 MIN: CPT

## 2024-03-29 PROCEDURE — 85025 COMPLETE CBC W/AUTO DIFF WBC: CPT

## 2024-03-29 PROCEDURE — 84295 ASSAY OF SERUM SODIUM: CPT

## 2024-03-29 PROCEDURE — 85730 THROMBOPLASTIN TIME PARTIAL: CPT

## 2024-03-29 PROCEDURE — 71045 X-RAY EXAM CHEST 1 VIEW: CPT

## 2024-03-29 PROCEDURE — 85027 COMPLETE CBC AUTOMATED: CPT

## 2024-03-29 PROCEDURE — 85018 HEMOGLOBIN: CPT

## 2024-03-29 PROCEDURE — 83735 ASSAY OF MAGNESIUM: CPT

## 2024-03-29 PROCEDURE — 70498 CT ANGIOGRAPHY NECK: CPT | Mod: MC

## 2024-03-29 PROCEDURE — 70450 CT HEAD/BRAIN W/O DYE: CPT | Mod: MC

## 2024-03-29 PROCEDURE — 82962 GLUCOSE BLOOD TEST: CPT

## 2024-03-29 PROCEDURE — 85014 HEMATOCRIT: CPT

## 2024-03-29 PROCEDURE — 84132 ASSAY OF SERUM POTASSIUM: CPT

## 2024-03-29 PROCEDURE — 93306 TTE W/DOPPLER COMPLETE: CPT

## 2024-03-29 PROCEDURE — 82947 ASSAY GLUCOSE BLOOD QUANT: CPT

## 2024-03-29 PROCEDURE — 0042T: CPT | Mod: MC

## 2024-03-29 PROCEDURE — 82435 ASSAY OF BLOOD CHLORIDE: CPT

## 2024-03-29 PROCEDURE — 84484 ASSAY OF TROPONIN QUANT: CPT

## 2024-03-29 PROCEDURE — 36415 COLL VENOUS BLD VENIPUNCTURE: CPT

## 2024-03-29 PROCEDURE — 82803 BLOOD GASES ANY COMBINATION: CPT

## 2024-03-29 PROCEDURE — 85610 PROTHROMBIN TIME: CPT

## 2024-03-29 PROCEDURE — 80048 BASIC METABOLIC PNL TOTAL CA: CPT

## 2024-03-29 PROCEDURE — 80053 COMPREHEN METABOLIC PANEL: CPT

## 2024-03-29 PROCEDURE — 82140 ASSAY OF AMMONIA: CPT

## 2024-03-29 PROCEDURE — 82330 ASSAY OF CALCIUM: CPT

## 2024-03-29 PROCEDURE — 80061 LIPID PANEL: CPT

## 2024-03-29 PROCEDURE — 81003 URINALYSIS AUTO W/O SCOPE: CPT

## 2024-03-29 PROCEDURE — 80307 DRUG TEST PRSMV CHEM ANLYZR: CPT

## 2024-03-29 RX ORDER — APIXABAN 2.5 MG/1
1 TABLET, FILM COATED ORAL
Qty: 60 | Refills: 0
Start: 2024-03-29 | End: 2024-04-27

## 2024-03-29 RX ORDER — ATORVASTATIN CALCIUM 80 MG/1
1 TABLET, FILM COATED ORAL
Qty: 30 | Refills: 0
Start: 2024-03-29 | End: 2024-04-27

## 2024-03-29 RX ADMIN — APIXABAN 5 MILLIGRAM(S): 2.5 TABLET, FILM COATED ORAL at 05:44

## 2024-03-29 RX ADMIN — Medication 81 MILLIGRAM(S): at 08:34

## 2024-03-29 NOTE — PHYSICAL THERAPY INITIAL EVALUATION ADULT - PERTINENT HX OF CURRENT PROBLEM, REHAB EVAL
aphasia, right facial palsy, dysarthria, left posterior frontal lobe cerebral infarct
78-year-old male with history of hypertension right parotid tumor, skin cancer, on Keytruda and on radiation therapy for left hip metatarsals presents with sudden onset of slurred speech around 11 AM today.  Patient has no other weakness.

## 2024-03-29 NOTE — OCCUPATIONAL THERAPY INITIAL EVALUATION ADULT - PERTINENT HX OF CURRENT PROBLEM, REHAB EVAL
As per MD note: 77yo M with history of HTN, CAD s/p stent placement x6, right parotid cancer s/p radiation therapy, squamous cell cancer with mets presenting to the ED with acute onset aphasia, dysarthria and right facial asymmetry with LKW time 11AM. Stroke code was activated for the patient, CTH, CTA H/N and CTP obtained with a finding of age indeterminate left frontal lobe and left parietal lobe infarcts. No LVO or perfusion deficit. TNK not administered given appearance of stroke on CT. Patient admitted to the Neurology service for further work up
As per MD note: 77yo M with history of HTN, CAD s/p stent placement x6, right parotid cancer s/p radiation therapy, squamous cell cancer with mets presenting to the ED with acute onset aphasia, dysarthria and right facial asymmetry with LKW time 11AM. Stroke code was activated for the patient, CTH, CTA H/N and CTP obtained with a finding of age indeterminate left frontal lobe and left parietal lobe infarcts. No LVO or perfusion deficit. TNK not administered given appearance of stroke on CT. Patient admitted to the Neurology service for further work up

## 2024-03-29 NOTE — PROGRESS NOTE ADULT - ASSESSMENT
ASSESSMENT: 77yo M with history of HTN, CAD s/p stent placement x6, right parotid cancer s/p radiation therapy, squamous cell cancer with mets presenting to the ED with acute onset aphasia, dysarthria and right facial palsy with LKW time 11AM 11/27/24. Stroke code was activated for the patient, CTH, CTA H/N and CTP obtained with a finding of age indeterminate left frontal lobe and left parietal lobe infarcts. No LVO or perfusion deficit. Tenecteplase not administered given appearance of hypodensity concerning for stroke on CT.  CT angiogram demonstrated bilateral carotid and right vertebral artery calcifications with a hypoplastic left vertebral artery.  No large vessel occlusion therefor not thrombectomy candidate. MRI demonstrated acute left posterior frontal lobe infarction.     Impression: left posterior frontal lobe cerebral infarction with underlying etiology concerning for hypercoagulable state in the setting of malignancy     NEURO:   -Neurologically without acute change   - Stroke neuro checks q 4 hours    - SBP goal 130-160mmHg for now - appears to be neurologically tolerating, avoid rapid fluctuations and hypotension , further titration pending clinical course for long term age/risk specific normotension    -ANTITHROMBOTIC THERAPY: on Apixaban 5mg bid. Suggest ongoing discussion re: long term duration.   -titrate statin to LDL goal less than 70  -MRI Brain w/o as noted   -Carotid duplex No significant hemodynamic stenosis of either carotid artery.  Delayed upstroke of the left vertebral artery waveform may be due to   congenitally hypoplastic vessel or stenosis. Correlate with recent CTA   neck- notes hypoplastic.   -Dysphagia screen: fail,, SLP evaluation for easy to chew/mildly thick   -Physical therapy/OT/Speech eval/treatment.     -CARDIOVASCULAR:  TTE as noted, cardiac monitoring w/ telemetry for now, further evaluation pending findings of noted workup                              -HEMATOLOGY: H/H without anemia, monitor for si/sx of bleeding, Platelets 213, patient should have all age and risk appropriate malignancy screenings with PCP or sooner if clinically suspected   -Heme onc consult for further advisement   -LE duplex w/out evidence of DVT in either lower extremity      DVT ppx: Heparin s.c [] LMWH [x]     PULMONARY: protecting airway, saturating well     RENAL: BUN/Cr within range, monitor urine output, maintain adequate hydration       Na Goal:  135-145    ID: afebrile, no leukocytosis, monitor for si/sx of infection     OTHER:  condition and plan of care d/w patient and wife, questions and concerns addressed.   Orthopaedic follow up: protective weight bearing with LLE cane. pain control. pending clinical course will need to determine surgical timeline but overall if benefit>risk there is no absolute contraindication to surgical intervention once optimized.  Risks/benefits/ alternatives  should be discussed and hematology should follow for guidance re: AC plan while bridging surgically.     DISPOSITION: home, home PT, supervision for outdoor ambulation, use of SAC, + home with assist; Home OT    CORE MEASURES:        Admission NIHSS: 5     Tenecteplase : [] YES x[] NO      LDL/HDL/A1C: 125/59/5.6     Depression Screen- if depression hx and/or present      Statin Therapy: Lipitor 40 once patient is able to tolerate PO     Dysphagia Screen: [] PASS [X] FAIL     Smoking [X] YES CIGARS [] NO      Afib [] YES X[] NO     Stroke Education [X] YES [] NO    Obtain screening lower extremity venous ultrasound in patients who meet 1 or more of the following criteria as patient is high risk for DVT/PE on admission:   [] History of DVT/PE  X[]Hypercoagulable states (Factor V Leiden, Cancer, OCP, etc. )  []Prolonged immobility (hemiplegia/hemiparesis/post operative or any other extended immobilization)  [] Transferred from outside facility (Rehab or Long term care)  [] Age </= to 50  [X]Swollen left lower extremity ASSESSMENT: 77yo M with history of HTN, CAD s/p stent placement x6, right parotid cancer s/p radiation therapy, squamous cell cancer with mets presenting to the ED with acute onset aphasia, dysarthria and right facial palsy with LKW time 11AM 11/27/24. Stroke code was activated for the patient, CTH, CTA H/N and CTP obtained with a finding of age indeterminate left frontal lobe and left parietal lobe infarcts. No LVO or perfusion deficit. Tenecteplase not administered given appearance of hypodensity concerning for stroke on CT.  CT angiogram demonstrated bilateral carotid and right vertebral artery calcifications with a hypoplastic left vertebral artery.  No large vessel occlusion therefor not thrombectomy candidate. MRI demonstrated acute left posterior frontal lobe infarction.     Impression: left posterior frontal lobe cerebral infarction with underlying etiology concerning for hypercoagulable state in the setting of malignancy     NEURO:   -Neurologically without acute change   - Stroke neuro checks q 4 hours    - SBP goal 130-160mmHg for now - appears to be neurologically tolerating, avoid rapid fluctuations and hypotension , further titration pending clinical course for long term age/risk specific normotension    -ANTITHROMBOTIC THERAPY: on Apixaban 5mg bid. Suggest ongoing discussion re: long term duration.  ASA 81mg daily given hx of CAD s/p stent placement.   -titrate statin to LDL goal less than 70  -MRI Brain w/o as noted   -Carotid duplex No significant hemodynamic stenosis of either carotid artery.  Delayed upstroke of the left vertebral artery waveform may be due to   congenitally hypoplastic vessel or stenosis. Correlate with recent CTA   neck- notes hypoplastic.   -Dysphagia screen: fail,, SLP evaluation for easy to chew/mildly thick   -Physical therapy/OT/Speech eval/treatment.     -CARDIOVASCULAR:  TTE as noted, cardiac monitoring w/ telemetry for now, further evaluation pending findings of noted workup                              -HEMATOLOGY: H/H without anemia, monitor for si/sx of bleeding, Platelets 213, patient should have all age and risk appropriate malignancy screenings with PCP or sooner if clinically suspected   -Heme onc consult for further advisement   -LE duplex w/out evidence of DVT in either lower extremity      DVT ppx: Heparin s.c [] LMWH [x]     PULMONARY: protecting airway, saturating well     RENAL: BUN/Cr within range, monitor urine output, maintain adequate hydration       Na Goal:  135-145    ID: afebrile, no leukocytosis, monitor for si/sx of infection     OTHER:  condition and plan of care d/w patient and wife, questions and concerns addressed.   Orthopaedic follow up: protective weight bearing with LLE cane. pain control. pending clinical course will need to determine surgical timeline but overall if benefit>risk there is no absolute contraindication to surgical intervention once optimized.  Risks/benefits/ alternatives  should be discussed and hematology should follow for guidance re: AC plan while bridging surgically.     DISPOSITION: home, home PT, supervision for outdoor ambulation, use of SAC, + home with assist; Home OT    CORE MEASURES:        Admission NIHSS: 5     Tenecteplase : [] YES x[] NO      LDL/HDL/A1C: 125/59/5.6     Depression Screen- if depression hx and/or present      Statin Therapy: Lipitor 40 once patient is able to tolerate PO     Dysphagia Screen: [] PASS [X] FAIL     Smoking [X] YES CIGARS [] NO      Afib [] YES X[] NO     Stroke Education [X] YES [] NO    Obtain screening lower extremity venous ultrasound in patients who meet 1 or more of the following criteria as patient is high risk for DVT/PE on admission:   [] History of DVT/PE  X[]Hypercoagulable states (Factor V Leiden, Cancer, OCP, etc. )  []Prolonged immobility (hemiplegia/hemiparesis/post operative or any other extended immobilization)  [] Transferred from outside facility (Rehab or Long term care)  [] Age </= to 50  [X]Swollen left lower extremity

## 2024-03-29 NOTE — PHYSICAL THERAPY INITIAL EVALUATION ADULT - GENERAL OBSERVATIONS, REHAB EVAL
received semi jones position in bed, NAD, agreeable to PT
Pt received in semifowler position with pleasant and agreeable to PT

## 2024-03-29 NOTE — OCCUPATIONAL THERAPY INITIAL EVALUATION ADULT - ADDITIONAL COMMENTS
right handed  Pt has a cane, RW, shower chair, and commode  Pt uses a walk in shower
right handed  Pt has a cane, RW, shower chair, and commode  Pt uses a walk in shower

## 2024-03-29 NOTE — PHYSICAL THERAPY INITIAL EVALUATION ADULT - PRECAUTIONS/LIMITATIONS, REHAB EVAL
fall precautions
keep head of bed above 30degrees/aspiration precautions/cardiac precautions/fall precautions/seizure precautions

## 2024-03-29 NOTE — PROGRESS NOTE ADULT - PROVIDER SPECIALTY LIST ADULT
[facility] Hospitalist Progress Note    SUBJECTIVE:    Patient is still generally weak, denied other symptoms    OBJECTIVE:    Current Facility-Administered Medications   Medication   • carvedilol (COREG) tablet 6.25 mg   • [Held by provider] potassium CHLORIDE (KLOR-CON) packet 40 mEq   • sodium chloride 0.9 % flush bag 25 mL   • hydrALAZINE (APRESOLINE) injection 10 mg   • acetaminophen (TYLENOL) tablet 650 mg   • folic acid (FOLATE) tablet 1 mg   • HYDROcodone-acetaminophen (NORCO) 7.5-325 MG per tablet 1 tablet   • atorvastatin (LIPITOR) tablet 40 mg   • donepezil (ARICEPT) tablet 10 mg   • pantoprazole (PROTONIX) EC tablet 40 mg   • SUMAtriptan (IMITREX) tablet 50 mg   • thiamine (VITAMIN B1) tablet 100 mg   • sodium chloride 0.9 % flush bag 25 mL   • sodium chloride (PF) 0.9 % injection 2 mL   • sodium chloride (NORMAL SALINE) 0.9 % bolus 500 mL   • enoxaparin (LOVENOX) injection 40 mg       I/O's    Intake/Output Summary (Last 24 hours) at 1/6/2022 1521  Last data filed at 1/6/2022 1200  Gross per 24 hour   Intake 1460 ml   Output 3700 ml   Net -2240 ml       Last Recorded Vitals  Vital Last Value 24 Hour Range   Temperature 98.2 °F (36.8 °C) (01/06/22 1445) Temp  Min: 97.7 °F (36.5 °C)  Max: 98.4 °F (36.9 °C)   Pulse 64 (01/06/22 1445) Pulse  Min: 61  Max: 89   Respiratory 18 (01/06/22 1445) Resp  Min: 16  Max: 18   Non-Invasive  Blood Pressure 123/74 (01/06/22 1445) BP  Min: 92/55  Max: 129/87   Pulse Oximetry 96 % (01/06/22 1445) SpO2  Min: 95 %  Max: 98 %     Vital Today Admitted   Weight 111.9 kg (246 lb 11.1 oz) (12/25/21 2300) Weight: 113.6 kg (250 lb 7.1 oz) (12/25/21 1741)   Height N/A Height: 5' 11\" (180.3 cm) (12/25/21 1741)   BMI N/A BMI (Calculated): 34.93 (12/25/21 1741)       Physical Exam  Vitals and nursing note reviewed.   Constitutional:       General: He is not in acute distress.     Appearance: He is obese. He is not ill-appearing.   HENT:      Head: Normocephalic.      Mouth/Throat:      
Mouth: Mucous membranes are moist.   Eyes:      Pupils: Pupils are equal, round, and reactive to light.   Cardiovascular:      Rate and Rhythm: Normal rate and regular rhythm.      Pulses: Normal pulses.      Heart sounds: Normal heart sounds.   Pulmonary:      Effort: Pulmonary effort is normal.      Breath sounds: Normal breath sounds.   Abdominal:      General: Bowel sounds are normal. There is no distension.      Palpations: Abdomen is soft.   Musculoskeletal:      Cervical back: Normal range of motion.   Neurological:      General: No focal deficit present.      Mental Status: He is alert and oriented to person, place, and time.   Psychiatric:         Mood and Affect: Mood normal.          Labs     Recent Labs   Lab 01/05/22  0434 01/04/22  1406 01/03/22  0440 01/02/22  1356 01/02/22  0542   WBC 7.9  --   --   --  7.6   HCT 35.7*  --   --   --  34.3*   HGB 11.4*  --   --   --  11.3*     --   --   --  273   SODIUM 137 137 136   < > 138   POTASSIUM 4.1 4.0 3.2*   < > 3.1*   CHLORIDE 105 106 106   < > 106   CO2 27 26 27   < > 26   CALCIUM 8.8 8.8 8.9   < > 8.7   GLUCOSE 95 116* 93   < > 90   BUN 4* 6 6   < > 5*   CREATININE 0.76 0.96 0.68   < > 0.73   AST 30  --   --   --   --    GPT 30  --   --   --   --    ALKPT 80  --   --   --   --    BILIRUBIN 0.4  --   --   --   --    ALBUMIN 2.6*  --   --   --   --    PHOS 4.3  --   --   --  3.9    < > = values in this interval not displayed.       Imaging  No results found.    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 
Neurology
Neurology
following  - Neurology following    Traumatic rhabdomyolysis, likely 2/2 above and medications   - CPK is WNL   - Holding Abilify and Zoloft for now  - Nephrology following    Elevated BP in patient with no hx of primary hypertension  - BP in 110's to 140's today  - Continue Coreg (now 6.25mg BID) and PRN hydralazine     Anemia and thrombocytopenia (resolved), likely 2/2 EtOH-related myelosuppression  - Hgb 11.3 --> 11.4 yesterday. 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 Continue Coreg and lisinopril   Major depressive disorder w/ component of substance-induced mood disorder - Continue with Aricept. Hold Abilify and Zoloft  Hyperlipidemia- Resume atorvastatin  H/o microdiscectomy-  No acute concerns  MIAH- Patient not on nocturnal CPAP  H/o cardiac contusion- No acute concerns  Mitral insufficiency - No acute issues  Migraine headaches w/ aura - Continue with PRN Imitrex  Asthma - No acute exacerbation . No maintenance meds noted  Obesity (BMI 34.41) s/p sleeve gastrectomy- Dietary and lifestyle modifications advised    DVT PPx: Lovenox, SCDs    DISPO: Pending clinical improvement and addtional specialist input. Pending placement. Case discussed with patient, , , with PT and w/ RN Radha  PEER to PEER with  done at 1 pm 1/5/22    JOSE JUAN: Unclear    PCP:  Marek Parry MD

## 2024-03-29 NOTE — PHYSICAL THERAPY INITIAL EVALUATION ADULT - NSPTDISCHREC_GEN_A_CORE
home, home PT, supervision for outdoor ambulation, use of SAC
Home with 24/7 A/Home PT
beer/liquor/wine

## 2024-03-29 NOTE — PROGRESS NOTE ADULT - SUBJECTIVE AND OBJECTIVE BOX
Preliminary note, official recommendations pending attending review/signature   Seen and examined by Stroke team attending , assessment/ plan as discussed with stroke team attending/team as noted.     [facility] Stroke Team  Progress Note     HPI:  77yo M with history of HTN, CAD s/p stent placement x6, right parotid cancer s/p radiation therapy, squamous cell cancer with mets presenting to the ED with acute onset aphasia, dysarthria and right facial asymmetry with LKW time 11AM. Stroke code was activated for the patient, CTH, CTA H/N and CTP obtained with a finding of age indeterminate left frontal lobe and left parietal lobe infarcts. No LVO or perfusion deficit. TNK not administered given appearance of stroke on CT. Patient admitted to the Neurology service for further work up    SUBJECTIVE: No events overnight.  No new neurologic complaints.  ROS reported negative unless otherwise noted.    apixaban 5 milliGRAM(s) Oral two times a day  aspirin  chewable 81 milliGRAM(s) Oral daily  atorvastatin 40 milliGRAM(s) Oral at bedtime      PHYSICAL EXAM:   Vital Signs Last 24 Hrs  T(C): 37.1 (29 Mar 2024 11:56), Max: 37.1 (28 Mar 2024 22:00)  T(F): 98.8 (29 Mar 2024 11:56), Max: 98.8 (28 Mar 2024 22:00)  HR: 89 (29 Mar 2024 11:56) (75 - 100)  BP: 139/95 (29 Mar 2024 11:56) (129/78 - 158/91)  BP(mean): --  RR: 19 (29 Mar 2024 11:56) (16 - 19)  SpO2: 98% (29 Mar 2024 11:56) (96% - 98%)    Parameters below as of 29 Mar 2024 11:56  Patient On (Oxygen Delivery Method): room air        General: No acute distress.   HEENT: Head normocephalic, atraumatic. Conjunctivae clear w/o exudates or hemorrhage. Sclera non-icteric. Nares are patent bilaterally. Mucous membranes pink and moist.  No tonsillar swelling or exudates.    Neck: Supple, no adenopathy. Trachea midline. No JVD.  Respiratory: Lung sounds clear in all fields. Chest wall symmetric, nontender.   Abdominal: Soft, nondistended, nontender. Bowel sounds normoactive x 4 quadrants.    Skin: Skin is warm,  multifocal lesions. Appropriate color for ethnicity. Nailbeds pink with no cyanosis or clubbing.   Extremities: No edema,  b/l LE mild erythema      NEUROLOGICAL EXAM:  Mental status: Awake, alert, oriented x3, speech fluent, follows commands, no neglect, normal memory   Cranial Nerves: slight dysarthria, mild right facial palsy, no nystagmus,  dysarthria,  tongue midline  Motor exam: Normal tone, no drift, 5-/5 RUE with subtle drift , 4/5 RLE, 5/5 LUE, 3/5 LLE (pain limited)  Sensation: Intact to light touch   Coordination/ Gait: No dysmetria, gait not tested    LABS:                        13.5   7.01  )-----------( 213      ( 29 Mar 2024 02:35 )             42.0    03-29    141  |  102  |  14.9  ----------------------------<  78  3.9   |  23.0  |  0.85    Ca    9.1      29 Mar 2024 02:35  Phos  3.5     03-29  Mg     1.9     03-29      ECHO:   TTE Limited W or WO Ultrasound Enhancing Agent (03.27.24 @ 15:04)   CONCLUSIONS:   1. Left ventricular cavity is normal in size. Left ventricular wall thickness is normal. Left ventricular systolic function is low normal with an ejection fraction of 55 % by Valdez's method of disks. There are no regional wall motion abnormalities seen.   2. There is mild (grade 1) left ventricular diastolic dysfunction, with normal filling pressure.   3. Normal right ventricular cavity size, with normal wall thickness, and normal systolic function.   4. There is moderate calcification of the aortic valve leaflets. mild to moderate aortic stenosis.   5. There is moderate calcification of the mitral valve annulus.   6. Mild mitral regurgitation.   7. No pericardial effusion seen.   8. No prior echocardiogram is available for comparison.   9. Pulmonary artery systolic pressure could not be estimated.    03-28 Chol 220<H> LDL -125 - HDL 59 Trig 182<H>    A1C: 5.6    IMAGING: Reviewed by me.     MR Head w/wo IV Cont (03.27.24 @ 21:29)   IMPRESSION: Acute infarct in the left posterior frontal lobe.    CT Brain Stroke Protocol (03.27.24 @ 11:35)     IMPRESSION: Age-indeterminate, possibly acute, left frontal lobe and left   parietal lobe infarctions. No acute intracranial hemorrhage    CTA BRAIN:  Mild bilateral cavernous carotid artery calcifications. Mild right   vertebral artery calcifications.  The right vertebral artery is dominant. 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  The [place] and vertebrobasilar system are unremarkable without   evidence of stenosis, occlusion or saccular aneurysm dilation. No   evidence for arterial venous malformation.      CTA NECK:  Mild bilateral carotid bulb/proximal internal carotid artery   calcifications.  A left-sided aortic arch is demonstrated. There is normal relationship to   the great vessels. The common carotid arteries, internal carotid arteries   and vertebral arteries show no evidence of significant stenosis,   occlusion or saccular aneurysm dilation.    CT PERFUSION:  Patient has only had less than 2 hours of symptoms may influence the CT   perfusion.    No core infarct or evidence of delayed mean transit time is identified.       [facility] Stroke Team  Progress Note     HPI:  77yo M with history of HTN, CAD s/p stent placement x6, right parotid cancer s/p radiation therapy, squamous cell cancer with mets presenting to the ED with acute onset aphasia, dysarthria and right facial asymmetry with LKW time 11AM. Stroke code was activated for the patient, CTH, CTA H/N and CTP obtained with a finding of age indeterminate left frontal lobe and left parietal lobe infarcts. No LVO or perfusion deficit. TNK not administered given appearance of stroke on CT. Patient admitted to the Neurology service for further work up    SUBJECTIVE: No events overnight.  No new neurologic complaints.  ROS reported negative unless otherwise noted.    apixaban 5 milliGRAM(s) Oral two times a day  aspirin  chewable 81 milliGRAM(s) Oral daily  atorvastatin 40 milliGRAM(s) Oral at bedtime      PHYSICAL EXAM:   Vital Signs Last 24 Hrs  T(C): 37.1 (29 Mar 2024 11:56), Max: 37.1 (28 Mar 2024 22:00)  T(F): 98.8 (29 Mar 2024 11:56), Max: 98.8 (28 Mar 2024 22:00)  HR: 89 (29 Mar 2024 11:56) (75 - 100)  BP: 139/95 (29 Mar 2024 11:56) (129/78 - 158/91)  BP(mean): --  RR: 19 (29 Mar 2024 11:56) (16 - 19)  SpO2: 98% (29 Mar 2024 11:56) (96% - 98%)    Parameters below as of 29 Mar 2024 11:56  Patient On (Oxygen Delivery Method): room air        General: No acute distress.   HEENT: Head normocephalic, atraumatic. Conjunctivae clear w/o exudates or hemorrhage. Sclera non-icteric. Nares are patent bilaterally. Mucous membranes pink and moist.  No tonsillar swelling or exudates.    Neck: Supple, no adenopathy. Trachea midline. No JVD.  Respiratory: Lung sounds clear in all fields. Chest wall symmetric, nontender.   Abdominal: Soft, nondistended, nontender. Bowel sounds normoactive x 4 quadrants.    Skin: Skin is warm,  multifocal lesions. Appropriate color for ethnicity. Nailbeds pink with no cyanosis or clubbing.   Extremities: No edema,  b/l LE mild erythema      NEUROLOGICAL EXAM:  Mental status: Awake, alert, oriented x3, speech fluent, follows commands, no neglect, normal memory   Cranial Nerves: slight dysarthria, mild right facial palsy, no nystagmus,  dysarthria,  tongue midline  Motor exam: Normal tone, no drift, 5-/5 RUE with subtle drift , 4/5 RLE, 5/5 LUE, 3/5 LLE (pain limited)  Sensation: Intact to light touch   Coordination/ Gait: No dysmetria, gait not tested    LABS:                        13.5   7.01  )-----------( 213      ( 29 Mar 2024 02:35 )             42.0    03-29    141  |  102  |  14.9  ----------------------------<  78  3.9   |  23.0  |  0.85    Ca    9.1      29 Mar 2024 02:35  Phos  3.5     03-29  Mg     1.9     03-29      ECHO:   TTE Limited W or WO Ultrasound Enhancing Agent (03.27.24 @ 15:04)   CONCLUSIONS:   1. Left ventricular cavity is normal in size. Left ventricular wall thickness is normal. Left ventricular systolic function is low normal with an ejection fraction of 55 % by Valdez's method of disks. There are no regional wall motion abnormalities seen.   2. There is mild (grade 1) left ventricular diastolic dysfunction, with normal filling pressure.   3. Normal right ventricular cavity size, with normal wall thickness, and normal systolic function.   4. There is moderate calcification of the aortic valve leaflets. mild to moderate aortic stenosis.   5. There is moderate calcification of the mitral valve annulus.   6. Mild mitral regurgitation.   7. No pericardial effusion seen.   8. No prior echocardiogram is available for comparison.   9. Pulmonary artery systolic pressure could not be estimated.    03-28 Chol 220<H> LDL -125 - HDL 59 Trig 182<H>    A1C: 5.6    IMAGING: Reviewed by me.     MR Head w/wo IV Cont (03.27.24 @ 21:29)   IMPRESSION: Acute infarct in the left posterior frontal lobe.    CT Brain Stroke Protocol (03.27.24 @ 11:35)     IMPRESSION: Age-indeterminate, possibly acute, left frontal lobe and left   parietal lobe infarctions. No acute intracranial hemorrhage    CTA BRAIN:  Mild bilateral cavernous carotid artery calcifications. Mild right   vertebral artery calcifications.  The right vertebral artery is dominant. 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  The [place] of [place] and vertebrobasilar system are unremarkable without   evidence of stenosis, occlusion or saccular aneurysm dilation. No   evidence for arterial venous malformation.      CTA NECK:  Mild bilateral carotid bulb/proximal internal carotid artery   calcifications.  A left-sided aortic arch is demonstrated. There is normal relationship to   the great vessels. The common carotid arteries, internal carotid arteries   and vertebral arteries show no evidence of significant stenosis,   occlusion or saccular aneurysm dilation.    CT PERFUSION:  Patient has only had less than 2 hours of symptoms may influence the CT   perfusion.    No core infarct or evidence of delayed mean transit time is identified.

## 2024-03-29 NOTE — OCCUPATIONAL THERAPY INITIAL EVALUATION ADULT - LIVES WITH, PROFILE
Pt lives in a 1 level condo. 0 [facility]. Pt has aide 24/7/spouse
Pt lives in a 1 level con[address]. Pt has aide 24/7/spouse

## 2024-03-29 NOTE — DISCHARGE NOTE NURSING/CASE MANAGEMENT/SOCIAL WORK - PATIENT PORTAL LINK FT
You can access the FollowMyHealth Patient Portal offered by [facility] by registering at the following website: [email or web address]. By joining [employer]’s FollowMyHealth portal, you will also be able to view your health information using other applications (apps) compatible with our system.

## 2024-03-29 NOTE — DISCHARGE NOTE NURSING/CASE MANAGEMENT/SOCIAL WORK - NSDCVIVACCINE_GEN_ALL_CORE_FT
influenza, high-dose, quadrivalent; 28-Oct-2022 12:29; Leilani Ji (RN); [employer]; [id number] (Exp. Date: 30-Jun-2023); IntraMuscular; Deltoid Right.; 0.7 milliLiter(s); VIS (VIS Published: 06-Aug-2021, VIS Presented: 28-Oct-2022);

## 2024-03-29 NOTE — PROGRESS NOTE ADULT - REASON FOR ADMISSION
acute onset dysarthria, aphasia and right facial asymmetry
acute onset dysarthria, aphasia and right facial asymmetry

## 2024-04-02 ENCOUNTER — APPOINTMENT (OUTPATIENT)
Dept: ORTHOPEDIC SURGERY | Facility: CLINIC | Age: 79
End: 2024-04-02
Payer: MEDICARE

## 2024-04-02 VITALS
HEIGHT: 72 IN | DIASTOLIC BLOOD PRESSURE: 62 MMHG | SYSTOLIC BLOOD PRESSURE: 99 MMHG | WEIGHT: 193 LBS | HEART RATE: 86 BPM | BODY MASS INDEX: 26.14 KG/M2

## 2024-04-02 PROCEDURE — 99213 OFFICE O/P EST LOW 20 MIN: CPT | Mod: 57

## 2024-04-02 PROCEDURE — 27230 TREAT THIGH FRACTURE: CPT | Mod: LT

## 2024-04-02 PROCEDURE — G2211 COMPLEX E/M VISIT ADD ON: CPT

## 2024-04-02 NOTE — CARDIOLOGY SUMMARY
[de-identified] : - CAD: Intolerant of beta blockers, Plavix discontinued 8/15/2016, noncompliant with aspirin and Praluent, RCA SHELDON 3/2007, 6/2007, 3/2008; CARDIAC CATHETERIZATION: 5/2008, normal,  NUKE: 12/7/2010, Normal, CARDIAC CATHETERIZATION: 6/18/2012, EF55%, mLAD 50, mCX40, pRCA 95 ISS >SHELDON, CARDIAC CATHETERIZATION: 8/9/2012 mLAD 70 >SHELDON, CARDIAC CATHETERIZATION: 2/7/2013, patent stents, CARDIAC CATHETERIZATION: 11/24/2014, RCA PL 50, NUCLEAR STRESS TEST: 9/21/2017, inferior ischemia. EF61%, CARDIAC CATH: 9/29/2017, patent stents. OM1 40%, dRCA 50%, NUCLEAR STRESS TEST: 11/20/2018, normal, EF 60%, PHARMACOLOGIC NUCLEAR STRESS TEST: 10/19/2021, normal, EF >65%, CARDIAC CATH: 11/29/2021, Patent stents, dRCA 50% nml IFR, PHARMACOLOGIC NUCLEAR STRESS TEST: 3/15/2024, reported mildly abnormal with a suggestion of inferior wall ischemia from the base to mid cavity, EF 64% (personal review, small to moderate size zone of moderate inferior ischemia from the base to mid  cavity) - Hypercholesterolemia: intolerant of statins, zetia, and Vascepa. Noncompliant with Praluent - Abnormal ECG/BBB/LAFB - Hypertension: home cuff accurate, Secondary workup 1/13 normal - ECHOCARDIOGRAM: 3/14/2024, reported EF 55 to 60%, septal LVH, mild aortic stenosis, no PA systolic  (11/7/2022, TDS, EF>65%, trace mitral regurgitation and tricuspid regurgitation RVSP 21, aortic valve calcification without significant aortic stenosis, the aortic root and ascending aorta measured 4.1 cm which is normal for the patient's BSA ) - Cigar smoker - SVT: event 2/2008, EPS demonstrated MAT, Verapamil added and later discontinued due to intolerance, MCOT: 11/2021, 12 days, NSR 72 (), 1 sxm =SR, 8 beat asymptomatic VT - TIA: 9/2010, Pt had been non compliant with medical therapy prior to that episode, reported TIA 12/31/12.  CVA 3/27/2024. - CAROTID ULTRASOUND: 12/28/2020, nonobstructive disease

## 2024-04-02 NOTE — HISTORY OF PRESENT ILLNESS
[Pain Location] : pain [Worsening] : worsening [] : left hip [___ yrs] : [unfilled] year(s) ago [Constant] : ~He/She~ states the symptoms seem to be constant [Bending] : worsened by bending [Hip Movement] : worsened by hip movement [Lifting] : worsened by lifting [Sitting] : worsened by sitting [Walking] : worsened by walking [Running] : worsened by running [Rest] : relieved by rest [de-identified] : Patient is a 78-year-old male here today for follow-up of his left hip periprosthetic fracture with metastatic lesions.  He was scheduled undergo a proximal femur replacement however 2 days prior to surgery he developed an acute stroke.  He has been placed on Eliquis and a statin.  He still has some mild residual facial droop.  He states he has been taking the medication.  Feels like his hip is feeling somewhat better.  He is ambulating with use of a cane.  Denies any falls or trauma [de-identified] : taking on and off shoes and socks and ambulating out of a car.

## 2024-04-02 NOTE — PHYSICAL EXAM
[Cane] : ambulates with cane [Walker] : ambulates with walker [de-identified] : Musculoskeletal: ambulates with cane. Left hip exam showed no groin pain with SLR, ROM is full flexion with 20 degrees external and 10 degrees internal with pain, GRANT negative, FADIR positive. Well healed surgical incisions.  5/5 motor strength in bilateral lower extremities. Sensory: Intact in bilateral lower extremities. DTRs: Biceps, brachioradialis, triceps, patellar, ankle and plantar 2+ and symmetric bilaterally. Pulses: dorsalis pedis, posterior tibial, femoral, popliteal, and radial 2+ and symmetric bilaterally. [de-identified] : INTERPRETATION:  CT OF THE LEFT HIP AND LEFT FEMUR CLINICAL INFORMATION: Pain. Pre-surgical evaluation for joint arthroplasty. TECHNIQUE: Initially, CT left hip was performed according to a BAKARI protocol. Multiplanar reformats were generated for review. Following this, CT left femur was performed before and after administration of 90 cc of Isovue 370 (10 cc discarded). COMPARISON: PET/CT 1/10/2024. FINDINGS: Patient is status post placement of intramedullary yaron within the left femur with a fixating screw traversing the left femoral head neck junction and additional fixation screws seen more distally along the inferior aspect of the yaron about the level of the knee. There is bending/breaking of the intramedullary femoral yaron at the level of the traversing femoral head neck fixating screw. Extensive lucency/sclerosis is also seen at this level about the proximal femur with extension/involvement of the femoral head neck junction, intertrochanteric region and lesser trochanter. Discernible lucent lines are present suggestive of periprosthetic fractures. Suspect a soft tissue component associated with the osseous metastatic disease; this is not well characterized on this study.  There is bilateral hip joint arthrosis. There is a left hip joint effusion present. Bilateral knee joint spaces are maintained. No evidence of advanced degenerative changes about the knees. Degenerative changes of the pubic symphysis and sacroiliac joints.  Intramuscular lipoma within the sartorius muscle. Colonic diverticulosis. Partially imaged lumbar spondylosis and postsurgical changes in the lower lumbar spine. Vascular calcification is present. IMPRESSION: Metastatic disease about the proximal left femur at the site of the ORIF with associated pathologic fracture and hardware failure as described. CT HIP ONLY LT  - ORDERED BY:  MARLYS CASPER PROCEDURE DATE:  03/11/2024 INTERPRETATION:  CT OF THE LEFT HIP AND LEFT FEMUR CLINICAL INFORMATION: Pain. Pre-surgical evaluation for joint arthroplasty. TECHNIQUE: Initially, CT left hip was performed according to a BAKARI protocol. Multiplanar reformats were generated for review. Following this, CT left femur was performed before and after administration of 90 cc of Isovue 370 (10 cc discarded). COMPARISON: PET/CT 1/10/2024.  FINDINGS: Patient is status post placement of intramedullary yaron within the left femur with a fixating screw traversing the left femoral head neck junction and additional fixation screws seen more distally along the inferior aspect of the yaron about the level of the knee. There is bending/breaking of the intramedullary femoral yaron at the level of the traversing femoral head neck fixating screw. Extensive lucency/sclerosis is also seen at this level about the proximal femur with extension/involvement of the femoral head neck junction, intertrochanteric region and lesser trochanter. Discernible lucent lines are present suggestive of periprosthetic fractures. Suspect a soft tissue component associated with the osseous metastatic disease; this is not well characterized on this study. There is bilateral hip joint arthrosis. There is a left hip joint effusion present. Bilateral knee joint spaces are maintained. No evidence of advanced degenerative changes about the knees. Degenerative changes of the pubic symphysis and sacroiliac joints. Intramuscular lipoma within the sartorius muscle. Colonic diverticulosis. Partially imaged lumbar spondylosis and postsurgical changes in the lower lumbar spine. Vascular calcification is present. IMPRESSION: Metastatic disease about the proximal left femur at the site of the ORIF with associated pathologic fracture and hardware failure as described.  MR FEMUR WAW IC LT  - ORDERED BY: MARLYS CASPER PROCEDURE DATE:  03/16/2024 INTERPRETATION:  MRI OF THE LEFT FEMUR CLINICAL INFORMATION: Pain. Metastatic squamous cell carcinoma.  COMPARISON: Left femur CT dated 3/11/2024. Multiple prior whole-body PET/CT examinations, most recently 1/10/2024. Presurgical left hip MRI dated 10/10/2022. TECHNIQUE: Multiplanar, multisequence imaging of the left femur both prior to and following administration of 90 cc Gadavist intravenous contrast. Additional 1 cc Gadavist contrast was discarded. FINDINGS: OSSEOUS: No acute displaced fracture. Status post open reduction and internal fixation of ill-defined comminuted pathologic left proximal femoral intertrochanteric fracture, occurring at site of ill-defined enhancing permeative osteolytic lesion measuring approximately 4.7 x 4.7 x 8.8 cm in AP, transverse, craniocaudal dimensions. Mild to moderate patchy perilesional soft tissue edema like signal, most notably at the femoral origin of the vastus lateralis, but without definite extraosseous extension. No other aggressive enhancing osseous neoplasm within the field of view. GENERAL: No mass lesion, hematoma, or drainable encapsulated fluid collection. Muscle bulk and signal intensity are within normal limits. IMPRESSION: Status post ORIF of chronic pathologic intertrochanteric left proximal femoral fracture, occurring at site of ill-defined approximately 4.7 x 4.7 x 8.8 cm aggressive osteolytic metastasis.

## 2024-04-02 NOTE — DISCUSSION/SUMMARY
rounding in PCU. Assessment: Patient is recovering from an infection and physically well. Patient expressed loneliness since her  has passed. Patient has family and jhoan support. Her  called to aggange a visit while I was present. Intervention: Engaged in conversation and active listening. Prayed with Patient. Outcome: Patient expressed appreciation for visit and offer of continued prayer. Plan: Chaplains are available on site or on call 24/7 for spiritual and emotional support. 05/23/22 1422   Encounter Summary   Encounter Overview/Reason  Initial Encounter;Spiritual/Emotional Needs   Service Provided For: Patient   Referral/Consult From: Rounding   Last Encounter  05/23/22   Complexity of Encounter Low   Begin Time 1410   End Time  1422   Total Time Calculated 12 min   Encounter    Type Initial Screen/Assessment   Spiritual/Emotional needs   Type Spiritual Support   Assessment/Intervention/Outcome   Assessment Calm; Loneliness;Passive   Intervention Active listening;Prayer (assurance of)/[place]   Outcome Comfort;Engaged in conversation;Expressed Gratitude     Electronically signed by Glo Ledesma on 5/23/2022 at 2:24 PM [Medication Risks Reviewed] : Medication risks reviewed [de-identified] : Patient is a 78-year-old male here today for follow-up of his left hip metastatic lesion.  He was scheduled undergo a proximal femur replacement however 2 days prior to surgery he did develop an acute stroke.  He is now on anticoagulation including Eliquis.  We did discuss that we are unable to proceed with surgical intervention at this time due to his acute stroke and need for anticoagulation.  We will therefore manage his hip conservatively for the time being.  We discussed the importance of protected weightbearing.  He may perform low impact activity and exercises.  He will continue with his physical therapy.  He will contact the office if he has any increasing pain or changes with his hip.  I will see him back in 6 weeks for repeat evaluation.  All questions were asked and answered

## 2024-04-02 NOTE — DISCUSSION/SUMMARY
[FreeTextEntry1] : Coronary artery disease Nuclear stress testing from 3/15/2024 consistent with one-vessel coronary artery disease (RCA or circumflex) and a preserved ejection fraction.   In view of the evidence of one-vessel disease by nuclear stress testing, preserved ejection fraction, absence of ischemic symptoms, and urgent surgical procedure, I have recommended medical treatment of the patient's coronary artery disease and to proceed with surgery. Since the patient is intolerant of beta-blockers and verapamil he will remain on amlodipine. Isosorbide was prescribed at his last visit.  It is well-tolerated.  In view of his blood pressure the dose will not be increased.   Postoperatively I will recommend the patient restart Praluent.  Postoperatively the patient should restart aspirin unless there is a medical/oncologic contraindication.  Hypercholesterolemia Noncompliant with Praluent as noted above  Preoperative cardiovascular evaluation: Total hip replacement surgery In view of the evidence of one-vessel coronary artery disease by nuclear stress testing, preserved ejection fraction, absence of ischemic symptoms, urgent surgical procedure, and the absence of unstable angina, congestive heart failure, or severe aortic stenosis, the patient is cleared for total hip replacement surgery with the following recommendations. The patient should remain on amlodipine, ramipril, and Imdur perioperatively.  The patient was instructed to hold these medications the morning of his procedure. Endocarditis prophylaxis and a postoperative monitored bed are not indicated. DVT prophylaxis at the surgeon's discretion   RTO 1 month  Addendum 4/2/2024 Surgery placed on hold secondary to the patient experiencing a CVA 3/27/2024

## 2024-04-02 NOTE — HISTORY OF PRESENT ILLNESS
[FreeTextEntry1] : The patient is a 78-year-old white male with a past medical history remarkable for coronary artery disease, hypertension, hypercholesterolemia, carotid artery disease, TIA, SVT, and an abnormal ECG who presented for evaluation on 2/26/2024 prior to total hip replacement surgery. The patient ambulates with a cane.  He continues to smoke cigars.  He is chest pain and dyspnea free.  The patient has been noncompliant with aspirin and Praluent. Nuclear stress testing was performed on March 15.  Personal review demonstrated a small to moderate-sized zone of moderate inferior ischemia from the base to mid cavity with a normal ejection fraction. The patient reports that his surgical procedure is urgent due to bending/breaking of the intramedullary femoral yaron, and a suggestion of periprosthetic fractures

## 2024-04-02 NOTE — PHYSICAL EXAM
[Well Developed] : well developed [Well Nourished] : well nourished [No Acute Distress] : no acute distress [Normal Conjunctiva] : normal conjunctiva [Normal Venous Pressure] : normal venous pressure [No Carotid Bruit] : no carotid bruit [Normal S1, S2] : normal S1, S2 [No Rub] : no rub [No Gallop] : no gallop [Murmur] : murmur [No Respiratory Distress] : no respiratory distress  [Soft] : abdomen soft [Non Tender] : non-tender [No Masses/organomegaly] : no masses/organomegaly [Normal Bowel Sounds] : normal bowel sounds [Normal Gait] : normal gait [No Edema] : no edema [No Cyanosis] : no cyanosis [No Clubbing] : no clubbing [No Varicosities] : no varicosities [No Rash] : no rash [No Skin Lesions] : no skin lesions [Moves all extremities] : moves all extremities [No Focal Deficits] : no focal deficits [Normal Speech] : normal speech [Alert and Oriented] : alert and oriented [Normal memory] : normal memory [de-identified] : 2/6 systolic murmur at the base [de-identified] : Decreased breath sounds bilaterally

## 2024-04-08 ENCOUNTER — APPOINTMENT (OUTPATIENT)
Dept: NEUROLOGY | Facility: CLINIC | Age: 79
End: 2024-04-08
Payer: MEDICARE

## 2024-04-08 VITALS
SYSTOLIC BLOOD PRESSURE: 133 MMHG | OXYGEN SATURATION: 95 % | DIASTOLIC BLOOD PRESSURE: 76 MMHG | WEIGHT: 190 LBS | BODY MASS INDEX: 25.73 KG/M2 | HEART RATE: 78 BPM | HEIGHT: 72 IN

## 2024-04-08 PROCEDURE — G2211 COMPLEX E/M VISIT ADD ON: CPT

## 2024-04-08 PROCEDURE — 99205 OFFICE O/P NEW HI 60 MIN: CPT

## 2024-04-08 RX ORDER — APIXABAN 5 MG/1
5 TABLET, FILM COATED ORAL
Qty: 60 | Refills: 0 | Status: ACTIVE | COMMUNITY
Start: 2024-03-29

## 2024-04-08 RX ORDER — ATORVASTATIN CALCIUM 40 MG/1
40 TABLET, FILM COATED ORAL
Qty: 30 | Refills: 0 | Status: ACTIVE | COMMUNITY
Start: 2024-03-29

## 2024-04-08 NOTE — PHYSICAL EXAM
[FreeTextEntry1] :   General: Cooperative, NAD HEENT: NC/AT, no carotid bruits Lungs: CTAB Chest: RRR, no murmurs Extremities: nontender, no erythema Neurological Examination: NIHSS:*** MS: AOx3. Appropriately interactive, normal affect. Speech fluent w/o paraphasic errors CN: PERLL, EOMI, V1-3 sensation intact, face symmetric, hearing intact, palate elevates symmetrically, tongue midline, SCM equal bilaterally Motor: normal bulk and tone, no tremor, rigidity or bradykinesia.  Normal tone, no drift, 5-/5 RUE with subtle drift , 4/5 RLE, 5/5 LUE, 3/5 LLE (pain limited) Sens: Intact to light touch. Reflexes: 2/4 all over, downgoing toes b/l Coord:  No dysmetria, ESTHER intact Gait: Normal

## 2024-04-08 NOTE — PHYSICAL EXAM
[FreeTextEntry1] :   General: Cooperative, NAD HEENT: NC/AT, no carotid bruits Lungs: CTAB Chest: RRR, no murmurs Extremities: nontender, no erythema Neurological Examination: NIHSS: 1 MS: AOx3. Appropriately interactive, normal affect. Speech with mild decreased fluency. Able to name and repeat.   CN: PERLL, EOMI, V1-3 sensation intact, face symmetric, hearing intact, palate elevates symmetrically, tongue midline, SCM equal bilaterally Motor: normal bulk and tone, no tremor, rigidity or bradykinesia.  5/5 all over except LLE is 5-/5 Sens: Intact to light touch. Reflexes: 2/4 all over, downgoing toes b/l Coord:  No dysmetria, ESTHER intact Gait: Antalgic, some dragging of the RLE

## 2024-04-08 NOTE — ASSESSMENT
[FreeTextEntry1] :   MRI Brain showed a Left frontal stroke.  Etiology concerning for hypercoagulable state in the setting of malignancy   L frontal Ischemic stroke -ANTITHROMBOTIC THERAPY: on Apixaban 5mg bid. ASA 81mg daily given hx of CAD s/p stent placement. - BP goal 130-160mmHg normotension.  -titrate statin to LDL goal less than 70    Orthopaedic follow up: Pending clinical course will need to determine surgical timeline but overall if benefit>risk there is no absolute contraindication to surgical intervention once optimized.  Risks/benefits/ alternatives  should be discussed and hematology should follow for guidance re: AC plan while bridging surgically.     CORE MEASURES:    Admission NIHSS: 5    Tenecteplase : [] YES x[] NO    LDL/HDL/A1C: 125/59/5.6    Depression Screen- if depression hx and/or present    Statin Therapy: Lipitor 40 once patient is able to tolerate PO    Dysphagia Screen: [] PASS [X] FAIL    Smoking [X] YES CIGARS [] NO    Afib [] YES X[] NO    Stroke Education [X] YES [] NO

## 2024-04-08 NOTE — HISTORY OF PRESENT ILLNESS
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    Neuro Hx:  Presented to [facility] ED on 3/27/24 with acute onset aphasia, dysarthria and right facial asymmetry with LKW time 11AM. Stroke code was activated for the patient, CTH, CTA H/N and CTP obtained with a finding of age indeterminate left frontal lobe and left parietal lobe infarcts. No LVO or perfusion deficit. TNK not administered given appearance of stroke on CT. Patient admitted to the Neurology service for further work up. MRI Brain showed Acute infarct in the left posterior frontal lobe. , A1c 5.6.  TTE was neg.  Stroke thought to be due to hypercoagulable state in the setting of malignancy and he was started on Eliquis.  ASA continued for CAD hx.    Today 4/8/2024:  Doing well.  Feels his speech is improved.   Will be getting speech therapy soon.  Feels weak in both legs.  Getting PT for this.  His surgery has been delayed 6 months.  No issues with the Eliquis and ASA.

## 2024-04-08 NOTE — DATA REVIEWED
[de-identified] : MR Head w/wo IV Cont (03.27.24 @ 21:29)  IMPRESSION: Acute infarct in the left posterior frontal lobe.  CT Brain Stroke Protocol (03.27.24 @ 11:35)   IMPRESSION: Age-indeterminate, possibly acute, left frontal lobe and left  parietal lobe infarctions. No acute intracranial hemorrhage  CTA BRAIN: Mild bilateral cavernous carotid artery calcifications. Mild right  vertebral artery calcifications. The right vertebral artery is dominant. Left vertebral artery is  hypoplastic. [place] and vertebrobasilar system are unremarkable without  evidence of stenosis, occlusion or saccular aneurysm dilation. No  evidence for arterial venous malformation.   CTA NECK: Mild bilateral carotid bulb/proximal internal carotid artery  calcifications. A left-sided aortic arch is demonstrated. There is normal relationship to  the great vessels. The common carotid arteries, internal carotid arteries  and vertebral arteries show no evidence of significant stenosis,  occlusion or saccular aneurysm dilation.  CT PERFUSION: Patient has only had less than 2 hours of symptoms may influence the CT  perfusion.  No core infarct or evidence of delayed mean transit time is identified.

## 2024-04-08 NOTE — ASSESSMENT
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 He was scheduled undergo a proximal femur replacement however 2 days prior to surgery he developed the acute stroke. MRI Reviewed and shows a L frontal stroke.  Vascular studies were normal.  Stroke etiology concerning for hypercoagulable state in the setting of malignancy   L frontal ischemic stroke -ANTITHROMBOTIC THERAPY: on Apixaban 5mg bid. ASA 81mg daily given hx of CAD s/p stent placement. - SBP goal normotension  -titrate statin to LDL goal less than 70 - Cont. PT/OT - Patient to f/u with oncologist  Left Hip metastatic lesion--pending femur replacement.   Pending clinical course will need to determine surgical timeline but overall if benefit>risk there is no absolute contraindication to surgical intervention once optimized as his vascular studies were overall normal. I suspect he may be able to have surgery sooner than 6 months, maybe even  2-3 months after his stroke.  Risks/benefits/ alternatives  should be discussed and hematology should follow for guidance re: AC plan with bridging prior to surgery.

## 2024-04-08 NOTE — HISTORY OF PRESENT ILLNESS
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   Neuro Hx:  Presented to [facility] ED with acute onset aphasia, dysarthria and right facial asymmetry with LKW time 11AM. Stroke code was activated for the patient, CTH, CTA H/N and CTP obtained with a finding of age indeterminate left frontal lobe and left parietal lobe infarcts. No LVO or perfusion deficit. TNK not administered given appearance of stroke on CT. Patient admitted to the Neurology service for further work up. MRI Brain showed Acute infarct in the left posterior frontal lobe. , A1c 5.6.

## 2024-04-10 ENCOUNTER — LABORATORY RESULT (OUTPATIENT)
Age: 79
End: 2024-04-10

## 2024-04-10 ENCOUNTER — APPOINTMENT (OUTPATIENT)
Dept: RHEUMATOLOGY | Facility: CLINIC | Age: 79
End: 2024-04-10
Payer: MEDICARE

## 2024-04-10 ENCOUNTER — OUTPATIENT (OUTPATIENT)
Dept: OUTPATIENT SERVICES | Facility: HOSPITAL | Age: 79
LOS: 1 days | Discharge: HOME | End: 2024-04-10

## 2024-04-10 VITALS
HEIGHT: 72 IN | OXYGEN SATURATION: 97 % | BODY MASS INDEX: 24.38 KG/M2 | DIASTOLIC BLOOD PRESSURE: 86 MMHG | WEIGHT: 180 LBS | SYSTOLIC BLOOD PRESSURE: 140 MMHG | HEART RATE: 68 BPM | RESPIRATION RATE: 17 BRPM | TEMPERATURE: 97.7 F

## 2024-04-10 DIAGNOSIS — C79.51 SECONDARY MALIGNANT NEOPLASM OF BONE: ICD-10-CM

## 2024-04-10 DIAGNOSIS — G56.03 CARPAL TUNNEL SYNDROM,BILATERAL UPPER LIMBS: ICD-10-CM

## 2024-04-10 DIAGNOSIS — M25.512 PAIN IN RIGHT SHOULDER: ICD-10-CM

## 2024-04-10 DIAGNOSIS — Z90.49 ACQUIRED ABSENCE OF OTHER SPECIFIED PARTS OF DIGESTIVE TRACT: Chronic | ICD-10-CM

## 2024-04-10 DIAGNOSIS — G89.29 PAIN IN RIGHT SHOULDER: ICD-10-CM

## 2024-04-10 DIAGNOSIS — M15.9 POLYOSTEOARTHRITIS, UNSPECIFIED: ICD-10-CM

## 2024-04-10 DIAGNOSIS — Z98.1 ARTHRODESIS STATUS: Chronic | ICD-10-CM

## 2024-04-10 DIAGNOSIS — M79.7 FIBROMYALGIA: ICD-10-CM

## 2024-04-10 DIAGNOSIS — Z86.73 PERSONAL HISTORY OF TRANSIENT ISCHEMIC ATTACK (TIA), AND CEREBRAL INFARCTION W/OUT RESIDUAL DEFICITS: ICD-10-CM

## 2024-04-10 DIAGNOSIS — H04.123 DRY EYE SYNDROME OF BILATERAL LACRIMAL GLANDS: ICD-10-CM

## 2024-04-10 DIAGNOSIS — M25.511 PAIN IN RIGHT SHOULDER: ICD-10-CM

## 2024-04-10 DIAGNOSIS — M35.00 SICCA SYNDROME, UNSPECIFIED: ICD-10-CM

## 2024-04-10 DIAGNOSIS — M79.89 OTHER SPECIFIED SOFT TISSUE DISORDERS: ICD-10-CM

## 2024-04-10 DIAGNOSIS — M06.9 RHEUMATOID ARTHRITIS, UNSPECIFIED: ICD-10-CM

## 2024-04-10 DIAGNOSIS — C77.0 SECONDARY AND UNSPECIFIED MALIGNANT NEOPLASM OF LYMPH NODES OF HEAD, FACE AND NECK: ICD-10-CM

## 2024-04-10 DIAGNOSIS — C80.1 MALIGNANT (PRIMARY) NEOPLASM, UNSPECIFIED: ICD-10-CM

## 2024-04-10 PROCEDURE — 81003 URINALYSIS AUTO W/O SCOPE: CPT | Mod: QW

## 2024-04-10 PROCEDURE — G2211 COMPLEX E/M VISIT ADD ON: CPT

## 2024-04-10 PROCEDURE — 36415 COLL VENOUS BLD VENIPUNCTURE: CPT

## 2024-04-10 PROCEDURE — 99215 OFFICE O/P EST HI 40 MIN: CPT

## 2024-04-10 PROCEDURE — G2212 PROLONG OUTPT/OFFICE VIS: CPT

## 2024-04-11 ENCOUNTER — LABORATORY RESULT (OUTPATIENT)
Age: 79
End: 2024-04-11

## 2024-04-11 NOTE — DATA REVIEWED
[de-identified] : MR Head w/wo IV Cont (03.27.24 @ 21:29) IMPRESSION: Acute infarct in the left posterior frontal lobe.   CT Brain Stroke Protocol (03.27.24 @ 11:35)   IMPRESSION: Age-indeterminate, possibly acute, left frontal lobe and left parietal lobe infarctions. No acute intracranial hemorrhage   CTA BRAIN: Mild bilateral cavernous carotid artery calcifications. Mild right vertebral artery calcifications. The right vertebral artery is dominant. Left vertebral artery is hypoplastic. The [place] of [place] and vertebrobasilar system are unremarkable without evidence of stenosis, occlusion or saccular aneurysm dilation. No evidence for arterial venous malformation.     CTA NECK: Mild bilateral carotid bulb/proximal internal carotid artery calcifications. A left-sided aortic arch is demonstrated. There is normal relationship to the great vessels. The common carotid arteries, internal carotid arteries and vertebral arteries show no evidence of significant stenosis, occlusion or saccular aneurysm dilation.   CT PERFUSION: Patient has only had less than 2 hours of symptoms may influence the CT perfusion.   No core infarct or evidence of delayed mean transit time is identified. [de-identified] : TTE Limited W or WO Ultrasound Enhancing Agent (03.27.24 @ 15:04) CONCLUSIONS:  1. Left ventricular cavity is normal in size. Left ventricular wall thickness is normal. Left ventricular systolic function is low normal with an ejection fraction of 55 % by Valdez's method of disks. There are no regional wall motion abnormalities seen.  2. There is mild (grade 1) left ventricular diastolic dysfunction, with normal filling pressure.  3. Normal right ventricular cavity size, with normal wall thickness, and normal systolic function.  4. There is moderate calcification of the aortic valve leaflets. mild to moderate aortic stenosis.  5. There is moderate calcification of the mitral valve annulus.  6. Mild mitral regurgitation.  7. No pericardial effusion seen.  8. No prior echocardiogram is available for comparison.

## 2024-04-13 ENCOUNTER — APPOINTMENT (OUTPATIENT)
Dept: RADIOLOGY | Facility: CLINIC | Age: 79
End: 2024-04-13
Payer: MEDICARE

## 2024-04-13 ENCOUNTER — OUTPATIENT (OUTPATIENT)
Dept: OUTPATIENT SERVICES | Facility: HOSPITAL | Age: 79
LOS: 1 days | End: 2024-04-13
Payer: MEDICARE

## 2024-04-13 DIAGNOSIS — Z95.5 PRESENCE OF CORONARY ANGIOPLASTY IMPLANT AND GRAFT: Chronic | ICD-10-CM

## 2024-04-13 DIAGNOSIS — M15.9 POLYOSTEOARTHRITIS, UNSPECIFIED: ICD-10-CM

## 2024-04-13 DIAGNOSIS — Z98.1 ARTHRODESIS STATUS: Chronic | ICD-10-CM

## 2024-04-13 DIAGNOSIS — Z90.49 ACQUIRED ABSENCE OF OTHER SPECIFIED PARTS OF DIGESTIVE TRACT: Chronic | ICD-10-CM

## 2024-04-13 DIAGNOSIS — M06.9 RHEUMATOID ARTHRITIS, UNSPECIFIED: ICD-10-CM

## 2024-04-13 DIAGNOSIS — G56.03 CARPAL TUNNEL SYNDROME, BILATERAL UPPER LIMBS: ICD-10-CM

## 2024-04-13 PROBLEM — Z86.73 STATUS POST CVA: Status: RESOLVED | Noted: 2024-04-13 | Resolved: 2024-04-13

## 2024-04-13 PROBLEM — M79.7 FIBROMYALGIA: Status: ACTIVE | Noted: 2022-02-14

## 2024-04-13 LAB
ALBUMIN MFR SERPL ELPH: 51.7 %
ALBUMIN SERPL ELPH-MCNC: 4 G/DL
ALBUMIN SERPL-MCNC: 3.4 G/DL
ALBUMIN/GLOB SERPL: 1.1 RATIO
ALP BLD-CCNC: 70 U/L
ALPHA1 GLOB MFR SERPL ELPH: 6.8 %
ALPHA1 GLOB SERPL ELPH-MCNC: 0.4 G/DL
ALPHA2 GLOB MFR SERPL ELPH: 15.8 %
ALPHA2 GLOB SERPL ELPH-MCNC: 1 G/DL
ALT SERPL-CCNC: 9 U/L
ANA SER IF-ACNC: NEGATIVE
ANION GAP SERPL CALC-SCNC: 12 MMOL/L
AST SERPL-CCNC: 12 U/L
B-GLOBULIN MFR SERPL ELPH: 14.1 %
B-GLOBULIN SERPL ELPH-MCNC: 0.9 G/DL
BASOPHILS # BLD AUTO: 0.03 K/UL
BASOPHILS NFR BLD AUTO: 0.3 %
BILIRUB SERPL-MCNC: 0.3 MG/DL
BILIRUB UR QL STRIP: NORMAL
BUN SERPL-MCNC: 17 MG/DL
CALCIUM SERPL-MCNC: 9.2 MG/DL
CCP AB SER IA-ACNC: <8 UNITS
CHLORIDE SERPL-SCNC: 106 MMOL/L
CK SERPL-CCNC: 48 U/L
CLARITY UR: CLEAR
CO2 SERPL-SCNC: 24 MMOL/L
COLLECTION METHOD: NORMAL
CREAT SERPL-MCNC: 1.06 MG/DL
CRP SERPL-MCNC: 15 MG/L
DEPRECATED KAPPA LC FREE/LAMBDA SER: 1.31 RATIO
EGFR: 73 ML/MIN/1.73M2
ENA RNP AB SER IA-ACNC: <0.2 AL
ENA SCL70 IGG SER IA-ACNC: <0.2 AL
ENA SM AB SER IA-ACNC: <0.2 AL
ENA SS-A AB SER IA-ACNC: <0.2 AL
ENA SS-B AB SER IA-ACNC: <0.2 AL
EOSINOPHIL # BLD AUTO: 0.06 K/UL
EOSINOPHIL NFR BLD AUTO: 0.6 %
ERYTHROCYTE [SEDIMENTATION RATE] IN BLOOD BY WESTERGREN METHOD: 70 MM/HR
GAMMA GLOB FLD ELPH-MCNC: 0.8 G/DL
GAMMA GLOB MFR SERPL ELPH: 11.6 %
GLUCOSE SERPL-MCNC: 117 MG/DL
GLUCOSE UR-MCNC: NORMAL
HCG UR QL: 0.2 EU/DL
HCT VFR BLD CALC: 38.6 %
HGB BLD-MCNC: 11.9 G/DL
HGB UR QL STRIP.AUTO: NORMAL
IGA SER QL IEP: 297 MG/DL
IGG SER QL IEP: 623 MG/DL
IGG SER QL IEP: 623 MG/DL
IGG1 SER-MCNC: 389 MG/DL
IGG2 SER-MCNC: 210 MG/DL
IGG3 SER-MCNC: 65.1 MG/DL
IGG4 SER-MCNC: 23.4 MG/DL
IGM SER QL IEP: 162 MG/DL
IMM GRANULOCYTES NFR BLD AUTO: 0.5 %
INTERPRETATION SERPL IEP-IMP: NORMAL
KAPPA LC CSF-MCNC: 2.23 MG/DL
KAPPA LC SERPL-MCNC: 2.93 MG/DL
KETONES UR-MCNC: NORMAL
LDH SERPL-CCNC: 189 U/L
LEUKOCYTE ESTERASE UR QL STRIP: NORMAL
LYMPHOCYTES # BLD AUTO: 0.42 K/UL
LYMPHOCYTES NFR BLD AUTO: 4.3 %
M PROTEIN SPEC IFE-MCNC: NORMAL
MAGNESIUM SERPL-MCNC: 1.9 MG/DL
MAN DIFF?: NORMAL
MCHC RBC-ENTMCNC: 29.7 PG
MCHC RBC-ENTMCNC: 30.8 GM/DL
MCV RBC AUTO: 96.3 FL
MONOCYTES # BLD AUTO: 0.41 K/UL
MONOCYTES NFR BLD AUTO: 4.1 %
NEUTROPHILS # BLD AUTO: 8.91 K/UL
NEUTROPHILS NFR BLD AUTO: 90.2 %
NITRITE UR QL STRIP: NORMAL
PH UR STRIP: 5.5
PHOSPHATE SERPL-MCNC: 3.6 MG/DL
PLATELET # BLD AUTO: 310 K/UL
POTASSIUM SERPL-SCNC: 4.8 MMOL/L
PROT SERPL-MCNC: 6.5 G/DL
PROT UR STRIP-MCNC: NORMAL
RBC # BLD: 4.01 M/UL
RBC # FLD: 14.6 %
RF+CCP IGG SER-IMP: NEGATIVE
RHEUMATOID FACT SER QL: <10 IU/ML
SODIUM SERPL-SCNC: 143 MMOL/L
SP GR UR STRIP: >=1.03
TRICHINELLA AB SER QL: NEGATIVE
TSH SERPL-ACNC: 6.68 UIU/ML
WBC # FLD AUTO: 9.88 K/UL

## 2024-04-13 PROCEDURE — 71046 X-RAY EXAM CHEST 2 VIEWS: CPT | Mod: 26

## 2024-04-13 PROCEDURE — 73130 X-RAY EXAM OF HAND: CPT | Mod: 26,50

## 2024-04-13 PROCEDURE — 73030 X-RAY EXAM OF SHOULDER: CPT | Mod: 26,50

## 2024-04-13 PROCEDURE — 71046 X-RAY EXAM CHEST 2 VIEWS: CPT

## 2024-04-13 PROCEDURE — 73630 X-RAY EXAM OF FOOT: CPT | Mod: 26,50

## 2024-04-13 PROCEDURE — 77085 DXA BONE DENSITY AXL VRT FX: CPT | Mod: 26

## 2024-04-13 PROCEDURE — 72110 X-RAY EXAM L-2 SPINE 4/>VWS: CPT | Mod: 26

## 2024-04-13 PROCEDURE — 73110 X-RAY EXAM OF WRIST: CPT | Mod: 26,50

## 2024-04-13 PROCEDURE — 72050 X-RAY EXAM NECK SPINE 4/5VWS: CPT

## 2024-04-13 PROCEDURE — 73630 X-RAY EXAM OF FOOT: CPT

## 2024-04-13 PROCEDURE — 72110 X-RAY EXAM L-2 SPINE 4/>VWS: CPT

## 2024-04-13 PROCEDURE — 77085 DXA BONE DENSITY AXL VRT FX: CPT

## 2024-04-13 PROCEDURE — 72050 X-RAY EXAM NECK SPINE 4/5VWS: CPT | Mod: 26

## 2024-04-13 PROCEDURE — 73130 X-RAY EXAM OF HAND: CPT

## 2024-04-13 PROCEDURE — 73110 X-RAY EXAM OF WRIST: CPT

## 2024-04-13 PROCEDURE — 73030 X-RAY EXAM OF SHOULDER: CPT

## 2024-04-13 RX ORDER — PREDNISONE 5 MG/1
5 TABLET ORAL TWICE DAILY
Refills: 0 | Status: DISCONTINUED | COMMUNITY
End: 2024-04-13

## 2024-04-13 RX ORDER — OXYCODONE 18 MG/1
18 CAPSULE, EXTENDED RELEASE ORAL
Refills: 0 | Status: ACTIVE | COMMUNITY

## 2024-04-13 RX ORDER — CALCIUM 500 MG
500 TABLET ORAL
Qty: 90 | Refills: 3 | Status: ACTIVE | COMMUNITY
Start: 2024-04-13

## 2024-04-13 RX ORDER — ASPIRIN 81 MG/1
81 TABLET, COATED ORAL
Refills: 0 | Status: ACTIVE | COMMUNITY

## 2024-04-13 RX ORDER — FOLIC ACID 1 MG/1
1 TABLET ORAL DAILY
Qty: 90 | Refills: 0 | Status: ACTIVE | COMMUNITY
Start: 2024-04-13 | End: 1900-01-01

## 2024-04-13 RX ORDER — RISEDRONATE SODIUM 150 MG/1
150 TABLET, FILM COATED ORAL
Qty: 3 | Refills: 0 | Status: ACTIVE | COMMUNITY
Start: 2024-04-13

## 2024-04-13 RX ORDER — ACETAMINOPHEN 500 MG/1
500 TABLET, COATED ORAL
Qty: 100 | Refills: 0 | Status: ACTIVE | COMMUNITY
Start: 2024-04-13

## 2024-04-13 RX ORDER — PREDNISONE 5 MG/1
5 TABLET ORAL
Qty: 270 | Refills: 0 | Status: ACTIVE | COMMUNITY
Start: 1900-01-01 | End: 1900-01-01

## 2024-04-13 RX ORDER — ERGOCALCIFEROL 1.25 MG/1
1.25 MG CAPSULE, LIQUID FILLED ORAL
Qty: 12 | Refills: 0 | Status: ACTIVE | COMMUNITY
Start: 2024-04-13 | End: 1900-01-01

## 2024-04-13 RX ORDER — SULFASALAZINE 500 MG/1
500 TABLET ORAL
Qty: 360 | Refills: 0 | Status: ACTIVE | COMMUNITY
Start: 2024-04-13 | End: 1900-01-01

## 2024-04-13 NOTE — PHYSICAL EXAM
[General Appearance - Alert] : alert [General Appearance - In No Acute Distress] : in no acute distress [General Appearance - Well Nourished] : well nourished [General Appearance - Well Developed] : well developed [General Appearance - Well-Appearing] : healthy appearing [Sclera] : the sclera and conjunctiva were normal [PERRL With Normal Accommodation] : pupils were equal in size, round, and reactive to light [Extraocular Movements] : extraocular movements were intact [Outer Ear] : the ears and nose were normal in appearance [Neck Appearance] : the appearance of the neck was normal [Neck Cervical Mass (___cm)] : no neck mass was observed [Thyroid Diffuse Enlargement] : the thyroid was not enlarged [Jugular Venous Distention Increased] : there was no jugular-venous distention [Thyroid Nodule] : there were no palpable thyroid nodules [Lungs Percussion] : the lungs were normal to percussion [Heart Rate And Rhythm] : heart rate was normal and rhythm regular [Edema] : there was no peripheral edema [Abdomen Soft] : soft [Abdomen Tenderness] : non-tender [Abdomen Mass (___ Cm)] : no abdominal mass palpated [Cervical Lymph Nodes Enlarged Posterior Bilaterally] : posterior cervical [Cervical Lymph Nodes Enlarged Anterior Bilaterally] : anterior cervical [Supraclavicular Lymph Nodes Enlarged Bilaterally] : supraclavicular [Axillary Lymph Nodes Enlarged Bilaterally] : axillary [No CVA Tenderness] : no ~M costovertebral angle tenderness [No Spinal Tenderness] : no spinal tenderness [Skin Color & Pigmentation] : normal skin color and pigmentation [Skin Turgor] : normal skin turgor [] : no rash [Cranial Nerves] : cranial nerves 2-12 were intact [Deep Tendon Reflexes (DTR)] : deep tendon reflexes were 2+ and symmetric [Sensation] : the sensory exam was normal to light touch and pinprick [Motor Exam] : the motor exam was normal [No Focal Deficits] : no focal deficits [Oriented To Time, Place, And Person] : oriented to person, place, and time [Impaired Insight] : insight and judgment were intact [Affect] : the affect was normal [Mood] : the mood was normal [FreeTextEntry1] : Strength-5/5; some speech difficulty

## 2024-04-13 NOTE — ADDENDUM
[FreeTextEntry1] :  I, Henry Baig, acted solely as a scribe for Dr. Myron I. Kleiner, MD. on 04/10/2024. I personally performed the services described in the documentation, reviewed the documentation recorded by the scribe in my presence, and it accurately and completely records my words and actions.

## 2024-04-13 NOTE — HISTORY OF PRESENT ILLNESS
[FreeTextEntry1] : JOSIANE SHAW is a 78 year old man who presents for follow up office visit for further evaluation of joint symptoms and rheumatic diseases including Rheumatoid Arthritis, osteoarthritis, fibromyalgia and Sjogren's Syndrome. Accompanied by his wife   Note: Patient last seen December 2022  Persistent pain right hip and thigh.  According to the patient and his wife, orthopedics dx yaron from previous fracture "is breaking" considered hip replacement and replacement of yaron. Surgery placed on hold for 6 months secondary to his recent CVA. Intermittent pain bilateral shoulders, MCPs, hips, knees, ball of the feet, plantar aspects of the heel, low back with Occasional radiation to the leg lower extremities to the distal thigh. Mentions bilateral ankles swelling Left greater than right including on awakening. Past 2 weeks increase paresthesias Bilateral fingers- denies using Bilateral wrist splints h.s. Some dry eyes and dry mouth, using biotene mouthwash and oral hydration without adequate relief. Denies any undercooked pork or raw beef consumption. On further questioning denies recent sleep disturbance and fatigue. Patient denies rash or side effects with current medications. Patient is content with current medication regimen.   PMH: March 2024 2 weeks ago--CVA with speech difficulty hospitalized at [facility] - treated with PT, OT, speech therapy, Eliquis, and atorvastatin

## 2024-04-13 NOTE — ASSESSMENT
[FreeTextEntry1] : Impression: JOSIANE SHAW is a 78 year old man who presents for follow up office visit for further evaluation of joint symptoms and rheumatic diseases including Rheumatoid Arthritis, osteoarthritis, fibromyalgia and Sjogren's Syndrome, accompanied by his wife.  Note: Patient last seen December 2022  Persistent pain right hip and thigh Secondary to Orthopedics dx yaron from previous fracture according to the patient and his wife "is breaking"-- considered hip replacement and replacement of yaron and osteoarthritis. Surgery placed on hold for 6 months secondary to recent CVA.. Intermittent pain bilateral shoulders, MCPs, hips, knees, ball of the feet, plantar aspect of the heel, low back with  Occasional radiation to the leg lower extremities to the distal thigh Secondary to a combination of active rheumatoid arthritis, osteoarthritis, and fibromyalgia. Mentions bilateral ankles swelling Left greater than right including on awakening. Past 2 weeks increase paresthesias bilateral fingers using Bilateral wrist splints h.s Secondary to carpal tunnel syndrome  --also consider secondary to his Recent CVA. On exam pt has mild synovial proliferation bilateral PIPs Secondary to rheumatoid arthritis. On exam pt has dry eyes and dry mouth Secondary to  Sjogren Syndrome, using biotene mouthwash and oral hydration without adequate relief. On further questioning denies recent sleep disturbance and fatigue with improvement to his  fibromyalgia.  On exam he has mild diffuse swelling bilateral fingers--I will evaluate him for scleroderma, MCTD, Lyme disease.  Denies any undercooked pork or raw beef consumption. Patient denies rash or side effects with current medications. Patient is content with current medication regimen.   Plan: I reviewed  chart and previous records  I reviewed previous lab results with patient with extensive discussion  Laboratory tests ordered - see list below - with coordination of care  Bone densitometry ordered - with coordination of care - to be authorized and performed   X-rays ordered  see list below -- with coordination of care Continue CBC/ Platelet count q.2 weeks -- with coordination of care  Diagnosis and prognosis discussed Continue current medications (other than those changed below) In view of patient's recent CVA, I am relucent to prescribe NSAIDs--- extensive discussion Continue Prednisone 10 mg q.d. end of breakfast ---when symptoms dramatically improve, decrease 7.5 mg q.d. thereafter  Restart Sulfasalazine 500 mg b.i.d. with large glass of fluid and food; after 7 days increase to t.i.d with large glass of fluid and food; thereafter increase to q.i.d. with large glass of fluid and food (Possible side effects explained) Folic acid 1 mg q.d. (possible side effects explained) Tylenol 1000 mg t.i.d. p.r.n. or Tylenol 1300 mg b.i.d. p.r.n. (possible side effects explained) Pilocarpine 5 mg b.i.d.; if no better/no side effects 7 days, increase t.i.d.(Possible side effects explained) Calcium 500 mg t.i.d. at the end of meals or 600 mg b.i.d end of meals (Possible side effects explained) Vitamin D 50,000 units once a week (possible side effects explained) In the future after clearance from [employer] consider Risedronate Sodium 150 mg q.1 month on an empty stomach with a large tap glass of water 30 minutes a.c breakfast (Possible side effects explained including AVN of jaw. If patient's insurance does not cover a prescription of Risedronate, I will authorize Alendronate 70 mg q.1 week on empty stomach with large glass of water 30 min a.c breakfast (Possible side effects explained including AVN of jaw)  Bilateral wrist splints h.s -- emphasized -- with extensive discussion--he already has them at home Artificial tears one drop each eye q.i.d. and p.r.n.(Possible side effects explained) Biotene mouthwash/spray q.i.d. and p.r.n.(Possible side effects explained)  Oral Hydration Sun protection and sunscreen use emphasized  Dermatology f/u regard scaly rash with some desquamation of the bilateral upper extremities  -- with coordination of care -- please send me consult report  Please send me recent lab tests including but not limited recent xrays, MRIs, -- emphasized -- with extensive discussion  Return visit 3 month All questions and concerns were addressed Total time for this office visit, including face-to-face time and non-face-to-face time, 102 minutes--- including review of the chart and previous records, detailed review of his medical history, review of previous lab results with extensive discussion with the patient and his wife, ordering lab tests with coordination of care, review of previous imaging reports/x-ray results, ordering of new x-rays with coordination of care, ordering of new bone densitometry with coordination of care, detailed medication history, review of medications going forward with their possible side effects, extensive discussions regarding his various medications as noted above including the possible side effects going forward, extensive discussion regarding the prednisone therapy as noted above, reviewed the impact of the patient's rheumatic disease on their other medical problems, reviewed the impact of the patient's other medical problems on their rheumatic disease

## 2024-04-13 NOTE — CONSULT LETTER
[Dear  ___] : Dear  [unfilled], [Consult Letter:] : I had the pleasure of evaluating your patient, [unfilled]. [Please see my note below.] : Please see my note below. [Consult Closing:] : Thank you very much for allowing me to participate in the care of this patient.  If you have any questions, please do not hesitate to contact me. [Sincerely,] : Sincerely, [DrTristen  ___] : Dr. WHITMORE [FreeTextEntry3] : Raheem\par  Myron I. Kleiner, M.D., FACR\par  Chief, Division of Rheumatology\par  Department of Medicine\par  [facility] [DrTristen ___] : Dr. WHITMORE

## 2024-04-13 NOTE — REVIEW OF SYSTEMS
[Feeling Poorly] : feeling poorly [Joint Pain] : joint pain [Negative] : Heme/Lymph [Dry Eyes] : dryness of the eyes [As Noted in HPI] : as noted in HPI

## 2024-04-14 LAB — RNA POLYMERASE III IGG: 5 UNITS

## 2024-04-17 ENCOUNTER — APPOINTMENT (OUTPATIENT)
Dept: HEMATOLOGY ONCOLOGY | Facility: CLINIC | Age: 79
End: 2024-04-17
Payer: MEDICARE

## 2024-04-17 VITALS — BODY MASS INDEX: 26.74 KG/M2 | HEIGHT: 70.87 IN | WEIGHT: 191.04 LBS

## 2024-04-17 VITALS
HEART RATE: 64 BPM | SYSTOLIC BLOOD PRESSURE: 136 MMHG | OXYGEN SATURATION: 97 % | TEMPERATURE: 97.5 F | DIASTOLIC BLOOD PRESSURE: 77 MMHG

## 2024-04-17 DIAGNOSIS — Z29.89 ENCOUNTER. FOR OTHER SPECIFIED PROPHYLACTIC MEASURES: ICD-10-CM

## 2024-04-17 PROCEDURE — 99215 OFFICE O/P EST HI 40 MIN: CPT

## 2024-04-18 ENCOUNTER — NON-APPOINTMENT (OUTPATIENT)
Age: 79
End: 2024-04-18

## 2024-04-18 ENCOUNTER — APPOINTMENT (OUTPATIENT)
Dept: ORTHOPEDIC SURGERY | Facility: CLINIC | Age: 79
End: 2024-04-18

## 2024-04-19 ENCOUNTER — RESULT REVIEW (OUTPATIENT)
Age: 79
End: 2024-04-19

## 2024-04-19 ENCOUNTER — APPOINTMENT (OUTPATIENT)
Age: 79
End: 2024-04-19

## 2024-04-19 DIAGNOSIS — Z51.11 ENCOUNTER FOR ANTINEOPLASTIC CHEMOTHERAPY: ICD-10-CM

## 2024-04-19 DIAGNOSIS — R11.2 NAUSEA WITH VOMITING, UNSPECIFIED: ICD-10-CM

## 2024-04-19 LAB
ALBUMIN SERPL ELPH-MCNC: 3.9 G/DL — SIGNIFICANT CHANGE UP (ref 3.3–5)
ALP SERPL-CCNC: 75 U/L — SIGNIFICANT CHANGE UP (ref 40–120)
ALT FLD-CCNC: 21 U/L — SIGNIFICANT CHANGE UP (ref 10–45)
ANION GAP SERPL CALC-SCNC: 12 MMOL/L — SIGNIFICANT CHANGE UP (ref 5–17)
AST SERPL-CCNC: 20 U/L — SIGNIFICANT CHANGE UP (ref 10–40)
BASOPHILS # BLD AUTO: 0.1 K/UL — SIGNIFICANT CHANGE UP (ref 0–0.2)
BASOPHILS NFR BLD AUTO: 0.7 % — SIGNIFICANT CHANGE UP (ref 0–2)
BILIRUB SERPL-MCNC: 0.2 MG/DL — SIGNIFICANT CHANGE UP (ref 0.2–1.2)
BUN SERPL-MCNC: 16 MG/DL — SIGNIFICANT CHANGE UP (ref 7–23)
CALCIUM SERPL-MCNC: 9.3 MG/DL — SIGNIFICANT CHANGE UP (ref 8.4–10.5)
CHLORIDE SERPL-SCNC: 106 MMOL/L — SIGNIFICANT CHANGE UP (ref 96–108)
CO2 SERPL-SCNC: 26 MMOL/L — SIGNIFICANT CHANGE UP (ref 22–31)
CREAT SERPL-MCNC: 1.1 MG/DL — SIGNIFICANT CHANGE UP (ref 0.5–1.3)
EGFR: 70 ML/MIN/1.73M2 — SIGNIFICANT CHANGE UP
EOSINOPHIL # BLD AUTO: 0.2 K/UL — SIGNIFICANT CHANGE UP (ref 0–0.5)
EOSINOPHIL NFR BLD AUTO: 2 % — SIGNIFICANT CHANGE UP (ref 0–6)
GLUCOSE SERPL-MCNC: 99 MG/DL — SIGNIFICANT CHANGE UP (ref 70–99)
HCT VFR BLD CALC: 38.4 % — LOW (ref 39–50)
HGB BLD-MCNC: 12.6 G/DL — LOW (ref 13–17)
LYMPHOCYTES # BLD AUTO: 0.6 K/UL — LOW (ref 1–3.3)
LYMPHOCYTES # BLD AUTO: 6.5 % — LOW (ref 13–44)
MCHC RBC-ENTMCNC: 30.2 PG — SIGNIFICANT CHANGE UP (ref 27–34)
MCHC RBC-ENTMCNC: 32.8 G/DL — SIGNIFICANT CHANGE UP (ref 32–36)
MCV RBC AUTO: 92.1 FL — SIGNIFICANT CHANGE UP (ref 80–100)
MONOCYTES # BLD AUTO: 0.7 K/UL — SIGNIFICANT CHANGE UP (ref 0–0.9)
MONOCYTES NFR BLD AUTO: 7.6 % — SIGNIFICANT CHANGE UP (ref 2–14)
NEUTROPHILS # BLD AUTO: 7.7 K/UL — HIGH (ref 1.8–7.4)
NEUTROPHILS NFR BLD AUTO: 83.1 % — HIGH (ref 43–77)
PLATELET # BLD AUTO: 298 K/UL — SIGNIFICANT CHANGE UP (ref 150–400)
POTASSIUM SERPL-MCNC: 4.2 MMOL/L — SIGNIFICANT CHANGE UP (ref 3.5–5.3)
POTASSIUM SERPL-SCNC: 4.2 MMOL/L — SIGNIFICANT CHANGE UP (ref 3.5–5.3)
PROT SERPL-MCNC: 6.4 G/DL — SIGNIFICANT CHANGE UP (ref 6–8.3)
RBC # BLD: 4.17 M/UL — LOW (ref 4.2–5.8)
RBC # FLD: 14 % — SIGNIFICANT CHANGE UP (ref 10.3–14.5)
SODIUM SERPL-SCNC: 144 MMOL/L — SIGNIFICANT CHANGE UP (ref 135–145)
T4 FREE SERPL-MCNC: 1.5 NG/DL — SIGNIFICANT CHANGE UP (ref 0.9–1.8)
T4 FREE+ TSH PNL SERPL: 8.35 UIU/ML — HIGH (ref 0.27–4.2)
WBC # BLD: 9.3 K/UL — SIGNIFICANT CHANGE UP (ref 3.8–10.5)
WBC # FLD AUTO: 9.3 K/UL — SIGNIFICANT CHANGE UP (ref 3.8–10.5)

## 2024-04-25 ENCOUNTER — LABORATORY RESULT (OUTPATIENT)
Age: 79
End: 2024-04-25

## 2024-04-26 LAB
FERRITIN SERPL-MCNC: 263 NG/ML
FOLATE SERPL-MCNC: 19 NG/ML
HAPTOGLOB SERPL-MCNC: 412 MG/DL
IRON SATN MFR SERPL: 16 %
IRON SERPL-MCNC: 41 UG/DL
TIBC SERPL-MCNC: 248 UG/DL
UIBC SERPL-MCNC: 207 UG/DL
VIT B12 SERPL-MCNC: 323 PG/ML

## 2024-04-27 LAB
CORTICOSTEROID BINDING GLOBULIN RESULT: 2.1 MG/DL — SIGNIFICANT CHANGE UP
CORTIS F/TOTAL MFR SERPL: 2.7 % — SIGNIFICANT CHANGE UP
CORTIS SERPL-MCNC: 1.3 UG/DL — LOW
CORTISOL, FREE RESULT: 0.03 UG/DL — LOW

## 2024-05-08 NOTE — PHYSICAL EXAM
[Normal] : affect appropriate [de-identified] : Ambulates with cane [de-identified] : Has dentures both upper and lower , right sided jaw swelling -non tender [de-identified] : Left hip Keloid scar changes s/p surgery [de-identified] : open lesion on left forearm, ulcer like lesion on left calf

## 2024-05-08 NOTE — HISTORY OF PRESENT ILLNESS
[de-identified] : JOSIANE SHAW is a 77 year male with PMHX of CAD s/p stents HTN, GERD, fibromyalgia, h/o polymyalgia rheumatica, Parotid ca s/p dissection/RT, cigar smoker ( 1-4 cigars/day) presents for initial consultation for metastatic carcinoma \par  \par  Patient presented to ENT, Dr. Alex Boston, on 1/18/22 c/o right neck swelling for 5 weeks. On exam, swelling int he right parotid gland approx. 3 x 3 cm firm, nontender was noted, no enlarged LN.  \par  MRI of neck was ordered and performed on 1/24/22. Scan revealed irregularly peripherally enhancing mass in the superficial lobe of the right parotid gland measuring 2.7 x 2.3 cm in AP . There is some strand-like internal enhancement as well. There is no left parotid mass.\par  \par  \par  Right parotid FNA biopsy performed on 2/1/22 at . Pathology demonstrated cells positive for malignancy ([place] Class IV), Consistent with carcinoma with squamous differentiation,Cytology slide and cell block show clusters and single scattered atypical squamous cells with prominent nucleoli, irregular nuclear membranes, irregular chromatin patterns, anisonucleosis and keratinization. Primary versus metastatic carcinoma.\par  \par  Subsequent PET/CT on 2/13/22 reported a rim of FDG activity surrounding a right intraparotid lesion (SUV 6.3, 3.1 x 2.3 cm). Decreased FDG activity centrally may be secondary to tumor necrosis. There is no other focal abnormal  FDG activity identified in the head and neck. There are no lytic or blastic lesions.\par  \par  \par  Patient referred to Head and Neck surgeon, Dr. Misbah Recio, on 2/16/22. Patient s/p Parotidectomy with facial nerve dissection and right neck dissection on 3/3/22. 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 Carcinoma focally very close to (less than 0.1 mm) inked circumferential margin, 4 intraparotid lymph nodes negative for metastatic carcinoma, 3 Lymph nodes, right level 1B- negative for metastatic carcinoma, Submandibular gland negative for carcinoma\par  Lymph nodes, right levels 2 and 3, neck dissection - 15 lymph nodes negative for metastatic carcinoma\par  \par  \par  Patient referred to Dr. Richardson for post op RT. Patient completed radiation on 6/1/22. \par  \par  Patient presented to Orthopedist on 10/6/22 for ongoing left hip pain for the past 4 years. He has been following Rheumatology, Dr. Kleiner, for pain. Pain has been progressive affect quality of life. \par  MRI of hip was performed  on 10/10/22 revealing  a fat-containing mass centered within the tensor fascia manjula muscle measuring 4 x 3.1 x 7.2 cm and a mass is seen centered within the lesser trochanter measuring 2.9 x 3.4 x 4.4 cm. There is overlying periosteal edema and cortical thinning\par  \par  Subsequent PET/CT on 10/14/22 visualized osseous structures reveal an intramedullary lytic 3 cm lesion along the left femoral intertrochanteric region max SUV 10.6.\par  Right parotid gland demonstrates interval resection of the hypermetabolic mass as well as right neck prominent soft tissue postsurgical low level uptake. No evidence of atypical lymph node activity identified.\par  \par  Biopsy of left proximal femur lesion on 10/27/2022. Pathology reported metastatic carcinoma,the tumor shows squamous differentiation on H T E. Performed immunostains show that the tumor cells are positive for AE1/AE3, CK5/ and p40. 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 Ambulates with cane.  S/p received 1 dose of Keytruda on 2/2/23, went to rehab however was too weak for rehab and was discharged 2/10/23 (enrolled on 12/14/23), was in a lot of pain at home and enrolled in hospice on 2/17/23, then slowly started increasing appetite, started ambulating more with walker, continued left hip pain, diffused body pain started to subside.  Continues on morphine 30 mg BID, and for breakthrough pain takes oxycodone prn - has not required since 3/2023  States is feeling better every day, tries to walk outside everyday  Reports continued swelling and redness on R cheek area of face,  Pt/family planning on discharging from hospice   2/3/23 Ca+ 13.6   6/14/23: Patient here for follow up  Had recent PET scan on 6/9/23  Patient discharged from hospice 6/12/23  Has dentures both upper and lower  Has Dermatologist, Dr. Wilkerson     PET 6/9/23: Utilized the dedicated head and neck series with image numbers from this series. Physiologic FDG activity in visualized brain. -Resolution of hypermetabolic soft tissue abutting the right masseter muscle present on the prior PET/CT. -S/P right parotidectomy with no focal abnormal activity within the surgical bed. -Foci of increased activity localizing to the the right ear showing SUV 3.6 at the top of its helix and SUV 4.7 within its inferior lobule; these are nonspecific activity but should be correlated with direct clinical examination. -Left ear shows focus in its posterior surface  showing SUV 3.7 that is difficult to correlate with on low-dose CT. Addition focus of cutaneous activity could also be seen behind the left ear  showing SUV 3.0. Please evaluate these areas. -Nonspecific activity in the thyroid gland showing SUV 5.4 in the right and 5.6 in the left.  Few muscles with increased activity around the neck and upper torso and pelvic region due to exertional strain. -Interval improvement in the left femoral bone currently smaller foci of intense activity with cortical bone reconstitution such as seen medially localizing to a not fully reconstituted lytic lesion (image 241) with SUV 10.0; same area previously had larger extent of lytic changes and activity with as high as SUV 9.3.  Similarly, soft tissue changes laterally also improved with smaller remaining tissue and less intense activity (image 212). Reassess on follow up for continued improvement or stability. -Interval resolution of FDG avid bone lesions with increased sclerosis in T8, T12, and in the right side of the sacrum. Marked improvement in the lytic lesion previously seen in the right acetabulum with reconstruction of some of its cortical defects currently with nonspecific patchy activity of SUV 2.4 (previously large lytic lesion with soft tissue component and SUV 7.2).   7/17/23: Patient here for follow up  Resumed Keytruda on 6/19/23 Admits Fatigue Patient right sided jaw swelling, no pain. Has an apt with  Dr. Boston this afternoon  Reports mild nausea Reports weight gain  States left hip pain stable , on Morphine ER  30 mg BID. Patient hopes to be weaned off medication  Denies fever/chills, rash, mouth sores, nausea, vomiting, diarrhea,constipation,  abdominal pain, bleeding, easy bruising or visual changes, chest pain, cough, SOB or NOONAN,  neuropathy, LE edema.  8/18/23:  Patient here for follow up  Resumed Keytruda on 6/19/23, next tx scheduled 8/21/23 Admits Fatigue Admits chills/ feeling cold once a week, likely from hypothyroid.   Admits intermittent decreased appetite, weight is stable  Patient still with right sided jaw swelling- improving, no pain. Evaluated by Dr. Boston, no indication for further work up- continue to observe. US on 7/19/23 reported No definable abnormality can be seen involving the area of swelling involving the right cheek. Reports nausea, not taking antinausea med States left hip pain stable , on Morphine ER  30 mg BID. Patient hopes to be weaned off medication  Dr. Kleiner has him on Prednisone 10 mg daily  Denies fever, rash, mouth sores, vomiting, diarrhea,constipation, abdominal pain, bleeding, easy bruising or visual changes, chest pain, cough, SOB or NOONAN,  neuropathy, LE edema.  9/29/23: Patient is here for a f/u with his wife He has been on Keytruda therapy, next scheduled 10/2/23 He endorses GI disturbance from therapy and increased skin rash on his hands He experienced left-sided hip pain after playing golf on Labor Day, with no improvement. He ambulates today with a cane Patient sleeps well and hasn't felt tired He takes Morphine 2x/day  PETCT scans show improved response to immunotherapy  12/29/23:  Patient here for f/u Next due for Keytruda on 1/16/23 On levothyroxine TSH high  T 4 WNL   Due for PET scan WIll order now Has some nausea  in AM Takes Ondansetron for nausea  1/30/24: Patient here for f/u s/p Keytruda on 1/16/24. Next treatment on 2/6/24. Patient reports b/l shoulder pain, constant fatigue, and LE swelling. Patient has 24-hour aids at home. He likes to go on walks on his own without the aids.  Wife expresses her concern for prolonged weight bearing and exercise.  He is currently taking long acting Oxycodone  Extampa BID, 9 mg per PAIN MANAGEMENT ( Dr Lowe) . States diffuse pain is more severe after dose reduction a few weeks ago.  Patient waiting for follow up with Dr. Lora Turner, orthopedic surgery, for hardware complications from previous hip replacement.   Reviewed PET Scan from 1/10/24: Head/Neck: -Postsurgical changes of right parotidectomy, unchanged.  -No abnormal radiotracer activity in the surgical bed. -No enlarged or FDG-avid cervical lymph ode.  BONES/SOFT TISSUES:  -Patient is status post left femur ORIF with transcervical screw and intramedullary yaron, as seen on prior studies. There is cortical destruction involving the left proximal femur and lesser trochanter, similar in appearance on CT, and demonstrating interval increase in extent and intensity of hypermetabolism in the left lesser trochanter (SUV 15.4; image 248; previous SUV 12.9). A photopenic lucent lesion in the anterior aspect of the T8 vertebral body is unchanged in (image 109). Physiologic FDG activity in remainder of visualized osseous structures.  3/6/24: Patient presents for follow up  Last Keytruda on 2/27/24 Completed RT to left femur end of January Planning for possible hip surgery with Dr. Blackwell, pending additional imaging  Reports more pronounce lower back pain, suspects gait has changed due to hip pain likely attributing to back pain. He continues to follow Dr. Lowe  Leg swelling stable L>R Patient reports mild fatigue, appetite is good Patient reports open lesion on left forearm and left calf. States lesion on calf has been there for years, was following Dermatology. Lesions are clean and bandaged Denies fever, n/v/d, abd pain, chest pain , SOB   4/17/24: Patient presents for follow up Last Keytruda tx on 3/19/24, next on 4/19/24 s/p admission to [facility] on 3/27/24 with CVA, infarct in left frontal and left parietal lobe Delayed 6 months by Dr. Blackwell, possibly in 2 months Currently on Eliquis and Aspirin Per Dr. Kleiner RHEUM, on 10 mg prednisone, when feeling better, 1.5 mg Reports increased back pain, especially when standing still, occasional pain Pt reports hip is not bothering him as much

## 2024-05-08 NOTE — ADDENDUM
[FreeTextEntry1] :  Documented by Lidia Gilmore acting as scribe for Dr. Orozco on  04/17/2024.   All Medical record entries made by the Scribe were at my, Dr. Orozco's, direction and personally dictated by me on  04/17/2024. I have reviewed the chart and agree that the record accurately reflects my personal performance of the history, physical exam, assessment and plan. I have also personally directed, reviewed, and agreed with the discharge instructions.

## 2024-05-08 NOTE — ASSESSMENT
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 Foundation: TMB 99 Muts/Mb, MS- stable  Patient presented to ENT, Dr. Alex Boston, on 1/18/22 c/o right neck swelling for 5 weeks. On exam, swelling in the right parotid gland approx. 3 x 3 cm firm, nontender was noted, no enlarged LN. Subsequently MRI of neck on 1/24/22 revealed irregularly peripherally enhancing mass in the superficial lobe of the right parotid gland measuring 2.7 x 2.3 cm in AP.  Right parotid FNA biopsy performed on 2/1/22 at  consistent with carcinoma with squamous differentiation,Cytology; clusters and single scattered atypical squamous cells with prominent nucleoli, irregular nuclear membranes, irregular chromatin patterns, anisonucleosis and keratinization. Primary versus metastatic carcinoma.  Patient s/p Parotidectomy with facial nerve dissection and right neck dissection on 3/3/22 by Dr. Misbah Recio. Pathology showed 22 lymph nodes negative for metastatic carcinoma. Completed radiation on 6/1/22 with Dr. Rowe  Plan: - Given the TMB is 99 treated with single agent pembro - Pembrolizumab 200mg q 3 weeks. received 1 dose of Keytruda on 2/2/23, went to rehab however was too weak for rehab and was discharged 2/10/23 (enrolled on 12/14/23), was in a lot of pain at home and enrolled in hospice on 2/17/23, then slowly started increasing appetite, started ambulating more with walker, continued left hip pain, diffused body pain started to subside. Discharged from hospice on 6/12/23.  -Repeat PET SCAN On 6/9/23 with improvement in Disease  - Restarted Keytruda 200 q 3 weeks on 6/19/23 with denosumab -Patient still with right sided jaw swelling- improving, no pain. Evaluated by Dr. Boston, no indication for further work up- continue to observe. US on 7/19/23 reported No definable abnormality can be seen involving the area of swelling involving the right cheek. - TSH on 6/19/23 was 11.90, started patient on Levothyroxine 25mch on 6/20,  increased to 50 mcg on 8/4/23. Patient is compliant.   - Patient's scans have improved and he is responding well to treatment - The patient should f/u with his dermatologist about his skin lesions. No Steroids while on immunotherapy. Reports that he has had skin lesions his entire life  - Patient's cortisol level is low. No associated complaints. Cortisol levels declined in June prior to re-initiation of immunotherapy.  -Recent PET Scan done on 1/10/24: 1. Hypermetabolism associated with unchanged cortical destruction in left lesser trochanter is more extensive and increased in intensity, concerning for recurrent disease. 2. The remainder of the study demonstrates no evidence of FDG-avid disease. No new lesion.  -Given the above scan, patient was recommended to see radiation oncologist s/p palliative re-radiation to left hip, proximal femur 5#  - completed on 1/29/30 with Dr. Richardson.  -Continue Keytruda. Last treatment on 2/27/24. Next treatment due on 3/19/24.  - On Oxycodone  Extampa BID, 9 mg bid  per PAIN MANAGEMENT ( Dr Lowe) with short acting oxycodone for break through pain, Patient reports that he is still in pain Seeing Pain management later today  -Completed RT to the left femur end of January -Admitted to [facility] on 3/27/24 with CVA, infarct in left posterior frontal lobe - Last received Keytruda on 3/19/24, next on 4/19/24 -Planned for possible hip surgery with Dr. Blackwell was scheduled for 3/29, however delayed for ~6 months per Dr. Blackwell due to hospitalization for CVA -Currently on 10 mg prednisone per Dr Kleiner, RHEUM, will reduce once pain improves -Repeat scan end of May / early June 2024 -F/u w/ Usha in 4-5 weeks, f/u with me middle of June 2024, after scans  Skin lesions:  - Lesion on left calf- suspect may be related to chronic swelling in LE - Advised to keep lesions clean, call office with any signs of infection - can consider consult with wound care

## 2024-05-09 ENCOUNTER — LABORATORY RESULT (OUTPATIENT)
Age: 79
End: 2024-05-09

## 2024-05-09 ENCOUNTER — APPOINTMENT (OUTPATIENT)
Dept: NEUROLOGY | Facility: CLINIC | Age: 79
End: 2024-05-09
Payer: MEDICARE

## 2024-05-09 ENCOUNTER — APPOINTMENT (OUTPATIENT)
Dept: ORTHOPEDIC SURGERY | Facility: CLINIC | Age: 79
End: 2024-05-09

## 2024-05-09 VITALS
DIASTOLIC BLOOD PRESSURE: 79 MMHG | HEIGHT: 71 IN | HEART RATE: 73 BPM | BODY MASS INDEX: 26.74 KG/M2 | WEIGHT: 191 LBS | OXYGEN SATURATION: 97 % | SYSTOLIC BLOOD PRESSURE: 155 MMHG

## 2024-05-09 PROCEDURE — 99214 OFFICE O/P EST MOD 30 MIN: CPT

## 2024-05-09 PROCEDURE — G2211 COMPLEX E/M VISIT ADD ON: CPT

## 2024-05-09 RX ORDER — AMLODIPINE BESYLATE AND BENAZEPRIL HYDROCHLORIDE 2.5; 1 MG/1; MG/1
2.5-1 CAPSULE ORAL
Refills: 0 | Status: ACTIVE | COMMUNITY

## 2024-05-09 RX ORDER — ONDANSETRON 4 MG/1
4 TABLET, ORALLY DISINTEGRATING ORAL
Refills: 0 | Status: ACTIVE | COMMUNITY

## 2024-05-09 NOTE — ASSESSMENT
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 He was scheduled undergo a proximal femur replacement however 2 days prior to surgery he developed the acute stroke. MRI Reviewed and shows a L frontal stroke.  Vascular studies were normal.  Stroke etiology concerning for hypercoagulable state in the setting of malignancy   L frontal ischemic stroke -ANTITHROMBOTIC THERAPY: on Apixaban 5mg bid. ASA 81mg daily given hx of CAD s/p stent placement. - SBP goal normotension  -titrate statin to LDL goal less than 70 - Cont. PT/OT - Patient to f/u with oncologist and Orthopedic surgeon   Low Back pain--suspect spinal stenosis given symptoms, but mets is a concern.  -MRI L Spine not uploaded and no report yet in [facility] system.   - f/u with Pain mgmt doc  Left Hip metastatic lesion--pending femur replacement.   Patient doing well.  No neurological contraindication to undergo surgery after 3 months post-stroke given vascular studies were overall normal.  Would recommend guidance re: AC plan with bridging prior to surgery from hematology.   f/u in 2-3 months

## 2024-05-09 NOTE — PHYSICAL EXAM
[FreeTextEntry1] :   General: Cooperative, NAD HEENT: NC/AT, no carotid bruits Lungs: CTAB Chest: RRR, no murmurs Extremities: nontender, no erythema Neurological Examination: NIHSS: 1 MS: AOx3. Appropriately interactive, normal affect. Speech fluent, Able to name and repeat long sentences. CN: PERLL, EOMI, V1-3 sensation intact, mild R facial assymetry,  hearing intact, palate elevates symmetrically, tongue midline, SCM equal bilaterally Motor: normal bulk and tone, no tremor, rigidity or bradykinesia.  5/5 all over except LLE is 5-/5 Sens: Intact to light touch. Reflexes: 2/4 all over, downgoing toes b/l Coord:  No dysmetria, ESTHER intact Gait: Antalgic, Straightening of the Left leg

## 2024-05-09 NOTE — DATA REVIEWED
[de-identified] : MR Head w/wo IV Cont (03.27.24 @ 21:29)  IMPRESSION: Acute infarct in the left posterior frontal lobe.  CT Brain Stroke Protocol (03.27.24 @ 11:35)   IMPRESSION: Age-indeterminate, possibly acute, left frontal lobe and left  parietal lobe infarctions. No acute intracranial hemorrhage  CTA BRAIN: Mild bilateral cavernous carotid artery calcifications. Mild right  vertebral artery calcifications. The right vertebral artery is dominant. Left vertebral artery is  hypoplastic. The [place] of [place] and vertebrobasilar system are unremarkable without  evidence of stenosis, occlusion or saccular aneurysm dilation. No  evidence for arterial venous malformation.   CTA NECK: Mild bilateral carotid bulb/proximal internal carotid artery  calcifications. A left-sided aortic arch is demonstrated. There is normal relationship to  the great vessels. The common carotid arteries, internal carotid arteries  and vertebral arteries show no evidence of significant stenosis,  occlusion or saccular aneurysm dilation.  CT PERFUSION: Patient has only had less than 2 hours of symptoms may influence the CT  perfusion.  No core infarct or evidence of delayed mean transit time is identified.

## 2024-05-09 NOTE — HISTORY OF PRESENT ILLNESS
[FreeTextEntry1] :  [facility] NEUROLOGY AT [place]  CC: Stroke HPI:77 year old male with PMH of HTN, GERD, CAD (s/p Stent x 6), spinal stenosis, arthritis, and right parotid cancer s/p 31 sessions of RT with metastasis to the hip, s/p left femur biopsy with intramedullary nailing 10/22 followed by radiation, now on keytruda, who presents for follow-up of L frontal stroke in March 2024.    Neuro Hx:  Presented to [facility] ED on 3/27/24 with acute onset aphasia, dysarthria and right facial asymmetry with LKW time 11AM. Stroke code was activated for the patient, CTH, CTA H/N and CTP obtained with a finding of age indeterminate left frontal lobe and left parietal lobe infarcts. No LVO or perfusion deficit. TNK not administered given appearance of stroke on CT. Patient admitted to the Neurology service for further work up. MRI Brain showed Acute infarct in the left posterior frontal lobe. , A1c 5.6.  TTE was neg.  Stroke thought to be due to hypercoagulable state in the setting of malignancy and he was started on Eliquis.  ASA continued for CAD hx.    4/8/2024:  Doing well.  Feels his speech is improved.   Will be getting speech therapy soon.  Feels weak in both legs.  Getting PT for this.  His surgery has been delayed 6 months.  No issues with the Eliquis and ASA.    Today 5/9/2024: Has been following up with Heme/onc.  Will be seeing Dr. Blackwell in June.  Currently still has complaints of speech--feels that he sometimes jumbles words when speaking fast.  Finished Speech therapy.  Has had intense low back pain.  Also c/o bilateral LE weakness, similar to prior, moreso in the left leg as he has the hip lesion there, so has mild pain.  LBP worse with prolonged standing and better with sitting.  No issues with the Eliquis and ASA.

## 2024-05-10 ENCOUNTER — RESULT REVIEW (OUTPATIENT)
Age: 79
End: 2024-05-10

## 2024-05-10 ENCOUNTER — APPOINTMENT (OUTPATIENT)
Age: 79
End: 2024-05-10

## 2024-05-10 LAB
ALBUMIN SERPL ELPH-MCNC: 4 G/DL — SIGNIFICANT CHANGE UP (ref 3.3–5)
ALP SERPL-CCNC: 79 U/L — SIGNIFICANT CHANGE UP (ref 40–120)
ALT FLD-CCNC: 14 U/L — SIGNIFICANT CHANGE UP (ref 10–45)
ANION GAP SERPL CALC-SCNC: 11 MMOL/L — SIGNIFICANT CHANGE UP (ref 5–17)
AST SERPL-CCNC: 15 U/L — SIGNIFICANT CHANGE UP (ref 10–40)
BASOPHILS # BLD AUTO: 0.1 K/UL — SIGNIFICANT CHANGE UP (ref 0–0.2)
BASOPHILS NFR BLD AUTO: 0.9 % — SIGNIFICANT CHANGE UP (ref 0–2)
BILIRUB SERPL-MCNC: 0.3 MG/DL — SIGNIFICANT CHANGE UP (ref 0.2–1.2)
BUN SERPL-MCNC: 17 MG/DL — SIGNIFICANT CHANGE UP (ref 7–23)
CALCIUM SERPL-MCNC: 9.3 MG/DL — SIGNIFICANT CHANGE UP (ref 8.4–10.5)
CHLORIDE SERPL-SCNC: 105 MMOL/L — SIGNIFICANT CHANGE UP (ref 96–108)
CO2 SERPL-SCNC: 26 MMOL/L — SIGNIFICANT CHANGE UP (ref 22–31)
CREAT SERPL-MCNC: 0.99 MG/DL — SIGNIFICANT CHANGE UP (ref 0.5–1.3)
EGFR: 79 ML/MIN/1.73M2 — SIGNIFICANT CHANGE UP
EOSINOPHIL # BLD AUTO: 0.2 K/UL — SIGNIFICANT CHANGE UP (ref 0–0.5)
EOSINOPHIL NFR BLD AUTO: 2.2 % — SIGNIFICANT CHANGE UP (ref 0–6)
GLUCOSE SERPL-MCNC: 130 MG/DL — HIGH (ref 70–99)
HCT VFR BLD CALC: 38.7 % — LOW (ref 39–50)
HGB BLD-MCNC: 12.6 G/DL — LOW (ref 13–17)
LYMPHOCYTES # BLD AUTO: 0.5 K/UL — LOW (ref 1–3.3)
LYMPHOCYTES # BLD AUTO: 6.1 % — LOW (ref 13–44)
MCHC RBC-ENTMCNC: 30.5 PG — SIGNIFICANT CHANGE UP (ref 27–34)
MCHC RBC-ENTMCNC: 32.6 G/DL — SIGNIFICANT CHANGE UP (ref 32–36)
MCV RBC AUTO: 93.5 FL — SIGNIFICANT CHANGE UP (ref 80–100)
MONOCYTES # BLD AUTO: 0.6 K/UL — SIGNIFICANT CHANGE UP (ref 0–0.9)
MONOCYTES NFR BLD AUTO: 6.9 % — SIGNIFICANT CHANGE UP (ref 2–14)
NEUTROPHILS # BLD AUTO: 6.8 K/UL — SIGNIFICANT CHANGE UP (ref 1.8–7.4)
NEUTROPHILS NFR BLD AUTO: 84 % — HIGH (ref 43–77)
PLATELET # BLD AUTO: 199 K/UL — SIGNIFICANT CHANGE UP (ref 150–400)
POTASSIUM SERPL-MCNC: 3.9 MMOL/L — SIGNIFICANT CHANGE UP (ref 3.5–5.3)
POTASSIUM SERPL-SCNC: 3.9 MMOL/L — SIGNIFICANT CHANGE UP (ref 3.5–5.3)
PROT SERPL-MCNC: 6.1 G/DL — SIGNIFICANT CHANGE UP (ref 6–8.3)
RBC # BLD: 4.14 M/UL — LOW (ref 4.2–5.8)
RBC # FLD: 14.6 % — HIGH (ref 10.3–14.5)
SODIUM SERPL-SCNC: 142 MMOL/L — SIGNIFICANT CHANGE UP (ref 135–145)
T4 FREE SERPL-MCNC: 1.2 NG/DL — SIGNIFICANT CHANGE UP (ref 0.9–1.8)
T4 FREE+ TSH PNL SERPL: 10.4 UIU/ML — HIGH (ref 0.27–4.2)
WBC # BLD: 8.1 K/UL — SIGNIFICANT CHANGE UP (ref 3.8–10.5)
WBC # FLD AUTO: 8.1 K/UL — SIGNIFICANT CHANGE UP (ref 3.8–10.5)

## 2024-05-13 ENCOUNTER — RESULT REVIEW (OUTPATIENT)
Age: 79
End: 2024-05-13

## 2024-05-13 ENCOUNTER — APPOINTMENT (OUTPATIENT)
Dept: HEMATOLOGY ONCOLOGY | Facility: CLINIC | Age: 79
End: 2024-05-13
Payer: MEDICARE

## 2024-05-13 VITALS
HEART RATE: 71 BPM | OXYGEN SATURATION: 96 % | SYSTOLIC BLOOD PRESSURE: 159 MMHG | DIASTOLIC BLOOD PRESSURE: 87 MMHG | TEMPERATURE: 97.2 F | HEIGHT: 71 IN | BODY MASS INDEX: 27.45 KG/M2 | WEIGHT: 196.05 LBS

## 2024-05-13 PROCEDURE — 99214 OFFICE O/P EST MOD 30 MIN: CPT

## 2024-05-14 ENCOUNTER — APPOINTMENT (OUTPATIENT)
Dept: ORTHOPEDIC SURGERY | Facility: CLINIC | Age: 79
End: 2024-05-14
Payer: MEDICARE

## 2024-05-14 VITALS
HEIGHT: 71 IN | BODY MASS INDEX: 26.88 KG/M2 | HEART RATE: 90 BPM | DIASTOLIC BLOOD PRESSURE: 77 MMHG | WEIGHT: 192 LBS | SYSTOLIC BLOOD PRESSURE: 133 MMHG

## 2024-05-14 DIAGNOSIS — I63.9 CEREBRAL INFARCTION, UNSPECIFIED: ICD-10-CM

## 2024-05-14 PROCEDURE — 99213 OFFICE O/P EST LOW 20 MIN: CPT

## 2024-05-14 NOTE — DISCUSSION/SUMMARY
[Medication Risks Reviewed] : Medication risks reviewed [Surgical risks reviewed] : Surgical risks reviewed [de-identified] : 78-year-old male presents to office for follow-up of his left hip metastatic lesion. He was scheduled undergo a proximal femur replacement however 2 days prior to surgery he did develop an acute stroke. He is now on anticoagulation.  At the recommendation of his neurosurgeon and oncologist he must wait at minimum 3 months until any surgical intervention can be considered.  We did discuss that we are unable to proceed with surgical intervention at this time due to his acute stroke and need for anticoagulation. He should therefore continue with conservative therapy. We discussed the importance of protected weightbearing. He may perform low impact activity and exercises. He will continue with his physical therapy. He should follow up with his neurosurgeon for results of his recent lumbar MRI.  He should follow-up in 6 weeks for repeat evaluation, or sooner if any issues arise including increase in pain or changes with his hip.  All questions were addressed with the patient and his wife, they both verbalized understanding and agreement the plan.

## 2024-05-14 NOTE — REVIEW OF SYSTEMS
[Joint Pain] : joint pain [Joint Stiffness] : joint stiffness [Joint Swelling] : joint swelling [FreeTextEntry9] : left hip pain

## 2024-05-14 NOTE — PHYSICAL EXAM
[Cane] : ambulates with cane [de-identified] : Left hip exam showed no groin pain with SLR, ROM is full flexion with 20 degrees external and 10 degrees internal with pain, GRANT negative, FADIR positive. Well healed surgical incisions. 5/5 motor strength in bilateral lower extremities. Sensory: Intact in bilateral lower extremities. DTRs: Biceps, brachioradialis, triceps, patellar, ankle and plantar 2+ and symmetric bilaterally. Pulses: dorsalis pedis, posterior tibial, femoral, popliteal, and radial 2+ and symmetric bilaterally.

## 2024-05-14 NOTE — HISTORY OF PRESENT ILLNESS
[de-identified] : 78-year-old male presents to office for follow-up of his left hip periprosthetic fracture with metastatic lesions. He was scheduled undergo a proximal femur replacement on 3/29/24, he suffered a stroke on 3/27/24, he has since started Eliquis. Has been ambulating with the use of a cane. States that he has been taking Oxycodone and Tylenol for pain although recently his back hurts more than his hip. He recently had an MRI of his lumbar spine and is waiting to hear from his neurosurgeon for result review, there are no results currently available in the chart for review. Denies any recent injuries, falls or trauma.

## 2024-05-15 NOTE — HISTORY OF PRESENT ILLNESS
[de-identified] : JOSIANE SHAW is a 77 year male with PMHX of CAD s/p stents HTN, GERD, fibromyalgia, h/o polymyalgia rheumatica, Parotid ca s/p dissection/RT, cigar smoker ( 1-4 cigars/day) presents for initial consultation for metastatic carcinoma \par  \par  Patient presented to ENT, Dr. Alex Boston, on 1/18/22 c/o right neck swelling for 5 weeks. On exam, swelling int he right parotid gland approx. 3 x 3 cm firm, nontender was noted, no enlarged LN.  \par  MRI of neck was ordered and performed on 1/24/22. Scan revealed irregularly peripherally enhancing mass in the superficial lobe of the right parotid gland measuring 2.7 x 2.3 cm in AP . There is some strand-like internal enhancement as well. There is no left parotid mass.\par  \par  \par  Right parotid FNA biopsy performed on 2/1/22 at . Pathology demonstrated cells positive for malignancy ([place] Class IV), Consistent with carcinoma with squamous differentiation,Cytology slide and cell block show clusters and single scattered atypical squamous cells with prominent nucleoli, irregular nuclear membranes, irregular chromatin patterns, anisonucleosis and keratinization. Primary versus metastatic carcinoma.\par  \par  Subsequent PET/CT on 2/13/22 reported a rim of FDG activity surrounding a right intraparotid lesion (SUV 6.3, 3.1 x 2.3 cm). Decreased FDG activity centrally may be secondary to tumor necrosis. There is no other focal abnormal  FDG activity identified in the head and neck. There are no lytic or blastic lesions.\par  \par  \par  Patient referred to Head and Neck surgeon, Dr. Misbah Recio, on 2/16/22. Patient s/p Parotidectomy with facial nerve dissection and right neck dissection on 3/3/22. 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 Carcinoma focally very close to (less than 0.1 mm) inked circumferential margin, 4 intraparotid lymph nodes negative for metastatic carcinoma, 3 Lymph nodes, right level 1B- negative for metastatic carcinoma, Submandibular gland negative for carcinoma\par  Lymph nodes, right levels 2 and 3, neck dissection - 15 lymph nodes negative for metastatic carcinoma\par  \par  \par  Patient referred to Dr. Richardson for post op RT. Patient completed radiation on 6/1/22. \par  \par  Patient presented to Orthopedist on 10/6/22 for ongoing left hip pain for the past 4 years. He has been following Rheumatology, Dr. Kleiner, for pain. Pain has been progressive affect quality of life. \par  MRI of hip was performed  on 10/10/22 revealing  a fat-containing mass centered within the tensor fascia manjula muscle measuring 4 x 3.1 x 7.2 cm and a mass is seen centered within the lesser trochanter measuring 2.9 x 3.4 x 4.4 cm. There is overlying periosteal edema and cortical thinning\par  \par  Subsequent PET/CT on 10/14/22 visualized osseous structures reveal an intramedullary lytic 3 cm lesion along the left femoral intertrochanteric region max SUV 10.6.\par  Right parotid gland demonstrates interval resection of the hypermetabolic mass as well as right neck prominent soft tissue postsurgical low level uptake. No evidence of atypical lymph node activity identified.\par  \par  Biopsy of left proximal femur lesion on 10/27/2022. Pathology reported metastatic carcinoma,the tumor shows squamous differentiation on H T E. Performed immunostains show that the tumor cells are positive for AE1/AE3, CK5/ and p40. 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 Ambulates with cane.  S/p received 1 dose of Keytruda on 2/2/23, went to rehab however was too weak for rehab and was discharged 2/10/23 (enrolled on 12/14/23), was in a lot of pain at home and enrolled in hospice on 2/17/23, then slowly started increasing appetite, started ambulating more with walker, continued left hip pain, diffused body pain started to subside.  Continues on morphine 30 mg BID, and for breakthrough pain takes oxycodone prn - has not required since 3/2023  States is feeling better every day, tries to walk outside everyday  Reports continued swelling and redness on R cheek area of face,  Pt/family planning on discharging from hospice   2/3/23 Ca+ 13.6   6/14/23: Patient here for follow up  Had recent PET scan on 6/9/23  Patient discharged from hospice 6/12/23  Has dentures both upper and lower  Has Dermatologist, Dr. Wilkerson     PET 6/9/23: Utilized the dedicated head and neck series with image numbers from this series. Physiologic FDG activity in visualized brain. -Resolution of hypermetabolic soft tissue abutting the right masseter muscle present on the prior PET/CT. -S/P right parotidectomy with no focal abnormal activity within the surgical bed. -Foci of increased activity localizing to the the right ear showing SUV 3.6 at the top of its helix and SUV 4.7 within its inferior lobule; these are nonspecific activity but should be correlated with direct clinical examination. -Left ear shows focus in its posterior surface  showing SUV 3.7 that is difficult to correlate with on low-dose CT. Addition focus of cutaneous activity could also be seen behind the left ear  showing SUV 3.0. Please evaluate these areas. -Nonspecific activity in the thyroid gland showing SUV 5.4 in the right and 5.6 in the left.  Few muscles with increased activity around the neck and upper torso and pelvic region due to exertional strain. -Interval improvement in the left femoral bone currently smaller foci of intense activity with cortical bone reconstitution such as seen medially localizing to a not fully reconstituted lytic lesion (image 241) with SUV 10.0; same area previously had larger extent of lytic changes and activity with as high as SUV 9.3.  Similarly, soft tissue changes laterally also improved with smaller remaining tissue and less intense activity (image 212). Reassess on follow up for continued improvement or stability. -Interval resolution of FDG avid bone lesions with increased sclerosis in T8, T12, and in the right side of the sacrum. Marked improvement in the lytic lesion previously seen in the right acetabulum with reconstruction of some of its cortical defects currently with nonspecific patchy activity of SUV 2.4 (previously large lytic lesion with soft tissue component and SUV 7.2).   7/17/23: Patient here for follow up  Resumed Keytruda on 6/19/23 Admits Fatigue Patient right sided jaw swelling, no pain. Has an apt with  Dr. Boston this afternoon  Reports mild nausea Reports weight gain  States left hip pain stable , on Morphine ER  30 mg BID. Patient hopes to be weaned off medication  Denies fever/chills, rash, mouth sores, nausea, vomiting, diarrhea,constipation,  abdominal pain, bleeding, easy bruising or visual changes, chest pain, cough, SOB or NOONAN,  neuropathy, LE edema.  8/18/23:  Patient here for follow up  Resumed Keytruda on 6/19/23, next tx scheduled 8/21/23 Admits Fatigue Admits chills/ feeling cold once a week, likely from hypothyroid.   Admits intermittent decreased appetite, weight is stable  Patient still with right sided jaw swelling- improving, no pain. Evaluated by Dr. Boston, no indication for further work up- continue to observe. US on 7/19/23 reported No definable abnormality can be seen involving the area of swelling involving the right cheek. Reports nausea, not taking antinausea med States left hip pain stable , on Morphine ER  30 mg BID. Patient hopes to be weaned off medication  Dr. Kleiner has him on Prednisone 10 mg daily  Denies fever, rash, mouth sores, vomiting, diarrhea,constipation, abdominal pain, bleeding, easy bruising or visual changes, chest pain, cough, SOB or NOONAN,  neuropathy, LE edema.  9/29/23: Patient is here for a f/u with his wife He has been on Keytruda therapy, next scheduled 10/2/23 He endorses GI disturbance from therapy and increased skin rash on his hands He experienced left-sided hip pain after playing golf on Labor Day, with no improvement. He ambulates today with a cane Patient sleeps well and hasn't felt tired He takes Morphine 2x/day  PETCT scans show improved response to immunotherapy  12/29/23:  Patient here for f/u Next due for Keytruda on 1/16/23 On levothyroxine TSH high  T 4 WNL   Due for PET scan WIll order now Has some nausea  in AM Takes Ondansetron for nausea  1/30/24: Patient here for f/u s/p Keytruda on 1/16/24. Next treatment on 2/6/24. Patient reports b/l shoulder pain, constant fatigue, and LE swelling. Patient has 24-hour aids at home. He likes to go on walks on his own without the aids.  Wife expresses her concern for prolonged weight bearing and exercise.  He is currently taking long acting Oxycodone  Extampa BID, 9 mg per PAIN MANAGEMENT ( Dr Lowe) . States diffuse pain is more severe after dose reduction a few weeks ago.  Patient waiting for follow up with Dr. Lora Turner, orthopedic surgery, for hardware complications from previous hip replacement.   Reviewed PET Scan from 1/10/24: Head/Neck: -Postsurgical changes of right parotidectomy, unchanged.  -No abnormal radiotracer activity in the surgical bed. -No enlarged or FDG-avid cervical lymph ode.  BONES/SOFT TISSUES:  -Patient is status post left femur ORIF with transcervical screw and intramedullary yaron, as seen on prior studies. There is cortical destruction involving the left proximal femur and lesser trochanter, similar in appearance on CT, and demonstrating interval increase in extent and intensity of hypermetabolism in the left lesser trochanter (SUV 15.4; image 248; previous SUV 12.9). A photopenic lucent lesion in the anterior aspect of the T8 vertebral body is unchanged in (image 109). Physiologic FDG activity in remainder of visualized osseous structures.  3/6/24: Patient presents for follow up  Last Keytruda on 2/27/24 Completed RT to left femur end of January Planning for possible hip surgery with Dr. Blackwell, pending additional imaging  Reports more pronounce lower back pain, suspects gait has changed due to hip pain likely attributing to back pain. He continues to follow Dr. Lowe  Leg swelling stable L>R Patient reports mild fatigue, appetite is good Patient reports open lesion on left forearm and left calf. States lesion on calf has been there for years, was following Dermatology. 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 Blackwell, possibly in 2 months Currently on Eliquis and Aspirin Per Dr. Kleiner RHEUM, on 10 mg prednisone, when feeling better, 1.5 mg Reports increased back pain, especially when standing still, occasional pain Pt reports hip is not bothering him as much  5/13/24: Patient presents for follow up  Last tx given on 5/10/24 Dermatologist, Dr. Henderson,  started patient on Efudex cream  Patient with persistent lower back pain following pain management, taking  Xtampa ER 18 mg BID and Oxycodone 10 BID. MRI recently done at - will obtain records  PCP recently decreased Amlodipine dose, suspect may be causing LE swelling- have not seen improvement Neurology cleared patient for possible hip surgery 3 month after CVA

## 2024-05-15 NOTE — ASSESSMENT
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 Foundation: TMB 99 Muts/Mb, MS- stable  Patient presented to ENT, Dr. Alex Boston, on 1/18/22 c/o right neck swelling for 5 weeks. On exam, swelling in the right parotid gland approx. 3 x 3 cm firm, nontender was noted, no enlarged LN. Subsequently MRI of neck on 1/24/22 revealed irregularly peripherally enhancing mass in the superficial lobe of the right parotid gland measuring 2.7 x 2.3 cm in AP.  Right parotid FNA biopsy performed on 2/1/22 at  consistent with carcinoma with squamous differentiation,Cytology; clusters and single scattered atypical squamous cells with prominent nucleoli, irregular nuclear membranes, irregular chromatin patterns, anisonucleosis and keratinization. Primary versus metastatic carcinoma.  Patient s/p Parotidectomy with facial nerve dissection and right neck dissection on 3/3/22 by Dr. Misbah Recio. Pathology showed 22 lymph nodes negative for metastatic carcinoma. Completed radiation on 6/1/22 with Dr. Rowe  Plan: - Given the TMB is 99 treated with single agent pembro - Pembrolizumab 200mg q 3 weeks. received 1 dose of Keytruda on 2/2/23, went to rehab however was too weak for rehab and was discharged 2/10/23 (enrolled on 12/14/23), was in a lot of pain at home and enrolled in hospice on 2/17/23, then slowly started increasing appetite, started ambulating more with walker, continued left hip pain, diffused body pain started to subside. Discharged from hospice on 6/12/23.  -Repeat PET SCAN On 6/9/23 with improvement in Disease  - Restarted Keytruda 200 q 3 weeks on 6/19/23 with denosumab -Patient still with right sided jaw swelling- improving, no pain. Evaluated by Dr. Boston, no indication for further work up- continue to observe. US on 7/19/23 reported No definable abnormality can be seen involving the area of swelling involving the right cheek. - TSH on 6/19/23 was 11.90, started patient on Levothyroxine 25mch on 6/20,  increased to 50 mcg on 8/4/23. Patient is compliant.   - Patient's scans have improved and he is responding well to treatment - The patient should f/u with his dermatologist about his skin lesions. No Steroids while on immunotherapy. Reports that he has had skin lesions his entire life  - Patient's cortisol level is low. No associated complaints. Cortisol levels declined in June prior to re-initiation of immunotherapy.  -Recent PET Scan done on 1/10/24: 1. Hypermetabolism associated with unchanged cortical destruction in left lesser trochanter is more extensive and increased in intensity, concerning for recurrent disease. 2. The remainder of the study demonstrates no evidence of FDG-avid disease. No new lesion.  -Given the above scan, patient was recommended to see radiation oncologist s/p palliative re-radiation to left hip, proximal femur 5#  - completed on 1/29/30 with Dr. Richardson.  -Continue Keytruda. Last treatment on 2/27/24. Next treatment due on 3/19/24.  - On Oxycodone  Extampa BID, 9 mg bid  per PAIN MANAGEMENT ( Dr Lowe) with short acting oxycodone for break through pain, Patient reports that he is still in pain Seeing Pain management later today  -Completed RT to the left femur end of January -Admitted to [facility] on 3/27/24 with CVA, infarct in left posterior frontal lobe - Last received Keytruda on 3/19/24, next on 4/19/24 -Planned for possible hip surgery with Dr. Blackwell was scheduled for 3/29, however delayed for ~6 months per Dr. Blackwell due to hospitalization for CVA. Has follow up this week  -Currently on 10 mg prednisone per Dr Kleiner, RHEUM, will reduce once pain improves -Repeat scan end of May / early June 2024 -F/u after scans  Skin lesions:  - Lesion on left calf- suspect may be related to chronic swelling in LE - Advised to keep lesions clean, call office with any signs of infection - can consider consult with wound care  - Continue f/u with Dermatology, Dr. Henderson

## 2024-05-15 NOTE — PHYSICAL EXAM
[Normal] : affect appropriate [de-identified] : Ambulates with cane [de-identified] : Has dentures both upper and lower , right sided jaw swelling -non tender [de-identified] : Left hip Keloid scar changes s/p surgery [de-identified] : open lesion on left forearm, ulcer like lesion on left calf

## 2024-05-17 RX ORDER — ISOSORBIDE MONONITRATE 30 MG/1
30 TABLET, EXTENDED RELEASE ORAL
Qty: 45 | Refills: 0 | Status: ACTIVE | COMMUNITY
Start: 2024-03-19 | End: 1900-01-01

## 2024-05-21 LAB
CORTICOSTEROID BINDING GLOBULIN RESULT: 1.8 MG/DL — SIGNIFICANT CHANGE UP
CORTIS F/TOTAL MFR SERPL: 3.2 % — SIGNIFICANT CHANGE UP
CORTIS SERPL-MCNC: 1.5 UG/DL — LOW
CORTISOL, FREE RESULT: 0.05 UG/DL — LOW

## 2024-05-23 ENCOUNTER — LABORATORY RESULT (OUTPATIENT)
Age: 79
End: 2024-05-23

## 2024-05-24 ENCOUNTER — RX RENEWAL (OUTPATIENT)
Age: 79
End: 2024-05-24

## 2024-05-24 RX ORDER — LEVOTHYROXINE SODIUM 0.1 MG/1
100 TABLET ORAL
Qty: 30 | Refills: 2 | Status: ACTIVE | COMMUNITY
Start: 2023-10-03 | End: 1900-01-01

## 2024-05-31 ENCOUNTER — RESULT REVIEW (OUTPATIENT)
Age: 79
End: 2024-05-31

## 2024-05-31 ENCOUNTER — APPOINTMENT (OUTPATIENT)
Age: 79
End: 2024-05-31

## 2024-05-31 LAB
ALBUMIN SERPL ELPH-MCNC: 4.2 G/DL — SIGNIFICANT CHANGE UP (ref 3.3–5)
ALP SERPL-CCNC: 71 U/L — SIGNIFICANT CHANGE UP (ref 40–120)
ALT FLD-CCNC: 15 U/L — SIGNIFICANT CHANGE UP (ref 10–45)
ANION GAP SERPL CALC-SCNC: 13 MMOL/L — SIGNIFICANT CHANGE UP (ref 5–17)
AST SERPL-CCNC: 17 U/L — SIGNIFICANT CHANGE UP (ref 10–40)
BASOPHILS # BLD AUTO: 0.1 K/UL — SIGNIFICANT CHANGE UP (ref 0–0.2)
BASOPHILS NFR BLD AUTO: 0.9 % — SIGNIFICANT CHANGE UP (ref 0–2)
BILIRUB SERPL-MCNC: 0.3 MG/DL — SIGNIFICANT CHANGE UP (ref 0.2–1.2)
BUN SERPL-MCNC: 16 MG/DL — SIGNIFICANT CHANGE UP (ref 7–23)
CALCIUM SERPL-MCNC: 9.3 MG/DL — SIGNIFICANT CHANGE UP (ref 8.4–10.5)
CHLORIDE SERPL-SCNC: 104 MMOL/L — SIGNIFICANT CHANGE UP (ref 96–108)
CO2 SERPL-SCNC: 25 MMOL/L — SIGNIFICANT CHANGE UP (ref 22–31)
CREAT SERPL-MCNC: 0.98 MG/DL — SIGNIFICANT CHANGE UP (ref 0.5–1.3)
EGFR: 80 ML/MIN/1.73M2 — SIGNIFICANT CHANGE UP
EOSINOPHIL # BLD AUTO: 0.1 K/UL — SIGNIFICANT CHANGE UP (ref 0–0.5)
EOSINOPHIL NFR BLD AUTO: 1.9 % — SIGNIFICANT CHANGE UP (ref 0–6)
GLUCOSE SERPL-MCNC: 97 MG/DL — SIGNIFICANT CHANGE UP (ref 70–99)
HCT VFR BLD CALC: 41.4 % — SIGNIFICANT CHANGE UP (ref 39–50)
HGB BLD-MCNC: 13.6 G/DL — SIGNIFICANT CHANGE UP (ref 13–17)
LYMPHOCYTES # BLD AUTO: 1 K/UL — SIGNIFICANT CHANGE UP (ref 1–3.3)
LYMPHOCYTES # BLD AUTO: 13.6 % — SIGNIFICANT CHANGE UP (ref 13–44)
MCHC RBC-ENTMCNC: 31.1 PG — SIGNIFICANT CHANGE UP (ref 27–34)
MCHC RBC-ENTMCNC: 32.8 G/DL — SIGNIFICANT CHANGE UP (ref 32–36)
MCV RBC AUTO: 94.6 FL — SIGNIFICANT CHANGE UP (ref 80–100)
MONOCYTES # BLD AUTO: 0.6 K/UL — SIGNIFICANT CHANGE UP (ref 0–0.9)
MONOCYTES NFR BLD AUTO: 7.9 % — SIGNIFICANT CHANGE UP (ref 2–14)
NEUTROPHILS # BLD AUTO: 5.4 K/UL — SIGNIFICANT CHANGE UP (ref 1.8–7.4)
NEUTROPHILS NFR BLD AUTO: 75.8 % — SIGNIFICANT CHANGE UP (ref 43–77)
PLATELET # BLD AUTO: 225 K/UL — SIGNIFICANT CHANGE UP (ref 150–400)
POTASSIUM SERPL-MCNC: 3.8 MMOL/L — SIGNIFICANT CHANGE UP (ref 3.5–5.3)
POTASSIUM SERPL-SCNC: 3.8 MMOL/L — SIGNIFICANT CHANGE UP (ref 3.5–5.3)
PROT SERPL-MCNC: 6.2 G/DL — SIGNIFICANT CHANGE UP (ref 6–8.3)
RBC # BLD: 4.38 M/UL — SIGNIFICANT CHANGE UP (ref 4.2–5.8)
RBC # FLD: 14.7 % — HIGH (ref 10.3–14.5)
SODIUM SERPL-SCNC: 142 MMOL/L — SIGNIFICANT CHANGE UP (ref 135–145)
T4 FREE SERPL-MCNC: 1.3 NG/DL — SIGNIFICANT CHANGE UP (ref 0.9–1.8)
T4 FREE+ TSH PNL SERPL: 14.6 UIU/ML — HIGH (ref 0.27–4.2)
WBC # BLD: 7.2 K/UL — SIGNIFICANT CHANGE UP (ref 3.8–10.5)
WBC # FLD AUTO: 7.2 K/UL — SIGNIFICANT CHANGE UP (ref 3.8–10.5)

## 2024-05-31 RX ORDER — ONDANSETRON 8 MG/1
1 TABLET, FILM COATED ORAL
Refills: 0 | DISCHARGE

## 2024-05-31 RX ORDER — AMLODIPINE BESYLATE 2.5 MG/1
1 TABLET ORAL
Refills: 0 | DISCHARGE

## 2024-05-31 RX ORDER — OXYCODONE HYDROCHLORIDE 5 MG/1
1 TABLET ORAL
Refills: 0 | DISCHARGE

## 2024-05-31 RX ORDER — ISOSORBIDE MONONITRATE 60 MG/1
0.5 TABLET, EXTENDED RELEASE ORAL
Refills: 0 | DISCHARGE

## 2024-05-31 RX ORDER — DULOXETINE HYDROCHLORIDE 30 MG/1
1 CAPSULE, DELAYED RELEASE ORAL
Refills: 0 | DISCHARGE

## 2024-05-31 RX ORDER — LEVOTHYROXINE SODIUM 125 MCG
1 TABLET ORAL
Refills: 0 | DISCHARGE

## 2024-05-31 RX ORDER — ASPIRIN/CALCIUM CARB/MAGNESIUM 324 MG
1 TABLET ORAL
Qty: 0 | Refills: 0 | DISCHARGE

## 2024-05-31 RX ORDER — RAMIPRIL 5 MG
2 CAPSULE ORAL
Qty: 0 | Refills: 0 | DISCHARGE

## 2024-06-03 NOTE — HISTORY OF PRESENT ILLNESS
[de-identified] : JOSIANE SHAW is a 77 year male with PMHX of CAD s/p stents HTN, GERD, fibromyalgia, h/o polymyalgia rheumatica, Parotid ca s/p dissection/RT, cigar smoker ( 1-4 cigars/day) presents for initial consultation for metastatic carcinoma \par  \par  Patient presented to ENT, Dr. Alex Boston, on 1/18/22 c/o right neck swelling for 5 weeks. On exam, swelling int he right parotid gland approx. 3 x 3 cm firm, nontender was noted, no enlarged LN.  \par  MRI of neck was ordered and performed on 1/24/22. Scan revealed irregularly peripherally enhancing mass in the superficial lobe of the right parotid gland measuring 2.7 x 2.3 cm in AP . There is some strand-like internal enhancement as well. There is no left parotid mass.\par  \par  \par  Right parotid FNA biopsy performed on 2/1/22 at . Pathology demonstrated cells positive for malignancy ([place] Class IV), Consistent with carcinoma with squamous differentiation,Cytology slide and cell block show clusters and single scattered atypical squamous cells with prominent nucleoli, irregular nuclear membranes, irregular chromatin patterns, anisonucleosis and keratinization. Primary versus metastatic carcinoma.\par  \par  Subsequent PET/CT on 2/13/22 reported a rim of FDG activity surrounding a right intraparotid lesion (SUV 6.3, 3.1 x 2.3 cm). Decreased FDG activity centrally may be secondary to tumor necrosis. There is no other focal abnormal  FDG activity identified in the head and neck. There are no lytic or blastic lesions.\par  \par  \par  Patient referred to Head and Neck surgeon, Dr. Misbah Recio, on 2/16/22. Patient s/p Parotidectomy with facial nerve dissection and right neck dissection on 3/3/22. 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 Carcinoma focally very close to (less than 0.1 mm) inked circumferential margin, 4 intraparotid lymph nodes negative for metastatic carcinoma, 3 Lymph nodes, right level 1B- negative for metastatic carcinoma, Submandibular gland negative for carcinoma\par  Lymph nodes, right levels 2 and 3, neck dissection - 15 lymph nodes negative for metastatic carcinoma\par  \par  \par  Patient referred to Dr. Richardson for post op RT. Patient completed radiation on 6/1/22. \par  \par  Patient presented to Orthopedist on 10/6/22 for ongoing left hip pain for the past 4 years. He has been following Rheumatology, Dr. Kleiner, for pain. Pain has been progressive affect quality of life. \par  MRI of hip was performed  on 10/10/22 revealing  a fat-containing mass centered within the tensor fascia manjula muscle measuring 4 x 3.1 x 7.2 cm and a mass is seen centered within the lesser trochanter measuring 2.9 x 3.4 x 4.4 cm. There is overlying periosteal edema and cortical thinning\par  \par  Subsequent PET/CT on 10/14/22 visualized osseous structures reveal an intramedullary lytic 3 cm lesion along the left femoral intertrochanteric region max SUV 10.6.\par  Right parotid gland demonstrates interval resection of the hypermetabolic mass as well as right neck prominent soft tissue postsurgical low level uptake. No evidence of atypical lymph node activity identified.\par  \par  Biopsy of left proximal femur lesion on 10/27/2022. Pathology reported metastatic carcinoma,the tumor shows squamous differentiation on H T E. Performed immunostains show that the tumor cells are positive for AE1/AE3, CK5/ and p40. 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 Ambulates with cane.  S/p received 1 dose of Keytruda on 2/2/23, went to rehab however was too weak for rehab and was discharged 2/10/23 (enrolled on 12/14/23), was in a lot of pain at home and enrolled in hospice on 2/17/23, then slowly started increasing appetite, started ambulating more with walker, continued left hip pain, diffused body pain started to subside.  Continues on morphine 30 mg BID, and for breakthrough pain takes oxycodone prn - has not required since 3/2023  States is feeling better every day, tries to walk outside everyday  Reports continued swelling and redness on R cheek area of face,  Pt/family planning on discharging from hospice   2/3/23 Ca+ 13.6   6/14/23: Patient here for follow up  Had recent PET scan on 6/9/23  Patient discharged from hospice 6/12/23  Has dentures both upper and lower  Has Dermatologist, Dr. Wilkerson     PET 6/9/23: Utilized the dedicated head and neck series with image numbers from this series. Physiologic FDG activity in visualized brain. -Resolution of hypermetabolic soft tissue abutting the right masseter muscle present on the prior PET/CT. -S/P right parotidectomy with no focal abnormal activity within the surgical bed. -Foci of increased activity localizing to the the right ear showing SUV 3.6 at the top of its helix and SUV 4.7 within its inferior lobule; these are nonspecific activity but should be correlated with direct clinical examination. -Left ear shows focus in its posterior surface  showing SUV 3.7 that is difficult to correlate with on low-dose CT. Addition focus of cutaneous activity could also be seen behind the left ear  showing SUV 3.0. Please evaluate these areas. -Nonspecific activity in the thyroid gland showing SUV 5.4 in the right and 5.6 in the left.  Few muscles with increased activity around the neck and upper torso and pelvic region due to exertional strain. -Interval improvement in the left femoral bone currently smaller foci of intense activity with cortical bone reconstitution such as seen medially localizing to a not fully reconstituted lytic lesion (image 241) with SUV 10.0; same area previously had larger extent of lytic changes and activity with as high as SUV 9.3.  Similarly, soft tissue changes laterally also improved with smaller remaining tissue and less intense activity (image 212). Reassess on follow up for continued improvement or stability. -Interval resolution of FDG avid bone lesions with increased sclerosis in T8, T12, and in the right side of the sacrum. Marked improvement in the lytic lesion previously seen in the right acetabulum with reconstruction of some of its cortical defects currently with nonspecific patchy activity of SUV 2.4 (previously large lytic lesion with soft tissue component and SUV 7.2).   7/17/23: Patient here for follow up  Resumed Keytruda on 6/19/23 Admits Fatigue Patient right sided jaw swelling, no pain. Has an apt with  Dr. Boston this afternoon  Reports mild nausea Reports weight gain  States left hip pain stable , on Morphine ER  30 mg BID. Patient hopes to be weaned off medication  Denies fever/chills, rash, mouth sores, nausea, vomiting, diarrhea,constipation,  abdominal pain, bleeding, easy bruising or visual changes, chest pain, cough, SOB or NOONAN,  neuropathy, LE edema.  8/18/23:  Patient here for follow up  Resumed Keytruda on 6/19/23, next tx scheduled 8/21/23 Admits Fatigue Admits chills/ feeling cold once a week, likely from hypothyroid.   Admits intermittent decreased appetite, weight is stable  Patient still with right sided jaw swelling- improving, no pain. Evaluated by Dr. Boston, no indication for further work up- continue to observe. US on 7/19/23 reported No definable abnormality can be seen involving the area of swelling involving the right cheek. Reports nausea, not taking antinausea med States left hip pain stable , on Morphine ER  30 mg BID. Patient hopes to be weaned off medication  Dr. Kleiner has him on Prednisone 10 mg daily  Denies fever, rash, mouth sores, vomiting, diarrhea,constipation, abdominal pain, bleeding, easy bruising or visual changes, chest pain, cough, SOB or NOONAN,  neuropathy, LE edema.  9/29/23: Patient is here for a f/u with his wife He has been on Keytruda therapy, next scheduled 10/2/23 He endorses GI disturbance from therapy and increased skin rash on his hands He experienced left-sided hip pain after playing golf on Labor Day, with no improvement. He ambulates today with a cane Patient sleeps well and hasn't felt tired He takes Morphine 2x/day  PETCT scans show improved response to immunotherapy  12/29/23:  Patient here for f/u Next due for Keytruda on 1/16/23 On levothyroxine TSH high  T 4 WNL   Due for PET scan WIll order now Has some nausea  in AM Takes Ondansetron for nausea  1/30/24: Patient here for f/u s/p Keytruda on 1/16/24. Next treatment on 2/6/24. Patient reports b/l shoulder pain, constant fatigue, and LE swelling. Patient has 24-hour aids at home. He likes to go on walks on his own without the aids.  Wife expresses her concern for prolonged weight bearing and exercise.  He is currently taking long acting Oxycodone  Extampa BID, 9 mg per PAIN MANAGEMENT ( Dr Lowe) . States diffuse pain is more severe after dose reduction a few weeks ago.  Patient waiting for follow up with Dr. Lora Turner, orthopedic surgery, for hardware complications from previous hip replacement.   Reviewed PET Scan from 1/10/24: Head/Neck: -Postsurgical changes of right parotidectomy, unchanged.  -No abnormal radiotracer activity in the surgical bed. -No enlarged or FDG-avid cervical lymph ode.  BONES/SOFT TISSUES:  -Patient is status post left femur ORIF with transcervical screw and intramedullary yaron, as seen on prior studies. There is cortical destruction involving the left proximal femur and lesser trochanter, similar in appearance on CT, and demonstrating interval increase in extent and intensity of hypermetabolism in the left lesser trochanter (SUV 15.4; image 248; previous SUV 12.9). A photopenic lucent lesion in the anterior aspect of the T8 vertebral body is unchanged in (image 109). Physiologic FDG activity in remainder of visualized osseous structures.  3/6/24: Patient presents for follow up  Last Keytruda on 2/27/24 Completed RT to left femur end of January Planning for possible hip surgery with Dr. Blackwell, pending additional imaging  Reports more pronounce lower back pain, suspects gait has changed due to hip pain likely attributing to back pain. He continues to follow Dr. Lowe  Leg swelling stable L>R Patient reports mild fatigue, appetite is good Patient reports open lesion on left forearm and left calf. States lesion on calf has been there for years, was following Dermatology. Lesions are clean and bandaged Denies fever, n/v/d, abd pain, chest pain , SOB

## 2024-06-03 NOTE — PHYSICAL EXAM
[Normal] : affect appropriate [de-identified] : Ambulates with cane [de-identified] : Has dentures both upper and lower , right sided jaw swelling -non tender [de-identified] : Left hip Keloid scar changes s/p surgery [de-identified] : open lesion on left forearm, ulcer like lesion on left calf

## 2024-06-03 NOTE — ASSESSMENT
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 Foundation: TMB 99 Muts/Mb, MS- stable  Patient presented to ENT, Dr. Alex Boston, on 1/18/22 c/o right neck swelling for 5 weeks. On exam, swelling in the right parotid gland approx. 3 x 3 cm firm, nontender was noted, no enlarged LN. Subsequently MRI of neck on 1/24/22 revealed irregularly peripherally enhancing mass in the superficial lobe of the right parotid gland measuring 2.7 x 2.3 cm in AP.  Right parotid FNA biopsy performed on 2/1/22 at  consistent with carcinoma with squamous differentiation,Cytology; clusters and single scattered atypical squamous cells with prominent nucleoli, irregular nuclear membranes, irregular chromatin patterns, anisonucleosis and keratinization. Primary versus metastatic carcinoma.  Patient s/p Parotidectomy with facial nerve dissection and right neck dissection on 3/3/22 by Dr. Misbah Recio. Pathology showed 22 lymph nodes negative for metastatic carcinoma. Completed radiation on 6/1/22 with Dr. Rowe  Plan: - Given the TMB is 99 treated with single agent pembro - Pembrolizumab 200mg q 3 weeks. received 1 dose of Keytruda on 2/2/23, went to rehab however was too weak for rehab and was discharged 2/10/23 (enrolled on 12/14/23), was in a lot of pain at home and enrolled in hospice on 2/17/23, then slowly started increasing appetite, started ambulating more with walker, continued left hip pain, diffused body pain started to subside. Discharged from hospice on 6/12/23.  -Repeat PET SCAN On 6/9/23 with improvement in Disease  - Restarted Keytruda 200 q 3 weeks on 6/19/23 with denosumab -Patient still with right sided jaw swelling- improving, no pain. Evaluated by Dr. Boston, no indication for further work up- continue to observe. US on 7/19/23 reported No definable abnormality can be seen involving the area of swelling involving the right cheek. - TSH on 6/19/23 was 11.90, started patient on Levothyroxine 25mch on 6/20,  increased to 50 mcg on 8/4/23. Patient is compliant.   - Patient's scans have improved and he is responding well to treatment - The patient should f/u with his dermatologist about his skin lesions. No Steroids while on immunotherapy. Reports that he has had skin lesions his entire life  - Patient's cortisol level is low. No associated complaints. Cortisol levels declined in June prior to re-initiation of immunotherapy.  -Recent PET Scan done on 1/10/24: 1. Hypermetabolism associated with unchanged cortical destruction in left lesser trochanter is more extensive and increased in intensity, concerning for recurrent disease. 2. The remainder of the study demonstrates no evidence of FDG-avid disease. No new lesion.  -Given the above scan, patient was recommended to see radiation oncologist s/p palliative re-radiation to left hip, proximal femur 5#  - completed on 1/29/30 with Dr. Richardson.  -Continue Keytruda. Last treatment on 2/27/24. Next treatment due on 3/19/24.  - On Oxycodone  Extampa BID, 9 mg bid  per PAIN MANAGEMENT ( Dr Lowe) with short acting oxycoodne for break through pain, Patient reports that he is still in pain Seeing Pain management later today  -Completed RT to the left femur end of January -Planning for possible hip surgery with Dr. Blackwell, pending additional imaging. Currently scheduled for 3/29 -Repeat scan mid-April 2024 - Will review with Dr. Orozco given upcoming surgery if treatment/imaging should be delayed. Will call patient with any changes -F/u in 6 - 8 weeks  Skin lesions:  - Lesion on left calf- suspect may be related to chronic swelling in LE - Advised to keep lesions clean, call office with any signs of infection - can conciser consult with wound care

## 2024-06-05 ENCOUNTER — APPOINTMENT (OUTPATIENT)
Dept: NUCLEAR MEDICINE | Facility: CLINIC | Age: 79
End: 2024-06-05
Payer: MEDICARE

## 2024-06-05 ENCOUNTER — OUTPATIENT (OUTPATIENT)
Dept: OUTPATIENT SERVICES | Facility: HOSPITAL | Age: 79
LOS: 1 days | End: 2024-06-05

## 2024-06-05 DIAGNOSIS — C79.51 SECONDARY MALIGNANT NEOPLASM OF BONE: ICD-10-CM

## 2024-06-05 DIAGNOSIS — Z90.49 ACQUIRED ABSENCE OF OTHER SPECIFIED PARTS OF DIGESTIVE TRACT: Chronic | ICD-10-CM

## 2024-06-05 DIAGNOSIS — Z98.1 ARTHRODESIS STATUS: Chronic | ICD-10-CM

## 2024-06-05 DIAGNOSIS — Z95.5 PRESENCE OF CORONARY ANGIOPLASTY IMPLANT AND GRAFT: Chronic | ICD-10-CM

## 2024-06-05 PROCEDURE — 78815 PET IMAGE W/CT SKULL-THIGH: CPT | Mod: 26,PS,KX

## 2024-06-06 ENCOUNTER — LABORATORY RESULT (OUTPATIENT)
Age: 79
End: 2024-06-06

## 2024-06-08 LAB
CORTICOSTEROID BINDING GLOBULIN RESULT: 2 MG/DL — SIGNIFICANT CHANGE UP
CORTIS F/TOTAL MFR SERPL: 2.9 % — SIGNIFICANT CHANGE UP
CORTIS SERPL-MCNC: 1.7 UG/DL — LOW
CORTISOL, FREE RESULT: 0.05 UG/DL — LOW

## 2024-06-12 ENCOUNTER — APPOINTMENT (OUTPATIENT)
Dept: RADIATION ONCOLOGY | Facility: CLINIC | Age: 79
End: 2024-06-12
Payer: MEDICARE

## 2024-06-12 ENCOUNTER — APPOINTMENT (OUTPATIENT)
Dept: HEMATOLOGY ONCOLOGY | Facility: CLINIC | Age: 79
End: 2024-06-12
Payer: MEDICARE

## 2024-06-12 VITALS
WEIGHT: 193 LBS | RESPIRATION RATE: 16 BRPM | SYSTOLIC BLOOD PRESSURE: 132 MMHG | OXYGEN SATURATION: 96 % | HEART RATE: 86 BPM | DIASTOLIC BLOOD PRESSURE: 74 MMHG | BODY MASS INDEX: 26.92 KG/M2

## 2024-06-12 DIAGNOSIS — M79.605 PAIN IN LEFT LEG: ICD-10-CM

## 2024-06-12 DIAGNOSIS — Z85.818 PERSONAL HISTORY OF MALIGNANT NEOPLASM OF OTHER SITES OF LIP, ORAL CAVITY, AND PHARYNX: ICD-10-CM

## 2024-06-12 DIAGNOSIS — Z92.3 PERSONAL HISTORY OF IRRADIATION: ICD-10-CM

## 2024-06-12 PROCEDURE — G2211 COMPLEX E/M VISIT ADD ON: CPT

## 2024-06-12 PROCEDURE — 99215 OFFICE O/P EST HI 40 MIN: CPT

## 2024-06-12 PROCEDURE — 99213 OFFICE O/P EST LOW 20 MIN: CPT

## 2024-06-12 NOTE — DATA REVIEWED
[FreeTextEntry1] : PET/CT scan June 5, 2024 IMPRESSION: 1.  Quantitatively stable uptake at the LEFT lesser trochanter which appears to encompass a smaller area than on the prior exam. 2.  No evidence of recurrent disease in the head and neck. 3.  Stable appearance of the thyroid. 4.  Otherwise, no evidence of FDG-avid malignancy.  MRI L-S spine  May 11, 2024  CT femur March 11, 2024 IMPRESSION: Metastatic disease about the proximal left femur at the site of the ORIF with associated pathologic fracture and hardware failure as described.  CT left hip March 11, 2024 IMPRESSION: Metastatic disease about the proximal left femur at the site of the ORIF with associated pathologic fracture and hardware failure as described.

## 2024-06-12 NOTE — VITALS
[Least Pain Intensity: 2/10] : 2/10 [Pain Description/Quality: ___] : Pain description/quality: [unfilled] [Pain Duration: ___] : Pain duration: [unfilled] [Pain Location: ___] : Pain Location: [unfilled] [Pain Interferes with ADLs] : Pain interferes with activities of daily living. [Opioid] : opioid [70: Cares for self; unalbe to carry on normal activity or do active work.] : 70: Cares for self; unable to carry on normal activity or do active work. [ECOG Performance Status: 2 - Ambulatory and capable of all self care but unable to carry out any work activities] : Performance Status: 2 - Ambulatory and capable of all self care but unable to carry out any work activities. Up and about more than 50% of waking hours [Maximal Pain Intensity: 9/10] : 9/10

## 2024-06-12 NOTE — REVIEW OF SYSTEMS
[Joint Pain] : joint pain [Muscle Pain] : muscle pain [Disturbance Of Gait] : gait disturbance [Negative] : Allergic/Immunologic [FreeTextEntry9] : left proximal hip and thigh [de-identified] : s/p CVA with scant residual.

## 2024-06-12 NOTE — LETTER CLOSING
[Sincerely yours,] : Sincerely yours, [FreeTextEntry3] : Frantz Richardson MD Physician in Chief Department of Radiation Medicine [facility]   of Radiation Medicine [employer] at  [facility]  Radiation  [facility]/ [facility] at [place] [address]  Tel: [phone] Fax: [phone]

## 2024-06-12 NOTE — PHYSICAL EXAM
[Normal] : oriented to person, place and time, the affect was normal, the mood was normal and not anxious [de-identified] : surgical changes right parotid /neck [de-identified] : deformity of left hip/femur ; requires walker to ambulate.

## 2024-06-12 NOTE — HISTORY OF PRESENT ILLNESS
[FreeTextEntry1] : This 78-year-old man is status post right parotidectomy and right neck dissection for probable metastatic squamous cell carcinoma. His interval history is remarkable for an erosive tumor involving the femoral neck. He completed radiation therapy to the left hip/femur on 12/2/2022 for metastatic disease. Today he presents for routine follow up..  Recently, the patient developed new pain and weakness in the area of the left hip previously treated.  PET/CT done Ethan 10, 2024 strongly suggested progression of metastatic malignancy in this area.  Apparent symptomatic progression of metastatic malignant neoplasm of the left hip / proximal femur.  Mr. Daily then completed repeat palliative radiotherapy to the left femur on 1/29/2024.     Reports he is scheduled for left total hip/proximal femur replacement on March 29, 2024  with Dr. Brannon Blackwell.  He had a stroke on 3/27/24,he was started on Eloquis  Patient continues treatment with Keytruda at the direction of Dr. Palmer.  5/8/24 MRI LUMBAR SPINE IMPRESSION MInimal listheses as noted Spondylosis and facet arthrosis as noted Lower lumbar postoperative changes as noted ,without postop complications Very sma;; left paracentral disc protrusion at L!-L2 without significant mass effect Small centric disc protrusion  Pt presents today wife wife using a walker. - On Oxycodone Extampa BID, 9 mg bid per PAIN MANAGEMENT ( Dr Lowe) with short acting oxycodone for break through pain, Patient reports that he is still in pain Seeing Pain management later today. pt reports pain at highest cam be 9 Ad[facility] to [facility]/27/24 with CVA, infarct in left posterior frontal lobe Currently on 10 mg prednisone per Dr Kleiner, RHEUM, will reduce once pain improves Pt in fair spirits. Denies seizures, falls, nausea ,weight loss. Ambulating well with walker. Denies pain while seated at rest , but after 10 min of walking or putting golf balls , it escalates to 10 /10.

## 2024-06-12 NOTE — REASON FOR VISIT
[Bone Metastasis] : bone metastasis [Head and Neck Cancer] : head and neck cancer [Spouse] : spouse [Routine Follow-Up] : routine follow-up visit for

## 2024-06-12 NOTE — DISEASE MANAGEMENT
[Pathological] : TNM Stage: p [IV] : IV [FreeTextEntry4] : bone metastasis [TTNM] : 2 [NTNM] : 0 [MTNM] : 1 [de-identified] : 4000cGy [de-identified] : left hip/femur X2;  Right parotid bed and neck  6600cGy

## 2024-06-12 NOTE — ASSESSMENT
[Metastatic disease without local control] : Metastatic disease without local control [FreeTextEntry1] : Progression of pain in left proximal lower extremity due to progression of malignant neoplasm  and hardware failure and patholoogical fracture.

## 2024-06-12 NOTE — LETTER GREETING
[Dear Doctor] : Dear Doctor, [Follow-Up] : Your patient, [unfilled] was seen in my office today for follow-up [Please see my note below.] : Please see my note below. [FreeTextEntry2] : MD Brannon Parekh MD

## 2024-06-13 NOTE — HISTORY OF PRESENT ILLNESS
[de-identified] : JOSIANE SHAW is a 77 year male with PMHX of CAD s/p stents HTN, GERD, fibromyalgia, h/o polymyalgia rheumatica, Parotid ca s/p dissection/RT, cigar smoker ( 1-4 cigars/day) presents for initial consultation for metastatic carcinoma \par  \par  Patient presented to ENT, Dr. Alex Boston, on 1/18/22 c/o right neck swelling for 5 weeks. On exam, swelling int he right parotid gland approx. 3 x 3 cm firm, nontender was noted, no enlarged LN.  \par  MRI of neck was ordered and performed on 1/24/22. Scan revealed irregularly peripherally enhancing mass in the superficial lobe of the right parotid gland measuring 2.7 x 2.3 cm in AP . There is some strand-like internal enhancement as well. There is no left parotid mass.\par  \par  \par  Right parotid FNA biopsy performed on 2/1/22 at . Pathology demonstrated cells positive for malignancy (Hammond Class IV), Consistent with carcinoma with squamous differentiation,Cytology slide and cell block show clusters and single scattered atypical squamous cells with prominent nucleoli, irregular nuclear membranes, irregular chromatin patterns, anisonucleosis and keratinization. Primary versus metastatic carcinoma.\par  \par  Subsequent PET/CT on 2/13/22 reported a rim of FDG activity surrounding a right intraparotid lesion (SUV 6.3, 3.1 x 2.3 cm). Decreased FDG activity centrally may be secondary to tumor necrosis. There is no other focal abnormal  FDG activity identified in the head and neck. There are no lytic or blastic lesions.\par  \par  \par  Patient referred to Head and Neck surgeon, Dr. Misbah Recio, on 2/16/22. Patient s/p Parotidectomy with facial nerve dissection and right neck dissection on 3/3/22. 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 Carcinoma focally very close to (less than 0.1 mm) inked circumferential margin, 4 intraparotid lymph nodes negative for metastatic carcinoma, 3 Lymph nodes, right level 1B- negative for metastatic carcinoma, Submandibular gland negative for carcinoma\par  Lymph nodes, right levels 2 and 3, neck dissection - 15 lymph nodes negative for metastatic carcinoma\par  \par  \par  Patient referred to Dr. Richardson for post op RT. Patient completed radiation on 6/1/22. \par  \par  Patient presented to Orthopedist on 10/6/22 for ongoing left hip pain for the past 4 years. He has been following Rheumatology, Dr. Kleiner, for pain. Pain has been progressive affect quality of life. \par  MRI of hip was performed  on 10/10/22 revealing  a fat-containing mass centered within the tensor fascia manjula muscle measuring 4 x 3.1 x 7.2 cm and a mass is seen centered within the lesser trochanter measuring 2.9 x 3.4 x 4.4 cm. There is overlying periosteal edema and cortical thinning\par  \par  Subsequent PET/CT on 10/14/22 visualized osseous structures reveal an intramedullary lytic 3 cm lesion along the left femoral intertrochanteric region max SUV 10.6.\par  Right parotid gland demonstrates interval resection of the hypermetabolic mass as well as right neck prominent soft tissue postsurgical low level uptake. No evidence of atypical lymph node activity identified.\par  \par  Biopsy of left proximal femur lesion on 10/27/2022. Pathology reported metastatic carcinoma,the tumor shows squamous differentiation on H T E. Performed immunostains show that the tumor cells are positive for AE1/AE3, CK5/ and p40. 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 Ambulates with cane.  S/p received 1 dose of Keytruda on 2/2/23, went to rehab however was too weak for rehab and was discharged 2/10/23 (enrolled on 12/14/23), was in a lot of pain at home and enrolled in hospice on 2/17/23, then slowly started increasing appetite, started ambulating more with walker, continued left hip pain, diffused body pain started to subside.  Continues on morphine 30 mg BID, and for breakthrough pain takes oxycodone prn - has not required since 3/2023  States is feeling better every day, tries to walk outside everyday  Reports continued swelling and redness on R cheek area of face,  Pt/family planning on discharging from hospice   2/3/23 Ca+ 13.6   6/14/23: Patient here for follow up  Had recent PET scan on 6/9/23  Patient discharged from hospice 6/12/23  Has dentures both upper and lower  Has Dermatologist, Dr. Wilkerson     PET 6/9/23: Utilized the dedicated head and neck series with image numbers from this series. Physiologic FDG activity in visualized brain. -Resolution of hypermetabolic soft tissue abutting the right masseter muscle present on the prior PET/CT. -S/P right parotidectomy with no focal abnormal activity within the surgical bed. -Foci of increased activity localizing to the the right ear showing SUV 3.6 at the top of its helix and SUV 4.7 within its inferior lobule; these are nonspecific activity but should be correlated with direct clinical examination. -Left ear shows focus in its posterior surface  showing SUV 3.7 that is difficult to correlate with on low-dose CT. Addition focus of cutaneous activity could also be seen behind the left ear  showing SUV 3.0. Please evaluate these areas. -Nonspecific activity in the thyroid gland showing SUV 5.4 in the right and 5.6 in the left.  Few muscles with increased activity around the neck and upper torso and pelvic region due to exertional strain. -Interval improvement in the left femoral bone currently smaller foci of intense activity with cortical bone reconstitution such as seen medially localizing to a not fully reconstituted lytic lesion (image 241) with SUV 10.0; same area previously had larger extent of lytic changes and activity with as high as SUV 9.3.  Similarly, soft tissue changes laterally also improved with smaller remaining tissue and less intense activity (image 212). Reassess on follow up for continued improvement or stability. -Interval resolution of FDG avid bone lesions with increased sclerosis in T8, T12, and in the right side of the sacrum. Marked improvement in the lytic lesion previously seen in the right acetabulum with reconstruction of some of its cortical defects currently with nonspecific patchy activity of SUV 2.4 (previously large lytic lesion with soft tissue component and SUV 7.2).   7/17/23: Patient here for follow up  Resumed Keytruda on 6/19/23 Admits Fatigue Patient right sided jaw swelling, no pain. Has an apt with  Dr. Boston this afternoon  Reports mild nausea Reports weight gain  States left hip pain stable , on Morphine ER  30 mg BID. Patient hopes to be weaned off medication  Denies fever/chills, rash, mouth sores, nausea, vomiting, diarrhea,constipation,  abdominal pain, bleeding, easy bruising or visual changes, chest pain, cough, SOB or NOONAN,  neuropathy, LE edema.  8/18/23:  Patient here for follow up  Resumed Keytruda on 6/19/23, next tx scheduled 8/21/23 Admits Fatigue Admits chills/ feeling cold once a week, likely from hypothyroid.   Admits intermittent decreased appetite, weight is stable  Patient still with right sided jaw swelling- improving, no pain. Evaluated by Dr. Boston, no indication for further work up- continue to observe. US on 7/19/23 reported No definable abnormality can be seen involving the area of swelling involving the right cheek. Reports nausea, not taking antinausea med States left hip pain stable , on Morphine ER  30 mg BID. Patient hopes to be weaned off medication  Dr. Kleiner has him on Prednisone 10 mg daily  Denies fever, rash, mouth sores, vomiting, diarrhea,constipation, abdominal pain, bleeding, easy bruising or visual changes, chest pain, cough, SOB or NOONAN,  neuropathy, LE edema.  9/29/23: Patient is here for a f/u with his wife He has been on Keytruda therapy, next scheduled 10/2/23 He endorses GI disturbance from therapy and increased skin rash on his hands He experienced left-sided hip pain after playing golf on Labor Day, with no improvement. He ambulates today with a cane Patient sleeps well and hasn't felt tired He takes Morphine 2x/day  PETCT scans show improved response to immunotherapy  12/29/23:  Patient here for f/u Next due for Keytruda on 1/16/23 On levothyroxine TSH high  T 4 WNL   Due for PET scan WIll order now Has some nausea  in AM Takes Ondansetron for nausea  1/30/24: Patient here for f/u s/p Keytruda on 1/16/24. Next treatment on 2/6/24. Patient reports b/l shoulder pain, constant fatigue, and LE swelling. Patient has 24-hour aids at home. He likes to go on walks on his own without the aids.  Wife expresses her concern for prolonged weight bearing and exercise.  He is currently taking long acting Oxycodone  Extampa BID, 9 mg per PAIN MANAGEMENT ( Dr Lowe) . States diffuse pain is more severe after dose reduction a few weeks ago.  Patient waiting for follow up with Dr. Lora Turner, orthopedic surgery, for hardware complications from previous hip replacement.   Reviewed PET Scan from 1/10/24: Head/Neck: -Postsurgical changes of right parotidectomy, unchanged.  -No abnormal radiotracer activity in the surgical bed. -No enlarged or FDG-avid cervical lymph ode.  BONES/SOFT TISSUES:  -Patient is status post left femur ORIF with transcervical screw and intramedullary yaron, as seen on prior studies. There is cortical destruction involving the left proximal femur and lesser trochanter, similar in appearance on CT, and demonstrating interval increase in extent and intensity of hypermetabolism in the left lesser trochanter (SUV 15.4; image 248; previous SUV 12.9). A photopenic lucent lesion in the anterior aspect of the T8 vertebral body is unchanged in (image 109). Physiologic FDG activity in remainder of visualized osseous structures.  3/6/24: Patient presents for follow up  Last Keytruda on 2/27/24 Completed RT to left femur end of January Planning for possible hip surgery with Dr. Blackwell, pending additional imaging  Reports more pronounce lower back pain, suspects gait has changed due to hip pain likely attributing to back pain. He continues to follow Dr. Lowe  Leg swelling stable L>R Patient reports mild fatigue, appetite is good Patient reports open lesion on left forearm and left calf. States lesion on calf has been there for years, was following Dermatology. Lesions are clean and bandaged Denies fever, n/v/d, abd pain, chest pain , SOB   4/17/24: Patient presents for follow up Last Keytruda tx on 3/19/24, next on 4/19/24 s/p admission to [facility] on 3/27/24 with CVA, infarct in left frontal and left parietal lobe Delayed 6 months by Dr. Blackwell, possibly in 2 months Currently on Eliquis and Aspirin Per Dr. Kleiner RHEUM, on 10 mg prednisone, when feeling better, 1.5 mg Reports increased back pain, especially when standing still, occasional pain Pt reports hip is not bothering him as much  5/13/24: Patient presents for follow up  Last tx given on 5/10/24 Dermatologist, Dr. Henderson,  started patient on Efudex cream  Patient with persistent lower back pain following pain management, taking  Xtampa ER 18 mg BID and Oxycodone 10 BID. MRI recently done at - will obtain records  PCP recently decreased Amlodipine dose, suspect may be causing LE swelling- have not seen improvement Neurology cleared patient for possible hip surgery 3 month after CVA  6/12/24: Patient presents for follow up s/p Keytruda on 5/31/24 Per Dr. Henderson, will be sent to Orthopedic surgeon Dr. Bravo However, is seeing SURG ONC, unsure of name, however their appointment is later next week Pt reports some back pain that goes away when sitting down He reports he is getting a spine procedure with Dr. Lowe and needs clearance  6/6/24 CBC:  WBC 7.22K, HGB 12.8g, HCT 39.6%, PLT 217K, ANC 5.36

## 2024-06-13 NOTE — PHYSICAL EXAM
[Normal] : affect appropriate [de-identified] : Ambulates with cane [de-identified] : Has dentures both upper and lower , right sided jaw swelling -non tender [de-identified] : Left hip Keloid scar changes s/p surgery [de-identified] : open lesion on left forearm, ulcer like lesion on left calf

## 2024-06-13 NOTE — ADDENDUM
[FreeTextEntry1] :  Documented by Lidia Gilmore acting as scribe for Dr. Orozco on  06/12/2024.   All Medical record entries made by the Scribe were at my, Dr. Orozco's, direction and personally dictated by me on  06/12/2024. I have reviewed the chart and agree that the record accurately reflects my personal performance of the history, physical exam, assessment and plan. I have also personally directed, reviewed, and agreed with the discharge instructions.

## 2024-06-13 NOTE — ASSESSMENT
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 Foundation: TMB 99 Muts/Mb, MS- stable  Patient presented to ENT, Dr. Alex Boston, on 1/18/22 c/o right neck swelling for 5 weeks. On exam, swelling in the right parotid gland approx. 3 x 3 cm firm, nontender was noted, no enlarged LN. Subsequently MRI of neck on 1/24/22 revealed irregularly peripherally enhancing mass in the superficial lobe of the right parotid gland measuring 2.7 x 2.3 cm in AP.  Right parotid FNA biopsy performed on 2/1/22 at  consistent with carcinoma with squamous differentiation,Cytology; clusters and single scattered atypical squamous cells with prominent nucleoli, irregular nuclear membranes, irregular chromatin patterns, anisonucleosis and keratinization. Primary versus metastatic carcinoma.  Patient s/p Parotidectomy with facial nerve dissection and right neck dissection on 3/3/22 by Dr. Misbah Recio. Pathology showed 22 lymph nodes negative for metastatic carcinoma. Completed radiation on 6/1/22 with Dr. Rowe  Plan: - Given the TMB is 99 treated with single agent pembro - Pembrolizumab 200mg q 3 weeks. received 1 dose of Keytruda on 2/2/23, went to rehab however was too weak for rehab and was discharged 2/10/23 (enrolled on 12/14/23), was in a lot of pain at home and enrolled in hospice on 2/17/23, then slowly started increasing appetite, started ambulating more with walker, continued left hip pain, diffused body pain started to subside. Discharged from hospice on 6/12/23.  -Repeat PET SCAN On 6/9/23 with improvement in Disease  - Restarted Keytruda 200 q 3 weeks on 6/19/23 with denosumab -Patient still with right sided jaw swelling- improving, no pain. Evaluated by Dr. Boston, no indication for further work up- continue to observe. US on 7/19/23 reported No definable abnormality can be seen involving the area of swelling involving the right cheek. - TSH on 6/19/23 was 11.90, started patient on Levothyroxine 25mch on 6/20,  increased to 50 mcg on 8/4/23. Patient is compliant.   - Patient's scans have improved and he is responding well to treatment - The patient should f/u with his dermatologist about his skin lesions. No Steroids while on immunotherapy. Reports that he has had skin lesions his entire life  - Patient's cortisol level is low. No associated complaints. Cortisol levels declined in June prior to re-initiation of immunotherapy.  -Recent PET Scan done on 1/10/24: 1. Hypermetabolism associated with unchanged cortical destruction in left lesser trochanter is more extensive and increased in intensity, concerning for recurrent disease. 2. The remainder of the study demonstrates no evidence of FDG-avid disease. No new lesion.  -Given the above scan, patient was recommended to see radiation oncologist s/p palliative re-radiation to left hip, proximal femur 5#  - completed on 1/29/30 with Dr. Richardson.  -Continue Keytruda. Last treatment on 2/27/24. Next treatment due on 3/19/24.  - On Oxycodone  Extampa BID, 9 mg bid  per PAIN MANAGEMENT ( Dr Lowe) with short acting oxycodone for break through pain, Patient reports that he is still in pain Seeing Pain management later today  -Completed RT to the left femur end of January -Admitted to [facility] on 3/27/24 with CVA, infarct in left posterior frontal lobe - Last received Keytruda on 5/31/24, next on 6/24/24 -Planned for possible hip surgery with Dr. Blackwell was scheduled for 3/29, however delayed for ~6 months per Dr. Blackwell due to hospitalization for CVA. Has follow up this week  -Currently on 10 mg prednisone per Dr Kleiner, RHEUM, will reduce once pain improves - 6/5/24 PET/CT: 1.  Quantitatively stable uptake at the LEFT lesser trochanter which appears to encompass a smaller area than on the prior exam. 2.  No evidence of recurrent disease in the head and neck. 3.  Stable appearance of the thyroid. 4.  Otherwise, no evidence of FDG-avid malignancy. - Advised to follow up with SURG ONC as soon as possible for concerning findings on biopsy  - No contraindications for spinal procedure with Dr. Lowe from an oncological standpoint - F/u in 4 weeks with Usha  Skin lesion - Medial Distal Left lower leg leison + basal cell carcinoma with some neuroendocrine features. He know to f/u with with surgical oncology Referral sent per dr dockery    Pain medication Dr. Lowe [phone] phone, [phone] fax [AdvancecareDate] : 6/12/24

## 2024-06-18 ENCOUNTER — APPOINTMENT (OUTPATIENT)
Dept: ORTHOPEDIC SURGERY | Facility: CLINIC | Age: 79
End: 2024-06-18
Payer: MEDICARE

## 2024-06-18 VITALS
SYSTOLIC BLOOD PRESSURE: 158 MMHG | WEIGHT: 193 LBS | HEIGHT: 71 IN | DIASTOLIC BLOOD PRESSURE: 85 MMHG | BODY MASS INDEX: 27.02 KG/M2 | HEART RATE: 74 BPM

## 2024-06-18 DIAGNOSIS — G89.29 PAIN IN LEFT HIP: ICD-10-CM

## 2024-06-18 DIAGNOSIS — M25.552 PAIN IN LEFT HIP: ICD-10-CM

## 2024-06-18 PROCEDURE — 73502 X-RAY EXAM HIP UNI 2-3 VIEWS: CPT | Mod: LT

## 2024-06-18 PROCEDURE — 73552 X-RAY EXAM OF FEMUR 2/>: CPT | Mod: LT

## 2024-06-18 PROCEDURE — 99213 OFFICE O/P EST LOW 20 MIN: CPT

## 2024-06-18 NOTE — DISCUSSION/SUMMARY
[Medication Risks Reviewed] : Medication risks reviewed [Surgical risks reviewed] : Surgical risks reviewed [de-identified] : 78-year-old male presents to the office for follow-up of his left hip metastatic lesion and for evaluation after a fall about 4 days ago.  The patient was scheduled to undergo a proximal femur replacement however 2 days prior to surgery he developed an acute stroke.  Is now on anticoagulation and at the recommendation of his neurosurgeon and oncologist he should wait a minimum of 3 months before any surgical intervention can be considered.  I have reassured the patient today that there are no fractures or changes on his radiographs as compared to last time. The patient is scheduled to undergo spinal injections within the next few weeks for his back pain.  He also has an appointment with his dermatologist to discuss removal of skin cancer on his left lower extremity.  I recommend the patient follow-up after the appointment with his dermatologist for repeat evaluation and further discussion regarding surgical intervention as he will be 3 months from his stroke on June 27, 2024.  In the meantime the patient should continue with conservative therapy, he should remain protected weightbearing with the use of his walker.  Perform low impact activity and exercises as tolerated.  He should continue with physical therapy.    All questions were addressed with the patient and his wife, they both verbalized understanding and agreement the plan.

## 2024-06-18 NOTE — HISTORY OF PRESENT ILLNESS
[de-identified] : 78-year-old male presents to the office for follow-up of his left hip periprosthetic fracture with metastatic lesions.  He sustained a witnessed fall about 5 days ago from standing height.  He states that he was stepping up to his front door when he lost strength in his legs and fell backwards, he held onto his rolling walker the entire time which helped slow his fall.  He fell onto cement and was helped up by his aide.  He was able to bear weight and ambulate without issue afterward.  He does however state that since the fall his left hip has become more painful.  He takes Tylenol for the pain with minimal relief.  He has been cleared for spinal injections and plans to have those in the coming weeks for his back pain.  He is also being seen by dermatology on Friday for evaluation and biopsy results of skin cancer on the left lower extremity.  He continues to take Eliquis due to his stroke from 3/27/2024.  He has been ambulating with the use of a rolling walker.

## 2024-06-18 NOTE — PHYSICAL EXAM
[Walker] : ambulates with walker [de-identified] : Left hip exam showed no groin pain with SLR, ROM is full flexion with 20 degrees external and 10 degrees internal with pain, GRANT negative, FADIR positive. Well healed surgical incisions. 5/5 motor strength in bilateral lower extremities. Sensory: Intact in bilateral lower extremities. DTRs: Biceps, brachioradialis, triceps, patellar, ankle and plantar 2+ and symmetric bilaterally. Pulses: dorsalis pedis, posterior tibial, femoral, popliteal, and radial 2+ and symmetric bilaterally. [de-identified] : AP pelvis and 2 views of the left hip taken in office today show no acute fractures or dislocations, there is evidence with an IM femoral yaron as well as the traversing femoral head/neck fixating screw.  The read does appear to be bending although it is unchanged from previous imaging.  There is a chronic deformity of the proximal femur with evidence of lytic lesions.  1 view of the left femur taken in office today shows no acute fractures or dislocations, there is a left IM femoral yaron with fixating screws above the level of the knee.  There is evidence of hardware failure of the femoral nail.

## 2024-06-18 NOTE — REVIEW OF SYSTEMS
[Joint Pain] : joint pain [Joint Stiffness] : joint stiffness [Joint Swelling] : joint swelling [FreeTextEntry9] : left hip

## 2024-06-20 ENCOUNTER — APPOINTMENT (OUTPATIENT)
Dept: ORTHOPEDIC SURGERY | Facility: CLINIC | Age: 79
End: 2024-06-20

## 2024-06-20 ENCOUNTER — APPOINTMENT (OUTPATIENT)
Dept: SURGICAL ONCOLOGY | Facility: CLINIC | Age: 79
End: 2024-06-20
Payer: MEDICARE

## 2024-06-20 VITALS
DIASTOLIC BLOOD PRESSURE: 74 MMHG | HEIGHT: 71 IN | SYSTOLIC BLOOD PRESSURE: 134 MMHG | WEIGHT: 199.25 LBS | OXYGEN SATURATION: 95 % | HEART RATE: 74 BPM | TEMPERATURE: 97.2 F | BODY MASS INDEX: 27.9 KG/M2

## 2024-06-20 DIAGNOSIS — C44.709: ICD-10-CM

## 2024-06-20 DIAGNOSIS — C79.51 SECONDARY MALIGNANT NEOPLASM OF BONE: ICD-10-CM

## 2024-06-20 DIAGNOSIS — C80.1 SECONDARY AND UNSPECIFIED MALIGNANT NEOPLASM OF LYMPH NODES OF HEAD, FACE AND NECK: ICD-10-CM

## 2024-06-20 DIAGNOSIS — C77.0 SECONDARY AND UNSPECIFIED MALIGNANT NEOPLASM OF LYMPH NODES OF HEAD, FACE AND NECK: ICD-10-CM

## 2024-06-20 PROCEDURE — 99204 OFFICE O/P NEW MOD 45 MIN: CPT

## 2024-06-20 NOTE — REASON FOR VISIT
[Initial Consultation] : an initial consultation for [Referred By: ___] : Referred By: [unfilled] [Spouse] : spouse [FreeTextEntry2] : Concern for merkel cell carcinoma

## 2024-06-20 NOTE — HISTORY OF PRESENT ILLNESS
[de-identified] : Mr. JOSIANE SHAW is a 78 year old male who presents for evaluation of a left medial distal pretibial lesion with neuroendocrine differentiation, concern for possible Merkel Cell Carcinoma. Referred by Dr. Richelle Bravo with [facility].  Per report, patient first noted the abnormal lesion about 3-4 months ago. In first appearance, the lesion appeared like an open sore. Since patient has first identified the lesion, it has changed in appearance, becoming more solid and scaly with time. Occasionally the lesion produces a mal odor, especially when a bandage remains for more than 2 days.   Today, Mr. 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 Patient is currently on Eliquis for a stroke in March. On 06/14/2024 patient underwent additional biopsies of the right upper lip and left forearm. Denies personal history of Merkel cell.   Patient is to possibly undergo a revision of his left hip. 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 Recio surgery & Dr. Richardson via radiation therapy. Eventually the cancer metastasized to the left hip and a yaron was placed for stabilization. Has yaron replacement scheduled with Dr. Blackwell. Currently taking Keytruda, under the direction of Dr. Orozco.   PMHx: CAD; atherosclerotic heart disease of native coronary artery w/o angina; fibromyalgia; TIA; parotid cancer; HLD; HTN; hypothyroid; lymphedema; metastatic SCC to bone; paroxysmal supraventricular tachycardia; rheumatoid arthritis; Sjogren's syndrome; stroke FMHx: Mother - angina, DM SHx: carpal tunnel release; coronary artery stent placement; cholecystectomy; laminectomy (cervical and lumbar); parotidectomy  SocHx: tobacco use (cigars); denies illicit drugs; retired;  +2 kids; occasional etoh

## 2024-06-20 NOTE — PHYSICAL EXAM
[Normal] : oriented to person, place and time, with appropriate affect [de-identified] : ambulatory with walker [de-identified] : mild LLE lymphedema  [de-identified] : Left posterior tibial skin lesion ~2cm, cauliflower-like in appearance

## 2024-06-20 NOTE — REVIEW OF SYSTEMS
[Negative] : Endocrine [de-identified] : see hpi  [FreeTextEntry2] : see hpi  [FreeTextEntry1] : see hpi

## 2024-06-20 NOTE — ASSESSMENT
[FreeTextEntry1] : Mr. Daily is a 78 year old male who presents for evaluation of a left distal medial pretibial lesion, path report reading atypical basaloid neoplasm most consistent with basal cell carcinoma, superficial and nodular types with neuroendocrine differentiation; extending to the peripheral and deep margins in two fragments. This lesion did not stain positive for CK20, which makes neuroendocrine differentiation less likely. Also of note, although the biopsy site was labeled pretibial, the patient explains that the skin lesion of concern, to his knowledge, is the fungating POSTERIOR tibial lesion. This will need to be clarified with his dermatologist.  Clinically, the patient's physical examination is remarkable for left posterior tibial skin lesion, 2cm in size, cauliflower-like in appearance. Lymph node examination was negative. Additionally, patient has two new biopsy sites, pathology is still pending. We had an in-depth review of the patient's past medical and surgical history, in addition to his history of skin cancers and Eliquis prescription. I have requested the patient's pathology be reevaluated by José Miguel for more thorough diagnosis.   I discussed the management of Merkel Cell Carcinoma, should our pathologists that is the diagnosis. We discussed the need for a wide excision to obtain negative margins and minimize the risk of local recurrence. The risks and benefits of this were discussed in detail and the magnitude of the excision was demonstrated. The risks include, but are not limited to, bleeding, infection, seroma, injury to surrounding structures including blood vessels and nerves which may lead to numbness or motor dysfunction, and wound breakdown. We also discussed the role of a sentinel lymph node biopsy in Merkel Cell cancer and its prognostic significance.  If the biopsy returns as SCC or BCC, we discussed WLE with margins as well, however a SLNB would not be needed.  At this point, I will refer to Plastics to discuss complex closure vs skin graft at the time of surgery as well.  PLAN: 1) Path review of slides at [facility] 2) Clarify with Dermatology the location of the concerning lesion. Path reports suggest a pretibial lesion, however the patient's lesion of concern is in the calf/posterior aspect of his leg. 3) Plastics referral 4) Will plan for WLE, possible SLNB, and complex closure with Plastics  5) Will discuss with Dr. Orozco, as she has been treating with Keytruda for metastatic SCC, and this lesion looks suspicious for an SCC.  Conrado Mariaa, MD   Assistant Professor of Surgery [facility] [facility] [employer] at [facility] Division of Surgical Oncology [facility] Phone: [phone] Fax: [phone]  Today, I personally spent 64 minutes in total time including reviewing imaging and studies, discussing complex treatment regimens, direct face to face time with the patient, patient education, answering patient questions and counseling, excluding separately billable procedures and billing time.  This note was written by Tana Rocha on 06/20/2024, acting solely as a scribe for Dr. Conrado Leroy MD. I have documented the information dictated during the patient encounter for the following sections: RFV, HPI, ROS, PE, ASSESSMENT/PLAN.  I personally performed the services described in the documentation, reviewed the documentation recorded by the scribe in my presence, and it accurately and completely records my words and actions.

## 2024-06-20 NOTE — RESULTS/DATA
[FreeTextEntry1] : ***PATHOLOGY - 0/03/2024*** Anatomic Site 1) Skin Shave Biopsy - Left medial distal pretibial region 2) Skin Shave Biopsy - Left inferior medial upper back Final Diagnosis 1) Left Medial Distal Pretibial Region - Atypical basaloid neoplasm most consistent with basal cell carcinoma, superficial and nodular types with neuroendocrine differentiation; extending to the peripheral and deep margins in two fragments - Sections show a proliferation of atypical and peripherally palisaded basaloid cells, arrayed as clusters near the junctional zone and as nodular aggregations in the dermis 2) Left Inferior Medial upper Back - Basal cell carcinoma, superficial and nodular types; extending to the peripheral and deep margins - Sections show a proliferation of atypical and peripherally palisaded basaloid cells, arrayed as clusters near the junctional zone and as nodular aggregations in th dermis

## 2024-06-24 ENCOUNTER — APPOINTMENT (OUTPATIENT)
Age: 79
End: 2024-06-24

## 2024-06-24 ENCOUNTER — RESULT REVIEW (OUTPATIENT)
Age: 79
End: 2024-06-24

## 2024-06-25 RX ORDER — PILOCARPINE HYDROCHLORIDE 5 MG/1
5 TABLET, FILM COATED ORAL
Qty: 180 | Refills: 0 | Status: ACTIVE | COMMUNITY
Start: 2024-04-13 | End: 1900-01-01

## 2024-06-26 ENCOUNTER — APPOINTMENT (OUTPATIENT)
Dept: ORTHOPEDIC SURGERY | Facility: CLINIC | Age: 79
End: 2024-06-26
Payer: MEDICARE

## 2024-06-26 VITALS
BODY MASS INDEX: 27.3 KG/M2 | HEART RATE: 76 BPM | HEIGHT: 71 IN | SYSTOLIC BLOOD PRESSURE: 148 MMHG | DIASTOLIC BLOOD PRESSURE: 75 MMHG | WEIGHT: 195 LBS

## 2024-06-26 DIAGNOSIS — T84.84XA PAIN DUE TO INTERNAL ORTHOPEDIC PROSTHETIC DEVICES, IMPLANTS AND GRAFTS, INITIAL ENCOUNTER: ICD-10-CM

## 2024-06-26 DIAGNOSIS — C79.51 SECONDARY MALIGNANT NEOPLASM OF BONE: ICD-10-CM

## 2024-06-26 PROCEDURE — 99213 OFFICE O/P EST LOW 20 MIN: CPT | Mod: 24

## 2024-06-26 PROCEDURE — G2211 COMPLEX E/M VISIT ADD ON: CPT

## 2024-07-01 ENCOUNTER — RESULT REVIEW (OUTPATIENT)
Age: 79
End: 2024-07-01

## 2024-07-01 ENCOUNTER — OUTPATIENT (OUTPATIENT)
Dept: OUTPATIENT SERVICES | Facility: HOSPITAL | Age: 79
LOS: 1 days | End: 2024-07-01
Payer: MEDICARE

## 2024-07-01 DIAGNOSIS — Z90.49 ACQUIRED ABSENCE OF OTHER SPECIFIED PARTS OF DIGESTIVE TRACT: Chronic | ICD-10-CM

## 2024-07-01 DIAGNOSIS — Z95.5 PRESENCE OF CORONARY ANGIOPLASTY IMPLANT AND GRAFT: Chronic | ICD-10-CM

## 2024-07-01 DIAGNOSIS — Z98.1 ARTHRODESIS STATUS: Chronic | ICD-10-CM

## 2024-07-01 DIAGNOSIS — C80.1 MALIGNANT (PRIMARY) NEOPLASM, UNSPECIFIED: ICD-10-CM

## 2024-07-01 PROCEDURE — 88321 CONSLTJ&REPRT SLD PREP ELSWR: CPT

## 2024-07-08 ENCOUNTER — APPOINTMENT (OUTPATIENT)
Dept: HEMATOLOGY ONCOLOGY | Facility: CLINIC | Age: 79
End: 2024-07-08
Payer: MEDICARE

## 2024-07-08 ENCOUNTER — OUTPATIENT (OUTPATIENT)
Dept: OUTPATIENT SERVICES | Facility: HOSPITAL | Age: 79
LOS: 1 days | End: 2024-07-08
Payer: MEDICARE

## 2024-07-08 ENCOUNTER — APPOINTMENT (OUTPATIENT)
Dept: ULTRASOUND IMAGING | Facility: CLINIC | Age: 79
End: 2024-07-08
Payer: MEDICARE

## 2024-07-08 VITALS
OXYGEN SATURATION: 94 % | TEMPERATURE: 97.4 F | HEART RATE: 73 BPM | DIASTOLIC BLOOD PRESSURE: 82 MMHG | WEIGHT: 199.38 LBS | HEIGHT: 71 IN | SYSTOLIC BLOOD PRESSURE: 165 MMHG | BODY MASS INDEX: 27.91 KG/M2

## 2024-07-08 DIAGNOSIS — Z98.1 ARTHRODESIS STATUS: Chronic | ICD-10-CM

## 2024-07-08 DIAGNOSIS — C79.51 SECONDARY MALIGNANT NEOPLASM OF BONE: ICD-10-CM

## 2024-07-08 DIAGNOSIS — Z95.5 PRESENCE OF CORONARY ANGIOPLASTY IMPLANT AND GRAFT: Chronic | ICD-10-CM

## 2024-07-08 DIAGNOSIS — Z90.49 ACQUIRED ABSENCE OF OTHER SPECIFIED PARTS OF DIGESTIVE TRACT: Chronic | ICD-10-CM

## 2024-07-08 PROCEDURE — 93971 EXTREMITY STUDY: CPT

## 2024-07-08 PROCEDURE — 93971 EXTREMITY STUDY: CPT | Mod: 26

## 2024-07-08 PROCEDURE — 99214 OFFICE O/P EST MOD 30 MIN: CPT

## 2024-07-10 ENCOUNTER — APPOINTMENT (OUTPATIENT)
Dept: CARDIOLOGY | Facility: CLINIC | Age: 79
End: 2024-07-10
Payer: MEDICARE

## 2024-07-10 VITALS
SYSTOLIC BLOOD PRESSURE: 122 MMHG | HEIGHT: 71 IN | BODY MASS INDEX: 27.58 KG/M2 | HEART RATE: 66 BPM | DIASTOLIC BLOOD PRESSURE: 88 MMHG | OXYGEN SATURATION: 98 % | WEIGHT: 197 LBS

## 2024-07-10 DIAGNOSIS — I25.10 ATHEROSCLEROTIC HEART DISEASE OF NATIVE CORONARY ARTERY W/OUT ANGINA PECTORIS: ICD-10-CM

## 2024-07-10 DIAGNOSIS — Z01.810 ENCOUNTER FOR PREPROCEDURAL CARDIOVASCULAR EXAMINATION: ICD-10-CM

## 2024-07-10 DIAGNOSIS — E78.5 HYPERLIPIDEMIA, UNSPECIFIED: ICD-10-CM

## 2024-07-10 DIAGNOSIS — I10 ESSENTIAL (PRIMARY) HYPERTENSION: ICD-10-CM

## 2024-07-10 PROCEDURE — 99214 OFFICE O/P EST MOD 30 MIN: CPT

## 2024-07-10 PROCEDURE — 93000 ELECTROCARDIOGRAM COMPLETE: CPT | Mod: NC

## 2024-07-10 RX ORDER — AMLODIPINE BESYLATE 2.5 MG/1
2.5 TABLET ORAL
Qty: 90 | Refills: 0 | Status: ACTIVE | COMMUNITY
Start: 2024-04-30

## 2024-07-10 RX ORDER — PILOCARPINE HYDROCHLORIDE 7.5 MG/1
TABLET, FILM COATED ORAL 3 TIMES DAILY
Refills: 0 | Status: ACTIVE | COMMUNITY

## 2024-07-10 RX ORDER — MORPHINE SULFATE 30 MG/1
30 TABLET ORAL EVERY 4 HOURS
Refills: 0 | Status: ACTIVE | COMMUNITY

## 2024-07-11 ENCOUNTER — OUTPATIENT (OUTPATIENT)
Dept: OUTPATIENT SERVICES | Facility: HOSPITAL | Age: 79
LOS: 1 days | End: 2024-07-11
Payer: MEDICARE

## 2024-07-11 VITALS
SYSTOLIC BLOOD PRESSURE: 130 MMHG | OXYGEN SATURATION: 97 % | TEMPERATURE: 97 F | WEIGHT: 191.8 LBS | RESPIRATION RATE: 16 BRPM | DIASTOLIC BLOOD PRESSURE: 80 MMHG | HEART RATE: 71 BPM | HEIGHT: 71 IN

## 2024-07-11 DIAGNOSIS — Z90.49 ACQUIRED ABSENCE OF OTHER SPECIFIED PARTS OF DIGESTIVE TRACT: Chronic | ICD-10-CM

## 2024-07-11 DIAGNOSIS — Z95.5 PRESENCE OF CORONARY ANGIOPLASTY IMPLANT AND GRAFT: Chronic | ICD-10-CM

## 2024-07-11 DIAGNOSIS — C77.0 SECONDARY AND UNSPECIFIED MALIGNANT NEOPLASM OF LYMPH NODES OF HEAD, FACE AND NECK: ICD-10-CM

## 2024-07-11 DIAGNOSIS — Z88.0 ALLERGY STATUS TO PENICILLIN: ICD-10-CM

## 2024-07-11 DIAGNOSIS — Z01.818 ENCOUNTER FOR OTHER PREPROCEDURAL EXAMINATION: ICD-10-CM

## 2024-07-11 DIAGNOSIS — Z98.1 ARTHRODESIS STATUS: Chronic | ICD-10-CM

## 2024-07-11 DIAGNOSIS — F11.20 OPIOID DEPENDENCE, UNCOMPLICATED: ICD-10-CM

## 2024-07-11 DIAGNOSIS — Z86.73 PERSONAL HISTORY OF TRANSIENT ISCHEMIC ATTACK (TIA), AND CEREBRAL INFARCTION WITHOUT RESIDUAL DEFICITS: ICD-10-CM

## 2024-07-11 DIAGNOSIS — Z29.9 ENCOUNTER FOR PROPHYLACTIC MEASURES, UNSPECIFIED: ICD-10-CM

## 2024-07-11 LAB
A1C WITH ESTIMATED AVERAGE GLUCOSE RESULT: 5.5 % — SIGNIFICANT CHANGE UP (ref 4–5.6)
ALBUMIN SERPL ELPH-MCNC: 3.4 G/DL — SIGNIFICANT CHANGE UP (ref 3.3–5.2)
ALP SERPL-CCNC: 67 U/L — SIGNIFICANT CHANGE UP (ref 40–120)
ALT FLD-CCNC: 8 U/L — SIGNIFICANT CHANGE UP
ANION GAP SERPL CALC-SCNC: 12 MMOL/L — SIGNIFICANT CHANGE UP (ref 5–17)
APTT BLD: 33.7 SEC — SIGNIFICANT CHANGE UP (ref 24.5–35.6)
AST SERPL-CCNC: 18 U/L — SIGNIFICANT CHANGE UP
BASOPHILS # BLD AUTO: 0.02 K/UL — SIGNIFICANT CHANGE UP (ref 0–0.2)
BASOPHILS NFR BLD AUTO: 0.3 % — SIGNIFICANT CHANGE UP (ref 0–2)
BILIRUB SERPL-MCNC: 0.4 MG/DL — SIGNIFICANT CHANGE UP (ref 0.4–2)
BLD GP AB SCN SERPL QL: SIGNIFICANT CHANGE UP
BUN SERPL-MCNC: 13.6 MG/DL — SIGNIFICANT CHANGE UP (ref 8–20)
CALCIUM SERPL-MCNC: 8.7 MG/DL — SIGNIFICANT CHANGE UP (ref 8.4–10.5)
CHLORIDE SERPL-SCNC: 101 MMOL/L — SIGNIFICANT CHANGE UP (ref 96–108)
CO2 SERPL-SCNC: 30 MMOL/L — HIGH (ref 22–29)
CREAT SERPL-MCNC: 0.98 MG/DL — SIGNIFICANT CHANGE UP (ref 0.5–1.3)
EGFR: 80 ML/MIN/1.73M2 — SIGNIFICANT CHANGE UP
EOSINOPHIL # BLD AUTO: 0.11 K/UL — SIGNIFICANT CHANGE UP (ref 0–0.5)
EOSINOPHIL NFR BLD AUTO: 1.6 % — SIGNIFICANT CHANGE UP (ref 0–6)
ESTIMATED AVERAGE GLUCOSE: 111 MG/DL — SIGNIFICANT CHANGE UP (ref 68–114)
GLUCOSE SERPL-MCNC: 106 MG/DL — HIGH (ref 70–99)
HCT VFR BLD CALC: 36.2 % — LOW (ref 39–50)
HGB BLD-MCNC: 11.8 G/DL — LOW (ref 13–17)
IMM GRANULOCYTES NFR BLD AUTO: 0.3 % — SIGNIFICANT CHANGE UP (ref 0–0.9)
INR BLD: 1.2 RATIO — HIGH (ref 0.85–1.18)
LYMPHOCYTES # BLD AUTO: 0.65 K/UL — LOW (ref 1–3.3)
LYMPHOCYTES # BLD AUTO: 9.7 % — LOW (ref 13–44)
MCHC RBC-ENTMCNC: 31.3 PG — SIGNIFICANT CHANGE UP (ref 27–34)
MCHC RBC-ENTMCNC: 32.6 GM/DL — SIGNIFICANT CHANGE UP (ref 32–36)
MCV RBC AUTO: 96 FL — SIGNIFICANT CHANGE UP (ref 80–100)
MONOCYTES # BLD AUTO: 0.73 K/UL — SIGNIFICANT CHANGE UP (ref 0–0.9)
MONOCYTES NFR BLD AUTO: 10.9 % — SIGNIFICANT CHANGE UP (ref 2–14)
NEUTROPHILS # BLD AUTO: 5.17 K/UL — SIGNIFICANT CHANGE UP (ref 1.8–7.4)
NEUTROPHILS NFR BLD AUTO: 77.2 % — HIGH (ref 43–77)
PLATELET # BLD AUTO: 228 K/UL — SIGNIFICANT CHANGE UP (ref 150–400)
POTASSIUM SERPL-MCNC: 3.3 MMOL/L — LOW (ref 3.5–5.3)
POTASSIUM SERPL-SCNC: 3.3 MMOL/L — LOW (ref 3.5–5.3)
PROT SERPL-MCNC: 6.2 G/DL — LOW (ref 6.6–8.7)
PROTHROM AB SERPL-ACNC: 13.3 SEC — HIGH (ref 9.5–13)
RBC # BLD: 3.77 M/UL — LOW (ref 4.2–5.8)
RBC # FLD: 14.9 % — HIGH (ref 10.3–14.5)
SODIUM SERPL-SCNC: 143 MMOL/L — SIGNIFICANT CHANGE UP (ref 135–145)
T3 SERPL-MCNC: 71 NG/DL — LOW (ref 80–200)
T4 AB SER-ACNC: 6.3 UG/DL — SIGNIFICANT CHANGE UP (ref 4.5–12)
TSH SERPL-MCNC: 16.08 UIU/ML — HIGH (ref 0.27–4.2)
WBC # BLD: 6.7 K/UL — SIGNIFICANT CHANGE UP (ref 3.8–10.5)
WBC # FLD AUTO: 6.7 K/UL — SIGNIFICANT CHANGE UP (ref 3.8–10.5)

## 2024-07-11 PROCEDURE — 86901 BLOOD TYPING SEROLOGIC RH(D): CPT

## 2024-07-11 PROCEDURE — 85610 PROTHROMBIN TIME: CPT

## 2024-07-11 PROCEDURE — 85730 THROMBOPLASTIN TIME PARTIAL: CPT

## 2024-07-11 PROCEDURE — 86850 RBC ANTIBODY SCREEN: CPT

## 2024-07-11 PROCEDURE — 36415 COLL VENOUS BLD VENIPUNCTURE: CPT

## 2024-07-11 PROCEDURE — 86900 BLOOD TYPING SEROLOGIC ABO: CPT

## 2024-07-11 PROCEDURE — 84443 ASSAY THYROID STIM HORMONE: CPT

## 2024-07-11 PROCEDURE — 85025 COMPLETE CBC W/AUTO DIFF WBC: CPT

## 2024-07-11 PROCEDURE — G0463: CPT

## 2024-07-11 PROCEDURE — 84480 ASSAY TRIIODOTHYRONINE (T3): CPT

## 2024-07-11 PROCEDURE — 80053 COMPREHEN METABOLIC PANEL: CPT

## 2024-07-11 PROCEDURE — 84436 ASSAY OF TOTAL THYROXINE: CPT

## 2024-07-11 PROCEDURE — 83036 HEMOGLOBIN GLYCOSYLATED A1C: CPT

## 2024-07-11 RX ORDER — ISOSORBIDE DINITRATE 5 MG/1
0.5 TABLET ORAL
Refills: 0 | DISCHARGE

## 2024-07-11 NOTE — H&P PST ADULT - ATTENDING COMMENTS
Planning wide local excision of left lower extremity cutaneous neoplasm x 2 today, complex closure versus skin graft with plastics.  R/B/A explained, including but not limited to pain, infection, bleeding, damage to nerves or muscles, numbness, and need for additional surgery due to residual disease, change in pathology, or positive margins. Patient and wife agree to these risks and all questions answered. Of note, patient is adamant that his pretibial region was NOT biopsied (though the path report says pretibial) and that it was the posterior lesion that was biopsied. Regardless, both look like they need to be removed and are suspicious for basal cells, so patient wishes to remove both today. Again, risks discussed, all questions answered.

## 2024-07-11 NOTE — H&P PST ADULT - HISTORY OF PRESENT ILLNESS
Mr. JOSIANE SHAW is a 78 year old male who presents for evaluation of a left medial distal pretibial lesion with neuroendocrine differentiation, concern for possible Merkel Cell Carcinoma. Referred by Dr. Richelle Bravo with [facility].  Per report, patient first noted the abnormal lesion about 3-4 months ago. In first appearance, the lesion appeared like an open sore. Since patient has first identified the lesion, it has changed in appearance, becoming more solid and scaly with time. Occasionally the lesion produces a mal odor, especially when a bandage remains for more than 2 days.   Today, Mr. 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 Patient is currently on Eliquis for a stroke in March. On 06/14/2024 patient underwent additional biopsies of the right upper lip and left forearm. Denies personal history of Merkel cell.   Patient is to possibly undergo a revision of his left hip. 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 Recio surgery & Dr. Richardson via radiation therapy. Eventually the cancer metastasized to the left hip and a yaron was placed for stabilization. Has yaron replacement scheduled with Dr. Blackwell. Currently taking Keytruda, under the direction of Dr. Orozco.    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     78 year old male who presents to [facility] for upcoming procedure on 7/26/24 for WIDE LOCAL EXCISION OF LEFT LOWER EXTREMITY OF CUTANEOUS NEOPLASM.  Per review of past charts, patient first noted the abnormal lesion about 3-4 months ago. In first appearance, the lesion appeared like an open sore. Since patient has first identified the lesion, it has changed in appearance, becoming more solid and scaly with time. Occasionally the lesion produces a mal odor, especially when a bandage remains for more than 2 days.  Lesion is in the left pretibial calf area.   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 Patient is currently on Eliquis for a stroke in March. On 06/14/2024 patient underwent additional biopsies of the right upper lip and left forearm.   Patient is to possibly undergo a revision of his left hip eventually. 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 Recio surgery & Dr. Richardson via radiation therapy. Eventually the cancer metastasized to the left hip and a yaron was placed for stabilization. Has yaron replacement scheduled with Dr. Blackwell. Currently taking Keytruda, under the direction of Dr. Orozco.    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    78 year old male who presents to [facility] for upcoming procedure on 7/26/24 for WIDE LOCAL EXCISION OF LEFT LOWER EXTREMITY OF CUTANEOUS NEOPLASM.  Per review of past charts, patient first noted the abnormal lesion about 3-4 months ago. In first appearance, the lesion appeared like an open sore. Since patient has first identified the lesion, it has changed in appearance, becoming more solid and scaly with time. Occasionally the lesion produces a mal odor, especially when a bandage remains for more than 2 days.  Lesion is in the left pretibial calf area.   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 Patient is currently on Eliquis for a stroke in March. On 06/14/2024 patient underwent additional biopsies of the right upper lip and left forearm.   Patient is to possibly undergo a revision of his left hip eventually. 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 Recio surgery & Dr. Richardson via radiation therapy. Eventually the cancer metastasized to the left hip and a yaron was placed for stabilization. Has yaron replacement scheduled with Dr. Blackwell. Currently taking Keytruda, under the direction of Dr. Orozco.    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    78 year old male who presents to [facility] for upcoming procedure on 7/26/24 for WIDE LOCAL EXCISION OF LEFT LOWER EXTREMITY OF CUTANEOUS NEOPLASM.  Per review of past charts, patient first noted the abnormal lesion about 3-4 months ago. In first appearance, the lesion appeared like an open sore. Since patient has first identified the lesion, it has changed in appearance, becoming more solid and scaly with time. Occasionally the lesion produces a mal odor, especially when a bandage remains for more than 2 days.  Lesion is in the left pretibial calf area.   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 Patient is currently on Eliquis for a stroke in March. On 06/14/2024 patient underwent additional biopsies of the right upper lip and left forearm.   Patient is to possibly undergo a revision of his left hip eventually. 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 Recio surgery & Dr. Richardson via radiation therapy. Eventually the cancer metastasized to the left hip and a yaron was placed for stabilization. Has yaron replacement scheduled with Dr. Blackwell. Currently taking Keytruda, under the direction of Dr. Orozco.    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

## 2024-07-11 NOTE — H&P PST ADULT - OTHER CARE PROVIDERS
PCP CARROLL AUGUSTE [phone]    CARDIO LISA KRAMER [phone]   NEURO MORRIS TEMPLE [phone]

## 2024-07-11 NOTE — H&P PST ADULT - PROBLEM SELECTOR PLAN 5
March 2024.  Has been on ASA + Eliquis.  Instructed patient to call prescriber to discuss when to discontinue medications prior to surgery.  He understands he will speak to his PCP, Cardiologist, and Neurologist prior to stopping or starting any medications.

## 2024-07-11 NOTE — H&P PST ADULT - PROBLEM SELECTOR PLAN 4
[place]  checked. Pain management consult ordered for day of surgery. March 2024.  Has been on ASA + Eliquis.  Instructed patient to call prescriber to discuss when to discontinue medications prior to surgery.  He understands he will speak to his PCP, Cardiologist, and Neurologist prior to stopping or starting any medications.

## 2024-07-11 NOTE — H&P PST ADULT - RESPIRATORY
normal/clear to auscultation bilaterally/no wheezes/no rales/no rhonchi
Principal Discharge DX:	CVA (cerebral vascular accident)  Secondary Diagnosis:	Urinary tract infection

## 2024-07-11 NOTE — H&P PST ADULT - PROBLEM SELECTOR PLAN 3
SURGICAL TEAM TO ADDRESS for Day of surgery antibiotics SURGICAL TEAM TO ADDRESS for Day of surgery antibiotics  Clindamycin ordered

## 2024-07-11 NOTE — H&P PST ADULT - PROBLEM SELECTOR PLAN 1
PST for upcoming procedure on 7/26/24 for WIDE LOCAL EXCISION OF LEFT LOWER EXTREMITY OF CUTANEOUS NEOPLASM.   Patient educated on surgical scrub and is aware not to use chlorhexidine on any open wounds or sores, NPO after MN, preadmission instructions, medical clearance and day of procedure medications, verbalizes understanding.   Instructed patient to call prescriber to discuss when to discontinue medications prior to surgery.  He understands he will speak to his PCP, Cardiologist, and Neurologist prior to stopping or starting any medications.  He understands he will require medical/cardiology and possibly neurology clearance.    PCP CARROLL AUGUSTE [phone]      CARDIO LISA KRAMER [phone]  NEURO MORRIS TEMPLE [phone] PST for upcoming procedure on 7/26/24 for WIDE LOCAL EXCISION OF LEFT LOWER EXTREMITY OF CUTANEOUS NEOPLASM.   Patient educated on surgical scrub and is aware not to use chlorhexidine on any open wounds or sores, NPO after MN, preadmission instructions, medical clearance and day of procedure medications, verbalizes understanding.   Instructed patient to call prescriber to discuss when to discontinue medications prior to surgery.  He understands he will speak to his PCP, Cardiologist, and Neurologist prior to stopping or starting any medications.  He understands he will require medical/cardiology and possibly neurology clearance.    PCP CARROLL AUGUSTE [phone]    pending medical clearance  CARDIO LISA KRAMER [phone]  pending cardiac clearance  NEURO MORRIS TEMPLE [phone] pending neuro clearance

## 2024-07-11 NOTE — H&P PST ADULT - ASSESSMENT
78 year old male who presents to [facility] for upcoming procedure on 7/26/24 for WIDE LOCAL EXCISION OF LEFT LOWER EXTREMITY OF CUTANEOUS NEOPLASM.  Per review of past charts, patient first noted the abnormal lesion about 3-4 months ago. In first appearance, the lesion appeared like an open sore. Since patient has first identified the lesion, it has changed in appearance, becoming more solid and scaly with time. Occasionally the lesion produces a mal odor, especially when a bandage remains for more than 2 days.  Lesion is in the left pretibial calf area.   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 Patient is currently on Eliquis for a stroke in March. On 06/14/2024 patient underwent additional biopsies of the right upper lip and left forearm.   Patient is to possibly undergo a revision of his left hip eventually. 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 Recio surgery & Dr. Richardson via radiation therapy. Eventually the cancer metastasized to the left hip and a yaron was placed for stabilization. Has yaron replacement scheduled with Dr. Blackwell. Currently taking Keytruda, under the direction of Dr. Orozco. 78 year old male who presents to [facility] for upcoming procedure on 24 for WIDE LOCAL EXCISION OF LEFT LOWER EXTREMITY OF CUTANEOUS NEOPLASM.  Per review of past charts, patient first noted the abnormal lesion about 3-4 months ago. In first appearance, the lesion appeared like an open sore. Since patient has first identified the lesion, it has changed in appearance, becoming more solid and scaly with time. Occasionally the lesion produces a mal odor, especially when a bandage remains for more than 2 days.  Lesion is in the left pretibial calf area.   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 Patient is currently on Eliquis for a stroke in March. On 2024 patient underwent additional biopsies of the right upper lip and left forearm.   Patient is to possibly undergo a revision of his left hip eventually. 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 Recio surgery & Dr. Richardson via radiation therapy. Eventually the cancer metastasized to the left hip and a yaron was placed for stabilization. Has yaron replacement scheduled with Dr. Blackwell. Currently taking Keytruda, under the direction of Dr. Orozco.    OPIOID RISK TOOL    SHANELLE EACH BOX THAT APPLIES AND ADD TOTALS AT THE END    FAMILY HISTORY OF SUBSTANCE ABUSE                 FEMALE         MALE                                               Alcohol                             [  ]1 pt          [  ]3pts                                               Illegal Durgs                     [  ]2 pts        [  ]3pts                                               Rx Drugs                           [  ]4 pts        [  ]4 pts    PERSONAL HISTORY OF SUBSTANCE ABUSE                                                                                         Alcohol                             [  ]3 pts       [  ]3 pts                                               Illegal Durgs                     [  ]4 pts        [  ]4 pts                                               Rx Drugs                           [  ]5 pts        [  ]5 pts    AGE BETWEEN 16-45 YEARS                                      [  ]1 pt         [  ]1 pt    HISTORY OF PREADOLESCENT  SEXUAL ABUSE                                                             [  ]3 pts        [  ]0pts    PSYCHOLOGICAL DISEASE                     ADD, OCD, Bipolar, Schizophrenia        [  ]2 pts         [  ]2 pts                      Depression                                               [  ]1 pt           [  ]1 pt          Total:  0  A score of 3 or lower indicated LOW risk for future opiod abuse  A score of 4 to 7 indicated moderate risk for future opiod abuse  A score of 8 or higher indicates a high risk for opiod abuse         CAPRINI SCORE    AGE RELATED RISK FACTORS                                                             [ ] Age 41-60 years                                            (1 Point)  [ ] Age: 61-74 years                                           (2 Points)                 [X ] Age= 75 years                                                (3 Points)             DISEASE RELATED RISK FACTORS                                                       [X ] Edema in the lower extremities                 (1 Point)                     [ ] Varicose veins                                               (1 Point)                                 [ X] BMI > 25 Kg/m2                                            (1 Point)                                  [ ] Serious infection (ie PNA)                            (1 Point)                     [ ] Lung disease ( COPD, Emphysema)            (1 Point)                                                                          [ ] Acute myocardial infarction                         (1 Point)                  [ ] Congestive heart failure (in the previous month)  (1 Point)         [ ] Inflammatory bowel disease                            (1 Point)                  [ ] Central venous access, PICC or Port               (2 points)       (within the last month)                                                                [ ] Stroke (in the previous month)                        (5 Points)    [X ] Previous or present malignancy                       (2 points)                                                                                                                                                         HEMATOLOGY RELATED FACTORS                                                         [ ] Prior episodes of VTE                                     (3 Points)                     [ ] Positive family history for VTE                      (3 Points)                  [ ] Prothrombin 52060 A                                     (3 Points)                     [ ] Factor V Leiden                                                (3 Points)                        [ ] Lupus anticoagulants                                      (3 Points)                                                           [ ] Anticardiolipin antibodies                              (3 Points)                                                       [ ] High homocysteine in the blood                   (3 Points)                                             [ X] Other congenital or acquired thrombophilia      (3 Points)                                                [ ] Heparin induced thrombocytopenia                  (3 Points)                                        MOBILITY RELATED FACTORS  [ ] Bed rest                                                         (1 Point)  [ ] Plaster cast                                                    (2 points)  [ ] Bed bound for more than 72 hours           (2 Points)    GENDER SPECIFIC FACTORS  [ ] Pregnancy or had a baby within the last month   (1 Point)  [ ] Post-partum < 6 weeks                                   (1 Point)  [ ] Hormonal therapy  or oral contraception   (1 Point)  [ ] History of pregnancy complications              (1 point)  [ ] Unexplained or recurrent              (1 Point)    OTHER RISK FACTORS                                           (1 Point)  [ ] BMI >40, smoking, diabetes requiring insulin, chemotherapy  blood transfusions and length of surgery over 2 hours    SURGERY RELATED RISK FACTORS  [ ]  Section within the last month     (1 Point)  [ ] Minor surgery                                                  (1 Point)  [ ] Arthroscopic surgery                                       (2 Points)  [X ] Planned major surgery lasting more            (2 Points)      than 45 minutes     [ ] Elective hip or knee joint replacement       (5 points)       surgery                                                TRAUMA RELATED RISK FACTORS  [ ] Fracture of the hip, pelvis, or leg                       (5 Points)  [ ] Spinal cord injury resulting in paralysis             (5 points)       (in the previous month)    [ ] Paralysis  (less than 1 month)                             (5 Points)  [ ] Multiple Trauma within 1 month                        (5 Points)    Total Score [ 12    ]    Caprini Score 0-2: Low Risk, NO VTE prophylaxis required for most patients, encourage ambulation  Caprini Score 3-6: Moderate Risk , pharmacologic VTE prophylaxis is indicated for most patients (in the absence of contraindications)  Caprini Score Greater than or =7: High risk, pharmocologic VTE prophylaxis indicated for most patients (in the absence of contraindications)

## 2024-07-11 NOTE — H&P PST ADULT - PROBLEM SELECTOR PLAN 2
SURGICAL TEAM TO ADDRESS      Total Score [ 12    ]    Caprini Score Greater than or =7: High risk, pharmocologic VTE prophylaxis indicated for most patients (in the absence of contraindications)

## 2024-07-15 ENCOUNTER — RESULT REVIEW (OUTPATIENT)
Age: 79
End: 2024-07-15

## 2024-07-15 ENCOUNTER — APPOINTMENT (OUTPATIENT)
Age: 79
End: 2024-07-15

## 2024-07-23 ENCOUNTER — NON-APPOINTMENT (OUTPATIENT)
Age: 79
End: 2024-07-23

## 2024-07-23 ENCOUNTER — APPOINTMENT (OUTPATIENT)
Dept: NEUROLOGY | Facility: CLINIC | Age: 79
End: 2024-07-23
Payer: MEDICARE

## 2024-07-23 VITALS
BODY MASS INDEX: 27.44 KG/M2 | DIASTOLIC BLOOD PRESSURE: 92 MMHG | HEIGHT: 71 IN | HEART RATE: 62 BPM | OXYGEN SATURATION: 93 % | SYSTOLIC BLOOD PRESSURE: 165 MMHG | WEIGHT: 196 LBS

## 2024-07-23 PROCEDURE — 99214 OFFICE O/P EST MOD 30 MIN: CPT

## 2024-07-23 NOTE — ASSESSMENT
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 He was scheduled undergo a proximal femur replacement however 2 days prior to surgery he developed the acute stroke. MRI Reviewed and shows a L frontal stroke.  Vascular studies were normal.  Stroke etiology concerning for hypercoagulable state in the setting of malignancy   L frontal ischemic stroke -ANTITHROMBOTIC THERAPY: on Apixaban 5mg bid. ASA 81mg daily given hx of CAD s/p stent placement. - SBP goal normotension  -titrate statin to LDL goal less than 70 - Cont. PT/OT - Patient to f/u with oncologist  Left Hip metastatic lesion--pending surgery.   --Patient first will have resection of the skin lesion, then planning for hip surgery.  There is no absolute contraindication to surgical intervention from neurological standpoint for both surgeries. Defer to hematology regarding anticoagulation plan.

## 2024-07-23 NOTE — DATA REVIEWED
[de-identified] : MR Head w/wo IV Cont (03.27.24 @ 21:29)  IMPRESSION: Acute infarct in the left posterior frontal lobe.  CT Brain Stroke Protocol (03.27.24 @ 11:35)   IMPRESSION: Age-indeterminate, possibly acute, left frontal lobe and left  parietal lobe infarctions. No acute intracranial hemorrhage  CTA BRAIN: Mild bilateral cavernous carotid artery calcifications. Mild right  vertebral artery calcifications. The right vertebral artery is dominant. Left vertebral artery is  hypoplastic. The [place] of [place] and vertebrobasilar system are unremarkable without  evidence of stenosis, occlusion or saccular aneurysm dilation. No  evidence for arterial venous malformation.   CTA NECK: Mild bilateral carotid bulb/proximal internal carotid artery  calcifications. A left-sided aortic arch is demonstrated. There is normal relationship to  the great vessels. The common carotid arteries, internal carotid arteries  and vertebral arteries show no evidence of significant stenosis,  occlusion or saccular aneurysm dilation.  CT PERFUSION: Patient has only had less than 2 hours of symptoms may influence the CT  perfusion.  No core infarct or evidence of delayed mean transit time is identified.

## 2024-07-23 NOTE — HISTORY OF PRESENT ILLNESS
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    Neuro Hx:  Presented to [facility] ED on 3/27/24 with acute onset aphasia, dysarthria and right facial asymmetry with LKW time 11AM. Stroke code was activated for the patient, CTH, CTA H/N and CTP obtained with a finding of age indeterminate left frontal lobe and left parietal lobe infarcts. No LVO or perfusion deficit. TNK not administered given appearance of stroke on CT. Patient admitted to the Neurology service for further work up. MRI Brain showed Acute infarct in the left posterior frontal lobe. , A1c 5.6.  TTE was neg.  Stroke thought to be due to hypercoagulable state in the setting of malignancy and he was started on Eliquis.  ASA continued for CAD hx.    4/8/2024:  Doing well.  Feels his speech is improved.   Will be getting speech therapy soon.  Feels weak in both legs.  Getting PT for this.  His surgery has been delayed 6 months.  No issues with the Eliquis and ASA.    Today 7/23/24: Patient will be undergoing resection of a skin lesion. Then there will be a plan for hip surgery.  Still c/o balance issues and weak legs.  No speech issues.  He is finished with speech therapy.  No new issues.

## 2024-07-23 NOTE — PHYSICAL EXAM
[FreeTextEntry1] :   General: Cooperative, NAD HEENT: NC/AT, no carotid bruits Lungs: CTAB Chest: RRR, no murmurs Extremities: nontender, no erythema Neurological Examination: NIHSS: 1 MS: AOx3. Appropriately interactive, normal affect. Speech with mild decreased fluency. Able to name and repeat.   CN: PERLL, EOMI, V1-3 sensation intact, face symmetric, hearing intact, palate elevates symmetrically, tongue midline, SCM equal bilaterally Motor: normal bulk and tone, no tremor, rigidity or bradykinesia.  5/5 all over except LLE is 4+/5 Sens: Intact to light touch. Reflexes: 2/4 all over, downgoing toes b/l Coord:  No dysmetria, ESTHER intact Gait: Antalgic

## 2024-07-24 ENCOUNTER — APPOINTMENT (OUTPATIENT)
Dept: RHEUMATOLOGY | Facility: CLINIC | Age: 79
End: 2024-07-24
Payer: MEDICARE

## 2024-07-24 ENCOUNTER — LABORATORY RESULT (OUTPATIENT)
Age: 79
End: 2024-07-24

## 2024-07-24 VITALS
HEIGHT: 71 IN | BODY MASS INDEX: 27.44 KG/M2 | SYSTOLIC BLOOD PRESSURE: 160 MMHG | DIASTOLIC BLOOD PRESSURE: 88 MMHG | TEMPERATURE: 98.3 F | WEIGHT: 196 LBS | RESPIRATION RATE: 17 BRPM

## 2024-07-24 DIAGNOSIS — M35.00 SICCA SYNDROME, UNSPECIFIED: ICD-10-CM

## 2024-07-24 DIAGNOSIS — M79.89 OTHER SPECIFIED SOFT TISSUE DISORDERS: ICD-10-CM

## 2024-07-24 DIAGNOSIS — G56.03 CARPAL TUNNEL SYNDROM,BILATERAL UPPER LIMBS: ICD-10-CM

## 2024-07-24 DIAGNOSIS — G89.29 LOW BACK PAIN, UNSPECIFIED: ICD-10-CM

## 2024-07-24 DIAGNOSIS — M15.9 POLYOSTEOARTHRITIS, UNSPECIFIED: ICD-10-CM

## 2024-07-24 DIAGNOSIS — H04.123 DRY EYE SYNDROME OF BILATERAL LACRIMAL GLANDS: ICD-10-CM

## 2024-07-24 DIAGNOSIS — M79.7 FIBROMYALGIA: ICD-10-CM

## 2024-07-24 DIAGNOSIS — M06.9 RHEUMATOID ARTHRITIS, UNSPECIFIED: ICD-10-CM

## 2024-07-24 DIAGNOSIS — M54.50 LOW BACK PAIN, UNSPECIFIED: ICD-10-CM

## 2024-07-24 PROCEDURE — 99215 OFFICE O/P EST HI 40 MIN: CPT

## 2024-07-24 PROCEDURE — G2211 COMPLEX E/M VISIT ADD ON: CPT

## 2024-07-24 PROCEDURE — 36415 COLL VENOUS BLD VENIPUNCTURE: CPT

## 2024-07-24 PROCEDURE — G2212 PROLONG OUTPT/OFFICE VIS: CPT

## 2024-07-24 PROCEDURE — 81003 URINALYSIS AUTO W/O SCOPE: CPT | Mod: QW

## 2024-07-24 NOTE — REASON FOR VISIT
Subjective:     Patient ID: Julia Preston is a 66 y.o. female.    Chief Complaint: Foot Problem (C/o states she cant walk on feet, rates pain 9/10, pt diabetic, wearing tennis&socks. Last seen PCP Dr. Bolivar Garsia 4/11/23. )      Julia is a 66 y.o. female who presents to the clinic for evaluation and treatment of high risk feet. Julia has a past medical history of CHF (congestive heart failure), Congestive heart failure, Congestive heart failure (2018), Diabetes mellitus, type 2, GERD (gastroesophageal reflux disease), High cholesterol, Hyperlipidemia, Hypertension, Pneumonia (6/2017 or 7/2017 per the patient), and Pneumonia (03/2018). The patient's chief complaint is long, thick toenails. Patient also complains of pain in feet especially a nighttime. Patient states sometimes its so bad she can't put pressure on the feet. Patient rates pain 9/10.  This patient has documented high risk feet requiring routine maintenance secondary to diabetes mellitis and those secondary complications of diabetes, as mentioned.  Patient has no other pedal complaints at this time.     PCP: Mana Flores MD Emily Burke, MD (nephrology)  Date Last Seen by PCP: 4/11/2023 (5/31/2023)    Current shoe gear:  Affected Foot: Tennis shoes     Unaffected Foot: Tennis shoes    Hemoglobin A1C   Date Value Ref Range Status   12/04/2022 5.2 <=6.5 % Final   10/15/2021 7.6 (H) 4.7 - 5.6 % Final   07/01/2020 5.9 <=6.5 % Final       Patient Active Problem List   Diagnosis    GERD (gastroesophageal reflux disease)    Type II diabetes mellitus with peripheral circulatory disorder    Hyperlipidemia    Hypertension    PVD (peripheral vascular disease)    Severe obesity (BMI 35.0-39.9) with comorbidity       Medication List with Changes/Refills   New Medications    CYCLOBENZAPRINE (FLEXERIL) 10 MG TABLET    Take 1 tablet (10 mg total) by mouth nightly as needed.    PREGABALIN (LYRICA) 150 MG CAPSULE    Take 1 capsule (150 mg total)  by mouth 2 (two) times daily.   Current Medications    ACCU-CHEK [place] PLUS TEST STRP STRP        ACCU[place]CL[place] [facility] [place]        ACETAMINOPHEN (TYLENOL) 500 MG TABLET    Take 1-2 tablets by mouth.    ASPIRIN (ECOTRIN) 81 MG EC TABLET    Take 81 mg by mouth once daily.    ATORVASTATIN (LIPITOR) 80 MG TABLET    Take 80 mg by mouth once daily.    AZELASTINE (OPTIVAR) 0.05 % OPHTHALMIC SOLUTION    azelastine 0.05 % eye drops   INSTILL 1 DROP INTO EACH EYE TWICE DAILY    BD ALCOHOL SWABS PADM        BLOOD-GLUCOSE METER MISC    Use to check blood sugar 4 times per day-before meals and before bedtime.    BUMETANIDE (BUMEX) 2 MG TABLET    Take 2 mg by mouth.    CALCIUM CARBONATE (TUMS) 200 MG CALCIUM (500 MG) CHEWABLE TABLET    Take 500 mg by mouth.    CARVEDILOL (COREG) 12.5 MG TABLET        CYANOCOBALAMIN 500 MCG TABLET    Take 500 mcg by mouth.    EPOETIN DOMONIQUE-EPBX 20,000 UNIT/2 ML SOLN    Inject 10,000 Units into the skin.    ERGOCALCIFEROL (ERGOCALCIFEROL) 50,000 UNIT CAP    Take 1 capsule by mouth.    FERROUS SULFATE 325 (65 FE) MG EC TABLET    TAKE 1 TABLET BY MOUTH TWICE DAILY DO NOT CRUSH CHEW OR SPLIT    FLUTICASONE PROPIONATE (FLONASE) 50 MCG/ACTUATION NASAL SPRAY    1 spray by Nasal route.    FOLIC ACID (FOLVITE) 1 MG TABLET    Take 1 tablet by mouth.    FUROSEMIDE (LASIX) 20 MG TABLET        HUMIDIFIERS MISC    Use nightly as needed for cough, cold, or congestion symptoms.    HYDRALAZINE (APRESOLINE) 100 MG TABLET        INSULIN ASPART PROTAMINE-INSULIN ASPART (NOVOLOG 70/30) 100 UNIT/ML (70-30) INPN PEN    Inject 40 Units into the skin every morning.    INSULIN ASPART U-100 (NOVOLOG) 100 UNIT/ML INJECTION    Inject 40 Units into the skin.    LINACLOTIDE (LINZESS) 145 MCG CAP CAPSULE    Take 1 capsule by mouth.    LISINOPRIL 10 MG TABLET    Take 1 tablet by mouth once daily.    METOLAZONE (ZAROXOLYN) 2.5 MG TABLET        NIFEDIPINE (PROCARDIA-XL) 60 MG (OSM) 24 HR TABLET    Take 60 mg by mouth once  "daily.    NITROGLYCERIN (NITROSTAT) 0.4 MG SL TABLET    Place 0.4 mg under the tongue every 5 (five) minutes as needed for Chest pain.    NOVOLIN 70/30 U-100 INSULIN 100 UNIT/ML (70-30) INJECTION        NOVOLOG MIX 70-30 U-100 INSULN 100 UNIT/ML (70-30) SOLN        PANTOPRAZOLE (PROTONIX) 40 MG TABLET    Take 40 mg by mouth.    PAZEO 0.7 % DROP        SODIUM CHLORIDE (OCEAN) 0.65 % NASAL SPRAY    1 spray by Nasal route 2 (two) times daily as needed.    XARELTO 2.5 MG TAB       Discontinued Medications    PREGABALIN (LYRICA) 100 MG CAPSULE    Take 1 capsule (100 mg total) by mouth 2 (two) times daily.       Review of patient's allergies indicates:   Allergen Reactions    Hydrocodone-acetaminophen Itching and Hives    Hydrocodone Hives    Morphine Other (See Comments)     Hallucinations  Hallucinations         Past Surgical History:   Procedure Laterality Date    BREAST BIOPSY      benign, pt states she was about 31yrs old    CARDIAC SURGERY  2013    CATARACT EXTRACTION Right 05/10/2021     SECTION      HYSTERECTOMY      OOPHORECTOMY         Family History   Problem Relation Age of Onset    Hypertension Mother     Diabetes Mother     Hypertension Brother     Diabetes Brother     Seizures Son        Social History     Socioeconomic History    Marital status:    Tobacco Use    Smoking status: Never    Smokeless tobacco: Never   Substance and Sexual Activity    Alcohol use: No    Drug use: No    Sexual activity: Yes     Partners: Male       Vitals:    23 0817   Weight: 103.9 kg (229 lb 0.9 oz)   Height: 5' 6" (1.676 m)   PainSc:   9       Hemoglobin A1C   Date Value Ref Range Status   2022 5.2 <=6.5 % Final   10/15/2021 7.6 (H) 4.7 - 5.6 % Final   2020 5.9 <=6.5 % Final       Review of Systems   Constitutional:  Negative for chills and fever.   Respiratory:  Negative for shortness of breath.    Cardiovascular:  Negative for chest pain, palpitations, orthopnea, claudication and leg " swelling (bilateral feet).   Gastrointestinal:  Negative for diarrhea, nausea and vomiting.   Skin:  Negative for rash.   Neurological:  Positive for tingling and sensory change.   Psychiatric/Behavioral: Negative.               Objective:      PHYSICAL EXAM: Apperance: Alert and orient in no distress,well developed, and with good attention to grooming and body habits  Patient presents ambulating in tennis shoes.   LOWER EXTREMITY EXAM:  VASCULAR: Dorsalis pedis pulses 0/4(monphasic with doppler) bilateral and Posterior Tibial pulses 1/4 bilateral. Capillary fill time <4 seconds bilateral. Mild edema observed right foot. Varicosities present bilateral. Skin temperature of the lower extremities is warm to warm, proximal to distal. Hair growth absent bilateral.   DERMATOLOGICAL: No skin rashes, lesions, or ulcers observed bilateral. Nails 1,2,3,4,5 bilateral elongated, thickened, and discolored with subungual debris. Webspaces 1,2,3,4 clean, dry and without evidence of break in skin integrity bilateral. Minimal hyperkeratotic tissue noted to left plantar hallux. Palpable fixated subcutaneous nodule noted to right medial 2nd toe.   NEUROLOGICAL: Light touch, sharp-dull, proprioception all present and equal bilaterally.  Vibratory sensation diminished at bilateral hallux. Protective sensation absent at 4/10 sites as tested with a [place]-Brandy 5.07 monofilament.   MUSCULOSKELETAL: Muscle strength is 5/5 for foot inverters, everters, plantarflexors, and dorsiflexors. Muscle tone is normal.          Assessment:       ICD-10-CM ICD-9-CM   1. Type II diabetes mellitus with neurological manifestations  E11.49 250.60   2. Lumbar radiculopathy  M54.16 724.4   3. PVD (peripheral vascular disease)  I73.9 443.9   4. Dermatophytosis of nail  B35.1 110.1   5. Encounter for comprehensive diabetic foot examination, type 2 diabetes mellitus  E11.9 250.00         Plan:   Type II diabetes mellitus with neurological manifestations  -      pregabalin (LYRICA) 150 MG capsule; Take 1 capsule (150 mg total) by mouth 2 (two) times daily.  Dispense: 60 capsule; Refill: 1    Lumbar radiculopathy  -     cyclobenzaprine (FLEXERIL) 10 MG tablet; Take 1 tablet (10 mg total) by mouth nightly as needed.  Dispense: 30 tablet; Refill: 1  -     Ambulatory referral/consult to Pain Clinic; Future; Expected date: 09/07/2023    PVD (peripheral vascular disease)    Dermatophytosis of nail    Encounter for comprehensive diabetic foot examination, type 2 diabetes mellitus      I counseled the patient on her conditions, regarding findings of my examination, my impressions, and usual treatment plan.   This visit spent on counseling and coordination of care.  Appointment spent on education about the diabetic foot, neuropathy, and prevention of limb loss.  Shoe inspection. Diabetic Foot Education. Patient reminded of the importance of good nutrition and blood sugar control to help prevent podiatric complications of diabetes. Patient instructed on proper foot hygeine. We discussed wearing proper shoe gear, daily foot inspections, never walking without protective shoe gear, never putting sharp instruments to feet.    With patient's permission, nails 1-5 bilateral were debrided/trimmed in length and thickness aggressively to their soft tissue attachment mechanically and with electric , removing all offending nail and debris. Patient relates relief following the procedure.  Prescription written for Lyrica 150mg to be taken once nightly. Informed patient of possible side effects including but not limited to disorientation and drowsiness. Patient instructed to discontinue use if there are any adverse effects. Patient states she understands.   Prescribed Flexerile 10mg to be taken as needed at night.   Referral completed for IPM.  Patient  will continue to monitor the areas daily, inspect feet, wear protective shoe gear when ambulatory, moisturizer to maintain skin  integrity. Patient reminded of the importance of good nutrition and blood sugar control to help prevent podiatric complications of diabetes.  Patient to return 3 months or sooner if needed.          Viet Muir DPM  [facility]              [Follow-Up: _____] : a [unfilled] follow-up visit [Spouse] : spouse [Other: _____] : [unfilled]

## 2024-07-26 ENCOUNTER — APPOINTMENT (OUTPATIENT)
Dept: SURGICAL ONCOLOGY | Facility: HOSPITAL | Age: 79
End: 2024-07-26

## 2024-07-26 ENCOUNTER — TRANSCRIPTION ENCOUNTER (OUTPATIENT)
Age: 79
End: 2024-07-26

## 2024-07-26 RX ORDER — MORPHINE SULFATE 100 MG/1
1 TABLET, EXTENDED RELEASE ORAL
Refills: 0 | DISCHARGE

## 2024-07-26 RX ORDER — PANTOPRAZOLE SODIUM 40 MG/10ML
1 INJECTION, POWDER, FOR SOLUTION INTRAVENOUS
Refills: 0 | DISCHARGE

## 2024-07-26 RX ORDER — SULFASALAZINE 500 MG/1
4 TABLET, DELAYED RELEASE ORAL
Refills: 0 | DISCHARGE

## 2024-07-26 RX ORDER — PREDNISOLONE SODIUM PHOSPHATE
1 POWDER (GRAM) MISCELLANEOUS
Refills: 0 | DISCHARGE

## 2024-07-26 RX ORDER — FOLIC ACID
1 POWDER (GRAM) MISCELLANEOUS
Refills: 0 | DISCHARGE

## 2024-07-26 RX ORDER — PILOCARPINE HYDROCHLORIDE 5 MG/1
1 TABLET, FILM COATED ORAL
Refills: 0 | DISCHARGE

## 2024-07-26 RX ORDER — SULFASALAZINE 500 MG/1
2 TABLET, DELAYED RELEASE ORAL
Refills: 0 | DISCHARGE

## 2024-07-28 LAB
ALBUMIN SERPL ELPH-MCNC: 4.1 G/DL
ALP BLD-CCNC: 73 U/L
ALT SERPL-CCNC: 11 U/L
ANION GAP SERPL CALC-SCNC: 13 MMOL/L
AST SERPL-CCNC: 20 U/L
BASOPHILS # BLD AUTO: 0.02 K/UL
BASOPHILS NFR BLD AUTO: 0.2 %
BILIRUB SERPL-MCNC: 0.4 MG/DL
BILIRUB UR QL STRIP: NORMAL
BUN SERPL-MCNC: 14 MG/DL
CALCIUM SERPL-MCNC: 9.2 MG/DL
CCP AB SER IA-ACNC: <8 UNITS
CHLORIDE SERPL-SCNC: 102 MMOL/L
CK SERPL-CCNC: 78 U/L
CLARITY UR: CLEAR
CO2 SERPL-SCNC: 26 MMOL/L
COLLECTION METHOD: NORMAL
CREAT SERPL-MCNC: 1.01 MG/DL
CRP SERPL-MCNC: 7 MG/L
EGFR: 77 ML/MIN/1.73M2
ENA SS-A AB SER IA-ACNC: <0.2 AL
ENA SS-B AB SER IA-ACNC: <0.2 AL
EOSINOPHIL # BLD AUTO: 0.01 K/UL
EOSINOPHIL NFR BLD AUTO: 0.1 %
ERYTHROCYTE [SEDIMENTATION RATE] IN BLOOD BY WESTERGREN METHOD: 40 MM/HR
GLUCOSE SERPL-MCNC: 123 MG/DL
GLUCOSE UR-MCNC: NORMAL
HCG UR QL: 0.2 EU/DL
HCT VFR BLD CALC: 39.6 %
HGB BLD-MCNC: 12.3 G/DL
HGB UR QL STRIP.AUTO: NORMAL
IMM GRANULOCYTES NFR BLD AUTO: 0.4 %
KETONES UR-MCNC: NORMAL
LDH SERPL-CCNC: 220 U/L
LEUKOCYTE ESTERASE UR QL STRIP: NORMAL
LYMPHOCYTES # BLD AUTO: 0.43 K/UL
LYMPHOCYTES NFR BLD AUTO: 4.3 %
MAGNESIUM SERPL-MCNC: 1.9 MG/DL
MAN DIFF?: NORMAL
MCHC RBC-ENTMCNC: 30.8 PG
MCHC RBC-ENTMCNC: 31.1 GM/DL
MCV RBC AUTO: 99 FL
MONOCYTES # BLD AUTO: 0.62 K/UL
MONOCYTES NFR BLD AUTO: 6.3 %
NEUTROPHILS # BLD AUTO: 8.79 K/UL
NEUTROPHILS NFR BLD AUTO: 88.7 %
NITRITE UR QL STRIP: NORMAL
PH UR STRIP: 6.5
PHOSPHATE SERPL-MCNC: 3.4 MG/DL
PLATELET # BLD AUTO: 271 K/UL
POTASSIUM SERPL-SCNC: 3.9 MMOL/L
PROT SERPL-MCNC: 6.6 G/DL
PROT UR STRIP-MCNC: NORMAL
RBC # BLD: 4 M/UL
RBC # FLD: 15.7 %
RF+CCP IGG SER-IMP: NEGATIVE
RHEUMATOID FACT SER QL: <10 IU/ML
SODIUM SERPL-SCNC: 141 MMOL/L
SP GR UR STRIP: 1.01
WBC # FLD AUTO: 9.91 K/UL

## 2024-07-28 RX ORDER — PEMBROLIZUMAB 25 MG/ML
INJECTION, SOLUTION INTRAVENOUS
Refills: 0 | Status: ACTIVE | COMMUNITY

## 2024-07-28 RX ORDER — GABAPENTIN 300 MG/1
300 CAPSULE ORAL
Qty: 90 | Refills: 1 | Status: ACTIVE | COMMUNITY
Start: 2024-07-28 | End: 1900-01-01

## 2024-07-28 RX ORDER — PILOCARPINE HYDROCHLORIDE 7.5 MG/1
7.5 TABLET, FILM COATED ORAL
Qty: 270 | Refills: 0 | Status: ACTIVE | COMMUNITY
Start: 2024-07-28 | End: 1900-01-01

## 2024-07-28 NOTE — HISTORY OF PRESENT ILLNESS
[FreeTextEntry1] : JOSIANE SHAW is a 78 year old man who presents for follow up office visit for further evaluation of joint symptoms and rheumatic diseases including Rheumatoid Arthritis, osteoarthritis, fibromyalgia and Sjogren's Syndrome. Accompanied by his wife Owen   Persistent pain left low back without radiation. Pain proximal left thigh. Denies other recent joint pain. Morning stiffness last "all day". Some dry eyes and dry mouth, not using biotene mouthwash. Denies recent sleep disturbance and fatigue. Pilocarpine 5 mg t.i.d. and oral hydration with some relief. Patient denies rash or side effects with current medications. Patient is content with current medication regimen. The patient continues calcium and vitamin D supplements. Patient denies rash or side effects with current medications. Patient is content with current medication regimen.   PMH: Dermatology--- Dr. Wilkerson no dx for pervious rash. 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

## 2024-07-28 NOTE — CONSULT LETTER
[Dear  ___] : Dear  [unfilled], [Consult Letter:] : I had the pleasure of evaluating your patient, [unfilled]. [Please see my note below.] : Please see my note below. [Consult Closing:] : Thank you very much for allowing me to participate in the care of this patient.  If you have any questions, please do not hesitate to contact me. [Sincerely,] : Sincerely, [DrTristen  ___] : Dr. WHITMORE [DrTristen ___] : Dr. WHITMORE [___] : [unfilled] [FreeTextEntry3] : Raheem\par  Myron I. Kleiner, M.D., FACR\par  Chief, Division of Rheumatology\par  Department of Medicine\par  [facility]

## 2024-07-28 NOTE — CONSULT LETTER
[Dear  ___] : Dear  [unfilled], [Consult Letter:] : I had the pleasure of evaluating your patient, [unfilled]. [Please see my note below.] : Please see my note below. [Consult Closing:] : Thank you very much for allowing me to participate in the care of this patient.  If you have any questions, please do not hesitate to contact me. [Sincerely,] : Sincerely, [DrTristen  ___] : Dr. WHITMORE [DrTristen ___] : Dr. WHITMORE [___] : [unfilled] [FreeTextEntry3] : Raheem\par  Myron I. Kleiner, M.D., FACR\par  Chief, Division of Rheumatology\par  Department of Medicine\par  [facility]

## 2024-07-28 NOTE — PHYSICAL EXAM
[General Appearance - Alert] : alert [General Appearance - In No Acute Distress] : in no acute distress [General Appearance - Well Nourished] : well nourished [General Appearance - Well Developed] : well developed [General Appearance - Well-Appearing] : healthy appearing [Sclera] : the sclera and conjunctiva were normal [PERRL With Normal Accommodation] : pupils were equal in size, round, and reactive to light [Extraocular Movements] : extraocular movements were intact [Outer Ear] : the ears and nose were normal in appearance [Neck Appearance] : the appearance of the neck was normal [Neck Cervical Mass (___cm)] : no neck mass was observed [Jugular Venous Distention Increased] : there was no jugular-venous distention [Thyroid Diffuse Enlargement] : the thyroid was not enlarged [Lungs Percussion] : the lungs were normal to percussion [Heart Rate And Rhythm] : heart rate was normal and rhythm regular [Edema] : there was no peripheral edema [Abdomen Soft] : soft [Abdomen Tenderness] : non-tender [] : no hepato-splenomegaly [Abdomen Mass (___ Cm)] : no abdominal mass palpated [Cervical Lymph Nodes Enlarged Posterior Bilaterally] : posterior cervical [Cervical Lymph Nodes Enlarged Anterior Bilaterally] : anterior cervical [Axillary Lymph Nodes Enlarged Bilaterally] : axillary [Supraclavicular Lymph Nodes Enlarged Bilaterally] : supraclavicular [No CVA Tenderness] : no ~M costovertebral angle tenderness [No Spinal Tenderness] : no spinal tenderness [Skin Color & Pigmentation] : normal skin color and pigmentation [Motor Exam] : the motor exam was normal [Sensation] : the sensory exam was normal to light touch and pinprick [No Focal Deficits] : no focal deficits [Oriented To Time, Place, And Person] : oriented to person, place, and time [Impaired Insight] : insight and judgment were intact [Affect] : the affect was normal [Mood] : the mood was normal [Oropharynx] : the oropharynx was normal [Cranial Nerves] : cranial nerves 2-12 were intact [FreeTextEntry1] : Strength-5/5; some speech difficulty

## 2024-07-28 NOTE — PHYSICAL EXAM
[General Appearance - In No Acute Distress] : in no acute distress [General Appearance - Alert] : alert [General Appearance - Well Nourished] : well nourished [General Appearance - Well Developed] : well developed [General Appearance - Well-Appearing] : healthy appearing [Sclera] : the sclera and conjunctiva were normal [PERRL With Normal Accommodation] : pupils were equal in size, round, and reactive to light [Extraocular Movements] : extraocular movements were intact [Outer Ear] : the ears and nose were normal in appearance [Neck Appearance] : the appearance of the neck was normal [Neck Cervical Mass (___cm)] : no neck mass was observed [Jugular Venous Distention Increased] : there was no jugular-venous distention [Thyroid Diffuse Enlargement] : the thyroid was not enlarged [Lungs Percussion] : the lungs were normal to percussion [Heart Rate And Rhythm] : heart rate was normal and rhythm regular [Edema] : there was no peripheral edema [Abdomen Soft] : soft [Abdomen Tenderness] : non-tender [] : no hepato-splenomegaly [Abdomen Mass (___ Cm)] : no abdominal mass palpated [Cervical Lymph Nodes Enlarged Posterior Bilaterally] : posterior cervical [Cervical Lymph Nodes Enlarged Anterior Bilaterally] : anterior cervical [Supraclavicular Lymph Nodes Enlarged Bilaterally] : supraclavicular [Axillary Lymph Nodes Enlarged Bilaterally] : axillary [No CVA Tenderness] : no ~M costovertebral angle tenderness [No Spinal Tenderness] : no spinal tenderness [Skin Color & Pigmentation] : normal skin color and pigmentation [Sensation] : the sensory exam was normal to light touch and pinprick [Motor Exam] : the motor exam was normal [No Focal Deficits] : no focal deficits [Oriented To Time, Place, And Person] : oriented to person, place, and time [Impaired Insight] : insight and judgment were intact [Affect] : the affect was normal [Mood] : the mood was normal [Oropharynx] : the oropharynx was normal [Cranial Nerves] : cranial nerves 2-12 were intact [FreeTextEntry1] : Strength-5/5; some speech difficulty

## 2024-07-28 NOTE — ASSESSMENT
[FreeTextEntry1] : Impression: JOSIANE SHAW is a 78 year old man who presents for follow up office visit for further evaluation of joint symptoms and rheumatic diseases including Rheumatoid Arthritis, osteoarthritis, fibromyalgia and Sjogren's Syndrome, accompanied by his wife Owen  Persistent pain left low back without radiation Secondary to osteoarthritis. Pain proximal left thigh. Denies other recent joint pain otherwise his osteoarthritis is under good control. Morning stiffness last "all day". His rheumatoid arthritis remains quiescent with no evidence of active synovitis on exam today. From previous physical exam mild synovial proliferation bilateral PIPs - resolved. On exam pt has mild diffuse swelling bilateral fingers -- I will continue to monitor him for scleroderma and MCTD. Denies recent sleep disturbance and fatigue with his fibromyalgia quiescent. Some dry eyes and dry mouth - on exam pt has dry eyes and tongue slightly moist Secondary to Sjogren Syndrome. Not using biotene mouthwash. Pilocarpine 5 mg t.i.d. and oral hydration with some relief. Recent lab tests results revealed anemia hemoglobin 11.8, potassium 3.4, TSH 10.4 (0.27-4.2) latter ordered by oncology-- with extensive discussion. Recent xray results revealed osteoarthritis cervical spine, chronic arthritic changes and calcification bilateral shoulders, osteoarthritis LS spine, bilateral calcaneus spurs, -- with extensive discussion. Recent MRI of LS spine revealed osteoarthritis, herniated disks L1-L2 and L4-5, postoperative changes -- with extensive discussion. Bone densitometry was normal. -- with extensive discussion. The patient continues calcium and vitamin D supplements. Patient denies rash or side effects with current medications. Patient is content with current medication regimen.   Plan: I reviewed  chart and previous records  I reviewed previous lab results with patient and his wife with extensive discussion  X-rays results reviewed with the patient and his wife with extensive discussion I reviewed recent Bone densitometry results with patient and his wife including my analysis of raw data with extensive discussion Laboratory tests ordered - see list below - with coordination of care   Diagnosis and prognosis discussed Continue current medications (other than those changed below) In view of patient's recent CVA, I am relucent to prescribe NSAIDs--- extensive discussion Decrease Prednisone to 7.5 mg q.d. for 2 weeks then decrease to 5 mg q.d. thereafter (Possible side effects explained) Gabapentin 300 mg q.d suppertime. ; if no better/side effects 7 days, increase 300 mg b.i.d. .(Possible side effects explained) Increase Pilocarpine 7.5 mg t.i.d.; if no better/no side effects 7 days, increase q.i.d.(Possible side effects explained) Calcium 500 mg 3 times daily or 600 mg twice daily at end of meals (Possible side effects explained) Bilateral wrist splints h.s -- emphasized -- with extensive discussion--he already has them at home Artificial tears one drop each eye q.i.d. and p.r.n.(Possible side effects explained) Biotene mouthwash/spray q.i.d. and p.r.n.(Possible side effects explained)  Oral Hydration Sun protection and sunscreen with an SPF of 50 or higher use emphasized--- extensive discussion Follow-up with PCP regarding hypothyroidism (already on levothyroxine)--extensive discussion I request consult report from Dermatology Dr. Wilkerson -- emphasized -- with coordination of care Return visit 3 month All questions and concerns were addressed Total time for this office visit, including face-to-face time and non-face-to-face time, 87 minutes--- including review of the chart and previous records, detailed review of his medical history, review of previous lab results with extensive discussion with the patient and his wife, ordering lab tests with coordination of care, review of recent imaging reports/x-ray results with extensive discussion with the patient and his wife, review of the recent bone densitometry results including my analysis of the raw data with extensive discussion with the patient and his wife, detailed medication history, review of medications going forward with their possible side effects, decreasing and tapering of his prednisone with extensive discussion as noted above, review of the modalities for treatment of his dryness with extensive discussion with the patient and his wife, reviewed the impact of the patient's rheumatic disease on their other medical problems, reviewed the impact of the patient's other medical problems on their rheumatic disease

## 2024-07-28 NOTE — ADDENDUM
[FreeTextEntry1] :  I, Henry Baig, acted solely as a scribe for Dr. Myron I. Kleiner, MD. on 07/24/2024. I personally performed the services described in the documentation, reviewed the documentation recorded by the scribe in my presence, and it accurately and completely records my words and actions.

## 2024-07-28 NOTE — HISTORY OF PRESENT ILLNESS
[FreeTextEntry1] : JOISANE SHAW is a 78 year old man who presents for follow up office visit for further evaluation of joint symptoms and rheumatic diseases including Rheumatoid Arthritis, osteoarthritis, fibromyalgia and Sjogren's Syndrome. Accompanied by his wife Owen   Persistent pain left low back without radiation. Pain proximal left thigh. Denies other recent joint pain. Morning stiffness last "all day". Some dry eyes and dry mouth, not using biotene mouthwash. Denies recent sleep disturbance and fatigue. Pilocarpine 5 mg t.i.d. and oral hydration with some relief. Patient denies rash or side effects with current medications. Patient is content with current medication regimen. The patient continues calcium and vitamin D supplements. Patient denies rash or side effects with current medications. Patient is content with current medication regimen.   PMH: Dermatology--- Dr. Wilkerson no dx for pervious rash. 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

## 2024-07-28 NOTE — REVIEW OF SYSTEMS
[Dry Eyes] : dryness of the eyes [Joint Pain] : joint pain [As Noted in HPI] : as noted in HPI [Negative] : Constitutional [Feeling Tired] : not feeling tired

## 2024-07-28 NOTE — CONSULT LETTER
[Dear  ___] : Dear  [unfilled], [Consult Letter:] : I had the pleasure of evaluating your patient, [unfilled]. [Please see my note below.] : Please see my note below. [Consult Closing:] : Thank you very much for allowing me to participate in the care of this patient.  If you have any questions, please do not hesitate to contact me. [Sincerely,] : Sincerely, [DrTristen  ___] : Dr. WHITMORE [DrTristen ___] : Dr. WHITMORE [___] : [unfilled] [FreeTextEntry3] : Raheem\par  Myron I. Kleiner, M.D., FACR\par  Chief, Division of Rheumatology\par  Department of Medicine\par  [facility]

## 2024-08-05 ENCOUNTER — APPOINTMENT (OUTPATIENT)
Dept: HEMATOLOGY ONCOLOGY | Facility: CLINIC | Age: 79
End: 2024-08-05

## 2024-08-05 ENCOUNTER — APPOINTMENT (OUTPATIENT)
Age: 79
End: 2024-08-05

## 2024-08-05 PROCEDURE — 99215 OFFICE O/P EST HI 40 MIN: CPT

## 2024-08-05 PROCEDURE — 99205 OFFICE O/P NEW HI 60 MIN: CPT

## 2024-08-05 NOTE — ASSESSMENT
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 Foundation: TMB 99 Muts/Mb, MS- stable  Patient presented to ENT, Dr. Alex Boston, on 1/18/22 c/o right neck swelling for 5 weeks. On exam, swelling in the right parotid gland approx. 3 x 3 cm firm, nontender was noted, no enlarged LN. Subsequently MRI of neck on 1/24/22 revealed irregularly peripherally enhancing mass in the superficial lobe of the right parotid gland measuring 2.7 x 2.3 cm in AP.  Right parotid FNA biopsy performed on 2/1/22 at  consistent with carcinoma with squamous differentiation,Cytology; clusters and single scattered atypical squamous cells with prominent nucleoli, irregular nuclear membranes, irregular chromatin patterns, anisonucleosis and keratinization. Primary versus metastatic carcinoma.  Patient s/p Parotidectomy with facial nerve dissection and right neck dissection on 3/3/22 by Dr. Misbah Recio. Pathology showed 22 lymph nodes negative for metastatic carcinoma. Completed radiation on 6/1/22 with Dr. Rowe  Plan: - Given the TMB is 99 treated with single agent pembro - Pembrolizumab 200mg q 3 weeks. received 1 dose of Keytruda on 2/2/23, went to rehab however was too weak for rehab and was discharged 2/10/23 (enrolled on 12/14/23), was in a lot of pain at home and enrolled in hospice on 2/17/23, then slowly started increasing appetite, started ambulating more with walker, continued left hip pain, diffused body pain started to subside. Discharged from hospice on 6/12/23.  -Repeat PET SCAN On 6/9/23 with improvement in Disease  - Restarted Keytruda 200 q 3 weeks on 6/19/23 with denosumab -Patient still with right sided jaw swelling- improving, no pain. Evaluated by Dr. Boston, no indication for further work up- continue to observe. US on 7/19/23 reported No definable abnormality can be seen involving the area of swelling involving the right cheek. - TSH on 6/19/23 was 11.90, started patient on Levothyroxine 25mch on 6/20,  increased to 50 mcg on 8/4/23. Patient is compliant. - Patient's scans have improved and he is responding well to treatment - The patient should f/u with his dermatologist about his skin lesions. No Steroids while on immunotherapy. Reports that he has had skin lesions his entire life  - Patient's cortisol level is low. No associated complaints. Cortisol levels declined in June prior to re-initiation of immunotherapy.  -Recent PET Scan done on 1/10/24: 1. Hypermetabolism associated with unchanged cortical destruction in left lesser trochanter is more extensive and increased in intensity, concerning for recurrent disease. 2. The remainder of the study demonstrates no evidence of FDG-avid disease. No new lesion.  -Given the above scan, patient was recommended to see radiation oncologist s/p palliative re-radiation to left hip, proximal femur 5#  - completed on 1/29/30 with Dr. Richardson.  -Continue Keytruda. Last treatment on 2/27/24. Next treatment due on 3/19/24.  - On Oxycodone  Extampa BID, 9 mg bid  per PAIN MANAGEMENT ( Dr Lowe) with short acting oxycodone for break through pain, Patient reports that he is still in pain Seeing Pain management later today  -Completed RT to the left femur end of January -Admitted to [facility] on 3/27/24 with CVA, infarct in left posterior frontal lobe - Last received Keytruda on 5/31/24, next on 7/15/24 -Planned for possible hip surgery with Dr. Blackwell was scheduled for 3/29, however delayed for ~6 months per Dr. Blackwell due to hospitalization for CVA. Has follow up this week  -Currently on 10 mg prednisone per Dr Kleiner, RHEUM, will reduce once pain improves - 6/5/24 PET/CT: 1.  Quantitatively stable uptake at the LEFT lesser trochanter which appears to encompass a smaller area than on the prior exam. 2.  No evidence of recurrent disease in the head and neck. 3.  Stable appearance of the thyroid. 4.  Otherwise, no evidence of FDG-avid malignancy. - No contraindications for spinal procedure with Dr. Lowe from an oncological standpoint Is due for keytruda today. WIll delay Keytruda due to diarrhea however low index of suspicion that it is IO mediated  Taking Imodium intermittently has not taken for the past 2 days   Skin lesion - Medial Distal Left lower leg lesion + basal cell carcinoma with some neuroendocrine features.  -   s/p  WLE, / closure with Plastics on 7/26/24 Pending path  Pain medication Dr. Lowe [phone] phone, [phone] fax [AdvancecareDate] : 6/12/24

## 2024-08-05 NOTE — HISTORY OF PRESENT ILLNESS
[de-identified] : JOSIANE SHAW is a 77 year male with PMHX of CAD s/p stents HTN, GERD, fibromyalgia, h/o polymyalgia rheumatica, Parotid ca s/p dissection/RT, cigar smoker ( 1-4 cigars/day) presents for initial consultation for metastatic carcinoma \par  \par  Patient presented to ENT, Dr. Alex Boston, on 1/18/22 c/o right neck swelling for 5 weeks. On exam, swelling int he right parotid gland approx. 3 x 3 cm firm, nontender was noted, no enlarged LN.  \par  MRI of neck was ordered and performed on 1/24/22. Scan revealed irregularly peripherally enhancing mass in the superficial lobe of the right parotid gland measuring 2.7 x 2.3 cm in AP . There is some strand-like internal enhancement as well. There is no left parotid mass.\par  \par  \par  Right parotid FNA biopsy performed on 2/1/22 at . Pathology demonstrated cells positive for malignancy ([place] Class IV), Consistent with carcinoma with squamous differentiation,Cytology slide and cell block show clusters and single scattered atypical squamous cells with prominent nucleoli, irregular nuclear membranes, irregular chromatin patterns, anisonucleosis and keratinization. Primary versus metastatic carcinoma.\par  \par  Subsequent PET/CT on 2/13/22 reported a rim of FDG activity surrounding a right intraparotid lesion (SUV 6.3, 3.1 x 2.3 cm). Decreased FDG activity centrally may be secondary to tumor necrosis. There is no other focal abnormal  FDG activity identified in the head and neck. There are no lytic or blastic lesions.\par  \par  \par  Patient referred to Head and Neck surgeon, Dr. Misbah Recio, on 2/16/22. Patient s/p Parotidectomy with facial nerve dissection and right neck dissection on 3/3/22. 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 Carcinoma focally very close to (less than 0.1 mm) inked circumferential margin, 4 intraparotid lymph nodes negative for metastatic carcinoma, 3 Lymph nodes, right level 1B- negative for metastatic carcinoma, Submandibular gland negative for carcinoma\par  Lymph nodes, right levels 2 and 3, neck dissection - 15 lymph nodes negative for metastatic carcinoma\par  \par  \par  Patient referred to Dr. Richardson for post op RT. Patient completed radiation on 6/1/22. \par  \par  Patient presented to Orthopedist on 10/6/22 for ongoing left hip pain for the past 4 years. He has been following Rheumatology, Dr. Kleiner, for pain. Pain has been progressive affect quality of life. \par  MRI of hip was performed  on 10/10/22 revealing  a fat-containing mass centered within the tensor fascia manjula muscle measuring 4 x 3.1 x 7.2 cm and a mass is seen centered within the lesser trochanter measuring 2.9 x 3.4 x 4.4 cm. There is overlying periosteal edema and cortical thinning\par  \par  Subsequent PET/CT on 10/14/22 visualized osseous structures reveal an intramedullary lytic 3 cm lesion along the left femoral intertrochanteric region max SUV 10.6.\par  Right parotid gland demonstrates interval resection of the hypermetabolic mass as well as right neck prominent soft tissue postsurgical low level uptake. No evidence of atypical lymph node activity identified.\par  \par  Biopsy of left proximal femur lesion on 10/27/2022. Pathology reported metastatic carcinoma,the tumor shows squamous differentiation on H T E. Performed immunostains show that the tumor cells are positive for AE1/AE3, CK5/ and p40. 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 Ambulates with cane.  S/p received 1 dose of Keytruda on 2/2/23, went to rehab however was too weak for rehab and was discharged 2/10/23 (enrolled on 12/14/23), was in a lot of pain at home and enrolled in hospice on 2/17/23, then slowly started increasing appetite, started ambulating more with walker, continued left hip pain, diffused body pain started to subside.  Continues on morphine 30 mg BID, and for breakthrough pain takes oxycodone prn - has not required since 3/2023  States is feeling better every day, tries to walk outside everyday  Reports continued swelling and redness on R cheek area of face,  Pt/family planning on discharging from hospice   2/3/23 Ca+ 13.6   6/14/23: Patient here for follow up  Had recent PET scan on 6/9/23  Patient discharged from hospice 6/12/23  Has dentures both upper and lower  Has Dermatologist, Dr. Wilkerson     PET 6/9/23: Utilized the dedicated head and neck series with image numbers from this series. Physiologic FDG activity in visualized brain. -Resolution of hypermetabolic soft tissue abutting the right masseter muscle present on the prior PET/CT. -S/P right parotidectomy with no focal abnormal activity within the surgical bed. -Foci of increased activity localizing to the the right ear showing SUV 3.6 at the top of its helix and SUV 4.7 within its inferior lobule; these are nonspecific activity but should be correlated with direct clinical examination. -Left ear shows focus in its posterior surface  showing SUV 3.7 that is difficult to correlate with on low-dose CT. Addition focus of cutaneous activity could also be seen behind the left ear  showing SUV 3.0. Please evaluate these areas. -Nonspecific activity in the thyroid gland showing SUV 5.4 in the right and 5.6 in the left.  Few muscles with increased activity around the neck and upper torso and pelvic region due to exertional strain. -Interval improvement in the left femoral bone currently smaller foci of intense activity with cortical bone reconstitution such as seen medially localizing to a not fully reconstituted lytic lesion (image 241) with SUV 10.0; same area previously had larger extent of lytic changes and activity with as high as SUV 9.3.  Similarly, soft tissue changes laterally also improved with smaller remaining tissue and less intense activity (image 212). Reassess on follow up for continued improvement or stability. -Interval resolution of FDG avid bone lesions with increased sclerosis in T8, T12, and in the right side of the sacrum. Marked improvement in the lytic lesion previously seen in the right acetabulum with reconstruction of some of its cortical defects currently with nonspecific patchy activity of SUV 2.4 (previously large lytic lesion with soft tissue component and SUV 7.2).   7/17/23: Patient here for follow up  Resumed Keytruda on 6/19/23 Admits Fatigue Patient right sided jaw swelling, no pain. Has an apt with  Dr. Boston this afternoon  Reports mild nausea Reports weight gain  States left hip pain stable , on Morphine ER  30 mg BID. Patient hopes to be weaned off medication  Denies fever/chills, rash, mouth sores, nausea, vomiting, diarrhea,constipation,  abdominal pain, bleeding, easy bruising or visual changes, chest pain, cough, SOB or NOONAN,  neuropathy, LE edema.  8/18/23:  Patient here for follow up  Resumed Keytruda on 6/19/23, next tx scheduled 8/21/23 Admits Fatigue Admits chills/ feeling cold once a week, likely from hypothyroid.   Admits intermittent decreased appetite, weight is stable  Patient still with right sided jaw swelling- improving, no pain. Evaluated by Dr. Boston, no indication for further work up- continue to observe. US on 7/19/23 reported No definable abnormality can be seen involving the area of swelling involving the right cheek. Reports nausea, not taking antinausea med States left hip pain stable , on Morphine ER  30 mg BID. Patient hopes to be weaned off medication  Dr. Kleiner has him on Prednisone 10 mg daily  Denies fever, rash, mouth sores, vomiting, diarrhea,constipation, abdominal pain, bleeding, easy bruising or visual changes, chest pain, cough, SOB or NOONAN,  neuropathy, LE edema.  9/29/23: Patient is here for a f/u with his wife He has been on Keytruda therapy, next scheduled 10/2/23 He endorses GI disturbance from therapy and increased skin rash on his hands He experienced left-sided hip pain after playing golf on Labor Day, with no improvement. He ambulates today with a cane Patient sleeps well and hasn't felt tired He takes Morphine 2x/day  PETCT scans show improved response to immunotherapy  12/29/23:  Patient here for f/u Next due for Keytruda on 1/16/23 On levothyroxine TSH high  T 4 WNL   Due for PET scan WIll order now Has some nausea  in AM Takes Ondansetron for nausea  1/30/24: Patient here for f/u s/p Keytruda on 1/16/24. Next treatment on 2/6/24. Patient reports b/l shoulder pain, constant fatigue, and LE swelling. Patient has 24-hour aids at home. He likes to go on walks on his own without the aids.  Wife expresses her concern for prolonged weight bearing and exercise.  He is currently taking long acting Oxycodone  Extampa BID, 9 mg per PAIN MANAGEMENT ( Dr Lowe) . States diffuse pain is more severe after dose reduction a few weeks ago.  Patient waiting for follow up with Dr. Lora Turner, orthopedic surgery, for hardware complications from previous hip replacement.   Reviewed PET Scan from 1/10/24: Head/Neck: -Postsurgical changes of right parotidectomy, unchanged.  -No abnormal radiotracer activity in the surgical bed. -No enlarged or FDG-avid cervical lymph ode.  BONES/SOFT TISSUES:  -Patient is status post left femur ORIF with transcervical screw and intramedullary yaron, as seen on prior studies. There is cortical destruction involving the left proximal femur and lesser trochanter, similar in appearance on CT, and demonstrating interval increase in extent and intensity of hypermetabolism in the left lesser trochanter (SUV 15.4; image 248; previous SUV 12.9). A photopenic lucent lesion in the anterior aspect of the T8 vertebral body is unchanged in (image 109). Physiologic FDG activity in remainder of visualized osseous structures.  3/6/24: Patient presents for follow up  Last Keytruda on 2/27/24 Completed RT to left femur end of January Planning for possible hip surgery with Dr. Blackwell, pending additional imaging  Reports more pronounce lower back pain, suspects gait has changed due to hip pain likely attributing to back pain. He continues to follow Dr. Lowe  Leg swelling stable L>R Patient reports mild fatigue, appetite is good Patient reports open lesion on left forearm and left calf. States lesion on calf has been there for years, was following Dermatology. Lesions are clean and bandaged Denies fever, n/v/d, abd pain, chest pain , SOB   4/17/24: Patient presents for follow up Last Keytruda tx on 3/19/24, next on 4/19/24 s/p admission to [facility] on 3/27/24 with CVA, infarct in left frontal and left parietal lobe Delayed 6 months by Dr. Blackwell, possibly in 2 months Currently on Eliquis and Aspirin Per Dr. Kleiner RHEUM, on 10 mg prednisone, when feeling better, 1.5 mg Reports increased back pain, especially when standing still, occasional pain Pt reports hip is not bothering him as much  5/13/24: Patient presents for follow up  Last tx given on 5/10/24 Dermatologist, Dr. Henderson,  started patient on Efudex cream  Patient with persistent lower back pain following pain management, taking  Xtampa ER 18 mg BID and Oxycodone 10 BID. MRI recently done at - will obtain records  PCP recently decreased Amlodipine dose, suspect may be causing LE swelling- have not seen improvement Neurology cleared patient for possible hip surgery 3 month after CVA  6/12/24: Patient presents for follow up s/p Keytruda on 5/31/24 Per Dr. Henderson, will be sent to Orthopedic surgeon Dr. Bravo However, is seeing SURG ONC, unsure of name, however their appointment is later next week Pt reports some back pain that goes away when sitting down He reports he is getting a spine procedure with Dr. Lowe and needs clearance  6/6/24 CBC:  WBC 7.22K, HGB 12.8g, HCT 39.6%, PLT 217K, ANC 5.36  7/8/24: Patient presents for follow up Patient scheduled for WLE of Left LE lesion on 7/26/24 Patient reports left leg swelling for the past week with calf pain.  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  s/p  WLE, / closure with Plastics on 7/26/24 Pending path ON 7/30 he started having diarrhea and wife called DR Kelley office and he came off antibiotics on 7/31 due to potential antibiotic induced diarrhea DIarrhea 2-3 x a days   ++ Hemal watery  8/2/24, more formed since this am  IS due for keytruda today  WIll delay due to diarhhea however low index of suspicion that it is IO mediated  Taking imodium intermittently has not taken for the past 2 days

## 2024-08-05 NOTE — PHYSICAL EXAM
[Normal] : affect appropriate [de-identified] : Ambulates with cane [de-identified] : Has dentures both upper and lower , right sided jaw swelling -non tender [de-identified] : Left leg slightly swollen  [de-identified] : open lesion on left forearm, ulcer like lesion on left calf

## 2024-08-08 ENCOUNTER — OUTPATIENT (OUTPATIENT)
Dept: OUTPATIENT SERVICES | Facility: HOSPITAL | Age: 79
LOS: 1 days | Discharge: HOME | End: 2024-08-08

## 2024-08-08 ENCOUNTER — NON-APPOINTMENT (OUTPATIENT)
Age: 79
End: 2024-08-08

## 2024-08-08 DIAGNOSIS — Z90.49 ACQUIRED ABSENCE OF OTHER SPECIFIED PARTS OF DIGESTIVE TRACT: Chronic | ICD-10-CM

## 2024-08-08 DIAGNOSIS — Z98.1 ARTHRODESIS STATUS: Chronic | ICD-10-CM

## 2024-08-08 DIAGNOSIS — C79.51 SECONDARY MALIGNANT NEOPLASM OF BONE: ICD-10-CM

## 2024-08-08 DIAGNOSIS — Z95.5 PRESENCE OF CORONARY ANGIOPLASTY IMPLANT AND GRAFT: Chronic | ICD-10-CM

## 2024-08-14 ENCOUNTER — EMERGENCY (EMERGENCY)
Facility: HOSPITAL | Age: 79
LOS: 1 days | Discharge: HOME | End: 2024-08-14
Attending: EMERGENCY MEDICINE
Payer: MEDICARE

## 2024-08-14 VITALS
RESPIRATION RATE: 18 BRPM | DIASTOLIC BLOOD PRESSURE: 65 MMHG | HEART RATE: 93 BPM | TEMPERATURE: 97 F | HEIGHT: 71 IN | OXYGEN SATURATION: 96 % | SYSTOLIC BLOOD PRESSURE: 104 MMHG | WEIGHT: 181.66 LBS

## 2024-08-14 DIAGNOSIS — Z90.49 ACQUIRED ABSENCE OF OTHER SPECIFIED PARTS OF DIGESTIVE TRACT: Chronic | ICD-10-CM

## 2024-08-14 DIAGNOSIS — Z98.1 ARTHRODESIS STATUS: Chronic | ICD-10-CM

## 2024-08-14 DIAGNOSIS — Z95.5 PRESENCE OF CORONARY ANGIOPLASTY IMPLANT AND GRAFT: Chronic | ICD-10-CM

## 2024-08-14 PROBLEM — Z98.890 STATUS POST SURGERY: Status: ACTIVE | Noted: 2024-08-14

## 2024-08-14 LAB
ALBUMIN SERPL ELPH-MCNC: 3.4 G/DL — SIGNIFICANT CHANGE UP (ref 3.3–5.2)
ALP SERPL-CCNC: 74 U/L — SIGNIFICANT CHANGE UP (ref 40–120)
ALT FLD-CCNC: 11 U/L — SIGNIFICANT CHANGE UP
ANION GAP SERPL CALC-SCNC: 12 MMOL/L — SIGNIFICANT CHANGE UP (ref 5–17)
ANISOCYTOSIS BLD QL: SLIGHT — SIGNIFICANT CHANGE UP
AST SERPL-CCNC: 19 U/L — SIGNIFICANT CHANGE UP
BASOPHILS # BLD AUTO: 0 K/UL — SIGNIFICANT CHANGE UP (ref 0–0.2)
BASOPHILS NFR BLD AUTO: 0 % — SIGNIFICANT CHANGE UP (ref 0–2)
BILIRUB SERPL-MCNC: 0.3 MG/DL — LOW (ref 0.4–2)
BUN SERPL-MCNC: 13.4 MG/DL — SIGNIFICANT CHANGE UP (ref 8–20)
CALCIUM SERPL-MCNC: 9 MG/DL — SIGNIFICANT CHANGE UP (ref 8.4–10.5)
CHLORIDE SERPL-SCNC: 104 MMOL/L — SIGNIFICANT CHANGE UP (ref 96–108)
CO2 SERPL-SCNC: 30 MMOL/L — HIGH (ref 22–29)
CREAT SERPL-MCNC: 1.1 MG/DL — SIGNIFICANT CHANGE UP (ref 0.5–1.3)
EGFR: 70 ML/MIN/1.73M2 — SIGNIFICANT CHANGE UP
EOSINOPHIL # BLD AUTO: 0.16 K/UL — SIGNIFICANT CHANGE UP (ref 0–0.5)
EOSINOPHIL NFR BLD AUTO: 1.8 % — SIGNIFICANT CHANGE UP (ref 0–6)
GLUCOSE SERPL-MCNC: 99 MG/DL — SIGNIFICANT CHANGE UP (ref 70–99)
HCT VFR BLD CALC: 37 % — LOW (ref 39–50)
HGB BLD-MCNC: 11.6 G/DL — LOW (ref 13–17)
LYMPHOCYTES # BLD AUTO: 0.49 K/UL — LOW (ref 1–3.3)
LYMPHOCYTES # BLD AUTO: 5.4 % — LOW (ref 13–44)
MAGNESIUM SERPL-MCNC: 1.6 MG/DL — SIGNIFICANT CHANGE UP (ref 1.6–2.6)
MANUAL SMEAR VERIFICATION: SIGNIFICANT CHANGE UP
MCHC RBC-ENTMCNC: 30.4 PG — SIGNIFICANT CHANGE UP (ref 27–34)
MCHC RBC-ENTMCNC: 31.4 GM/DL — LOW (ref 32–36)
MCV RBC AUTO: 96.9 FL — SIGNIFICANT CHANGE UP (ref 80–100)
MICROCYTES BLD QL: SLIGHT — SIGNIFICANT CHANGE UP
MONOCYTES # BLD AUTO: 0.65 K/UL — SIGNIFICANT CHANGE UP (ref 0–0.9)
MONOCYTES NFR BLD AUTO: 7.1 % — SIGNIFICANT CHANGE UP (ref 2–14)
NEUTROPHILS # BLD AUTO: 7.82 K/UL — HIGH (ref 1.8–7.4)
NEUTROPHILS NFR BLD AUTO: 85.7 % — HIGH (ref 43–77)
OVALOCYTES BLD QL SMEAR: SLIGHT — SIGNIFICANT CHANGE UP
PLAT MORPH BLD: NORMAL — SIGNIFICANT CHANGE UP
PLATELET # BLD AUTO: 271 K/UL — SIGNIFICANT CHANGE UP (ref 150–400)
POIKILOCYTOSIS BLD QL AUTO: SLIGHT — SIGNIFICANT CHANGE UP
POLYCHROMASIA BLD QL SMEAR: SLIGHT — SIGNIFICANT CHANGE UP
POTASSIUM SERPL-MCNC: 4.7 MMOL/L — SIGNIFICANT CHANGE UP (ref 3.5–5.3)
POTASSIUM SERPL-SCNC: 4.7 MMOL/L — SIGNIFICANT CHANGE UP (ref 3.5–5.3)
PROT SERPL-MCNC: 5.9 G/DL — LOW (ref 6.6–8.7)
RBC # BLD: 3.82 M/UL — LOW (ref 4.2–5.8)
RBC # FLD: 14.1 % — SIGNIFICANT CHANGE UP (ref 10.3–14.5)
RBC BLD AUTO: ABNORMAL
SODIUM SERPL-SCNC: 146 MMOL/L — HIGH (ref 135–145)
WBC # BLD: 9.13 K/UL — SIGNIFICANT CHANGE UP (ref 3.8–10.5)
WBC # FLD AUTO: 9.13 K/UL — SIGNIFICANT CHANGE UP (ref 3.8–10.5)

## 2024-08-14 PROCEDURE — 99284 EMERGENCY DEPT VISIT MOD MDM: CPT | Mod: GC

## 2024-08-14 PROCEDURE — 99283 EMERGENCY DEPT VISIT LOW MDM: CPT

## 2024-08-14 PROCEDURE — 85025 COMPLETE CBC W/AUTO DIFF WBC: CPT

## 2024-08-14 PROCEDURE — 83735 ASSAY OF MAGNESIUM: CPT

## 2024-08-14 PROCEDURE — 36415 COLL VENOUS BLD VENIPUNCTURE: CPT

## 2024-08-14 PROCEDURE — 80053 COMPREHEN METABOLIC PANEL: CPT

## 2024-08-14 RX ORDER — BACTERIOSTATIC SODIUM CHLORIDE 0.9 %
1000 VIAL (ML) INJECTION ONCE
Refills: 0 | Status: COMPLETED | OUTPATIENT
Start: 2024-08-14 | End: 2024-08-14

## 2024-08-14 RX ADMIN — Medication 1000 MILLILITER(S): at 16:09

## 2024-08-14 NOTE — ED PROVIDER NOTE - PATIENT PORTAL LINK FT
You can access the FollowMyHealth Patient Portal offered by [facility] by registering at the following website: [email or web address]. By joining [facility]’s FollowMyHealth portal, you will also be able to view your health information using other applications (apps) compatible with our system.

## 2024-08-14 NOTE — ED PROVIDER NOTE - OBJECTIVE STATEMENT
78 year old male with pmhx of pretibial basal cell carcinoma s/p excision on 7/26/24 presents with weakness. He states he has been feeling generalized weakness that is progressively worsening for the past 2 weeks, and is making it more difficult to walk. He has had diarrhea that started 2 weeks. Patient is currently being treated for c.diff which was diagnosed 2 weeks ago by his PCP, patient still currently experiencing diarrhea. He denies nausea, vomiting, abdominal pain, fever, paresthesias, vision changes, headache, focal neuro deficits, or any other symptoms at this time.

## 2024-08-14 NOTE — ED PROVIDER NOTE - PHYSICAL EXAMINATION
Gen: Well appearing in NAD  Head: NC/AT  Neck: trachea midline  Card: regular rate and rhythm  Resp:  CTAB  Abd: soft, non-distended, non-tender  Ext: left lower leg in ace bandage s/p pretibial excision of basal cell carcinoma, no erythema, purulence, or other signs of infection.   Neuro:  A&O, no motor or sensory deficits above reported baseline  Skin:  Warm and dry as visualized  Psych:  Normal affect and mood

## 2024-08-14 NOTE — ED ADULT TRIAGE NOTE - CHIEF COMPLAINT QUOTE
Generalized weakness and diarrhea x a few weeks. Pt stated he has tested +for cdiff currently on antibiotics.

## 2024-08-14 NOTE — ED PROVIDER NOTE - PROGRESS NOTE DETAILS
Juan Pablo: Patient's wife now at bedside. She states patient's diarrhea has been improving every day. Patient does not want to wait for CT scan. He is feeling better after fluids and would like to go home and followup with his PCP. Labs wnl

## 2024-08-14 NOTE — ED PROVIDER NOTE - CLINICAL SUMMARY MEDICAL DECISION MAKING FREE TEXT BOX
78 year old male presents with generalized weakness. Currently experiencing diarrhea and being treated for c.diff. CBC, CMP, EKG ordered. fluids given 78 year old male presents with generalized weakness. Currently experiencing diarrhea and being treated for c.diff. CBC, CMP, EKG ordered. fluids given    Patient not wanting to wait for CT. Symptoms improving after fluids. Stable for discharge home with PCP followup and continuing course of antibiotics for c.diff infection.

## 2024-08-14 NOTE — ED ADULT NURSE NOTE - OBJECTIVE STATEMENT
alert and oriented x4. pt presenting to the ED complaining of alert and oriented x4. pt presenting to the ED complaining of generalized weakness and diarrhea for a few weeks. Pt states he tested + for cdiff. Denies chest pain, shortness of breath. rr even/unlabored. in no acute distress.

## 2024-08-14 NOTE — ED ADULT TRIAGE NOTE - BEFAST BALANCE
No
-Any HOWARDO: labs non-actionable, pt instructed to f/u with PMD and given strict return precautions, verbalized understanding.

## 2024-08-14 NOTE — ED PROVIDER NOTE - ATTENDING CONTRIBUTION TO CARE
78 year old male with pmhx of pretibial basal cell carcinoma s/p excision on 7/26/24 presents with weakness.  h/o c diff on vancomycin;  as per wife decreased bowel movement;  pe awake alert heent  ncat neck supple cor s1s2 lung clear abd soft nontender neuro nonfocal dx c diff; diarrhea

## 2024-08-15 ENCOUNTER — APPOINTMENT (OUTPATIENT)
Dept: SURGICAL ONCOLOGY | Facility: CLINIC | Age: 79
End: 2024-08-15
Payer: MEDICARE

## 2024-08-15 ENCOUNTER — RX RENEWAL (OUTPATIENT)
Age: 79
End: 2024-08-15

## 2024-08-15 VITALS
SYSTOLIC BLOOD PRESSURE: 147 MMHG | DIASTOLIC BLOOD PRESSURE: 85 MMHG | WEIGHT: 185 LBS | OXYGEN SATURATION: 95 % | HEART RATE: 67 BPM | HEIGHT: 71 IN | BODY MASS INDEX: 25.9 KG/M2

## 2024-08-15 DIAGNOSIS — Z98.890 OTHER SPECIFIED POSTPROCEDURAL STATES: ICD-10-CM

## 2024-08-15 PROCEDURE — 99213 OFFICE O/P EST LOW 20 MIN: CPT

## 2024-08-15 NOTE — PHYSICAL EXAM
[Normal] : oriented to person, place and time, with appropriate affect [de-identified] : ambulatory with walker [de-identified] : mild LLE lymphedema  [de-identified] : s/p WLE  of Left posterior tibial region

## 2024-08-15 NOTE — ADDENDUM
[FreeTextEntry1] :  Documented by Douglas Ospina acting as a scribe for  on 08/15/2024  All Medical record entries made by the Scribe were at my, Dr. Leroy, direction and personally dictated by me on  08/15/2024. I have reviewed the chart and agree that the record accurately reflects my personal performance of the history, physical exam, assessment and plan. I have also personally directed, reviewed, and agreed with the discharge instructions.

## 2024-08-15 NOTE — HISTORY OF PRESENT ILLNESS
[de-identified] : Mr. 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 Referred by Dr. Richelle Bravo with [facility].  Per report, patient first noted the abnormal lesion about 3-4 months ago. In first appearance, the lesion appeared like an open sore. Since patient has first identified the lesion, it has changed in appearance, becoming more solid and scaly with time. Occasionally the lesion produces a mal odor, especially when a bandage remains for more than 2 days.   Today, Mr. 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 Patient is currently on Eliquis for a stroke in March. On 06/14/2024 patient underwent additional biopsies of the right upper lip and left forearm. Denies personal history of Merkel cell.   Patient is to possibly undergo a revision of his left hip. 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 Recio surgery & Dr. Richardson via radiation therapy. Eventually the cancer metastasized to the left hip and a yaron was placed for stabilization. Has yaron replacement scheduled with Dr. Blackwell. Currently taking Keytruda, under the direction of Dr. Orozco.   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  Path reviewed, shown to be BCCs with negative margins. He explains that he developed C.Diff and has been on vancomycin. His diarrhea has not improved and I encouraged him to follow up with his PCP.. His wife expressed concern that Mr. Shaw is unsure about what he should eat and has been frequently running to the bathroom.

## 2024-08-15 NOTE — ASSESSMENT
[FreeTextEntry1] : Mr. Daily is a 78 year old male with metastatic SCC, now with two LLE cutaneous lesions, s/p WLE x 2 with path showing BCC. Path reviewed, negative margins.  PLAN: - FU with DERM for continued surveillance - FU in 3 months - FU with Dr. Pam Leroy MD   Assistant Professor of Surgery [place] [employer] [employer] [facility] Division of Surgical Oncology [facility] Phone: [phone] Fax: [phone]  Today, I personally spent 32 minutes in total time including reviewing imaging and studies, discussing complex treatment regimens, direct face to face time with the patient, patient education, answering patient questions and counseling, excluding separately billable procedures and billing time.  This note was written by Tana Rocha on 06/20/2024, acting solely as a scribe for Dr. Conrado Leroy MD. I have documented the information dictated during the patient encounter for the following sections: RFV, HPI, ROS, PE, ASSESSMENT/PLAN.  I personally performed the services described in the documentation, reviewed the documentation recorded by the scribe in my presence, and it accurately and completely records my words and actions.

## 2024-08-15 NOTE — PHYSICAL EXAM
[Normal] : oriented to person, place and time, with appropriate affect [de-identified] : ambulatory with walker [de-identified] : mild LLE lymphedema  [de-identified] : s/p WLE  of Left posterior tibial region

## 2024-08-15 NOTE — REVIEW OF SYSTEMS
[Negative] : Endocrine [FreeTextEntry2] : see hpi  [de-identified] : see hpi  [FreeTextEntry1] : see hpi

## 2024-08-15 NOTE — REVIEW OF SYSTEMS
[Negative] : Endocrine [FreeTextEntry2] : see hpi  [de-identified] : see hpi  [FreeTextEntry1] : see hpi

## 2024-08-15 NOTE — HISTORY OF PRESENT ILLNESS
[de-identified] : Mr. 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 Referred by Dr. Richelle Bravo with [facility].  Per report, patient first noted the abnormal lesion about 3-4 months ago. In first appearance, the lesion appeared like an open sore. Since patient has first identified the lesion, it has changed in appearance, becoming more solid and scaly with time. Occasionally the lesion produces a mal odor, especially when a bandage remains for more than 2 days.   Today, Mr. 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 Patient is currently on Eliquis for a stroke in March. On 06/14/2024 patient underwent additional biopsies of the right upper lip and left forearm. Denies personal history of Merkel cell.   Patient is to possibly undergo a revision of his left hip. 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 Recio surgery & Dr. Richardson via radiation therapy. Eventually the cancer metastasized to the left hip and a yaron was placed for stabilization. Has yaron replacement scheduled with Dr. Blackwell. Currently taking Keytruda, under the direction of Dr. Orozco.   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  Path reviewed, shown to be BCCs with negative margins. He explains that he developed C.Diff and has been on vancomycin. His diarrhea has not improved and I encouraged him to follow up with his PCP.. His wife expressed concern that Mr. Shaw is unsure about what he should eat and has been frequently running to the bathroom.

## 2024-08-15 NOTE — REASON FOR VISIT
[de-identified] : Wide local excision of left lower extremity pretibial cutaneous neoplasm [de-identified] : 07/26/24

## 2024-08-15 NOTE — ASSESSMENT
[FreeTextEntry1] : Mr. Daily is a 78 year old male with metastatic SCC, now with two LLE cutaneous lesions, s/p WLE x 2 with path showing BCC. Path reviewed, negative margins.  PLAN: - FU with DERM for continued surveillance - FU in 3 months - FU with Dr. Pam Leroy MD   Assistant Professor of Surgery [place] [employer] [facility] [employer] at [facility] Division of Surgical Oncology [facility] Phone: [phone] Fax: [phone]  Today, I personally spent 32 minutes in total time including reviewing imaging and studies, discussing complex treatment regimens, direct face to face time with the patient, patient education, answering patient questions and counseling, excluding separately billable procedures and billing time.  This note was written by Tana Rocha on 06/20/2024, acting solely as a scribe for Dr. Conrado Leroy MD. I have documented the information dictated during the patient encounter for the following sections: RFV, HPI, ROS, PE, ASSESSMENT/PLAN.  I personally performed the services described in the documentation, reviewed the documentation recorded by the scribe in my presence, and it accurately and completely records my words and actions.

## 2024-08-15 NOTE — REASON FOR VISIT
[de-identified] : Wide local excision of left lower extremity pretibial cutaneous neoplasm [de-identified] : 07/26/24

## 2024-08-15 NOTE — RESULTS/DATA
[FreeTextEntry1] : ***SURGERY*** 07/26/24  Wide local excision of left lower extremity pretibial cutaneous neoplasm. ***PATH*** Specimen(s) Submitted 1  Left pretibial skin cancer, short superior, long lateral 2  Left lower extremity posterior tibial skin cancer, short superior, long lateral 3  Superior margin left leg 4  Inferior margin left leg  Final Diagnosis 1. Left pretibial skin cancer, short superior, long lateral skin excision: - Basal-cell carcinoma, nodular type,    examined inked margins are free 2. Left lower extremity posterior tibial skin cancer, short superior, long lateral skin excision: - Basal-cell carcinoma, nodular type,    examined inked margins are free - Milium, incidental, small - Separate incidental small basal-cell carcinoma, superficial type, present within 4mm from the inferior inked margin 3. Superior margin left leg: - Negative for carcinoma - Melanocytic hyperplasia consequent to chronic sun damage 4. Inferior margin left leg: - Negative for carcinoma - Melanocytic hyperplasia consequent to chronic sun damage - Actinic keratosis - Seborrheic keratosis

## 2024-08-16 ENCOUNTER — APPOINTMENT (OUTPATIENT)
Age: 79
End: 2024-08-16

## 2024-08-21 DIAGNOSIS — R19.7 DIARRHEA, UNSPECIFIED: ICD-10-CM

## 2024-08-23 ENCOUNTER — APPOINTMENT (OUTPATIENT)
Dept: CT IMAGING | Facility: CLINIC | Age: 79
End: 2024-08-23

## 2024-08-26 LAB — BACTERIA STL CULT: NORMAL

## 2024-08-27 ENCOUNTER — APPOINTMENT (OUTPATIENT)
Dept: HEMATOLOGY ONCOLOGY | Facility: CLINIC | Age: 79
End: 2024-08-27

## 2024-08-27 LAB
C DIFF TOX B STL QL CT TISS CULT: NORMAL
Lab: NORMAL

## 2024-08-28 LAB — DEPRECATED O AND P PREP STL: NORMAL

## 2024-09-06 ENCOUNTER — APPOINTMENT (OUTPATIENT)
Age: 79
End: 2024-09-06

## 2024-09-10 ENCOUNTER — APPOINTMENT (OUTPATIENT)
Dept: ORTHOPEDIC SURGERY | Facility: CLINIC | Age: 79
End: 2024-09-10
Payer: MEDICARE

## 2024-09-10 VITALS
WEIGHT: 185 LBS | HEART RATE: 81 BPM | HEIGHT: 71 IN | SYSTOLIC BLOOD PRESSURE: 117 MMHG | BODY MASS INDEX: 25.9 KG/M2 | DIASTOLIC BLOOD PRESSURE: 79 MMHG

## 2024-09-10 DIAGNOSIS — C79.51 SECONDARY MALIGNANT NEOPLASM OF BONE: ICD-10-CM

## 2024-09-10 DIAGNOSIS — T84.84XA PAIN DUE TO INTERNAL ORTHOPEDIC PROSTHETIC DEVICES, IMPLANTS AND GRAFTS, INITIAL ENCOUNTER: ICD-10-CM

## 2024-09-10 PROCEDURE — 73502 X-RAY EXAM HIP UNI 2-3 VIEWS: CPT

## 2024-09-10 PROCEDURE — G2211 COMPLEX E/M VISIT ADD ON: CPT

## 2024-09-10 PROCEDURE — 99213 OFFICE O/P EST LOW 20 MIN: CPT

## 2024-09-10 NOTE — HISTORY OF PRESENT ILLNESS
[Pain Location] : pain [] : left hip [Worsening] : worsening [___ mths] : [unfilled] month(s) ago [Constant] : ~He/She~ states the symptoms seem to be constant [Bending] : worsened by bending [Direct Pressure] : worsened by direct pressure [Hip Movement] : worsened by hip movement [Recumbency] : relieved by recumbency [de-identified] : 78 year old male presents to the office today for a follow-up visit for his left hip pain. Hip pain has gotten worse for months with no change. Has skin cancer and Hx of stroke. . Weakness in the legs intermittently. Is not in physical therapy at this time. Patient has recently had skin grafts done. Plays golf and would eventually like to continue. The patient denies any falls or trauma and has been using conservative treatment. Ambulates with cane. No constitutional symptoms noted. States he is waiting for pathologies to come back prior to initiating surgical intervention for a hip arthroplasty. No other complaints. [Physical Therapy] : not relieved by physical therapy [de-identified] : golf

## 2024-09-10 NOTE — PHYSICAL EXAM
[Cane] : ambulates with cane [de-identified] : Left hip exam showed no groin pain with SLR, ROM is full flexion with 20 degrees external and 10 degrees internal with pain, GRATN negative, FADIR positive. Well healed surgical incisions. 5/5 motor strength in bilateral lower extremities. Sensory: Intact in bilateral lower extremities. DTRs: Biceps, brachioradialis, triceps, patellar, ankle and plantar 2+ and symmetric bilaterally. Pulses: dorsalis pedis, posterior tibial, femoral, popliteal, and radial 2+ and symmetric bilaterally. [de-identified] : AP pelvis and 2 views of the left hip obtained the office today show no acute fracture or dislocation.  Metastatic lesions of the femoral neck and intertrochanteric region noted with failure of the orthopedic hardware unchanged from prior x-ray

## 2024-09-10 NOTE — DISCUSSION/SUMMARY
[Medication Risks Reviewed] : Medication risks reviewed [PRN] : PRN [de-identified] : Patient is a 78-year-old male here today for follow-up of his left hip metastatic bone tumor as well as failed orthopedic hardware.  He recently underwent a procedure in July for removal of a cancerous lesion of his leg.  He still has an open wound on his leg.  He is following with the plastic service.  I did discuss with the due to the large open wound on his leg he is an increased risk of infection.  At this time he has no change in his hardware or bony alignment.  For that reason I recommended continue conservative treatment.  We will contact the plastic service to discuss further options.  He will continue low impact activity exercise.  Continue be protected weightbearing.  I will see him back in 6 weeks for repeat evaluation sooner if any issues arise.  All questions were asked and answered

## 2024-09-10 NOTE — ADDENDUM
[FreeTextEntry1] :  This note was written by Ja Atwodo, acting as the  for Dr. Blackwell. This note accurately reflects the work and decisions made by Dr. Blackwell.

## 2024-09-19 ENCOUNTER — APPOINTMENT (OUTPATIENT)
Dept: RADIATION ONCOLOGY | Facility: CLINIC | Age: 79
End: 2024-09-19
Payer: MEDICARE

## 2024-09-19 VITALS
DIASTOLIC BLOOD PRESSURE: 77 MMHG | SYSTOLIC BLOOD PRESSURE: 115 MMHG | HEART RATE: 59 BPM | RESPIRATION RATE: 16 BRPM | OXYGEN SATURATION: 97 %

## 2024-09-19 DIAGNOSIS — C79.51 SECONDARY MALIGNANT NEOPLASM OF BONE: ICD-10-CM

## 2024-09-19 DIAGNOSIS — Z92.3 PERSONAL HISTORY OF IRRADIATION: ICD-10-CM

## 2024-09-19 DIAGNOSIS — Z85.818 PERSONAL HISTORY OF MALIGNANT NEOPLASM OF OTHER SITES OF LIP, ORAL CAVITY, AND PHARYNX: ICD-10-CM

## 2024-09-19 PROCEDURE — 99213 OFFICE O/P EST LOW 20 MIN: CPT

## 2024-09-19 PROCEDURE — G2211 COMPLEX E/M VISIT ADD ON: CPT

## 2024-09-23 NOTE — VITALS
[Maximal Pain Intensity: 10/10] : 10/10 [Least Pain Intensity: 5/10] : 5/10 [Pain Duration: ___] : Pain duration: [unfilled] [Opioid] : opioid [70: Cares for self; unalbe to carry on normal activity or do active work.] : 70: Cares for self; unable to carry on normal activity or do active work. [ECOG Performance Status: 2 - Ambulatory and capable of all self care but unable to carry out any work activities] : Performance Status: 2 - Ambulatory and capable of all self care but unable to carry out any work activities. Up and about more than 50% of waking hours

## 2024-09-23 NOTE — LETTER CLOSING
[Sincerely yours,] : Sincerely yours, [FreeTextEntry3] : Frantz Richardson MD Physician in Chief Department of Radiation Medicine [facility]   of Radiation Medicine [employer] at  [facility]  Radiation  [facility]/ [facility] at [place] [address]  Tel: [phone] Fax: [phone]

## 2024-09-23 NOTE — DATA REVIEWED
[FreeTextEntry1] : PET 6-5-2024 MPRESSION: 1.  Quantitatively stable uptake at the LEFT lesser trochanter which appears to encompass a smaller area than on the prior exam. 2.  No evidence of recurrent disease in the head and neck. 3.  Stable appearance of the thyroid. 4.  Otherwise, no evidence of FDG-avid malignancy.  Labs Aug 22, 2024  [facility] Number : [id number] Patient:     JOSIANE SHAW   Accession:                             [id number]  Collected Date/Time:                   7/26/2024 13:00 EDT Received Date/Time:                    7/26/2024 13:25 EDT  Surgical Pathology Report - Auth (Verified)  Specimen(s) Submitted 1  Left pretibial skin cancer, short superior, long lateral 2  Left lower extremity posterior tibial skin cancer, short superior, long lateral 3  Superior margin left leg 4  Inferior margin left leg  Final Diagnosis  1. Left pretibial skin cancer, short superior, long lateral skin excision: - Basal-cell carcinoma, nodular type,    examined inked margins are free  2. Left lower extremity posterior tibial skin cancer, short superior, long lateral skin excision: - Basal-cell carcinoma, nodular type,    examined inked margins are free - Milium, incidental, small - Separate incidental small basal-cell carcinoma, superficial type, present within 4mm from the inferior inked margin  3. Superior margin left leg: - Negative for carcinoma - Melanocytic hyperplasia consequent to chronic sun damage  4. Inferior margin left leg: - Negative for carcinoma - Melanocytic hyperplasia consequent to chronic sun damage - Actinic keratosis - Seborrheic keratosis Verified by: Kina Nath M.D., Dermatopathologist (Electronic Signature)  Reported on: 08/08/24 12:41 EDT, [facility] Dept. of Dermatology, [address], [place] [address] Phone: [phone]   Fax: [phone] _________________________________________________________________   --- End of Report ---       SHY HILTON MD; Attending Radiologist This document has been electronically signed. Jun 5 2024  3:05PM

## 2024-09-23 NOTE — HISTORY OF PRESENT ILLNESS
[FreeTextEntry1] : This 78-year-old man is status post right parotidectomy and right neck dissection for probable metastatic squamous cell carcinoma. His interval history is remarkable for an erosive tumor involving the femoral neck. He completed radiation therapy to the left hip/femur on 12/2/2022 for metastatic disease. Today he presents for routine follow up..  Recently, the patient developed new pain and weakness in the area of the left hip previously treated. PET/CT done Ethan 10, 2024 strongly suggested progression of metastatic malignancy in this area. Apparent symptomatic progression of metastatic malignant neoplasm of the left hip / proximal femur.  Mr. Daily then completed repeat palliative radiotherapy to the left femur on 1/29/2024.  Reports he is scheduled for left total hip/proximal femur replacement on March 29, 2024 with Dr. Brannon Blackwell. He had a stroke on 3/27/24,he was started on Eloquis  Patient continues treatment with Keytruda at the direction of Dr. Palmer.  5/8/24 MRI LUMBAR SPINE IMPRESSION MInimal listheses as noted Spondylosis and facet arthrosis as noted Lower lumbar postoperative changes as noted ,without postop complications Very small left paracentral disc protrusion at L!-L2 without significant mass effect Small centric disc protrusion  Pt presents today  using a cane  - On Oxycodone Extampa BID, 9 mg bid per PAIN MANAGEMENT ( Dr Lowe) with short acting oxycodone for break through pain, Patient reports that he is still in pain Seeing Pain management later today. pt reports pain at highest cam be 9 Admitted to [facility] on 3/27/24 with CVA, infarct in left posterior frontal lobe Currently on 10 mg prednisone per Dr Kleiner, RHEUM, will reduce once pain improves Pt in fair spirits. Denies seizures, falls, nausea ,weight loss. Ambulating well with cane. Denies pain while seated at rest , but after 10 min of walking or putting golf balls , it escalates to 10 /10.  Interval history includes surgical removal of a SCCA of the skin of his left leg. Also contracted C.diff while having his out patient procedure in [facility].

## 2024-09-23 NOTE — ASSESSMENT
[Metastatic disease without local control] : Metastatic disease without local control [FreeTextEntry1] : probable malignant in left hip. BCCC left leg.

## 2024-09-23 NOTE — PHYSICAL EXAM
[Normal] : oriented to person, place and time, the affect was normal, the mood was normal and not anxious [de-identified] : ambulates with cane , antalgic gait favoring left LE. [de-identified] : dressed wound on left leg., clean and dry.

## 2024-09-23 NOTE — DISEASE MANAGEMENT
[Clinical] : TNM Stage: c [IV] : IV [TTNM] : x [NTNM] : x [MTNM] : 1 [de-identified] : 2000cGy [de-identified] : left hip

## 2024-09-23 NOTE — DISEASE MANAGEMENT
[Clinical] : TNM Stage: c [IV] : IV [TTNM] : x [NTNM] : x [MTNM] : 1 [de-identified] : 2000cGy [de-identified] : left hip

## 2024-09-23 NOTE — HISTORY OF PRESENT ILLNESS
[FreeTextEntry1] : This 78-year-old man is status post right parotidectomy and right neck dissection for probable metastatic squamous cell carcinoma. His interval history is remarkable for an erosive tumor involving the femoral neck. He completed radiation therapy to the left hip/femur on 12/2/2022 for metastatic disease. Today he presents for routine follow up..  Recently, the patient developed new pain and weakness in the area of the left hip previously treated. PET/CT done Ethan 10, 2024 strongly suggested progression of metastatic malignancy in this area. Apparent symptomatic progression of metastatic malignant neoplasm of the left hip / proximal femur.  Mr. Daily then completed repeat palliative radiotherapy to the left femur on 1/29/2024.  Reports he is scheduled for left total hip/proximal femur replacement on March 29, 2024 with Dr. Brannon Blackwell. He had a stroke on 3/27/24,he was started on Eloquis  Patient continues treatment with Keytruda at the direction of Dr. Palmer.  5/8/24 MRI LUMBAR SPINE IMPRESSION MInimal listheses as noted Spondylosis and facet arthrosis as noted Lower lumbar postoperative changes as noted ,without postop complications Very small left paracentral disc protrusion at L!-L2 without significant mass effect Small centric disc protrusion  Pt presents today  using a cane  - On Oxycodone Extampa BID, 9 mg bid per PAIN MANAGEMENT ( Dr Lowe) with short acting oxycodone for break through pain, Patient reports that he is still in pain Seeing Pain management later today. pt reports pain at highest cam be 9 Admitted to [facility] on 3/27/24 with CVA, infarct in left posterior frontal lobe Currently on 10 mg prednisone per Dr Kleiner, RHEUM, will reduce once pain improves Pt in fair spirits. Denies seizures, falls, nausea ,weight loss. Ambulating well with cane. Denies pain while seated at rest , but after 10 min of walking or putting golf balls , it escalates to 10 /10.  Interval history includes surgical removal of a SCCA of the skin of his left leg. Also contracted C.diff while having his out patient procedure in [facility].

## 2024-09-23 NOTE — LETTER CLOSING
[Sincerely yours,] : Sincerely yours, [FreeTextEntry3] : Frantz Richardson MD Physician in Chief Department of Radiation Medicine [facility]   of Radiation Medicine [employer] at  [facility]  Radiation  [facility]/ [facility] at [place] [address]  Tel: [phone] Fax: [phone]

## 2024-09-23 NOTE — PHYSICAL EXAM
[Normal] : oriented to person, place and time, the affect was normal, the mood was normal and not anxious [de-identified] : ambulates with cane , antalgic gait favoring left LE. [de-identified] : dressed wound on left leg., clean and dry.

## 2024-09-23 NOTE — DATA REVIEWED
[FreeTextEntry1] : PET 6-5-2024 MPRESSION: 1.  Quantitatively stable uptake at the LEFT lesser trochanter which appears to encompass a smaller area than on the prior exam. 2.  No evidence of recurrent disease in the head and neck. 3.  Stable appearance of the thyroid. 4.  Otherwise, no evidence of FDG-avid malignancy.  Labs Aug 22, 2024  [facility]ion Number : [id number] Patient:     JOSIANE SHAW   Accession:                             [id number]  Collected Date/Time:                   7/26/2024 13:00 EDT Received Date/Time:                    7/26/2024 13:25 EDT  Surgical Pathology Report - Auth (Verified)  Specimen(s) Submitted 1  Left pretibial skin cancer, short superior, long lateral 2  Left lower extremity posterior tibial skin cancer, short superior, long lateral 3  Superior margin left leg 4  Inferior margin left leg  Final Diagnosis  1. Left pretibial skin cancer, short superior, long lateral skin excision: - Basal-cell carcinoma, nodular type,    examined inked margins are free  2. Left lower extremity posterior tibial skin cancer, short superior, long lateral skin excision: - Basal-cell carcinoma, nodular type,    examined inked margins are free - Milium, incidental, small - Separate incidental small basal-cell carcinoma, superficial type, present within 4mm from the inferior inked margin  3. Superior margin left leg: - Negative for carcinoma - Melanocytic hyperplasia consequent to chronic sun damage  4. Inferior margin left leg: - Negative for carcinoma - Melanocytic hyperplasia consequent to chronic sun damage - Actinic keratosis - Seborrheic keratosis Verified by: Kina Nath M.D., Dermatopathologist (Electronic Signature)  Reported on: 08/08/24 12:41 EDT, [facility] Dept. of Dermatology, [address], [place] [address] Phone: [phone]   Fax: [phone] _________________________________________________________________   --- End of Report ---       SHY HILTON MD; Attending Radiologist This document has been electronically signed. Jun 5 2024  3:05PM

## 2024-09-26 ENCOUNTER — APPOINTMENT (OUTPATIENT)
Dept: NEUROLOGY | Facility: CLINIC | Age: 79
End: 2024-09-26
Payer: MEDICARE

## 2024-09-26 VITALS
BODY MASS INDEX: 25.06 KG/M2 | HEIGHT: 71 IN | HEART RATE: 78 BPM | DIASTOLIC BLOOD PRESSURE: 104 MMHG | OXYGEN SATURATION: 98 % | WEIGHT: 179 LBS | SYSTOLIC BLOOD PRESSURE: 155 MMHG

## 2024-09-26 PROCEDURE — 99213 OFFICE O/P EST LOW 20 MIN: CPT

## 2024-09-26 PROCEDURE — G2211 COMPLEX E/M VISIT ADD ON: CPT

## 2024-09-26 NOTE — DATA REVIEWED
[de-identified] : MR Head w/wo IV Cont (03.27.24 @ 21:29)  IMPRESSION: Acute infarct in the left posterior frontal lobe.  CT Brain Stroke Protocol (03.27.24 @ 11:35)   IMPRESSION: Age-indeterminate, possibly acute, left frontal lobe and left  parietal lobe infarctions. No acute intracranial hemorrhage  CTA BRAIN: Mild bilateral cavernous carotid artery calcifications. Mild right  vertebral artery calcifications. The right vertebral artery is dominant. Left vertebral artery is  hypoplastic. The [place] and vertebrobasilar system are unremarkable without  evidence of stenosis, occlusion or saccular aneurysm dilation. No  evidence for arterial venous malformation.   CTA NECK: Mild bilateral carotid bulb/proximal internal carotid artery  calcifications. A left-sided aortic arch is demonstrated. There is normal relationship to  the great vessels. The common carotid arteries, internal carotid arteries  and vertebral arteries show no evidence of significant stenosis,  occlusion or saccular aneurysm dilation.  CT PERFUSION: Patient has only had less than 2 hours of symptoms may influence the CT  perfusion.  No core infarct or evidence of delayed mean transit time is identified.

## 2024-09-26 NOTE — HISTORY OF PRESENT ILLNESS
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    Neuro Hx:  Presented to [facility] ED on 3/27/24 with acute onset aphasia, dysarthria and right facial asymmetry with LKW time 11AM. Stroke code was activated for the patient, CTH, CTA H/N and CTP obtained with a finding of age indeterminate left frontal lobe and left parietal lobe infarcts. No LVO or perfusion deficit. TNK not administered given appearance of stroke on CT. Patient admitted to the Neurology service for further work up. MRI Brain showed Acute infarct in the left posterior frontal lobe. , A1c 5.6.  TTE was neg.  Stroke thought to be due to hypercoagulable state in the setting of malignancy and he was started on Eliquis.  ASA continued for CAD hx.    4/8/2024:  Doing well.  Feels his speech is improved.   Will be getting speech therapy soon.  Feels weak in both legs.  Getting PT for this.  His surgery has been delayed 6 months.  No issues with the Eliquis and ASA.    7/23/24: Patient will be undergoing resection of a skin lesion. Then there will be a plan for hip surgery.  Still c/o balance issues and weak legs.  No speech issues.  He is finished with speech therapy.  No new issues.   Today 9/26/24: In July underwent procedure in July for removal of a cancerous lesion of his leg. Has not undergone hip surgery yet, he is being managed conservatively.  Patient recently also been battling with C.diff infection.  Today c/o of generalized weakness.  No issues with the Eliquis and Aspirin.

## 2024-09-26 NOTE — ASSESSMENT
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 He was scheduled undergo a proximal femur replacement however 2 days prior to surgery he developed the acute stroke. MRI Reviewed and shows a L frontal stroke.  Vascular studies were normal.  Stroke etiology concerning for hypercoagulable state in the setting of malignancy   L frontal ischemic stroke -ANTITHROMBOTIC THERAPY: on Apixaban 5mg bid. ASA 81mg daily given hx of CAD s/p stent placement. - SBP goal normotension  -titrate statin to LDL goal less than 70 - Cont. PT/OT - Patient to f/u with oncologist  Left Hip metastatic lesion--pending surgery.   --Patient first will have resection of the skin lesion, then planning for hip surgery.  There is no absolute contraindication to surgical intervention from neurological standpoint for both surgeries. Defer to hematology regarding anticoagulation plan.   We spoke about the importance of lifestyle factors including refraining from tobacco use, diet (Mediterranean diet) that emphasizes vegetables, fruits, and whole grains and includes low-fat dairy products, poultry, fish, legumes, olive oil, and nuts while limiting intake of sweets and red meats, moderate- to vigorous-intensity aerobic physical exercise lasting at least 40 minutes 3-4 times per week, adequate glucose control, medication compliance, and consistent outpatient follow-up for monitoring of blood pressure, glucose levels and lipids with a goal blood pressure under 140/90, hemoglobin A1c < 7.0 and goal LDL under 70 that will help in reducing future stroke risk.  We discussed the importance of adequate hydration and making sure to drink eight eight-ounce glasses of water daily.   We also discussed general symptoms that should prompt immediate presentation to the emergency department for evaluation of acute stroke including: sudden onset of focal weakness, numbness, difficulty with speech production or comprehension, slurred speech, visual changes, gait imbalance, and/or sudden/severe headache.

## 2024-09-26 NOTE — PHYSICAL EXAM
[FreeTextEntry1] :   General: Cooperative, NAD HEENT: NC/AT, no carotid bruits Lungs: CTAB Chest: RRR, no murmurs Extremities: nontender, no erythema Neurological Examination: NIHSS: 1 MS: AOx3. Appropriately interactive, normal affect. Speech with mild decreased fluency. Able to name and repeat.   CN: PERLL, EOMI, V1-3 sensation intact, face symmetric, hearing intact, palate elevates symmetrically, tongue midline, SCM equal bilaterally Motor: normal bulk and tone, no tremor, rigidity or bradykinesia.  5/5 all over except LLE is 4+/5 (hip issue) Sens: Intact to light touch. Reflexes: 2/4 all over, downgoing toes b/l Coord:  No dysmetria, ESTHER intact Gait: Antalgic

## 2024-09-27 ENCOUNTER — RESULT REVIEW (OUTPATIENT)
Age: 79
End: 2024-09-27

## 2024-09-27 ENCOUNTER — APPOINTMENT (OUTPATIENT)
Age: 79
End: 2024-09-27

## 2024-09-27 LAB
BASOPHILS # BLD AUTO: 0.1 K/UL — SIGNIFICANT CHANGE UP (ref 0–0.2)
BASOPHILS NFR BLD AUTO: 0.9 % — SIGNIFICANT CHANGE UP (ref 0–2)
EOSINOPHIL # BLD AUTO: 0.1 K/UL — SIGNIFICANT CHANGE UP (ref 0–0.5)
EOSINOPHIL NFR BLD AUTO: 0.7 % — SIGNIFICANT CHANGE UP (ref 0–6)
HCT VFR BLD CALC: 34.9 % — LOW (ref 39–50)
HGB BLD-MCNC: 10.9 G/DL — LOW (ref 13–17)
LYMPHOCYTES # BLD AUTO: 0.4 K/UL — LOW (ref 1–3.3)
LYMPHOCYTES # BLD AUTO: 4.9 % — LOW (ref 13–44)
MCHC RBC-ENTMCNC: 30.8 PG — SIGNIFICANT CHANGE UP (ref 27–34)
MCHC RBC-ENTMCNC: 31.3 G/DL — LOW (ref 32–36)
MCV RBC AUTO: 98.5 FL — SIGNIFICANT CHANGE UP (ref 80–100)
MONOCYTES # BLD AUTO: 0.6 K/UL — SIGNIFICANT CHANGE UP (ref 0–0.9)
MONOCYTES NFR BLD AUTO: 8.1 % — SIGNIFICANT CHANGE UP (ref 2–14)
NEUTROPHILS # BLD AUTO: 6.7 K/UL — SIGNIFICANT CHANGE UP (ref 1.8–7.4)
NEUTROPHILS NFR BLD AUTO: 85.4 % — HIGH (ref 43–77)
PLATELET # BLD AUTO: 286 K/UL — SIGNIFICANT CHANGE UP (ref 150–400)
RBC # BLD: 3.54 M/UL — LOW (ref 4.2–5.8)
RBC # FLD: 15 % — HIGH (ref 10.3–14.5)
WBC # BLD: 7.8 K/UL — SIGNIFICANT CHANGE UP (ref 3.8–10.5)
WBC # FLD AUTO: 7.8 K/UL — SIGNIFICANT CHANGE UP (ref 3.8–10.5)

## 2024-09-28 LAB
ALBUMIN SERPL ELPH-MCNC: 3.3 G/DL — SIGNIFICANT CHANGE UP (ref 3.3–5)
ALP SERPL-CCNC: 161 U/L — HIGH (ref 40–120)
ALT FLD-CCNC: 26 U/L — SIGNIFICANT CHANGE UP (ref 10–45)
ANION GAP SERPL CALC-SCNC: 15 MMOL/L — SIGNIFICANT CHANGE UP (ref 5–17)
AST SERPL-CCNC: 37 U/L — SIGNIFICANT CHANGE UP (ref 10–40)
BILIRUB SERPL-MCNC: 0.3 MG/DL — SIGNIFICANT CHANGE UP (ref 0.2–1.2)
BUN SERPL-MCNC: 16 MG/DL — SIGNIFICANT CHANGE UP (ref 7–23)
CALCIUM SERPL-MCNC: 9.3 MG/DL — SIGNIFICANT CHANGE UP (ref 8.4–10.5)
CHLORIDE SERPL-SCNC: 101 MMOL/L — SIGNIFICANT CHANGE UP (ref 96–108)
CO2 SERPL-SCNC: 26 MMOL/L — SIGNIFICANT CHANGE UP (ref 22–31)
CREAT SERPL-MCNC: 1.02 MG/DL — SIGNIFICANT CHANGE UP (ref 0.5–1.3)
EGFR: 76 ML/MIN/1.73M2 — SIGNIFICANT CHANGE UP
GLUCOSE SERPL-MCNC: 86 MG/DL — SIGNIFICANT CHANGE UP (ref 70–99)
POTASSIUM SERPL-MCNC: 4.1 MMOL/L — SIGNIFICANT CHANGE UP (ref 3.5–5.3)
POTASSIUM SERPL-SCNC: 4.1 MMOL/L — SIGNIFICANT CHANGE UP (ref 3.5–5.3)
PROT SERPL-MCNC: 6.3 G/DL — SIGNIFICANT CHANGE UP (ref 6–8.3)
SODIUM SERPL-SCNC: 142 MMOL/L — SIGNIFICANT CHANGE UP (ref 135–145)
T4 FREE SERPL-MCNC: 1 NG/DL — SIGNIFICANT CHANGE UP (ref 0.9–1.8)
T4 FREE+ TSH PNL SERPL: 15 UIU/ML — HIGH (ref 0.27–4.2)

## 2024-09-29 ENCOUNTER — NON-APPOINTMENT (OUTPATIENT)
Age: 79
End: 2024-09-29

## 2024-09-30 ENCOUNTER — APPOINTMENT (OUTPATIENT)
Dept: SURGICAL ONCOLOGY | Facility: CLINIC | Age: 79
End: 2024-09-30
Payer: MEDICARE

## 2024-09-30 VITALS
BODY MASS INDEX: 25.11 KG/M2 | DIASTOLIC BLOOD PRESSURE: 71 MMHG | HEART RATE: 88 BPM | SYSTOLIC BLOOD PRESSURE: 109 MMHG | TEMPERATURE: 97.7 F | OXYGEN SATURATION: 96 % | HEIGHT: 71 IN | WEIGHT: 179.35 LBS

## 2024-09-30 DIAGNOSIS — C80.1 MALIGNANT (PRIMARY) NEOPLASM, UNSPECIFIED: ICD-10-CM

## 2024-09-30 DIAGNOSIS — Z51.11 ENCOUNTER FOR ANTINEOPLASTIC CHEMOTHERAPY: ICD-10-CM

## 2024-09-30 DIAGNOSIS — R60.0 LOCALIZED EDEMA: ICD-10-CM

## 2024-09-30 PROCEDURE — 99212 OFFICE O/P EST SF 10 MIN: CPT

## 2024-09-30 NOTE — REASON FOR VISIT
Update request routed to Poncho Tesfaye.  Pt also due for labs. Will watch for results.    [Spouse] : spouse [Family Member] : family member [FreeTextEntry2] : Left Lower Leg Swelling

## 2024-09-30 NOTE — ASSESSMENT
[FreeTextEntry1] : Mr. Daily is a 78 year old male with metastatic SCC, now with two LLE cutaneous lesions, s/p WLE x 2 with path showing BCC. Path reviewed, negative margins.  09/30/24: Pt presents with left lower extremity edema proximal to the surgical site. Due to his extensive cardiac history, there may be a correlation of his swelling due to a possible left lower extremity vascular insufficiency. In the past (07/08/24) a US duplex venous was performed due to the same issue with imaging demonstrating no evidence of left lower extremity DVT.  Education was provided to the patient with regards to maintaining Bilateral lower extremities elevated when laying down, continue medication regimen, and adequate hydration along with contacting his cardiologist to schedule a FU    Plan: -US left lower extremity Duplex Venous - FU with Cardiology - Education provided on wound care - RTO if needed

## 2024-09-30 NOTE — HISTORY OF PRESENT ILLNESS
[de-identified] : Mr. 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 Referred by Dr. Richelle Bravo with [facility].  Per report, patient first noted the abnormal lesion about 3-4 months ago. In first appearance, the lesion appeared like an open sore. Since patient has first identified the lesion, it has changed in appearance, becoming more solid and scaly with time. Occasionally the lesion produces a mal odor, especially when a bandage remains for more than 2 days.   Today, Mr. 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 Patient is currently on Eliquis for a stroke in March. On 06/14/2024 patient underwent additional biopsies of the right upper lip and left forearm. Denies personal history of Merkel cell.   Patient is to possibly undergo a revision of his left hip. 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 Recio surgery & Dr. Richardson via radiation therapy. Eventually the cancer metastasized to the left hip and a yaron was placed for stabilization. Has yaron replacement scheduled with Dr. Blackwell. Currently taking Keytruda, under the direction of Dr. Orozco.   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  Path reviewed, shown to be BCCs with negative margins. He explains that he developed C.Diff and has been on vancomycin. His diarrhea has not improved and I encouraged him to follow up with his PCP.. His wife expressed concern that Mr. Shaw is unsure about what he should eat and has been frequently running to the bathroom.   09/30/24: Pt presents today for an acute visit for left lower leg swelling. Pt states that the swelling has been occurring intermediate after his post-op visit with Dr. Leroy. Denies any fevers, left lower leg pain, or any change in temperature. Continues taking Eliquis. Endorses increase fatigue and muscle weakness "lately"

## 2024-09-30 NOTE — HISTORY OF PRESENT ILLNESS
[de-identified] : Mr. 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 Referred by Dr. Richelle Bravo with [facility].  Per report, patient first noted the abnormal lesion about 3-4 months ago. In first appearance, the lesion appeared like an open sore. Since patient has first identified the lesion, it has changed in appearance, becoming more solid and scaly with time. Occasionally the lesion produces a mal odor, especially when a bandage remains for more than 2 days.   Today, Mr. 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 Patient is currently on Eliquis for a stroke in March. On 06/14/2024 patient underwent additional biopsies of the right upper lip and left forearm. Denies personal history of Merkel cell.   Patient is to possibly undergo a revision of his left hip. 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 Recio surgery & Dr. Richardson via radiation therapy. Eventually the cancer metastasized to the left hip and a yaron was placed for stabilization. Has yaron replacement scheduled with Dr. Blackwell. Currently taking Keytruda, under the direction of Dr. Orozco.   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  Path reviewed, shown to be BCCs with negative margins. He explains that he developed C.Diff and has been on vancomycin. His diarrhea has not improved and I encouraged him to follow up with his PCP.. His wife expressed concern that Mr. Shaw is unsure about what he should eat and has been frequently running to the bathroom.   09/30/24: Pt presents today for an acute visit for left lower leg swelling. Pt states that the swelling has been occurring intermediate after his post-op visit with Dr. Leroy. Denies any fevers, left lower leg pain, or any change in temperature. Continues taking Eliquis. Endorses increase fatigue and muscle weakness "lately"   none

## 2024-09-30 NOTE — REVIEW OF SYSTEMS
[Negative] : Endocrine [FreeTextEntry2] : see hpi  [de-identified] : swelling left lower extremity, ambulates with assistive device  [de-identified] : s/p wle left lower extremity-posterior

## 2024-09-30 NOTE — PHYSICAL EXAM
[Normal] : oriented to person, place and time, with appropriate affect [de-identified] : ambulatory with walker [de-identified] : Left lower extremity noted proximal to surgical site. Left >right (see uploaded images), able to move distal digits bilateral, left foot > right capillary refill, >3 secs [de-identified] : s/p WLE  of Left posterior tibial region with graft healing well. no evidence of REEDA.

## 2024-09-30 NOTE — PHYSICAL EXAM
[Normal] : oriented to person, place and time, with appropriate affect [de-identified] : ambulatory with walker [de-identified] : Left lower extremity noted proximal to surgical site. Left >right (see uploaded images), able to move distal digits bilateral, left foot > right capillary refill, >3 secs [de-identified] : s/p WLE  of Left posterior tibial region with graft healing well. no evidence of REEDA.

## 2024-09-30 NOTE — REVIEW OF SYSTEMS
[Negative] : Endocrine [FreeTextEntry2] : see hpi  [de-identified] : swelling left lower extremity, ambulates with assistive device  [de-identified] : s/p wle left lower extremity-posterior

## 2024-10-01 ENCOUNTER — APPOINTMENT (OUTPATIENT)
Dept: ULTRASOUND IMAGING | Facility: CLINIC | Age: 79
End: 2024-10-01
Payer: MEDICARE

## 2024-10-01 ENCOUNTER — OUTPATIENT (OUTPATIENT)
Dept: OUTPATIENT SERVICES | Facility: HOSPITAL | Age: 79
LOS: 1 days | End: 2024-10-01
Payer: MEDICARE

## 2024-10-01 DIAGNOSIS — Z90.49 ACQUIRED ABSENCE OF OTHER SPECIFIED PARTS OF DIGESTIVE TRACT: Chronic | ICD-10-CM

## 2024-10-01 DIAGNOSIS — R60.0 LOCALIZED EDEMA: ICD-10-CM

## 2024-10-01 DIAGNOSIS — Z95.5 PRESENCE OF CORONARY ANGIOPLASTY IMPLANT AND GRAFT: Chronic | ICD-10-CM

## 2024-10-01 DIAGNOSIS — Z98.1 ARTHRODESIS STATUS: Chronic | ICD-10-CM

## 2024-10-01 PROCEDURE — 93971 EXTREMITY STUDY: CPT | Mod: 26

## 2024-10-04 ENCOUNTER — APPOINTMENT (OUTPATIENT)
Dept: HEMATOLOGY ONCOLOGY | Facility: CLINIC | Age: 79
End: 2024-10-04
Payer: MEDICARE

## 2024-10-04 VITALS
BODY MASS INDEX: 25.03 KG/M2 | DIASTOLIC BLOOD PRESSURE: 79 MMHG | SYSTOLIC BLOOD PRESSURE: 124 MMHG | HEART RATE: 87 BPM | WEIGHT: 178.77 LBS | TEMPERATURE: 97.2 F | OXYGEN SATURATION: 93 % | HEIGHT: 71 IN

## 2024-10-04 DIAGNOSIS — C77.0 SECONDARY AND UNSPECIFIED MALIGNANT NEOPLASM OF LYMPH NODES OF HEAD, FACE AND NECK: ICD-10-CM

## 2024-10-04 DIAGNOSIS — C79.51 SECONDARY MALIGNANT NEOPLASM OF BONE: ICD-10-CM

## 2024-10-04 DIAGNOSIS — C80.1 SECONDARY AND UNSPECIFIED MALIGNANT NEOPLASM OF LYMPH NODES OF HEAD, FACE AND NECK: ICD-10-CM

## 2024-10-04 PROCEDURE — 99214 OFFICE O/P EST MOD 30 MIN: CPT

## 2024-10-07 NOTE — ASSESSMENT
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 Foundation: TMB 99 Muts/Mb, MS- stable  Patient presented to ENT, Dr. Alex Boston, on 1/18/22 c/o right neck swelling for 5 weeks. On exam, swelling in the right parotid gland approx. 3 x 3 cm firm, nontender was noted, no enlarged LN. Subsequently MRI of neck on 1/24/22 revealed irregularly peripherally enhancing mass in the superficial lobe of the right parotid gland measuring 2.7 x 2.3 cm in AP.  Right parotid FNA biopsy performed on 2/1/22 at  consistent with carcinoma with squamous differentiation,Cytology; clusters and single scattered atypical squamous cells with prominent nucleoli, irregular nuclear membranes, irregular chromatin patterns, anisonucleosis and keratinization. Primary versus metastatic carcinoma.  Patient s/p Parotidectomy with facial nerve dissection and right neck dissection on 3/3/22 by Dr. Misbah Recio. Pathology showed 22 lymph nodes negative for metastatic carcinoma. Completed radiation on 6/1/22 with Dr. Rowe  Plan: - Given the TMB is 99 treated with single agent pembro - Pembrolizumab 200mg q 3 weeks. received 1 dose of Keytruda on 2/2/23, went to rehab however was too weak for rehab and was discharged 2/10/23 (enrolled on 12/14/23), was in a lot of pain at home and enrolled in hospice on 2/17/23, then slowly started increasing appetite, started ambulating more with walker, continued left hip pain, diffused body pain started to subside. Discharged from hospice on 6/12/23.  -Repeat PET SCAN On 6/9/23 with improvement in Disease  - Restarted Keytruda 200 q 3 weeks on 6/19/23 with denosumab -Patient still with right sided jaw swelling- improving, no pain. Evaluated by Dr. Boston, no indication for further work up- continue to observe. US on 7/19/23 reported No definable abnormality can be seen involving the area of swelling involving the right cheek. - TSH on 6/19/23 was 11.90, started patient on Levothyroxine 25mch on 6/20,  increased to 50 mcg on 8/4/23. Patient is compliant. - Patient's cortisol level is low. No associated complaints. Cortisol levels declined in June prior to re-initiation of immunotherapy.  -PET Scan done on 1/10/24: 1. Hypermetabolism associated with unchanged cortical destruction in left lesser trochanter is more extensive and increased in intensity, concerning for recurrent disease. 2. The remainder of the study demonstrates no evidence of FDG-avid disease. No new lesion.  -Given the above scan, patient was recommended to see radiation oncologist s/p palliative re-radiation to left hip, proximal femur 5#  - completed on 1/29/30 with Dr. Richardson.   - On Oxycodone  Extampa BID, 9 mg bid  per PAIN MANAGEMENT ( Dr Lowe) with short acting oxycodone for break through pain, Patient reports that he is still in pain Seeing Pain management later today  -Completed RT to the left femur end of January -Admitted to [facility] on 3/27/24 with CVA, infarct in left posterior frontal lobe -Planned for possible hip surgery with Dr. Blackwell was scheduled for 3/29, however delayed for ~6 months per Dr. Blackwell due to hospitalization for CVA. Has follow up this month -Patient on 10 mg prednisone per Dr Kleiner, RHEUM, dose reduced to 7.5 mg daily in July then further reduced to 5 mg daily - 6/5/24 PET/CT: 1.  Quantitatively stable uptake at the LEFT lesser trochanter which appears to encompass a smaller area than on the prior exam. No evidence of recurrent disease in the head and neck. Stable appearance of the thyroid. Otherwise, no evidence of FDG-avid malignancy. - No contraindications for spinal procedure with Dr. Lowe from an oncological standpoint -Diagnosed with C. Diff at last visit, as per patient last stool test showed C.Diff has resolved. No diarrhea  -Treatment has been delayed since 7/15/24 2/2 C.Diff, pt resumed Keytruda on 9/27/24 - PET/CT ordered - F/u in 4 weeks   Skin lesion - Medial Distal Left lower leg lesion + basal cell carcinoma with some neuroendocrine features.  -   s/p  WLE, / closure with Plastics on 7/26/24. Path showing BCC, negative margins.   Pain medication Dr. Lowe [phone] phone, [phone] fax [Referred to Palliative/Pain management] : Referred to Palliative/Pain management [AdvancecareDate] : 6/12/24

## 2024-10-07 NOTE — HISTORY OF PRESENT ILLNESS
[de-identified] : JOSIANE SHAW is a 77 year male with PMHX of CAD s/p stents HTN, GERD, fibromyalgia, h/o polymyalgia rheumatica, Parotid ca s/p dissection/RT, cigar smoker ( 1-4 cigars/day) presents for initial consultation for metastatic carcinoma \par  \par  Patient presented to ENT, Dr. Alex Boston, on 1/18/22 c/o right neck swelling for 5 weeks. On exam, swelling int he right parotid gland approx. 3 x 3 cm firm, nontender was noted, no enlarged LN.  \par  MRI of neck was ordered and performed on 1/24/22. Scan revealed irregularly peripherally enhancing mass in the superficial lobe of the right parotid gland measuring 2.7 x 2.3 cm in AP . There is some strand-like internal enhancement as well. There is no left parotid mass.\par  \par  \par  Right parotid FNA biopsy performed on 2/1/22 at . Pathology demonstrated cells positive for malignancy (Mohler Class IV), Consistent with carcinoma with squamous differentiation,Cytology slide and cell block show clusters and single scattered atypical squamous cells with prominent nucleoli, irregular nuclear membranes, irregular chromatin patterns, anisonucleosis and keratinization. Primary versus metastatic carcinoma.\par  \par  Subsequent PET/CT on 2/13/22 reported a rim of FDG activity surrounding a right intraparotid lesion (SUV 6.3, 3.1 x 2.3 cm). Decreased FDG activity centrally may be secondary to tumor necrosis. There is no other focal abnormal  FDG activity identified in the head and neck. There are no lytic or blastic lesions.\par  \par  \par  Patient referred to Head and Neck surgeon, Dr. Misbah Recio, on 2/16/22. Patient s/p Parotidectomy with facial nerve dissection and right neck dissection on 3/3/22. 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 Carcinoma focally very close to (less than 0.1 mm) inked circumferential margin, 4 intraparotid lymph nodes negative for metastatic carcinoma, 3 Lymph nodes, right level 1B- negative for metastatic carcinoma, Submandibular gland negative for carcinoma\par  Lymph nodes, right levels 2 and 3, neck dissection - 15 lymph nodes negative for metastatic carcinoma\par  \par  \par  Patient referred to Dr. Richardson for post op RT. Patient completed radiation on 6/1/22. \par  \par  Patient presented to Orthopedist on 10/6/22 for ongoing left hip pain for the past 4 years. He has been following Rheumatology, Dr. Kleiner, for pain. Pain has been progressive affect quality of life. \par  MRI of hip was performed  on 10/10/22 revealing  a fat-containing mass centered within the tensor fascia manjula muscle measuring 4 x 3.1 x 7.2 cm and a mass is seen centered within the lesser trochanter measuring 2.9 x 3.4 x 4.4 cm. There is overlying periosteal edema and cortical thinning\par  \par  Subsequent PET/CT on 10/14/22 visualized osseous structures reveal an intramedullary lytic 3 cm lesion along the left femoral intertrochanteric region max SUV 10.6.\par  Right parotid gland demonstrates interval resection of the hypermetabolic mass as well as right neck prominent soft tissue postsurgical low level uptake. No evidence of atypical lymph node activity identified.\par  \par  Biopsy of left proximal femur lesion on 10/27/2022. Pathology reported metastatic carcinoma,the tumor shows squamous differentiation on H T E. Performed immunostains show that the tumor cells are positive for AE1/AE3, CK5/ and p40. 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 Ambulates with cane.  S/p received 1 dose of Keytruda on 2/2/23, went to rehab however was too weak for rehab and was discharged 2/10/23 (enrolled on 12/14/23), was in a lot of pain at home and enrolled in hospice on 2/17/23, then slowly started increasing appetite, started ambulating more with walker, continued left hip pain, diffused body pain started to subside.  Continues on morphine 30 mg BID, and for breakthrough pain takes oxycodone prn - has not required since 3/2023  States is feeling better every day, tries to walk outside everyday  Reports continued swelling and redness on R cheek area of face,  Pt/family planning on discharging from hospice   2/3/23 Ca+ 13.6   6/14/23: Patient here for follow up  Had recent PET scan on 6/9/23  Patient discharged from hospice 6/12/23  Has dentures both upper and lower  Has Dermatologist, Dr. Wilkerson     PET 6/9/23: Utilized the dedicated head and neck series with image numbers from this series. Physiologic FDG activity in visualized brain. -Resolution of hypermetabolic soft tissue abutting the right masseter muscle present on the prior PET/CT. -S/P right parotidectomy with no focal abnormal activity within the surgical bed. -Foci of increased activity localizing to the the right ear showing SUV 3.6 at the top of its helix and SUV 4.7 within its inferior lobule; these are nonspecific activity but should be correlated with direct clinical examination. -Left ear shows focus in its posterior surface  showing SUV 3.7 that is difficult to correlate with on low-dose CT. Addition focus of cutaneous activity could also be seen behind the left ear  showing SUV 3.0. Please evaluate these areas. -Nonspecific activity in the thyroid gland showing SUV 5.4 in the right and 5.6 in the left.  Few muscles with increased activity around the neck and upper torso and pelvic region due to exertional strain. -Interval improvement in the left femoral bone currently smaller foci of intense activity with cortical bone reconstitution such as seen medially localizing to a not fully reconstituted lytic lesion (image 241) with SUV 10.0; same area previously had larger extent of lytic changes and activity with as high as SUV 9.3.  Similarly, soft tissue changes laterally also improved with smaller remaining tissue and less intense activity (image 212). Reassess on follow up for continued improvement or stability. -Interval resolution of FDG avid bone lesions with increased sclerosis in T8, T12, and in the right side of the sacrum. Marked improvement in the lytic lesion previously seen in the right acetabulum with reconstruction of some of its cortical defects currently with nonspecific patchy activity of SUV 2.4 (previously large lytic lesion with soft tissue component and SUV 7.2).   7/17/23: Patient here for follow up  Resumed Keytruda on 6/19/23 Admits Fatigue Patient right sided jaw swelling, no pain. Has an apt with  Dr. Boston this afternoon  Reports mild nausea Reports weight gain  States left hip pain stable , on Morphine ER  30 mg BID. Patient hopes to be weaned off medication  Denies fever/chills, rash, mouth sores, nausea, vomiting, diarrhea,constipation,  abdominal pain, bleeding, easy bruising or visual changes, chest pain, cough, SOB or NOONAN,  neuropathy, LE edema.  8/18/23:  Patient here for follow up  Resumed Keytruda on 6/19/23, next tx scheduled 8/21/23 Admits Fatigue Admits chills/ feeling cold once a week, likely from hypothyroid.   Admits intermittent decreased appetite, weight is stable  Patient still with right sided jaw swelling- improving, no pain. Evaluated by Dr. Boston, no indication for further work up- continue to observe. US on 7/19/23 reported No definable abnormality can be seen involving the area of swelling involving the right cheek. Reports nausea, not taking antinausea med States left hip pain stable , on Morphine ER  30 mg BID. Patient hopes to be weaned off medication  Dr. Kleiner has him on Prednisone 10 mg daily  Denies fever, rash, mouth sores, vomiting, diarrhea,constipation, abdominal pain, bleeding, easy bruising or visual changes, chest pain, cough, SOB or NOONAN,  neuropathy, LE edema.  9/29/23: Patient is here for a f/u with his wife He has been on Keytruda therapy, next scheduled 10/2/23 He endorses GI disturbance from therapy and increased skin rash on his hands He experienced left-sided hip pain after playing golf on Labor Day, with no improvement. He ambulates today with a cane Patient sleeps well and hasn't felt tired He takes Morphine 2x/day  PETCT scans show improved response to immunotherapy  12/29/23:  Patient here for f/u Next due for Keytruda on 1/16/23 On levothyroxine TSH high  T 4 WNL   Due for PET scan WIll order now Has some nausea  in AM Takes Ondansetron for nausea  1/30/24: Patient here for f/u s/p Keytruda on 1/16/24. Next treatment on 2/6/24. Patient reports b/l shoulder pain, constant fatigue, and LE swelling. Patient has 24-hour aids at home. He likes to go on walks on his own without the aids.  Wife expresses her concern for prolonged weight bearing and exercise.  He is currently taking long acting Oxycodone  Extampa BID, 9 mg per PAIN MANAGEMENT ( Dr Lowe) . States diffuse pain is more severe after dose reduction a few weeks ago.  Patient waiting for follow up with Dr. Lora Turner, orthopedic surgery, for hardware complications from previous hip replacement.   Reviewed PET Scan from 1/10/24: Head/Neck: -Postsurgical changes of right parotidectomy, unchanged.  -No abnormal radiotracer activity in the surgical bed. -No enlarged or FDG-avid cervical lymph ode.  BONES/SOFT TISSUES:  -Patient is status post left femur ORIF with transcervical screw and intramedullary yaron, as seen on prior studies. There is cortical destruction involving the left proximal femur and lesser trochanter, similar in appearance on CT, and demonstrating interval increase in extent and intensity of hypermetabolism in the left lesser trochanter (SUV 15.4; image 248; previous SUV 12.9). A photopenic lucent lesion in the anterior aspect of the T8 vertebral body is unchanged in (image 109). Physiologic FDG activity in remainder of visualized osseous structures.  3/6/24: Patient presents for follow up  Last Keytruda on 2/27/24 Completed RT to left femur end of January Planning for possible hip surgery with Dr. Blackwell, pending additional imaging  Reports more pronounce lower back pain, suspects gait has changed due to hip pain likely attributing to back pain. He continues to follow Dr. Lowe  Leg swelling stable L>R Patient reports mild fatigue, appetite is good Patient reports open lesion on left forearm and left calf. States lesion on calf has been there for years, was following Dermatology. 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 Blackwell, possibly in 2 months Currently on Eliquis and Aspirin Per Dr. Kleiner RHEUM, on 10 mg prednisone, when feeling better, 1.5 mg Reports increased back pain, especially when standing still, occasional pain Pt reports hip is not bothering him as much  5/13/24: Patient presents for follow up  Last tx given on 5/10/24 Dermatologist, Dr. Henderson,  started patient on Efudex cream  Patient with persistent lower back pain following pain management, taking  Xtampa ER 18 mg BID and Oxycodone 10 BID. MRI recently done at - will obtain records  PCP recently decreased Amlodipine dose, suspect may be causing LE swelling- have not seen improvement Neurology cleared patient for possible hip surgery 3 month after CVA  6/12/24: Patient presents for follow up s/p Keytruda on 5/31/24 Per Dr. Henderson, will be sent to Orthopedic surgeon Dr. Bravo However, is seeing SURG ON[facility], unsure of name, however their appointment is later next week Pt reports some back pain that goes away when sitting down He reports he is getting a spine procedure with Dr. Lowe and needs clearance  6/6/24 CBC:  WBC 7.22K, HGB 12.8g, HCT 39.6%, PLT 217K, ANC 5.36  7/8/24: Patient presents for follow up Patient scheduled for WLE of Left LE lesion on 7/26/24 Patient reports left leg swelling for the past week with calf pain.  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  s/p  WLE, / closure with Plastics on 7/26/24 Pending path ON 7/30 he started having diarrhea and wife called DR Kelley office and he came off antibiotics on 7/31 due to potential antibiotic induced diarrhea DIarrhea 2-3 x a days   ++ Hemal watery  8/2/24, more formed since this am  IS due for keytruda today  WIll delay due to diarhhea however low index of suspicion that it is IO mediated  Taking imodium intermittently has not taken for the past 2 days    10/4/24: Patient presents for follow up Patient diagnosed with C. Diff at last visit, C.Diff resolved with last stool sample Treatment has been delayed since 7/15/24 2/2 C.Diff, pt resumed Keytruda on 9/27/24 Reports some foods do not agree with stomach but otherwise denies diarrhea  Patient states lately he feels "off balance", denies fall.  He feels may be related to left hip. Currently using a cane Patient admits nausea int he morning, taking Zofran 4 mg with minimal relief

## 2024-10-07 NOTE — HISTORY OF PRESENT ILLNESS
[de-identified] : JOSIANE SHAW is a 77 year male with PMHX of CAD s/p stents HTN, GERD, fibromyalgia, h/o polymyalgia rheumatica, Parotid ca s/p dissection/RT, cigar smoker ( 1-4 cigars/day) presents for initial consultation for metastatic carcinoma \par  \par  Patient presented to ENT, Dr. Alex Boston, on 1/18/22 c/o right neck swelling for 5 weeks. On exam, swelling int he right parotid gland approx. 3 x 3 cm firm, nontender was noted, no enlarged LN.  \par  MRI of neck was ordered and performed on 1/24/22. Scan revealed irregularly peripherally enhancing mass in the superficial lobe of the right parotid gland measuring 2.7 x 2.3 cm in AP . There is some strand-like internal enhancement as well. There is no left parotid mass.\par  \par  \par  Right parotid FNA biopsy performed on 2/1/22 at . Pathology demonstrated cells positive for malignancy (Parks Class IV), Consistent with carcinoma with squamous differentiation,Cytology slide and cell block show clusters and single scattered atypical squamous cells with prominent nucleoli, irregular nuclear membranes, irregular chromatin patterns, anisonucleosis and keratinization. Primary versus metastatic carcinoma.\par  \par  Subsequent PET/CT on 2/13/22 reported a rim of FDG activity surrounding a right intraparotid lesion (SUV 6.3, 3.1 x 2.3 cm). Decreased FDG activity centrally may be secondary to tumor necrosis. There is no other focal abnormal  FDG activity identified in the head and neck. There are no lytic or blastic lesions.\par  \par  \par  Patient referred to Head and Neck surgeon, Dr. Misbah Recio, on 2/16/22. Patient s/p Parotidectomy with facial nerve dissection and right neck dissection on 3/3/22. 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 Carcinoma focally very close to (less than 0.1 mm) inked circumferential margin, 4 intraparotid lymph nodes negative for metastatic carcinoma, 3 Lymph nodes, right level 1B- negative for metastatic carcinoma, Submandibular gland negative for carcinoma\par  Lymph nodes, right levels 2 and 3, neck dissection - 15 lymph nodes negative for metastatic carcinoma\par  \par  \par  Patient referred to Dr. Richardson for post op RT. Patient completed radiation on 6/1/22. \par  \par  Patient presented to Orthopedist on 10/6/22 for ongoing left hip pain for the past 4 years. He has been following Rheumatology, Dr. Kleiner, for pain. Pain has been progressive affect quality of life. \par  MRI of hip was performed  on 10/10/22 revealing  a fat-containing mass centered within the tensor fascia manjula muscle measuring 4 x 3.1 x 7.2 cm and a mass is seen centered within the lesser trochanter measuring 2.9 x 3.4 x 4.4 cm. There is overlying periosteal edema and cortical thinning\par  \par  Subsequent PET/CT on 10/14/22 visualized osseous structures reveal an intramedullary lytic 3 cm lesion along the left femoral intertrochanteric region max SUV 10.6.\par  Right parotid gland demonstrates interval resection of the hypermetabolic mass as well as right neck prominent soft tissue postsurgical low level uptake. No evidence of atypical lymph node activity identified.\par  \par  Biopsy of left proximal femur lesion on 10/27/2022. Pathology reported metastatic carcinoma,the tumor shows squamous differentiation on H T E. Performed immunostains show that the tumor cells are positive for AE1/AE3, CK5/ and p40. 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 Ambulates with cane.  S/p received 1 dose of Keytruda on 2/2/23, went to rehab however was too weak for rehab and was discharged 2/10/23 (enrolled on 12/14/23), was in a lot of pain at home and enrolled in hospice on 2/17/23, then slowly started increasing appetite, started ambulating more with walker, continued left hip pain, diffused body pain started to subside.  Continues on morphine 30 mg BID, and for breakthrough pain takes oxycodone prn - has not required since 3/2023  States is feeling better every day, tries to walk outside everyday  Reports continued swelling and redness on R cheek area of face,  Pt/family planning on discharging from hospice   2/3/23 Ca+ 13.6   6/14/23: Patient here for follow up  Had recent PET scan on 6/9/23  Patient discharged from hospice 6/12/23  Has dentures both upper and lower  Has Dermatologist, Dr. Wilkerson     PET 6/9/23: Utilized the dedicated head and neck series with image numbers from this series. Physiologic FDG activity in visualized brain. -Resolution of hypermetabolic soft tissue abutting the right masseter muscle present on the prior PET/CT. -S/P right parotidectomy with no focal abnormal activity within the surgical bed. -Foci of increased activity localizing to the the right ear showing SUV 3.6 at the top of its helix and SUV 4.7 within its inferior lobule; these are nonspecific activity but should be correlated with direct clinical examination. -Left ear shows focus in its posterior surface  showing SUV 3.7 that is difficult to correlate with on low-dose CT. Addition focus of cutaneous activity could also be seen behind the left ear  showing SUV 3.0. Please evaluate these areas. -Nonspecific activity in the thyroid gland showing SUV 5.4 in the right and 5.6 in the left.  Few muscles with increased activity around the neck and upper torso and pelvic region due to exertional strain. -Interval improvement in the left femoral bone currently smaller foci of intense activity with cortical bone reconstitution such as seen medially localizing to a not fully reconstituted lytic lesion (image 241) with SUV 10.0; same area previously had larger extent of lytic changes and activity with as high as SUV 9.3.  Similarly, soft tissue changes laterally also improved with smaller remaining tissue and less intense activity (image 212). Reassess on follow up for continued improvement or stability. -Interval resolution of FDG avid bone lesions with increased sclerosis in T8, T12, and in the right side of the sacrum. Marked improvement in the lytic lesion previously seen in the right acetabulum with reconstruction of some of its cortical defects currently with nonspecific patchy activity of SUV 2.4 (previously large lytic lesion with soft tissue component and SUV 7.2).   7/17/23: Patient here for follow up  Resumed Keytruda on 6/19/23 Admits Fatigue Patient right sided jaw swelling, no pain. Has an apt with  Dr. Boston this afternoon  Reports mild nausea Reports weight gain  States left hip pain stable , on Morphine ER  30 mg BID. Patient hopes to be weaned off medication  Denies fever/chills, rash, mouth sores, nausea, vomiting, diarrhea,constipation,  abdominal pain, bleeding, easy bruising or visual changes, chest pain, cough, SOB or NOONAN,  neuropathy, LE edema.  8/18/23:  Patient here for follow up  Resumed Keytruda on 6/19/23, next tx scheduled 8/21/23 Admits Fatigue Admits chills/ feeling cold once a week, likely from hypothyroid.   Admits intermittent decreased appetite, weight is stable  Patient still with right sided jaw swelling- improving, no pain. Evaluated by Dr. Boston, no indication for further work up- continue to observe. US on 7/19/23 reported No definable abnormality can be seen involving the area of swelling involving the right cheek. Reports nausea, not taking antinausea med States left hip pain stable , on Morphine ER  30 mg BID. Patient hopes to be weaned off medication  Dr. Kleiner has him on Prednisone 10 mg daily  Denies fever, rash, mouth sores, vomiting, diarrhea,constipation, abdominal pain, bleeding, easy bruising or visual changes, chest pain, cough, SOB or NOONAN,  neuropathy, LE edema.  9/29/23: Patient is here for a f/u with his wife He has been on Keytruda therapy, next scheduled 10/2/23 He endorses GI disturbance from therapy and increased skin rash on his hands He experienced left-sided hip pain after playing golf on Labor Day, with no improvement. He ambulates today with a cane Patient sleeps well and hasn't felt tired He takes Morphine 2x/day  PETCT scans show improved response to immunotherapy  12/29/23:  Patient here for f/u Next due for Keytruda on 1/16/23 On levothyroxine TSH high  T 4 WNL   Due for PET scan WIll order now Has some nausea  in AM Takes Ondansetron for nausea  1/30/24: Patient here for f/u s/p Keytruda on 1/16/24. Next treatment on 2/6/24. Patient reports b/l shoulder pain, constant fatigue, and LE swelling. Patient has 24-hour aids at home. He likes to go on walks on his own without the aids.  Wife expresses her concern for prolonged weight bearing and exercise.  He is currently taking long acting Oxycodone  Extampa BID, 9 mg per PAIN MANAGEMENT ( Dr Lowe) . States diffuse pain is more severe after dose reduction a few weeks ago.  Patient waiting for follow up with Dr. Lora Turner, orthopedic surgery, for hardware complications from previous hip replacement.   Reviewed PET Scan from 1/10/24: Head/Neck: -Postsurgical changes of right parotidectomy, unchanged.  -No abnormal radiotracer activity in the surgical bed. -No enlarged or FDG-avid cervical lymph ode.  BONES/SOFT TISSUES:  -Patient is status post left femur ORIF with transcervical screw and intramedullary yaron, as seen on prior studies. There is cortical destruction involving the left proximal femur and lesser trochanter, similar in appearance on CT, and demonstrating interval increase in extent and intensity of hypermetabolism in the left lesser trochanter (SUV 15.4; image 248; previous SUV 12.9). A photopenic lucent lesion in the anterior aspect of the T8 vertebral body is unchanged in (image 109). Physiologic FDG activity in remainder of visualized osseous structures.  3/6/24: Patient presents for follow up  Last Keytruda on 2/27/24 Completed RT to left femur end of January Planning for possible hip surgery with Dr. Blackwell, pending additional imaging  Reports more pronounce lower back pain, suspects gait has changed due to hip pain likely attributing to back pain. He continues to follow Dr. Lowe  Leg swelling stable L>R Patient reports mild fatigue, appetite is good Patient reports open lesion on left forearm and left calf. States lesion on calf has been there for years, was following Dermatology. 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 Blackwell, possibly in 2 months Currently on Eliquis and Aspirin Per Dr. Kleiner RHEUM, on 10 mg prednisone, when feeling better, 1.5 mg Reports increased back pain, especially when standing still, occasional pain Pt reports hip is not bothering him as much  5/13/24: Patient presents for follow up  Last tx given on 5/10/24 Dermatologist, Dr. Henderson,  started patient on Efudex cream  Patient with persistent lower back pain following pain management, taking  Xtampa ER 18 mg BID and Oxycodone 10 BID. MRI recently done at - will obtain records  PCP recently decreased Amlodipine dose, suspect may be causing LE swelling- have not seen improvement Neurology cleared patient for possible hip surgery 3 month after CVA  6/12/24: Patient presents for follow up s/p Keytruda on 5/31/24 Per Dr. Henderson, will be sent to Orthopedic surgeon Dr. Bravo However, is seeing SURG ONC, unsure of name, however their appointment is later next week Pt reports some back pain that goes away when sitting down He reports he is getting a spine procedure with Dr. Lowe and needs clearance  6/6/24 CBC:  WBC 7.22K, HGB 12.8g, HCT 39.6%, PLT 217K, ANC 5.36  7/8/24: Patient presents for follow up Patient scheduled for WLE of Left LE lesion on 7/26/24 Patient reports left leg swelling for the past week with calf pain.  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  s/p  WLE, / closure with Plastics on 7/26/24 Pending path ON 7/30 he started having diarrhea and wife called DR Kelley office and he came off antibiotics on 7/31 due to potential antibiotic induced diarrhea DIarrhea 2-3 x a days   ++ Hemal watery  8/2/24, more formed since this am  IS due for keytruda today  WIll delay due to diarhhea however low index of suspicion that it is IO mediated  Taking imodium intermittently has not taken for the past 2 days    10/4/24: Patient presents for follow up Patient diagnosed with C. Diff at last visit, C.Diff resolved with last stool sample Treatment has been delayed since 7/15/24 2/2 C.Diff, pt resumed Keytruda on 9/27/24 Reports some foods do not agree with stomach but otherwise denies diarrhea  Patient states lately he feels "off balance", denies fall.  He feels may be related to left hip. Currently using a cane Patient admits nausea int he morning, taking Zofran 4 mg with minimal relief

## 2024-10-07 NOTE — ASSESSMENT
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 Foundation: TMB 99 Muts/Mb, MS- stable  Patient presented to ENT, Dr. Alex Boston, on 1/18/22 c/o right neck swelling for 5 weeks. On exam, swelling in the right parotid gland approx. 3 x 3 cm firm, nontender was noted, no enlarged LN. Subsequently MRI of neck on 1/24/22 revealed irregularly peripherally enhancing mass in the superficial lobe of the right parotid gland measuring 2.7 x 2.3 cm in AP.  Right parotid FNA biopsy performed on 2/1/22 at  consistent with carcinoma with squamous differentiation,Cytology; clusters and single scattered atypical squamous cells with prominent nucleoli, irregular nuclear membranes, irregular chromatin patterns, anisonucleosis and keratinization. Primary versus metastatic carcinoma.  Patient s/p Parotidectomy with facial nerve dissection and right neck dissection on 3/3/22 by Dr. Misbah Recio. Pathology showed 22 lymph nodes negative for metastatic carcinoma. Completed radiation on 6/1/22 with Dr. Rowe  Plan: - Given the TMB is 99 treated with single agent pembro - Pembrolizumab 200mg q 3 weeks. received 1 dose of Keytruda on 2/2/23, went to rehab however was too weak for rehab and was discharged 2/10/23 (enrolled on 12/14/23), was in a lot of pain at home and enrolled in hospice on 2/17/23, then slowly started increasing appetite, started ambulating more with walker, continued left hip pain, diffused body pain started to subside. Discharged from hospice on 6/12/23.  -Repeat PET SCAN On 6/9/23 with improvement in Disease  - Restarted Keytruda 200 q 3 weeks on 6/19/23 with denosumab -Patient still with right sided jaw swelling- improving, no pain. Evaluated by Dr. Boston, no indication for further work up- continue to observe. US on 7/19/23 reported No definable abnormality can be seen involving the area of swelling involving the right cheek. - TSH on 6/19/23 was 11.90, started patient on Levothyroxine 25mch on 6/20,  increased to 50 mcg on 8/4/23. Patient is compliant. - Patient's cortisol level is low. No associated complaints. Cortisol levels declined in June prior to re-initiation of immunotherapy.  -PET Scan done on 1/10/24: 1. Hypermetabolism associated with unchanged cortical destruction in left lesser trochanter is more extensive and increased in intensity, concerning for recurrent disease. 2. The remainder of the study demonstrates no evidence of FDG-avid disease. No new lesion.  -Given the above scan, patient was recommended to see radiation oncologist s/p palliative re-radiation to left hip, proximal femur 5#  - completed on 1/29/30 with Dr. Richardson.   - On Oxycodone  Extampa BID, 9 mg bid  per PAIN MANAGEMENT ( Dr Lowe) with short acting oxycodone for break through pain, Patient reports that he is still in pain Seeing Pain management later today  -Completed RT to the left femur end of January -Admitted to [facility] on 3/27/24 with CVA, infarct in left posterior frontal lobe -Planned for possible hip surgery with Dr. Blackwell was scheduled for 3/29, however delayed for ~6 months per Dr. Blackwell due to hospitalization for CVA. Has follow up this month -Patient on 10 mg prednisone per Dr Kleiner, RHEUM, dose reduced to 7.5 mg daily in July then further reduced to 5 mg daily - 6/5/24 PET/CT: 1.  Quantitatively stable uptake at the LEFT lesser trochanter which appears to encompass a smaller area than on the prior exam. No evidence of recurrent disease in the head and neck. Stable appearance of the thyroid. Otherwise, no evidence of FDG-avid malignancy. - No contraindications for spinal procedure with Dr. Lowe from an oncological standpoint -Diagnosed with C. Diff at last visit, as per patient last stool test showed C.Diff has resolved. No diarrhea  -Treatment has been delayed since 7/15/24 2/2 C.Diff, pt resumed Keytruda on 9/27/24 - PET/CT ordered - F/u in 4 weeks   Skin lesion - Medial Distal Left lower leg lesion + basal cell carcinoma with some neuroendocrine features.  -   s/p  WLE, / closure with Plastics on 7/26/24. Path showing BCC, negative margins.   Pain medication Dr. Lowe [phone] phone, [phone] fax [Referred to Palliative/Pain management] : Referred to Palliative/Pain management [AdvancecareDate] : 6/12/24

## 2024-10-07 NOTE — PHYSICAL EXAM
[Normal] : affect appropriate [de-identified] : Ambulates with cane [de-identified] : Has dentures both upper and lower , right sided jaw swelling -non tender [de-identified] : Left leg slightly swollen  [de-identified] : open lesion on left forearm, ulcer like lesion on left calf

## 2024-10-07 NOTE — PHYSICAL EXAM
[Normal] : affect appropriate [de-identified] : Ambulates with cane [de-identified] : Has dentures both upper and lower , right sided jaw swelling -non tender [de-identified] : Left leg slightly swollen  [de-identified] : open lesion on left forearm, ulcer like lesion on left calf

## 2024-10-09 ENCOUNTER — NON-APPOINTMENT (OUTPATIENT)
Age: 79
End: 2024-10-09

## 2024-10-11 LAB
CORTICOSTEROID BINDING GLOBULIN RESULT: 1.9 MG/DL — SIGNIFICANT CHANGE UP
CORTIS F/TOTAL MFR SERPL: 4.4 % — SIGNIFICANT CHANGE UP
CORTIS SERPL-MCNC: 5.4 UG/DL — SIGNIFICANT CHANGE UP
CORTISOL, FREE RESULT: 0.24 UG/DL — SIGNIFICANT CHANGE UP

## 2024-10-13 ENCOUNTER — OUTPATIENT (OUTPATIENT)
Dept: OUTPATIENT SERVICES | Facility: HOSPITAL | Age: 79
LOS: 1 days | Discharge: HOME | End: 2024-10-13

## 2024-10-13 DIAGNOSIS — Z90.49 ACQUIRED ABSENCE OF OTHER SPECIFIED PARTS OF DIGESTIVE TRACT: Chronic | ICD-10-CM

## 2024-10-13 DIAGNOSIS — Z98.1 ARTHRODESIS STATUS: Chronic | ICD-10-CM

## 2024-10-13 DIAGNOSIS — C79.51 SECONDARY MALIGNANT NEOPLASM OF BONE: ICD-10-CM

## 2024-10-18 ENCOUNTER — RESULT REVIEW (OUTPATIENT)
Age: 79
End: 2024-10-18

## 2024-10-18 ENCOUNTER — APPOINTMENT (OUTPATIENT)
Age: 79
End: 2024-10-18

## 2024-10-18 LAB
BASOPHILS # BLD AUTO: 0 K/UL — SIGNIFICANT CHANGE UP (ref 0–0.2)
BASOPHILS NFR BLD AUTO: 0.4 % — SIGNIFICANT CHANGE UP (ref 0–2)
EOSINOPHIL # BLD AUTO: 0 K/UL — SIGNIFICANT CHANGE UP (ref 0–0.5)
EOSINOPHIL NFR BLD AUTO: 0 % — SIGNIFICANT CHANGE UP (ref 0–6)
HCT VFR BLD CALC: 40.1 % — SIGNIFICANT CHANGE UP (ref 39–50)
HGB BLD-MCNC: 12.7 G/DL — LOW (ref 13–17)
LYMPHOCYTES # BLD AUTO: 0.4 K/UL — LOW (ref 1–3.3)
LYMPHOCYTES # BLD AUTO: 5.7 % — LOW (ref 13–44)
MCHC RBC-ENTMCNC: 30.7 PG — SIGNIFICANT CHANGE UP (ref 27–34)
MCHC RBC-ENTMCNC: 31.8 G/DL — LOW (ref 32–36)
MCV RBC AUTO: 96.6 FL — SIGNIFICANT CHANGE UP (ref 80–100)
MONOCYTES # BLD AUTO: 0.4 K/UL — SIGNIFICANT CHANGE UP (ref 0–0.9)
MONOCYTES NFR BLD AUTO: 5.7 % — SIGNIFICANT CHANGE UP (ref 2–14)
NEUTROPHILS # BLD AUTO: 6 K/UL — SIGNIFICANT CHANGE UP (ref 1.8–7.4)
NEUTROPHILS NFR BLD AUTO: 88.1 % — HIGH (ref 43–77)
PLATELET # BLD AUTO: 418 K/UL — HIGH (ref 150–400)
RBC # BLD: 4.15 M/UL — LOW (ref 4.2–5.8)
RBC # FLD: 14.3 % — SIGNIFICANT CHANGE UP (ref 10.3–14.5)
WBC # BLD: 6.9 K/UL — SIGNIFICANT CHANGE UP (ref 3.8–10.5)
WBC # FLD AUTO: 6.9 K/UL — SIGNIFICANT CHANGE UP (ref 3.8–10.5)

## 2024-10-18 RX ORDER — PROCHLORPERAZINE MALEATE 10 MG/1
10 TABLET ORAL EVERY 6 HOURS
Qty: 120 | Refills: 2 | Status: ACTIVE | COMMUNITY
Start: 2024-10-18 | End: 1900-01-01

## 2024-10-19 LAB
ALBUMIN SERPL ELPH-MCNC: 3 G/DL — LOW (ref 3.3–5)
ALP SERPL-CCNC: 205 U/L — HIGH (ref 40–120)
ALT FLD-CCNC: 15 U/L — SIGNIFICANT CHANGE UP (ref 10–45)
ANION GAP SERPL CALC-SCNC: 8 MMOL/L — SIGNIFICANT CHANGE UP (ref 5–17)
AST SERPL-CCNC: 32 U/L — SIGNIFICANT CHANGE UP (ref 10–40)
BILIRUB SERPL-MCNC: 0.4 MG/DL — SIGNIFICANT CHANGE UP (ref 0.2–1.2)
BUN SERPL-MCNC: 17 MG/DL — SIGNIFICANT CHANGE UP (ref 7–23)
CALCIUM SERPL-MCNC: 8.8 MG/DL — SIGNIFICANT CHANGE UP (ref 8.4–10.5)
CHLORIDE SERPL-SCNC: 102 MMOL/L — SIGNIFICANT CHANGE UP (ref 96–108)
CO2 SERPL-SCNC: 27 MMOL/L — SIGNIFICANT CHANGE UP (ref 22–31)
CREAT SERPL-MCNC: 1.6 MG/DL — HIGH (ref 0.5–1.3)
EGFR: 44 ML/MIN/1.73M2 — LOW
GLUCOSE SERPL-MCNC: 96 MG/DL — SIGNIFICANT CHANGE UP (ref 70–99)
POTASSIUM SERPL-MCNC: 4.6 MMOL/L — SIGNIFICANT CHANGE UP (ref 3.5–5.3)
POTASSIUM SERPL-SCNC: 4.6 MMOL/L — SIGNIFICANT CHANGE UP (ref 3.5–5.3)
PROT SERPL-MCNC: 5.9 G/DL — LOW (ref 6–8.3)
SODIUM SERPL-SCNC: 137 MMOL/L — SIGNIFICANT CHANGE UP (ref 135–145)
T4 FREE SERPL-MCNC: 1 NG/DL — SIGNIFICANT CHANGE UP (ref 0.9–1.8)
T4 FREE+ TSH PNL SERPL: 24.4 UIU/ML — HIGH (ref 0.27–4.2)

## 2024-10-21 DIAGNOSIS — Z51.11 ENCOUNTER FOR ANTINEOPLASTIC CHEMOTHERAPY: ICD-10-CM

## 2024-10-21 DIAGNOSIS — E86.0 DEHYDRATION: ICD-10-CM

## 2024-10-21 DIAGNOSIS — C80.1 MALIGNANT (PRIMARY) NEOPLASM, UNSPECIFIED: ICD-10-CM

## 2024-10-22 ENCOUNTER — APPOINTMENT (OUTPATIENT)
Dept: NUCLEAR MEDICINE | Facility: CLINIC | Age: 79
End: 2024-10-22
Payer: COMMERCIAL

## 2024-10-22 ENCOUNTER — OUTPATIENT (OUTPATIENT)
Dept: OUTPATIENT SERVICES | Facility: HOSPITAL | Age: 79
LOS: 1 days | End: 2024-10-22

## 2024-10-22 DIAGNOSIS — Z98.1 ARTHRODESIS STATUS: Chronic | ICD-10-CM

## 2024-10-22 DIAGNOSIS — Z90.49 ACQUIRED ABSENCE OF OTHER SPECIFIED PARTS OF DIGESTIVE TRACT: Chronic | ICD-10-CM

## 2024-10-22 DIAGNOSIS — Z95.5 PRESENCE OF CORONARY ANGIOPLASTY IMPLANT AND GRAFT: Chronic | ICD-10-CM

## 2024-10-22 DIAGNOSIS — C77.0 SECONDARY AND UNSPECIFIED MALIGNANT NEOPLASM OF LYMPH NODES OF HEAD, FACE AND NECK: ICD-10-CM

## 2024-10-22 PROCEDURE — 78815 PET IMAGE W/CT SKULL-THIGH: CPT | Mod: 26,PS

## 2024-10-23 ENCOUNTER — APPOINTMENT (OUTPATIENT)
Dept: RHEUMATOLOGY | Facility: CLINIC | Age: 79
End: 2024-10-23
Payer: COMMERCIAL

## 2024-10-23 VITALS
WEIGHT: 178 LBS | HEIGHT: 71 IN | OXYGEN SATURATION: 97 % | RESPIRATION RATE: 17 BRPM | TEMPERATURE: 97.8 F | DIASTOLIC BLOOD PRESSURE: 82 MMHG | HEART RATE: 84 BPM | BODY MASS INDEX: 24.92 KG/M2 | SYSTOLIC BLOOD PRESSURE: 126 MMHG

## 2024-10-23 DIAGNOSIS — M79.89 OTHER SPECIFIED SOFT TISSUE DISORDERS: ICD-10-CM

## 2024-10-23 DIAGNOSIS — M54.50 LOW BACK PAIN, UNSPECIFIED: ICD-10-CM

## 2024-10-23 DIAGNOSIS — M15.9 POLYOSTEOARTHRITIS, UNSPECIFIED: ICD-10-CM

## 2024-10-23 DIAGNOSIS — G56.03 CARPAL TUNNEL SYNDROM,BILATERAL UPPER LIMBS: ICD-10-CM

## 2024-10-23 DIAGNOSIS — M35.00 SICCA SYNDROME, UNSPECIFIED: ICD-10-CM

## 2024-10-23 DIAGNOSIS — A04.72 ENTEROCOLITIS DUE TO CLOSTRIDIUM DIFFICILE, NOT SPECIFIED AS RECURRENT: ICD-10-CM

## 2024-10-23 DIAGNOSIS — M79.7 FIBROMYALGIA: ICD-10-CM

## 2024-10-23 DIAGNOSIS — G89.29 LOW BACK PAIN, UNSPECIFIED: ICD-10-CM

## 2024-10-23 DIAGNOSIS — Z85.818 PERSONAL HISTORY OF MALIGNANT NEOPLASM OF OTHER SITES OF LIP, ORAL CAVITY, AND PHARYNX: ICD-10-CM

## 2024-10-23 DIAGNOSIS — H04.123 DRY EYE SYNDROME OF BILATERAL LACRIMAL GLANDS: ICD-10-CM

## 2024-10-23 DIAGNOSIS — M06.9 RHEUMATOID ARTHRITIS, UNSPECIFIED: ICD-10-CM

## 2024-10-23 PROCEDURE — G2212 PROLONG OUTPT/OFFICE VIS: CPT

## 2024-10-23 PROCEDURE — G2211 COMPLEX E/M VISIT ADD ON: CPT | Mod: NC

## 2024-10-23 PROCEDURE — 99215 OFFICE O/P EST HI 40 MIN: CPT

## 2024-10-23 PROCEDURE — 36415 COLL VENOUS BLD VENIPUNCTURE: CPT

## 2024-10-25 ENCOUNTER — EMERGENCY (EMERGENCY)
Facility: HOSPITAL | Age: 79
LOS: 1 days | Discharge: HOME | End: 2024-10-25
Attending: STUDENT IN AN ORGANIZED HEALTH CARE EDUCATION/TRAINING PROGRAM
Payer: MEDICARE

## 2024-10-25 VITALS
DIASTOLIC BLOOD PRESSURE: 76 MMHG | HEIGHT: 71 IN | TEMPERATURE: 98 F | RESPIRATION RATE: 18 BRPM | HEART RATE: 86 BPM | WEIGHT: 186.95 LBS | SYSTOLIC BLOOD PRESSURE: 122 MMHG | OXYGEN SATURATION: 96 %

## 2024-10-25 VITALS
OXYGEN SATURATION: 97 % | SYSTOLIC BLOOD PRESSURE: 122 MMHG | RESPIRATION RATE: 18 BRPM | TEMPERATURE: 98 F | DIASTOLIC BLOOD PRESSURE: 62 MMHG | HEART RATE: 68 BPM

## 2024-10-25 DIAGNOSIS — Z98.1 ARTHRODESIS STATUS: Chronic | ICD-10-CM

## 2024-10-25 DIAGNOSIS — Z95.5 PRESENCE OF CORONARY ANGIOPLASTY IMPLANT AND GRAFT: Chronic | ICD-10-CM

## 2024-10-25 DIAGNOSIS — Z90.49 ACQUIRED ABSENCE OF OTHER SPECIFIED PARTS OF DIGESTIVE TRACT: Chronic | ICD-10-CM

## 2024-10-25 PROCEDURE — 70450 CT HEAD/BRAIN W/O DYE: CPT | Mod: 26,MC

## 2024-10-25 PROCEDURE — 99284 EMERGENCY DEPT VISIT MOD MDM: CPT

## 2024-10-25 NOTE — ED ADULT NURSE NOTE - OBJECTIVE STATEMENT
A&Ox3, family at bedside, presents after standing and losing balance and falling backwards. On elequis.  Breathing is spontaneous even and unlabored. Denies headache, pain, SOB, CP. Bed is in lowest position and side rails up. Safety precautions maintained.

## 2024-10-25 NOTE — ED PROVIDER NOTE - CLINICAL SUMMARY MEDICAL DECISION MAKING FREE TEXT BOX
HPI: 79-year-old male past medical history of basal cell carcinoma, cva on Eliquis, CAD s/p pci presents status post fall.  Patient states that his left leg gave out on him which she is pending surgery on the knee.  Denies chest pain, dizziness, difficulty breathing.  Patient states he fell backwards and hit his head without LOC.  No other current complaints at this time.    ROS:   General: No fever, no chills, no malaise, no fatigue  ENT: No earache, no coryza, no sore throat  Neck: No stiffness, no swollen glands, no dysphagia  Respiratory: No cough, no dyspnea, no pleuritic chest pain  Cardiac: no chest pain, no palpitations, no edema, no jvd  Abdomen: No abdominal pain, no nausea, no vomiting, no diarrhea  : No dysuria, no increase frequency, no urgency, No discharge  Musculoskeletal: No myalgia, no arthralgia  Neurologic: No headache, no vertigo, no paresthesia, no focal deficits, no diplopia  Skin: No rash, no evidence of trauma  All other ROS are negative    PE:  General: NAD; well appearing; A&O x3   Head: NC/AT  Eyes: PERRL, EOMI  ENT: Airway patent, mmm  Pulmonary: CTA b/l, symmetric breath sounds. No W/R/R.  Cardiac: s1s2, RRR, no M,G,R, No JVD  Back: Normal  spine  Extremities: FROM, symmetric pulses, capillary refill < 2 seconds, no edema, 5/5 strength in b/l UE and LE, pelvis stable to pelvis rock.  Skin: no rash or bruising  Neurologic: alert, speech clear, no focal deficits, CN II-XII grossly intact, normal gait, sensation grossly intact  Psych: nl mood/affect, nl insight.    MDM: 79-year-old male past medical history of basal cell carcinoma, cva on Eliquis, CAD s/p pci presents status post fall.  Patient states that his left leg gave out on him which she is pending surgery on the knee.  Prior to CT called on arrival, negative for intracranial hemorrhage.  Offered patient lab evaluation for additional etiologies of weakness.  Patient declined at this time.  Strict return precautions given.  Patient cleared for outpatient follow-up.

## 2024-10-25 NOTE — ED ADULT NURSE NOTE - NSFALLHARMRISKINTERV_ED_ALL_ED

## 2024-10-25 NOTE — ED PROVIDER NOTE - PATIENT PORTAL LINK FT
You can access the FollowMyHealth Patient Portal offered by [facility] by registering at the following website: [email or web address]. By joining [facility]’s FollowMyHealth portal, you will also be able to view your health information using other applications (apps) compatible with our system.

## 2024-10-25 NOTE — ED PROVIDER NOTE - NSFOLLOWUPINSTRUCTIONS_ED_ALL_ED_FT
You were seen and evaluated emergency department today for your symptoms.  Your CT head did not show any evidence of intracranial bleeding.  Please see results of following pages.    Please follow-up your primary care doctor within 2 days to discuss your visit here today.    Please return to the emergency department for any new or concerning symptoms including but not limited to fever, chills, chest pain, difficulty breathing.      You have elected to forego blood work evaluation of your weakness today.  Please return to the emergency department if you become more fatigued, confused, or have fever/chills.

## 2024-10-27 PROBLEM — Z85.818: Status: RESOLVED | Noted: 2022-02-20 | Resolved: 2024-10-27

## 2024-10-27 PROBLEM — A04.72 C. DIFFICILE DIARRHEA: Status: RESOLVED | Noted: 2024-10-27 | Resolved: 2024-10-27

## 2024-10-27 LAB
ALBUMIN SERPL ELPH-MCNC: 3.2 G/DL
ALP BLD-CCNC: 252 U/L
ALP BONE SERPL-MCNC: 18.9 UG/L
ALT SERPL-CCNC: 25 U/L
ANION GAP SERPL CALC-SCNC: 14 MMOL/L
AST SERPL-CCNC: 38 U/L
BILIRUB SERPL-MCNC: 0.2 MG/DL
BUN SERPL-MCNC: 12 MG/DL
CALCIUM SERPL-MCNC: 9 MG/DL
CCP AB SER IA-ACNC: <8 U/ML
CHLORIDE SERPL-SCNC: 101 MMOL/L
CK SERPL-CCNC: 64 U/L
CO2 SERPL-SCNC: 28 MMOL/L
CREAT SERPL-MCNC: 1.23 MG/DL
CRP SERPL-MCNC: 23 MG/L
EGFR: 60 ML/MIN/1.73M2
ENA RNP AB SER IA-ACNC: <0.2 AL
ENA SCL70 IGG SER IA-ACNC: <0.2 AL
ENA SM AB SER IA-ACNC: <0.2 AL
ENA SS-A AB SER IA-ACNC: <0.2 AL
ENA SS-B AB SER IA-ACNC: <0.2 AL
ERYTHROCYTE [SEDIMENTATION RATE] IN BLOOD BY WESTERGREN METHOD: 27 MM/HR
GGT SERPL-CCNC: 85 U/L
GLUCOSE SERPL-MCNC: 139 MG/DL
LDH SERPL-CCNC: 239 U/L
MAGNESIUM SERPL-MCNC: 1.4 MG/DL
MITOCHONDRIA AB SER IF-ACNC: NORMAL
PHOSPHATE SERPL-MCNC: 2.7 MG/DL
POTASSIUM SERPL-SCNC: 5.1 MMOL/L
PROT SERPL-MCNC: 5.5 G/DL
RF+CCP IGG SER-IMP: NEGATIVE
RHEUMATOID FACT SER QL: <10 IU/ML
RNA POLYMERASE III IGG: 5 UNITS
SMOOTH MUSCLE AB SER QL IF: ABNORMAL
SODIUM SERPL-SCNC: 143 MMOL/L

## 2024-10-27 RX ORDER — IBUPROFEN 200 MG
600 CAPSULE ORAL
Refills: 5 | Status: ACTIVE | COMMUNITY

## 2024-10-27 RX ORDER — CHLORHEXIDINE GLUCONATE 4 %
325 (65 FE) LIQUID (ML) TOPICAL
Qty: 60 | Refills: 0 | Status: DISCONTINUED | COMMUNITY
Start: 2024-10-04

## 2024-10-27 RX ORDER — CIPROFLOXACIN HYDROCHLORIDE 500 MG/1
500 TABLET, FILM COATED ORAL
Qty: 14 | Refills: 0 | Status: DISCONTINUED | COMMUNITY
Start: 2024-07-16

## 2024-10-27 RX ORDER — CEPHALEXIN 500 MG/1
500 CAPSULE ORAL
Qty: 28 | Refills: 0 | Status: DISCONTINUED | COMMUNITY
Start: 2024-07-18

## 2024-10-27 RX ORDER — VANCOMYCIN HYDROCHLORIDE 125 MG/1
125 CAPSULE ORAL
Qty: 21 | Refills: 0 | Status: DISCONTINUED | COMMUNITY
Start: 2024-08-23

## 2024-10-27 RX ORDER — OXYCODONE AND ACETAMINOPHEN 10; 325 MG/1; MG/1
10-325 TABLET ORAL
Qty: 60 | Refills: 0 | Status: DISCONTINUED | COMMUNITY
Start: 2024-05-23

## 2024-10-27 RX ORDER — GABAPENTIN 600 MG/1
600 TABLET, COATED ORAL TWICE DAILY
Qty: 180 | Refills: 0 | Status: ACTIVE | COMMUNITY
Start: 2024-10-27 | End: 1900-01-01

## 2024-10-27 RX ORDER — FLUOROURACIL 50 MG/G
5 CREAM TOPICAL
Qty: 40 | Refills: 0 | Status: DISCONTINUED | COMMUNITY
Start: 2024-10-21

## 2024-10-28 ENCOUNTER — APPOINTMENT (OUTPATIENT)
Dept: GASTROENTEROLOGY | Facility: CLINIC | Age: 79
End: 2024-10-28
Payer: MEDICARE

## 2024-10-28 VITALS
WEIGHT: 178 LBS | HEIGHT: 71 IN | HEART RATE: 94 BPM | SYSTOLIC BLOOD PRESSURE: 94 MMHG | BODY MASS INDEX: 24.92 KG/M2 | OXYGEN SATURATION: 97 % | DIASTOLIC BLOOD PRESSURE: 70 MMHG

## 2024-10-28 DIAGNOSIS — K59.1 FUNCTIONAL DIARRHEA: ICD-10-CM

## 2024-10-28 DIAGNOSIS — R19.7 DIARRHEA, UNSPECIFIED: ICD-10-CM

## 2024-10-28 DIAGNOSIS — R79.89 OTHER SPECIFIED ABNORMAL FINDINGS OF BLOOD CHEMISTRY: ICD-10-CM

## 2024-10-28 PROCEDURE — 99204 OFFICE O/P NEW MOD 45 MIN: CPT

## 2024-10-29 ENCOUNTER — APPOINTMENT (OUTPATIENT)
Dept: ORTHOPEDIC SURGERY | Facility: CLINIC | Age: 79
End: 2024-10-29

## 2024-11-05 LAB
A1AT SERPL-MCNC: 240 MG/DL
AFP-TM SERPL-MCNC: 2.9 NG/ML
CERULOPLASMIN SERPL-MCNC: 17 MG/DL
FERRITIN SERPL-MCNC: 201 NG/ML
GGT SERPL-CCNC: 74 U/L
HAV IGM SER QL: NONREACTIVE
HBV CORE IGM SER QL: NONREACTIVE
HBV E AB SER QL: NONREACTIVE
HBV E AG SER QL: NONREACTIVE
HBV SURFACE AB SER QL: NONREACTIVE
HBV SURFACE AG SER QL: NONREACTIVE
HCV AB SER QL: NONREACTIVE
HCV S/CO RATIO: 0.1 S/CO
HEPATITIS A IGG ANTIBODY: NONREACTIVE
IGG SER QL IEP: 621 MG/DL
INR PPP: 1.3 RATIO
IRON SATN MFR SERPL: 13 %
IRON SERPL-MCNC: 28 UG/DL
PT BLD: 15.3 SEC
TIBC SERPL-MCNC: 215 UG/DL
UIBC SERPL-MCNC: 187 UG/DL

## 2024-11-06 ENCOUNTER — OUTPATIENT (OUTPATIENT)
Dept: OUTPATIENT SERVICES | Facility: HOSPITAL | Age: 79
LOS: 1 days | End: 2024-11-06
Payer: MEDICARE

## 2024-11-06 ENCOUNTER — APPOINTMENT (OUTPATIENT)
Dept: CT IMAGING | Facility: CLINIC | Age: 79
End: 2024-11-06

## 2024-11-06 DIAGNOSIS — R19.7 DIARRHEA, UNSPECIFIED: ICD-10-CM

## 2024-11-06 DIAGNOSIS — R79.89 OTHER SPECIFIED ABNORMAL FINDINGS OF BLOOD CHEMISTRY: ICD-10-CM

## 2024-11-06 LAB
ANA SER IF-ACNC: NEGATIVE
LKM AB SER QL IF: <20.1 UNITS

## 2024-11-06 PROCEDURE — 74177 CT ABD & PELVIS W/CONTRAST: CPT | Mod: 26,MH

## 2024-11-08 ENCOUNTER — APPOINTMENT (OUTPATIENT)
Age: 79
End: 2024-11-08

## 2024-11-08 ENCOUNTER — APPOINTMENT (OUTPATIENT)
Dept: HEMATOLOGY ONCOLOGY | Facility: CLINIC | Age: 79
End: 2024-11-08
Payer: MEDICARE

## 2024-11-08 VITALS
HEIGHT: 71 IN | HEART RATE: 89 BPM | SYSTOLIC BLOOD PRESSURE: 118 MMHG | OXYGEN SATURATION: 96 % | TEMPERATURE: 97.9 F | WEIGHT: 175.38 LBS | DIASTOLIC BLOOD PRESSURE: 75 MMHG | BODY MASS INDEX: 24.55 KG/M2

## 2024-11-08 DIAGNOSIS — C80.1 SECONDARY AND UNSPECIFIED MALIGNANT NEOPLASM OF LYMPH NODES OF HEAD, FACE AND NECK: ICD-10-CM

## 2024-11-08 DIAGNOSIS — C77.0 SECONDARY AND UNSPECIFIED MALIGNANT NEOPLASM OF LYMPH NODES OF HEAD, FACE AND NECK: ICD-10-CM

## 2024-11-08 PROCEDURE — 99215 OFFICE O/P EST HI 40 MIN: CPT

## 2024-11-09 LAB
ALP BLD-CCNC: 231 IU/L
ALP BONE CFR SERPL: 19 %
ALP INTEST CFR SERPL: 3 %
ALP LIVER CFR SERPL: 78 %

## 2024-11-11 ENCOUNTER — APPOINTMENT (OUTPATIENT)
Dept: GASTROENTEROLOGY | Facility: CLINIC | Age: 79
End: 2024-11-11
Payer: MEDICARE

## 2024-11-11 VITALS
BODY MASS INDEX: 24.5 KG/M2 | WEIGHT: 175 LBS | DIASTOLIC BLOOD PRESSURE: 70 MMHG | SYSTOLIC BLOOD PRESSURE: 120 MMHG | OXYGEN SATURATION: 98 % | HEIGHT: 71 IN | RESPIRATION RATE: 16 BRPM | HEART RATE: 70 BPM

## 2024-11-11 DIAGNOSIS — Z85.828 PERSONAL HISTORY OF OTHER MALIGNANT NEOPLASM OF SKIN: ICD-10-CM

## 2024-11-11 DIAGNOSIS — Z86.79 PERSONAL HISTORY OF OTHER DISEASES OF THE CIRCULATORY SYSTEM: ICD-10-CM

## 2024-11-11 DIAGNOSIS — Z82.49 FAMILY HISTORY OF ISCHEMIC HEART DISEASE AND OTHER DISEASES OF THE CIRCULATORY SYSTEM: ICD-10-CM

## 2024-11-11 DIAGNOSIS — F17.290 NICOTINE DEPENDENCE, OTHER TOBACCO PRODUCT, UNCOMPLICATED: ICD-10-CM

## 2024-11-11 DIAGNOSIS — A04.72 ENTEROCOLITIS DUE TO CLOSTRIDIUM DIFFICILE, NOT SPECIFIED AS RECURRENT: ICD-10-CM

## 2024-11-11 DIAGNOSIS — R11.0 NAUSEA: ICD-10-CM

## 2024-11-11 DIAGNOSIS — Z86.73 PERSONAL HISTORY OF TRANSIENT ISCHEMIC ATTACK (TIA), AND CEREBRAL INFARCTION W/OUT RESIDUAL DEFICITS: ICD-10-CM

## 2024-11-11 DIAGNOSIS — R19.7 DIARRHEA, UNSPECIFIED: ICD-10-CM

## 2024-11-11 PROCEDURE — 99203 OFFICE O/P NEW LOW 30 MIN: CPT

## 2024-11-11 RX ORDER — LOPERAMIDE HYDROCHLORIDE 2 MG/1
2 CAPSULE ORAL
Qty: 480 | Refills: 0 | Status: ACTIVE | COMMUNITY
Start: 2024-11-11 | End: 1900-01-01

## 2024-11-11 RX ORDER — PSYLLIUM SEED
55.6 PACKET (EA) ORAL
Qty: 1 | Refills: 0 | Status: ACTIVE | COMMUNITY
Start: 2024-11-11 | End: 1900-01-01

## 2024-11-12 ENCOUNTER — APPOINTMENT (OUTPATIENT)
Dept: ORTHOPEDIC SURGERY | Facility: CLINIC | Age: 79
End: 2024-11-12
Payer: MEDICARE

## 2024-11-12 VITALS
BODY MASS INDEX: 23.8 KG/M2 | HEART RATE: 92 BPM | SYSTOLIC BLOOD PRESSURE: 124 MMHG | DIASTOLIC BLOOD PRESSURE: 78 MMHG | HEIGHT: 71 IN | WEIGHT: 170 LBS

## 2024-11-12 DIAGNOSIS — C79.51 SECONDARY MALIGNANT NEOPLASM OF BONE: ICD-10-CM

## 2024-11-12 DIAGNOSIS — M25.552 PAIN IN LEFT HIP: ICD-10-CM

## 2024-11-12 DIAGNOSIS — G89.29 PAIN IN LEFT HIP: ICD-10-CM

## 2024-11-12 DIAGNOSIS — M89.9 DISORDER OF BONE, UNSPECIFIED: ICD-10-CM

## 2024-11-12 DIAGNOSIS — T84.84XA PAIN DUE TO INTERNAL ORTHOPEDIC PROSTHETIC DEVICES, IMPLANTS AND GRAFTS, INITIAL ENCOUNTER: ICD-10-CM

## 2024-11-12 DIAGNOSIS — Z92.3 PERSONAL HISTORY OF IRRADIATION: ICD-10-CM

## 2024-11-12 PROCEDURE — 73552 X-RAY EXAM OF FEMUR 2/>: CPT | Mod: LT

## 2024-11-12 PROCEDURE — G2211 COMPLEX E/M VISIT ADD ON: CPT

## 2024-11-12 PROCEDURE — 99215 OFFICE O/P EST HI 40 MIN: CPT

## 2024-11-13 ENCOUNTER — NON-APPOINTMENT (OUTPATIENT)
Age: 79
End: 2024-11-13

## 2024-11-14 ENCOUNTER — APPOINTMENT (OUTPATIENT)
Dept: SURGICAL ONCOLOGY | Facility: CLINIC | Age: 79
End: 2024-11-14

## 2024-11-14 ENCOUNTER — NON-APPOINTMENT (OUTPATIENT)
Age: 79
End: 2024-11-14

## 2024-11-14 LAB
CORTICOSTEROID BINDING GLOBULIN RESULT: 2 MG/DL — SIGNIFICANT CHANGE UP
CORTIS F/TOTAL MFR SERPL: 16 % — SIGNIFICANT CHANGE UP
CORTIS SERPL-MCNC: 14 UG/DL — SIGNIFICANT CHANGE UP
CORTISOL, FREE RESULT: 2.2 UG/DL — HIGH

## 2024-11-20 PROCEDURE — 93971 EXTREMITY STUDY: CPT

## 2024-11-20 PROCEDURE — 74177 CT ABD & PELVIS W/CONTRAST: CPT

## 2024-11-20 PROCEDURE — 99284 EMERGENCY DEPT VISIT MOD MDM: CPT | Mod: 25

## 2024-11-20 PROCEDURE — 70450 CT HEAD/BRAIN W/O DYE: CPT | Mod: MC

## 2024-11-21 ENCOUNTER — NON-APPOINTMENT (OUTPATIENT)
Age: 79
End: 2024-11-21

## 2024-11-21 ENCOUNTER — INPATIENT (INPATIENT)
Facility: HOSPITAL | Age: 79
LOS: 4 days | Discharge: HOME | DRG: 684 | End: 2024-11-26
Attending: STUDENT IN AN ORGANIZED HEALTH CARE EDUCATION/TRAINING PROGRAM | Admitting: HOSPITALIST
Payer: MEDICARE

## 2024-11-21 VITALS
TEMPERATURE: 97 F | HEIGHT: 71 IN | WEIGHT: 164.02 LBS | OXYGEN SATURATION: 98 % | RESPIRATION RATE: 16 BRPM | HEART RATE: 81 BPM | SYSTOLIC BLOOD PRESSURE: 104 MMHG | DIASTOLIC BLOOD PRESSURE: 67 MMHG

## 2024-11-21 DIAGNOSIS — Z95.5 PRESENCE OF CORONARY ANGIOPLASTY IMPLANT AND GRAFT: Chronic | ICD-10-CM

## 2024-11-21 DIAGNOSIS — Z98.1 ARTHRODESIS STATUS: Chronic | ICD-10-CM

## 2024-11-21 DIAGNOSIS — Z90.49 ACQUIRED ABSENCE OF OTHER SPECIFIED PARTS OF DIGESTIVE TRACT: Chronic | ICD-10-CM

## 2024-11-21 LAB
ALBUMIN SERPL ELPH-MCNC: 2.9 G/DL — LOW (ref 3.3–5.2)
ALP SERPL-CCNC: 140 U/L — HIGH (ref 40–120)
ALT FLD-CCNC: 12 U/L — SIGNIFICANT CHANGE UP
ANION GAP SERPL CALC-SCNC: 12 MMOL/L — SIGNIFICANT CHANGE UP (ref 5–17)
AST SERPL-CCNC: 27 U/L — SIGNIFICANT CHANGE UP
BASOPHILS # BLD AUTO: 0.03 K/UL — SIGNIFICANT CHANGE UP (ref 0–0.2)
BASOPHILS NFR BLD AUTO: 0.3 % — SIGNIFICANT CHANGE UP (ref 0–2)
BILIRUB SERPL-MCNC: 0.3 MG/DL — LOW (ref 0.4–2)
BUN SERPL-MCNC: 22.2 MG/DL — HIGH (ref 8–20)
CALCIUM SERPL-MCNC: 8.9 MG/DL — SIGNIFICANT CHANGE UP (ref 8.4–10.5)
CHLORIDE SERPL-SCNC: 101 MMOL/L — SIGNIFICANT CHANGE UP (ref 96–108)
CO2 SERPL-SCNC: 26 MMOL/L — SIGNIFICANT CHANGE UP (ref 22–29)
CREAT SERPL-MCNC: 2.05 MG/DL — HIGH (ref 0.5–1.3)
EGFR: 33 ML/MIN/1.73M2 — LOW
EOSINOPHIL # BLD AUTO: 0.1 K/UL — SIGNIFICANT CHANGE UP (ref 0–0.5)
EOSINOPHIL NFR BLD AUTO: 0.9 % — SIGNIFICANT CHANGE UP (ref 0–6)
GAS PNL BLDV: SIGNIFICANT CHANGE UP
GLUCOSE SERPL-MCNC: 100 MG/DL — HIGH (ref 70–99)
HCT VFR BLD CALC: 36.9 % — LOW (ref 39–50)
HGB BLD-MCNC: 11.6 G/DL — LOW (ref 13–17)
IMM GRANULOCYTES NFR BLD AUTO: 0.4 % — SIGNIFICANT CHANGE UP (ref 0–0.9)
LIDOCAIN IGE QN: 43 U/L — SIGNIFICANT CHANGE UP (ref 22–51)
LYMPHOCYTES # BLD AUTO: 0.74 K/UL — LOW (ref 1–3.3)
LYMPHOCYTES # BLD AUTO: 6.8 % — LOW (ref 13–44)
MCHC RBC-ENTMCNC: 29.2 PG — SIGNIFICANT CHANGE UP (ref 27–34)
MCHC RBC-ENTMCNC: 31.4 G/DL — LOW (ref 32–36)
MCV RBC AUTO: 92.9 FL — SIGNIFICANT CHANGE UP (ref 80–100)
MONOCYTES # BLD AUTO: 0.67 K/UL — SIGNIFICANT CHANGE UP (ref 0–0.9)
MONOCYTES NFR BLD AUTO: 6.2 % — SIGNIFICANT CHANGE UP (ref 2–14)
NEUTROPHILS # BLD AUTO: 9.3 K/UL — HIGH (ref 1.8–7.4)
NEUTROPHILS NFR BLD AUTO: 85.4 % — HIGH (ref 43–77)
PLATELET # BLD AUTO: 381 K/UL — SIGNIFICANT CHANGE UP (ref 150–400)
POTASSIUM SERPL-MCNC: 4.9 MMOL/L — SIGNIFICANT CHANGE UP (ref 3.5–5.3)
POTASSIUM SERPL-SCNC: 4.9 MMOL/L — SIGNIFICANT CHANGE UP (ref 3.5–5.3)
PROT SERPL-MCNC: 6.4 G/DL — LOW (ref 6.6–8.7)
RBC # BLD: 3.97 M/UL — LOW (ref 4.2–5.8)
RBC # FLD: 14.9 % — HIGH (ref 10.3–14.5)
SODIUM SERPL-SCNC: 139 MMOL/L — SIGNIFICANT CHANGE UP (ref 135–145)
WBC # BLD: 10.88 K/UL — HIGH (ref 3.8–10.5)
WBC # FLD AUTO: 10.88 K/UL — HIGH (ref 3.8–10.5)

## 2024-11-21 PROCEDURE — 74176 CT ABD & PELVIS W/O CONTRAST: CPT | Mod: 26,MC

## 2024-11-21 PROCEDURE — 99285 EMERGENCY DEPT VISIT HI MDM: CPT

## 2024-11-21 PROCEDURE — 71045 X-RAY EXAM CHEST 1 VIEW: CPT | Mod: 26

## 2024-11-21 RX ORDER — SODIUM CHLORIDE 9 MG/ML
1000 INJECTION, SOLUTION INTRAMUSCULAR; INTRAVENOUS; SUBCUTANEOUS ONCE
Refills: 0 | Status: COMPLETED | OUTPATIENT
Start: 2024-11-21 | End: 2024-11-21

## 2024-11-21 RX ORDER — CIPROFLOXACIN HCL 750 MG
400 TABLET ORAL ONCE
Refills: 0 | Status: COMPLETED | OUTPATIENT
Start: 2024-11-21 | End: 2024-11-21

## 2024-11-21 RX ORDER — 0.9 % SODIUM CHLORIDE 0.9 %
1300 INTRAVENOUS SOLUTION INTRAVENOUS ONCE
Refills: 0 | Status: COMPLETED | OUTPATIENT
Start: 2024-11-21 | End: 2024-11-21

## 2024-11-21 RX ORDER — METRONIDAZOLE 500 MG/1
500 TABLET ORAL ONCE
Refills: 0 | Status: COMPLETED | OUTPATIENT
Start: 2024-11-21 | End: 2024-11-21

## 2024-11-21 RX ADMIN — Medication 1300 MILLILITER(S): at 23:47

## 2024-11-21 RX ADMIN — METRONIDAZOLE 100 MILLIGRAM(S): 500 TABLET ORAL at 23:47

## 2024-11-21 NOTE — ED PROVIDER NOTE - NS ED MD TWO NIGHTS YN
"Chief Complaint   Patient presents with     Chronic Disease Management     Flu Shot     done       Initial /67  Pulse 82  Temp 98.1  F (36.7  C) (Oral)  Ht 5' 8\" (1.727 m)  Wt 198 lb (89.8 kg)  SpO2 97%  BMI 30.11 kg/m2 Estimated body mass index is 30.11 kg/(m^2) as calculated from the following:    Height as of this encounter: 5' 8\" (1.727 m).    Weight as of this encounter: 198 lb (89.8 kg).  Medication Reconciliation: complete   ]Silva Jean MA      "
Yes

## 2024-11-21 NOTE — ED ADULT TRIAGE NOTE - ACCOMPANIED BY
Infectious Diseases Progress Note    Date:  10/27/2022    Reason for consultation: meningitis     HPI: Sherrie Quiroga is a 80 year old female who was admitted on 10/24/2022 with complaints of headache, neck pain and nausea for past 3 days. In the ER, patient was afebrile, found to have leukocytosis of 13. No acute findings of CT head.  LP was obtained by IR and showed elevated nucleated cell count of 1,006 with neutrophilic predominance. ME panel is negative. CSF culture is pending. She is on ceftriaxone and vancomycin.    ROS:  She denies any headache today or neck stiffness or pain.  She feels much better.  She denies any dizziness.  She is tolerating current antibiotic.  No fevers. No vomiting.  No diarrhea.  She is tolerating the current antibiotic.  Denies dizziness or lightheadedness.  She is been able to ambulate to the bathroom and back to the bed.       Current Medications  Current Facility-Administered Medications   Medication Dose Route Frequency Provider Last Rate Last Admin   • sodium chloride (PF) 0.9 % injection 10 mL  10 mL Injection 2 times per day Solis Holloway MD       • heparin (porcine) injection 7,500 Units  7,500 Units Subcutaneous 3 times per day Solis Holloway MD   7,500 Units at 10/27/22 0538   • sodium chloride (PF) 0.9 % injection 2 mL  2 mL Intracatheter 2 times per day Marlon Waterman MD   2 mL at 10/27/22 0842   • pantoprazole (PROTONIX) EC tablet 40 mg  [facility]er[facility]   40 mg at 10/27/22 0539   • carvedilol (COREG) tablet 25 mg  25 mg Oral 2 times per day Rui Banerjee MD   25 mg at 10/27/22 0841   • Magnesium Standard Replacement Protocol   Does not apply See Admin Instructions Rui Banerjee MD       • Potassium Standard Replacement Protocol (Levels 3.5 and lower)   Does not apply See Admin Instructions Rui Banerjee MD       • cefTRIAXone (ROCEPHIN) syringe 2,000 mg  2,000 mg Intravenous 2 times per day Rui Banerjee MD   2,000 mg at 10/27/22 0842      Immediate family member glucose, 155 protein    Imaging:  10/10/25 with end date of November 8th placed a [facility] Mena Rock PA-C    10/27/2022

## 2024-11-21 NOTE — ED ADULT NURSE NOTE - NS ED NURSE LEVEL OF CONSCIOUSNESS AFFECT
[facility] Call Center    Phone Message    May a detailed message be left on voicemail: yes     Reason for Call: Other: please call patient back to discuss      Action Taken: Message routed to:  [facility] irasema    Travel Screening: Not Applicable     Patient wants to know if Jason wants to see her or order anything before her surgery 3/9     She is also curious about roughly how long will Rehab take ?     Please call back and discuss                                               Calm

## 2024-11-21 NOTE — ED ADULT NURSE NOTE - OBJECTIVE STATEMENT
Pt is A&OX3 c/o abdominal pain, N/V/D. Wife at bedside states he had c diff over summer. Per wife his stool has bright red blood in it. Wife states she has noticed decreased intake as well. Wife denies any fevers. Abdomen is soft, nontender, and nondistended. Respirations even and unlabored. Bed in lowest position with wheels locked.

## 2024-11-21 NOTE — ED PROVIDER NOTE - CLINICAL SUMMARY MEDICAL DECISION MAKING FREE TEXT BOX
79-year-old male; with PMH significant for CVA (on Eliquis), CAD, basal cell carcinoma; now presenting with diarrhea x 2 weeks.  Patient was recently in the ED for evaluation of of TIA secondary to aphasia as well as constant diarrhea.  Patient was discharged 2 days ago.  Patient now returning for generalized increased weakness and near syncope with ambulation.  Complaining abdominal pain–epigastric cramping.  Complaining of nausea and vomiting x 2-3 episodes–NBNB.  Patient also complaining of diarrhea–nonbloody, x 5-6 episodes daily for the past 2 weeks. will do labs, ivf, abx, ct, and likely admission given that family states patient was sent if for sigmoidoscopy.

## 2024-11-21 NOTE — ED ADULT NURSE NOTE - NSFALLHARMRISKINTERV_ED_ALL_ED

## 2024-11-21 NOTE — ED ADULT TRIAGE NOTE - CHIEF COMPLAINT QUOTE
Sent in by GI MD for eval.  Pt with hx of colitis.  Experiencing N/V/D.  Denies fevers. Pt at baseline mental status as per wife.

## 2024-11-21 NOTE — ED PROVIDER NOTE - PROGRESS NOTE DETAILS
Lopez: Pt received in signout from Dr. Anne. No emergent findings on CT. Will admit given JEFF. GI consult placed.

## 2024-11-21 NOTE — ED ADULT TRIAGE NOTE - LOCATION:
Myopia / Astigmatism / Presbyopia OU - Discussed diagnosis in detail with patient- New glasses RX given today- Would like for Eleonora Mcintosh to do a VF in the future being pressures are up and patient may have family history of Glaucoma - Patient may work with Jessica Rajput for CL fitting for [employer] vision when ready (patient is a previous wear)- Continue to monitor- RTC 1 year complete
Left arm;

## 2024-11-21 NOTE — ED PROVIDER NOTE - OBJECTIVE STATEMENT
79-year-old male; with PMH significant for CVA (on Eliquis), CAD, basal cell carcinoma; now presenting with diarrhea x 2 weeks.  Patient was recently in the ED for evaluation of of TIA secondary to aphasia as well as constant diarrhea.  Patient was discharged 2 days ago.  Patient now returning for generalized increased weakness and near syncope with ambulation.  Complaining abdominal pain–epigastric cramping.  Complaining of nausea and vomiting x 2-3 episodes–NBNB.  Patient also complaining of diarrhea–nonbloody, x 5-6 episodes daily for the past 2 weeks.  Patient had a negative C. difficile and negative stool culture 1 week ago.  Patient was sent in by their GI doctor Dr. Velasquez for further evaluation.

## 2024-11-21 NOTE — ED ADULT NURSE NOTE - PRO INTERPRETER NEED 2
Detail Level: Zone Render In Strict Bullet Format?: No Initiate Treatment: clobetasol 0.05 % topical cream: Apply to affected areas of hands twice daily x 2 weeks then as needed for itch\\n\\nMedrol (Yosi) 4 mg tablets in a dose pack: Take as directed. English

## 2024-11-21 NOTE — ED PROVIDER NOTE - NS ED ROS FT
Constitutional: (-) fever  (-)chills  (-)sweats  Eyes/ENT: (-)   Cardiovascular: (-) chest pain, (-) palpitations (-) edema   Respiratory: (-) cough, (-) shortness of breath   Gastrointestinal: (+)nausea  (+)vomiting, (+) diarrhea  (+ abdominal pain   :  (-)dysuria, (-)frequency, (-)urgency, (-)hematuria  Musculoskeletal: (-) neck pain, (-) back pain, (-) joint pain  Integumentary: (-) rash, (-) edema  Neurological: (-) headache, (-) altered mental status  (-)LOC

## 2024-11-21 NOTE — ED PROVIDER NOTE - PHYSICAL EXAMINATION
General:     NAD  Head:     NC/AT, EOMI, oral mucosa moist  Neck:     trachea midline  Lungs:     CTA b/l, no w/r/r  CVS:     S1S2, RRR, no m/g/r  Abd:     +BS, s/nd, no organomegaly. ttp @ suprapubic. no rebound.   Ext:    2+ radial and pedal pulses, no c/c/e  Neuro: AAOx3, no sensory/motor deficits

## 2024-11-22 ENCOUNTER — TRANSCRIPTION ENCOUNTER (OUTPATIENT)
Age: 79
End: 2024-11-22

## 2024-11-22 ENCOUNTER — NON-APPOINTMENT (OUTPATIENT)
Age: 79
End: 2024-11-22

## 2024-11-22 DIAGNOSIS — Z01.810 ENCOUNTER FOR PREPROCEDURAL CARDIOVASCULAR EXAMINATION: ICD-10-CM

## 2024-11-22 DIAGNOSIS — N17.9 ACUTE KIDNEY FAILURE, UNSPECIFIED: ICD-10-CM

## 2024-11-22 LAB
ANION GAP SERPL CALC-SCNC: 7 MMOL/L — SIGNIFICANT CHANGE UP (ref 5–17)
BUN SERPL-MCNC: 20.7 MG/DL — HIGH (ref 8–20)
C DIFF BY PCR RESULT: SIGNIFICANT CHANGE UP
CALCIUM SERPL-MCNC: 7.7 MG/DL — LOW (ref 8.4–10.5)
CHLORIDE SERPL-SCNC: 106 MMOL/L — SIGNIFICANT CHANGE UP (ref 96–108)
CO2 SERPL-SCNC: 26 MMOL/L — SIGNIFICANT CHANGE UP (ref 22–29)
CREAT SERPL-MCNC: 1.62 MG/DL — HIGH (ref 0.5–1.3)
EGFR: 43 ML/MIN/1.73M2 — LOW
GLUCOSE SERPL-MCNC: 82 MG/DL — SIGNIFICANT CHANGE UP (ref 70–99)
HCT VFR BLD CALC: 30.2 % — LOW (ref 39–50)
HGB BLD-MCNC: 9.8 G/DL — LOW (ref 13–17)
MCHC RBC-ENTMCNC: 30.2 PG — SIGNIFICANT CHANGE UP (ref 27–34)
MCHC RBC-ENTMCNC: 32.5 G/DL — SIGNIFICANT CHANGE UP (ref 32–36)
MCV RBC AUTO: 92.9 FL — SIGNIFICANT CHANGE UP (ref 80–100)
PLATELET # BLD AUTO: 291 K/UL — SIGNIFICANT CHANGE UP (ref 150–400)
POTASSIUM SERPL-MCNC: 4.1 MMOL/L — SIGNIFICANT CHANGE UP (ref 3.5–5.3)
POTASSIUM SERPL-SCNC: 4.1 MMOL/L — SIGNIFICANT CHANGE UP (ref 3.5–5.3)
RBC # BLD: 3.25 M/UL — LOW (ref 4.2–5.8)
RBC # FLD: 14.6 % — HIGH (ref 10.3–14.5)
SODIUM SERPL-SCNC: 139 MMOL/L — SIGNIFICANT CHANGE UP (ref 135–145)
WBC # BLD: 8.19 K/UL — SIGNIFICANT CHANGE UP (ref 3.8–10.5)
WBC # FLD AUTO: 8.19 K/UL — SIGNIFICANT CHANGE UP (ref 3.8–10.5)

## 2024-11-22 PROCEDURE — 99223 1ST HOSP IP/OBS HIGH 75: CPT

## 2024-11-22 PROCEDURE — 93010 ELECTROCARDIOGRAM REPORT: CPT

## 2024-11-22 PROCEDURE — 88305 TISSUE EXAM BY PATHOLOGIST: CPT | Mod: 26

## 2024-11-22 PROCEDURE — 99222 1ST HOSP IP/OBS MODERATE 55: CPT

## 2024-11-22 PROCEDURE — 45380 COLONOSCOPY AND BIOPSY: CPT

## 2024-11-22 PROCEDURE — 99222 1ST HOSP IP/OBS MODERATE 55: CPT | Mod: 25

## 2024-11-22 DEVICE — NAIL OSTEO 1.5X16MM STRL
Type: IMPLANTABLE DEVICE | Status: NON-FUNCTIONAL
Removed: 2024-11-22

## 2024-11-22 RX ORDER — METHYLPREDNISOLONE SOD SUCC 125 MG
40 VIAL (EA) INJECTION EVERY 8 HOURS
Refills: 0 | Status: DISCONTINUED | OUTPATIENT
Start: 2024-11-22 | End: 2024-11-25

## 2024-11-22 RX ORDER — PROCHLORPERAZINE EDISYLATE 5 MG/ML
10 INJECTION INTRAMUSCULAR; INTRAVENOUS EVERY 6 HOURS
Refills: 0 | Status: DISCONTINUED | OUTPATIENT
Start: 2024-11-22 | End: 2024-11-26

## 2024-11-22 RX ORDER — LISINOPRIL 20 MG/1
40 TABLET ORAL DAILY
Refills: 0 | Status: DISCONTINUED | OUTPATIENT
Start: 2024-11-22 | End: 2024-11-22

## 2024-11-22 RX ORDER — PANTOPRAZOLE SODIUM 40 MG/1
40 TABLET, DELAYED RELEASE ORAL
Refills: 0 | Status: DISCONTINUED | OUTPATIENT
Start: 2024-11-22 | End: 2024-11-26

## 2024-11-22 RX ORDER — GABAPENTIN 300 MG/1
600 CAPSULE ORAL
Refills: 0 | Status: DISCONTINUED | OUTPATIENT
Start: 2024-11-22 | End: 2024-11-26

## 2024-11-22 RX ORDER — ISOSORBIDE MONONITRATE 10 MG
30 TABLET ORAL DAILY
Refills: 0 | Status: DISCONTINUED | OUTPATIENT
Start: 2024-11-22 | End: 2024-11-26

## 2024-11-22 RX ORDER — ONDANSETRON HYDROCHLORIDE 4 MG/1
4 TABLET, FILM COATED ORAL EVERY 8 HOURS
Refills: 0 | Status: DISCONTINUED | OUTPATIENT
Start: 2024-11-22 | End: 2024-11-26

## 2024-11-22 RX ORDER — GLUCOSAMINE SULFATE DIPOT CHLR 500 MG
1 CAPSULE ORAL DAILY
Refills: 0 | Status: DISCONTINUED | OUTPATIENT
Start: 2024-11-22 | End: 2024-11-26

## 2024-11-22 RX ORDER — PREDNISONE 20 MG/1
5 TABLET ORAL DAILY
Refills: 0 | Status: DISCONTINUED | OUTPATIENT
Start: 2024-11-22 | End: 2024-11-22

## 2024-11-22 RX ORDER — LEVOTHYROXINE SODIUM 150 MCG
100 TABLET ORAL DAILY
Refills: 0 | Status: DISCONTINUED | OUTPATIENT
Start: 2024-11-22 | End: 2024-11-26

## 2024-11-22 RX ORDER — 0.9 % SODIUM CHLORIDE 0.9 %
1000 INTRAVENOUS SOLUTION INTRAVENOUS
Refills: 0 | Status: DISCONTINUED | OUTPATIENT
Start: 2024-11-22 | End: 2024-11-25

## 2024-11-22 RX ORDER — MAGNESIUM, ALUMINUM HYDROXIDE 200-225/5
30 SUSPENSION, ORAL (FINAL DOSE FORM) ORAL EVERY 4 HOURS
Refills: 0 | Status: DISCONTINUED | OUTPATIENT
Start: 2024-11-22 | End: 2024-11-26

## 2024-11-22 RX ORDER — ACETAMINOPHEN, DIPHENHYDRAMINE HCL, PHENYLEPHRINE HCL 325; 25; 5 MG/1; MG/1; MG/1
3 TABLET ORAL AT BEDTIME
Refills: 0 | Status: DISCONTINUED | OUTPATIENT
Start: 2024-11-22 | End: 2024-11-26

## 2024-11-22 RX ORDER — ACETAMINOPHEN 500MG 500 MG/1
650 TABLET, COATED ORAL EVERY 6 HOURS
Refills: 0 | Status: DISCONTINUED | OUTPATIENT
Start: 2024-11-22 | End: 2024-11-26

## 2024-11-22 RX ORDER — SULFASALAZINE 500 MG/1
1000 TABLET ORAL
Refills: 0 | Status: DISCONTINUED | OUTPATIENT
Start: 2024-11-22 | End: 2024-11-26

## 2024-11-22 RX ORDER — DULOXETINE HCL 60 MG
40 CAPSULE,DELAYED RELEASE (ENTERIC COATED) ORAL
Refills: 0 | Status: DISCONTINUED | OUTPATIENT
Start: 2024-11-22 | End: 2024-11-26

## 2024-11-22 RX ORDER — HEPARIN SODIUM,PORCINE 1000/ML
5000 VIAL (ML) INJECTION EVERY 8 HOURS
Refills: 0 | Status: DISCONTINUED | OUTPATIENT
Start: 2024-11-22 | End: 2024-11-22

## 2024-11-22 RX ORDER — AMLODIPINE BESYLATE 10 MG/1
2.5 TABLET ORAL DAILY
Refills: 0 | Status: DISCONTINUED | OUTPATIENT
Start: 2024-11-22 | End: 2024-11-26

## 2024-11-22 RX ORDER — OXYCODONE HYDROCHLORIDE 30 MG/1
15 TABLET ORAL EVERY 12 HOURS
Refills: 0 | Status: DISCONTINUED | OUTPATIENT
Start: 2024-11-22 | End: 2024-11-26

## 2024-11-22 RX ADMIN — PROCHLORPERAZINE EDISYLATE 10 MILLIGRAM(S): 5 INJECTION INTRAMUSCULAR; INTRAVENOUS at 18:28

## 2024-11-22 RX ADMIN — GABAPENTIN 600 MILLIGRAM(S): 300 CAPSULE ORAL at 07:02

## 2024-11-22 RX ADMIN — SULFASALAZINE 1000 MILLIGRAM(S): 500 TABLET ORAL at 18:29

## 2024-11-22 RX ADMIN — Medication 200 MILLIGRAM(S): at 03:36

## 2024-11-22 RX ADMIN — PANTOPRAZOLE SODIUM 40 MILLIGRAM(S): 40 TABLET, DELAYED RELEASE ORAL at 07:02

## 2024-11-22 RX ADMIN — OXYCODONE HYDROCHLORIDE 15 MILLIGRAM(S): 30 TABLET ORAL at 08:15

## 2024-11-22 RX ADMIN — PROCHLORPERAZINE EDISYLATE 10 MILLIGRAM(S): 5 INJECTION INTRAMUSCULAR; INTRAVENOUS at 08:16

## 2024-11-22 RX ADMIN — Medication 1 MILLIGRAM(S): at 08:15

## 2024-11-22 RX ADMIN — Medication 1300 MILLILITER(S): at 00:52

## 2024-11-22 RX ADMIN — Medication 40 MILLIGRAM(S): at 21:51

## 2024-11-22 RX ADMIN — Medication 30 MILLIGRAM(S): at 12:39

## 2024-11-22 RX ADMIN — Medication 40 MILLIGRAM(S): at 07:02

## 2024-11-22 RX ADMIN — Medication 100 MICROGRAM(S): at 07:01

## 2024-11-22 RX ADMIN — AMLODIPINE BESYLATE 2.5 MILLIGRAM(S): 10 TABLET ORAL at 07:02

## 2024-11-22 RX ADMIN — GABAPENTIN 600 MILLIGRAM(S): 300 CAPSULE ORAL at 18:27

## 2024-11-22 RX ADMIN — Medication 84 MILLILITER(S): at 08:14

## 2024-11-22 RX ADMIN — LISINOPRIL 40 MILLIGRAM(S): 20 TABLET ORAL at 07:02

## 2024-11-22 RX ADMIN — Medication 30 MILLIGRAM(S): at 08:15

## 2024-11-22 RX ADMIN — OXYCODONE HYDROCHLORIDE 15 MILLIGRAM(S): 30 TABLET ORAL at 18:27

## 2024-11-22 RX ADMIN — Medication 40 MILLIGRAM(S): at 18:27

## 2024-11-22 RX ADMIN — SULFASALAZINE 1000 MILLIGRAM(S): 500 TABLET ORAL at 08:15

## 2024-11-22 RX ADMIN — PREDNISONE 5 MILLIGRAM(S): 20 TABLET ORAL at 08:14

## 2024-11-22 NOTE — PATIENT PROFILE ADULT - NSPROMEDSADMININFO_GEN_A_NUR
Regarding: [place], 32 yo, F, 11 weeks pregant, lower abdominal pain, cramping  ----- Message from [facility] sent at 11/21/2024  8:50 AM CST -----  Patient Name: Huma Barrera    Specialist or PCP Name: Hiwot GUERRERO MD ---Paolo GUERRERO     Symptoms: Lower abdominal pain, mild 3/10, cramping, other symptoms wants to discuss with nurse     Pregnant (females aged 13-60. If Yes, how long?) : yes 11 weeks     Call Back # : [phone]    Which State are you currently located in?: [place]     Name of Clinic Site / Acct# : [facility] [address]     Call arrived during: Work Hours     no concerns

## 2024-11-22 NOTE — CONSULT NOTE ADULT - PROBLEM SELECTOR RECOMMENDATION 9
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, HTN, CAD, CVA, on Eliquis   - denies angina/anginal equivalents, appears euvolemic, no appreciable murmur on exam, no appreciable carotid bruit on exam  - no syncopal episodes, telemetry showing NSR without ectopy   - EKG shows NSR without ST changes   - DASI score 1.75 consistent w/ 2.96 mets which is consistent with low functional capacity   - RCRI Estimated Risk of Adverse Outcome with Non-cardiac Surgery is 6.6% consistent with moderate risk   - Fam Score for Anorectal procedures shows 3.9% Risk of myocardial infarction or cardiac arrest, intraoperatively or up to 30 days post-op  - NSQIP risk of any complication is 12.7% and risk of serious complication is 9.0% which both are consistent with above average risk.   - Overall patient's risk profile is consistent with moderate risk, there are no absolute cardiac contraindications to GI procedures and further testing will not change the patient's risk profile.   - No further workup, we will sign off.

## 2024-11-22 NOTE — CHART NOTE - NSCHARTNOTEFT_GEN_A_CORE
Called by JULIANA Cohen requesting Cardiology Clearance for flex sig this morning.  Cardiology consult [facility] cardio placed

## 2024-11-22 NOTE — PATIENT PROFILE ADULT - FUNCTIONAL ASSESSMENT - BASIC MOBILITY 6.
3-calculated by average/Not able to assess (calculate score using [facility] averaging method)

## 2024-11-22 NOTE — CHART NOTE - NSCHARTNOTEFT_GEN_A_CORE
Patient was seen and examined at bedside. Nocturnist H and P was reviewed. Continues to have diarrhea. Plan for Flex sig today per GI. Cardio recs appreciated. Spoke with wife, patient is not on any more steroids so was taken off. PT requested. Will continue to monitor.

## 2024-11-22 NOTE — CONSULT NOTE ADULT - SUBJECTIVE AND OBJECTIVE BOX
[facility]                                              CARDIOLOGY AT [place] [address]                                             Telephone: [phone]. Fax:[phone]                                                       CARDIOLOGY CONSULTATION NOTE                                                                                             History obtained by: Patient and medical record   Community Cardiologist: Deandra    obtained: Yes [  ] No [  ]  Reason for Consultation: Pre-procedural risk stratification   Available out pt records reviewed: Yes [ x ] No [  ]    Chief complaint:    Patient is a 79y old  Male who presents with a chief complaint of Colitis, JEFF (2024 07:41)    HPI:  78 yo male with pmhx HTN, CAD, CVA on Eliquis, Basal Cell Carcinoma presenting to the ED with 2 weeks of diarrhea. Patient is a poor medical historian and cannot give me much of a description of his symptoms other than that he has had continued diarrhea. I attempted to reach his wife for collateral information but she did not answer at the number provided so most history obtained from the chart. Patient has Parotid cancer with mets to the bone on Keytruda. Initially Keytruda was stopped due to diarrhea, but restarted when diarrhea did not stop. Patient started seeing GI at the end of October for these symptoms. Wife called GI today who recommended hospital admission for colonoscopy vs. flex sig. Patient now additionally has JEFF.  (2024 00:49)    PAST MEDICAL HISTORY  HTN (hypertension)    CAD (coronary artery disease)    GERD (gastroesophageal reflux disease)    Arthritis    H/O spinal stenosis    Childhood asthma    History of blood transfusion    Cancer of salivary gland    Disorder of bone    Basal cell carcinoma    Squamous cell skin cancer    Rheumatoid arthritis    Other chronic back pain    Pain due to internal orthopedic prosthetic device, implant, or graft    Pain in left hip        PAST SURGICAL HISTORY  S/P coronary artery stent placement    Status post cervical spinal fusion    History of fusion of lumbar spine    S/P cholecystectomy    H/O parotidectomy        SUBSTANCE USE HISTORY  Denies current and previous substance use [  ]   CIGARETTES -   ALCOHOL -   DRUGS -     FAMILY HISTORY:  FH: CAD (coronary artery disease) (Mother)        CARDIAC SPECIFIC FAMILY HX   No KNOWN family history of Cardiovascular disease, CAD, or sudden death in first degree relatives unless specified below  Family History of Cardiovascular Disease:  [  ]   Coronary Artery Disease in first degree relative:  [  ]   Sudden Cardiac Death in First degree relative: [  ]    HOME MEDICATIONS:  amLODIPine 5 mg oral tablet: 0.5 tab(s) orally once a day (:18)  atorvastatin 40 mg oral tablet: 1 tab(s) orally once a day (at bedtime) (:18)  DULoxetine 40 mg oral delayed release capsule: 1 cap(s) orally 2 times a day (:18)  ergocalciferol 1.25 mg (50,000 intl units) oral tablet: 1 tab(s) orally once a week (:18)  fluorouracil 5% topical cream: Apply topically to affected area once a day (at bedtime) for 4 weeks (:18)  folic acid: 1 milligram(s) orally once a day (:18)  gabapentin 600 mg oral tablet: 1 tab(s) orally 2 times a day (:18)  isosorbide mononitrate 30 mg oral tablet, extended release: 1 tab(s) orally once a day (:18)  levothyroxine 100 mcg (0.1 mg) oral capsule: 1 cap(s) orally once a day (:18)  loperamide 2 mg oral tablet: 1 tab(s) orally (:18)  morphine 30 mg oral tablet: 1 tab(s) orally (:18)  pantoprazole 40 mg oral delayed release tablet: 1 tab(s) orally once a day (:18)  pilocarpine 7.5 mg oral tablet: 1 tab(s) orally 4 times a day (:18)  predniSONE 5 mg oral tablet: 1 tab(s) orally once a day (:18)  prochlorperazine 10 mg oral tablet: 1 tab(s) orally every 6 hours (:18)  ramipril 10 mg oral tablet: 2 tab(s) orally once a day (:18)  sulfaSALAzine 500 mg oral tablet: 2 tab(s) orally 2 times a day (:18)  Xtampza ER 18 mg oral capsule, extended release: 1 cap(s) orally 2 times a day for pain (:18)      CURRENT CARDIAC MEDICATIONS:  amLODIPine   Tablet 2.5 milliGRAM(s) Oral daily  isosorbide   mononitrate ER Tablet (IMDUR) 30 milliGRAM(s) Oral daily      CURRENT OTHER MEDICATIONS:  acetaminophen     Tablet .. 650 milliGRAM(s) Oral every 6 hours PRN Temp greater or equal to 38C (100.4F), Mild Pain (1 - 3)  DULoxetine 40 milliGRAM(s) Oral two times a day  gabapentin 600 milliGRAM(s) Oral two times a day  melatonin 3 milliGRAM(s) Oral at bedtime PRN Insomnia  morphine  IR 30 milliGRAM(s) Oral daily  ondansetron Injectable 4 milliGRAM(s) IV Push every 8 hours PRN Nausea and/or Vomiting  oxyCODONE  ER Tablet 15 milliGRAM(s) Oral every 12 hours  prochlorperazine   Tablet 10 milliGRAM(s) Oral every 6 hours  aluminum hydroxide/magnesium hydroxide/simethicone Suspension 30 milliLiter(s) Oral every 4 hours PRN Dyspepsia  loperamide 2 milliGRAM(s) Oral two times a day PRN Diarrhea  pantoprazole    Tablet 40 milliGRAM(s) Oral before breakfast  sulfaSALAzine 1000 milliGRAM(s) Oral two times a day  atorvastatin 40 milliGRAM(s) Oral at bedtime  folic acid 1 milliGRAM(s) Oral daily  lactated ringers. 1000 milliLiter(s) (84 mL/Hr) IV Continuous <Continuous>  levothyroxine 100 MICROGram(s) Oral daily  predniSONE   Tablet 5 milliGRAM(s) Oral daily      ALLERGIES:   penicillin (Rash)      VITAL SIGNS:  T(C): 36.4 (24 @ 07:40), Max: 36.7 (24 @ 02:27)  T(F): 97.6 (24 @ 07:40), Max: 98 (24 @ 02:27)  HR: 67 (24 @ 07:40) (60 - 81)  BP: 143/87 (24 @ 07:40) (104/67 - 143/87)  RR: 18 (24 @ 07:40) (16 - 18)  SpO2: 98% (24 @ 07:40) (98% - 99%)    INTAKE AND OUTPUT:       LABS:                            9.8    8.19  )-----------( 291      ( 2024 07:00 )             30.2         139  |  106  |  20.7[H]  ----------------------------<  82  4.1   |  26.0  |  1.62[H]    Ca    7.7[L]      2024 07:00    TPro  6.4[L]  /  Alb  2.9[L]  /  TBili  0.3[L]  /  DBili  x   /  AST  27  /  ALT  12  /  AlkPhos  140[H]  11      Urinalysis Basic - ( 2024 07:00 )    Color: x / Appearance: x / SG: x / pH: x  Gluc: 82 mg/dL / Ketone: x  / Bili: x / Urobili: x   Blood: x / Protein: x / Nitrite: x   Leuk Esterase: x / RBC: x / WBC x   Sq Epi: x / Non Sq Epi: x / Bacteria: x              RADIOLOGY IMAGIN Lead ECG (24 @ 18:38) [Ordered.]

## 2024-11-22 NOTE — CONSULT NOTE ADULT - ASSESSMENT
78 yo male with pmhx HTN, CAD, CVA on Eliquis, Basal Cell Carcinoma presenting to the ED with 2 weeks of diarrhea.  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 He has been on/off Keytruda since 2/2/23 for various reasons. In July 2024, he was having diarrhea after antibiotics, found to have +c.diff and treated with 2 courses of antibiotics.  Keytruda was held due to diarrhea. He continued to have diarrhea and cdiff was negative so he was restarted on Keytruda on 9/27/24. He continues to have diarrhea, nausea and vomiting. GI asked to consult for further management.    #diarrhea   #parotid Cancer on Keytruda   CT Abdomen and Pelvis No Cont (11.21.24) Again seen is diffuse wall thickening of the rectum and colon with sparing of the transverse colon. This is unchanged from the previous exam of 11/16/2024 and suspicious for some form of colitis.  GI PCR and C Diff negative 11/17/24  no leukocytosis, pt remains afebrile   -Trend CBC and BMP daily  -monitor electrolytes and repeat as needed  -PPI daily for GI mucosa cytoprotection   -Antiemetics prn   -continue with imodium BID  -will plan for flex sigmoidoscopy today, 11/22/24, to evaluate for malabsorptive disorders, microscopic colitis, checkpoint inhibitor colitis  -Please keep NPO   -pt will need pre procedure cardiac clearance   -please give 2 tap water enemas prior to procedure   -Maintain current T&S, INR <1.5, Hgb >7.0, Plt >50 prior to procedure  D/w hospitalist Dr. Griffin this morning   _________________________________________________________________  Assessment and recommendations are final when note is signed by the attending physician.  
This is a 79 year old male with PMH of HTN, CAD, CVA on Eliquis, Basal Cell Carcinoma presenting to the ED with 2 weeks of diarrhea.  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 He has been on/off Keytruda since 2/2/23 for various reasons. In July 2024, he was having diarrhea after antibiotics, found to have +c.diff and treated with 2 courses of antibiotics.  Keytruda was held due to diarrhea. He continued to have diarrhea and cdiff was negative so he was restarted on Keytruda on 9/27/24. He continues to have diarrhea, nausea and vomiting. GI asked to consult for further management.

## 2024-11-22 NOTE — CONSULT NOTE ADULT - SUBJECTIVE AND OBJECTIVE BOX
Chief Complaint:  Patient is a 79y old  Male who presents with a chief complaint of Colitis, JEFF (22 Nov 2024 00:49)      HPI: 78 yo male with pmhx HTN, CAD, CVA on Eliquis, Basal Cell Carcinoma presenting to the ED with 2 weeks of diarrhea. GI asked to consult for further management. History obtained form pt and chart review. Pt was originally seen in the GI office 10/28/24 for diarrhea, nausea and vomiting. He then saw Dr. Velasquez in office 11/11/24. 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 He has been on/off Keytruda since 2/2/23 for various reasons. In July 2024, he was having diarrhea after antibiotics, found to have +c.diff and treated with 2 courses of antibiotics.  Keytruda was held due to diarrhea. He continued to have diarrhea and cdiff was negative so he was restarted on Keytruda on 9/27/24.  His most recent c diff was negative 11/12/24. He had CT Enterography w/ Oral Cont and w/ IV Cont (11/06/24) showing Improved diffuse colitis with a residual 7 cm in length diffuse area thickening involving the sigmoid colon possibly diverticular narrowing however underlying lesion cannot be excluded. Pt called the office yesterday with c/o continued diarrhea, nausea, vomiting and weakness so Dr. Velasquez instructed pt to come to the ER for flex sig vs colonoscopy. In the ER CT Abdomen and Pelvis No Cont (11.21.24) Again seen is diffuse wall thickening of the rectum and colon with sparing of the transverse colon. This is unchanged from the previous exam of 11/16/2024 and suspicious for some form of colitis. Pt currently denies nausea, vomiting, abdominal pain, melena or hematochezia.             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      Disorder of bone      Basal cell carcinoma  skin      Squamous cell skin cancer      Rheumatoid arthritis      Other chronic back pain      Pain due to internal orthopedic prosthetic device, implant, or graft      Pain in left hip      S/P coronary artery stent placement  x 6 - last one 6 years ago      Status post cervical spinal fusion  1991      History of fusion of lumbar spine  1981      S/P cholecystectomy      H/O parotidectomy  right          REVIEW OF SYSTEMS:   General: Negative  HEENT: Negative  CV: Negative  Respiratory: Negative  GI: See HPI  : Negative  MSK: Negative  Hematologic: Negative  Skin: Negative    MEDICATIONS:   MEDICATIONS  (STANDING):  amLODIPine   Tablet 2.5 milliGRAM(s) Oral daily  atorvastatin 40 milliGRAM(s) Oral at bedtime  DULoxetine 40 milliGRAM(s) Oral two times a day  folic acid 1 milliGRAM(s) Oral daily  gabapentin 600 milliGRAM(s) Oral two times a day  isosorbide   mononitrate ER Tablet (IMDUR) 30 milliGRAM(s) Oral daily  lactated ringers. 1000 milliLiter(s) (84 mL/Hr) IV Continuous <Continuous>  levothyroxine 100 MICROGram(s) Oral daily  morphine  IR 30 milliGRAM(s) Oral daily  oxyCODONE  ER Tablet 15 milliGRAM(s) Oral every 12 hours  pantoprazole    Tablet 40 milliGRAM(s) Oral before breakfast  predniSONE   Tablet 5 milliGRAM(s) Oral daily  prochlorperazine   Tablet 10 milliGRAM(s) Oral every 6 hours  sulfaSALAzine 1000 milliGRAM(s) Oral two times a day    MEDICATIONS  (PRN):  acetaminophen     Tablet .. 650 milliGRAM(s) Oral every 6 hours PRN Temp greater or equal to 38C (100.4F), Mild Pain (1 - 3)  aluminum hydroxide/magnesium hydroxide/simethicone Suspension 30 milliLiter(s) Oral every 4 hours PRN Dyspepsia  loperamide 2 milliGRAM(s) Oral two times a day PRN Diarrhea  melatonin 3 milliGRAM(s) Oral at bedtime PRN Insomnia  ondansetron Injectable 4 milliGRAM(s) IV Push every 8 hours PRN Nausea and/or Vomiting          DIET:  Diet, NPO:   Except Medications  With Ice Chips/Sips of Water (11-22-24 @ 04:17) [Active]          ALLERGIES:   Allergies    penicillin (Rash)    Intolerances        Substance Use:   (  ) never used  (  ) other:  Tobacco Usage:  (   ) never smoked   (   ) former smoker   (   ) current smoker  (     ) pack year  (        ) last cigarette date  Alcohol Usage:    Family History   IBD (  ) Yes   (  ) No  GI Malignancy (  )  Yes    (  ) No    Health Management  Last Colonoscopy:  Last Endoscopy:     VITAL SIGNS:   Vital Signs Last 24 Hrs  T(C): 36.4 (22 Nov 2024 07:40), Max: 36.7 (22 Nov 2024 02:27)  T(F): 97.6 (22 Nov 2024 07:40), Max: 98 (22 Nov 2024 02:27)  HR: 67 (22 Nov 2024 07:40) (60 - 81)  BP: 143/87 (22 Nov 2024 07:40) (104/67 - 143/87)  BP(mean): --  RR: 18 (22 Nov 2024 07:40) (16 - 18)  SpO2: 98% (22 Nov 2024 07:40) (98% - 99%)    Parameters below as of 22 Nov 2024 07:40  Patient On (Oxygen Delivery Method): room air      I&O's Summary      PHYSICAL EXAM:   GENERAL:  No acute distress  HEENT:  NC/AT, conjunctiva clear, sclera anicteric  CHEST:  No increased effort  HEART:  Regular rate  ABDOMEN:  Soft, non-tender, non-distended, normoactive bowel sounds, no rebound or guarding  EXTREMITIES: No edema  SKIN:  Warm, dry  NEURO:  Calm, cooperative    LABS:                        9.8    8.19  )-----------( 291      ( 22 Nov 2024 07:00 )             30.2     Hemoglobin: 9.8 g/dL (11-22-24 @ 07:00)  Hemoglobin: 11.6 g/dL (11-21-24 @ 19:03)    11-22    139  |  106  |  20.7[H]  ----------------------------<  82  4.1   |  26.0  |  1.62[H]    Ca    7.7[L]      22 Nov 2024 07:00    TPro  6.4[L]  /  Alb  2.9[L]  /  TBili  0.3[L]  /  DBili  x   /  AST  27  /  ALT  12  /  AlkPhos  140[H]  11-21    LIVER FUNCTIONS - ( 21 Nov 2024 19:03 )  Alb: 2.9 g/dL / Pro: 6.4 g/dL / ALK PHOS: 140 U/L / ALT: 12 U/L / AST: 27 U/L / GGT: x                 Lipase: 43 U/L (11-21-24 @ 19:03)                                    RADIOLOGY & ADDITIONAL STUDIES:      ACC: [id number] EXAM:  CT ABDOMEN AND PELVIS   ORDERED BY: LENORA MONDRAGON     PROCEDURE DATE:  11/21/2024          INTERPRETATION:  CLINICAL INFORMATION: Vomiting. JEFF. 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    COMPARISON: CT abdomen pelvis 11/16/2024.    CONTRAST/COMPLICATIONS:  IV Contrast: NONE  Oral Contrast: NONE      PROCEDURE:  CT of the Abdomen and Pelvis was performed.  Sagittal and coronal reformats were performed.    FINDINGS:  LOWER CHEST: Coronary artery calcifications. Mitral valve annulus   calcification.    LIVER: Within normal limits.  BILE DUCTS: Normal caliber.  GALLBLADDER: Cholecystectomy.  SPLEEN: 2.9 cm cystic splenic lesion unchanged.  PANCREAS: Within normal limits.  ADRENALS: Within normal limits.  KIDNEYS/URETERS: Small left renal cyst.    BLADDER: Within normal limits.  REPRODUCTIVE ORGANS: Prostate is enlarged.    BOWEL: Again seen is diffuse wall thickening of the rectum and colon with   sparing of the transverse colon. This is unchanged from the previous exam   of 11/16/2024 and suspicious for some form of colitis. No bowel   obstruction. Appendix is normal. Small scattered colonic diverticula   without diverticulitis.  PERITONEUM/RETROPERITONEUM: Within normal limits.  VESSELS: Atherosclerotic changes.  LYMPH NODES: No lymphadenopathy.  ABDOMINAL WALL: Unchanged left thigh lipoma.  BONES: Degenerative changes. Destructive heterogeneous sclerotic lesion   involving the proximal left femur with a pathologic femoral neck fracture   status post ORIF. Redemonstrated vertebral body osseous lesions.    IMPRESSION:    Again seen is diffuse wall thickening of the rectum and colon with   sparing of the transverse colon. This is unchanged from the previous exam   of 11/16/2024 and suspicious for some form of colitis. No bowel   obstruction.      --- End of Report ---            RESHMA GARAY MD; Attending Radiologist  This document has been electronically signed. Nov 21 2024 11:10PM  11-21-24 @ 22:50       Chief Complaint:  Patient is a 79y old  Male who presents with a chief complaint of Colitis, JEFF (22 Nov 2024 00:49)      HPI: 80 yo male with pmhx HTN, CAD, CVA on Eliquis, Basal Cell Carcinoma presenting to the ED with 2 weeks of diarrhea. GI asked to consult for further management. History obtained form pt and chart review. Pt was originally seen in the GI office 10/28/24 for diarrhea, nausea and vomiting. He then saw Dr. Velasquez in office 11/11/24. 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 He has been on/off Keytruda since 2/2/23 for various reasons. In July 2024, he was having diarrhea after antibiotics, found to have +c.diff and treated with 2 courses of antibiotics.  Keytruda was held due to diarrhea. He continued to have diarrhea and cdiff was negative so he was restarted on Keytruda on 9/27/24.  His most recent c diff was negative 11/12/24. He had CT Enterography w/ Oral Cont and w/ IV Cont (11/06/24) showing Improved diffuse colitis with a residual 7 cm in length diffuse area thickening involving the sigmoid colon possibly diverticular narrowing however underlying lesion cannot be excluded. Pt called the office yesterday with c/o continued diarrhea, nausea, vomiting and weakness so Dr. Velasquez instructed pt to come to the ER for flex sig vs colonoscopy. In the ER CT Abdomen and Pelvis No Cont (11.21.24) Again seen is diffuse wall thickening of the rectum and colon with sparing of the transverse colon. This is unchanged from the previous exam of 11/16/2024 and suspicious for some form of colitis. Pt currently denies nausea, vomiting, abdominal pain, melena or hematochezia.             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  Disorder of bone  Basal cell carcinoma  skin  Squamous cell skin cancer  Rheumatoid arthritis  Other chronic back pain  Pain due to internal orthopedic prosthetic device, implant, or graft  Pain in left hip  S/P coronary artery stent placement  x 6 - last one 6 years ago  Status post cervical spinal fusion  1991  History of fusion of lumbar spine  1981  S/P cholecystectomy  H/O parotidectomy  right      REVIEW OF SYSTEMS:   General: Negative  HEENT: Negative  CV: Negative  Respiratory: Negative  GI: See HPI  : Negative  MSK: Negative  Hematologic: Negative  Skin: Negative    MEDICATIONS:   MEDICATIONS  (STANDING):  amLODIPine   Tablet 2.5 milliGRAM(s) Oral daily  atorvastatin 40 milliGRAM(s) Oral at bedtime  DULoxetine 40 milliGRAM(s) Oral two times a day  folic acid 1 milliGRAM(s) Oral daily  gabapentin 600 milliGRAM(s) Oral two times a day  isosorbide   mononitrate ER Tablet (IMDUR) 30 milliGRAM(s) Oral daily  lactated ringers. 1000 milliLiter(s) (84 mL/Hr) IV Continuous <Continuous>  levothyroxine 100 MICROGram(s) Oral daily  morphine  IR 30 milliGRAM(s) Oral daily  oxyCODONE  ER Tablet 15 milliGRAM(s) Oral every 12 hours  pantoprazole    Tablet 40 milliGRAM(s) Oral before breakfast  predniSONE   Tablet 5 milliGRAM(s) Oral daily  prochlorperazine   Tablet 10 milliGRAM(s) Oral every 6 hours  sulfaSALAzine 1000 milliGRAM(s) Oral two times a day    MEDICATIONS  (PRN):  acetaminophen     Tablet .. 650 milliGRAM(s) Oral every 6 hours PRN Temp greater or equal to 38C (100.4F), Mild Pain (1 - 3)  aluminum hydroxide/magnesium hydroxide/simethicone Suspension 30 milliLiter(s) Oral every 4 hours PRN Dyspepsia  loperamide 2 milliGRAM(s) Oral two times a day PRN Diarrhea  melatonin 3 milliGRAM(s) Oral at bedtime PRN Insomnia  ondansetron Injectable 4 milliGRAM(s) IV Push every 8 hours PRN Nausea and/or Vomiting          DIET:  Diet, NPO:   Except Medications  With Ice Chips/Sips of Water (11-22-24 @ 04:17) [Active]          ALLERGIES:   Allergies    penicillin (Rash)    Intolerances        Substance Use:   (  ) never used  (  ) other:  Tobacco Usage:  (   ) never smoked   (   ) former smoker   (   ) current smoker  (     ) pack year  (        ) last cigarette date  Alcohol Usage:    Family History   IBD (  ) Yes   (  ) No  GI Malignancy (  )  Yes    (  ) No    Health Management  Last Colonoscopy:  Last Endoscopy:     VITAL SIGNS:   Vital Signs Last 24 Hrs  T(C): 36.4 (22 Nov 2024 07:40), Max: 36.7 (22 Nov 2024 02:27)  T(F): 97.6 (22 Nov 2024 07:40), Max: 98 (22 Nov 2024 02:27)  HR: 67 (22 Nov 2024 07:40) (60 - 81)  BP: 143/87 (22 Nov 2024 07:40) (104/67 - 143/87)  BP(mean): --  RR: 18 (22 Nov 2024 07:40) (16 - 18)  SpO2: 98% (22 Nov 2024 07:40) (98% - 99%)    Parameters below as of 22 Nov 2024 07:40  Patient On (Oxygen Delivery Method): room air      I&O's Summary      PHYSICAL EXAM:   GENERAL:  No acute distress  HEENT:  NC/AT, conjunctiva clear, sclera anicteric  CHEST:  No increased effort  HEART:  Regular rate  ABDOMEN:  Soft, non-tender, non-distended, normoactive bowel sounds, no rebound or guarding  EXTREMITIES: No edema  SKIN:  Warm, dry  NEURO:  Calm, cooperative    LABS:                        9.8    8.19  )-----------( 291      ( 22 Nov 2024 07:00 )             30.2     Hemoglobin: 9.8 g/dL (11-22-24 @ 07:00)  Hemoglobin: 11.6 g/dL (11-21-24 @ 19:03)    11-22    139  |  106  |  20.7[H]  ----------------------------<  82  4.1   |  26.0  |  1.62[H]    Ca    7.7[L]      22 Nov 2024 07:00    TPro  6.4[L]  /  Alb  2.9[L]  /  TBili  0.3[L]  /  DBili  x   /  AST  27  /  ALT  12  /  AlkPhos  140[H]  11-21    LIVER FUNCTIONS - ( 21 Nov 2024 19:03 )  Alb: 2.9 g/dL / Pro: 6.4 g/dL / ALK PHOS: 140 U/L / ALT: 12 U/L / AST: 27 U/L / GGT: x                 Lipase: 43 U/L (11-21-24 @ 19:03)                                    RADIOLOGY & ADDITIONAL STUDIES:      ACC: [id number] EXAM:  CT ABDOMEN AND PELVIS   ORDERED BY: LENORA MONDRAGON     PROCEDURE DATE:  11/21/2024          INTERPRETATION:  CLINICAL INFORMATION: Vomiting. EJFF. 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    COMPARISON: CT abdomen pelvis 11/16/2024.    CONTRAST/COMPLICATIONS:  IV Contrast: NONE  Oral Contrast: NONE      PROCEDURE:  CT of the Abdomen and Pelvis was performed.  Sagittal and coronal reformats were performed.    FINDINGS:  LOWER CHEST: Coronary artery calcifications. Mitral valve annulus   calcification.    LIVER: Within normal limits.  BILE DUCTS: Normal caliber.  GALLBLADDER: Cholecystectomy.  SPLEEN: 2.9 cm cystic splenic lesion unchanged.  PANCREAS: Within normal limits.  ADRENALS: Within normal limits.  KIDNEYS/URETERS: Small left renal cyst.    BLADDER: Within normal limits.  REPRODUCTIVE ORGANS: Prostate is enlarged.    BOWEL: Again seen is diffuse wall thickening of the rectum and colon with   sparing of the transverse colon. This is unchanged from the previous exam   of 11/16/2024 and suspicious for some form of colitis. No bowel   obstruction. Appendix is normal. Small scattered colonic diverticula   without diverticulitis.  PERITONEUM/RETROPERITONEUM: Within normal limits.  VESSELS: Atherosclerotic changes.  LYMPH NODES: No lymphadenopathy.  ABDOMINAL WALL: Unchanged left thigh lipoma.  BONES: Degenerative changes. Destructive heterogeneous sclerotic lesion   involving the proximal left femur with a pathologic femoral neck fracture   status post ORIF. Redemonstrated vertebral body osseous lesions.    IMPRESSION:    Again seen is diffuse wall thickening of the rectum and colon with   sparing of the transverse colon. This is unchanged from the previous exam   of 11/16/2024 and suspicious for some form of colitis. No bowel   obstruction.      --- End of Report ---            RESHMA GARAY MD; Attending Radiologist  This document has been electronically signed. Nov 21 2024 11:10PM  11-21-24 @ 22:50

## 2024-11-22 NOTE — H&P ADULT - NSHPPHYSICALEXAM_GEN_ALL_CORE
Vital Signs Last 24 Hrs  T(C): 36.3 (21 Nov 2024 16:17), Max: 36.3 (21 Nov 2024 16:17)  T(F): 97.3 (21 Nov 2024 16:17), Max: 97.3 (21 Nov 2024 16:17)  HR: 81 (21 Nov 2024 16:17) (81 - 81)  BP: 104/67 (21 Nov 2024 16:17) (104/67 - 104/67)  BP(mean): --  RR: 16 (21 Nov 2024 16:17) (16 - 16)  SpO2: 98% (21 Nov 2024 16:17) (98% - 98%)    Parameters below as of 21 Nov 2024 16:17  Patient On (Oxygen Delivery Method): room air        General: Age-appearing, in no acute distress  Head: Normochephalic, atraumatic  ENMT: EOMI, neck supple  Cardiovascular: +S1, S2; Regular rate and rhythm, no murmurs, rubs, gallops  Respiratory: CTA BL, no wheezes, rales, rhonchi  Gastrointestinal: Abdomen soft, non-tender, +BS in all 4 quadrants  Extremities: No clubbing, cyanosis, or edema  Vascular: 2+ pulses, cap refill < 2 seconds  Neuro: Non-focal, AAOx4, sensation intact BL  Musculoskeletal: Normal tone, no deformities  Skin: Warm, dry; no acute rash seen  Psych: Appropriate, cooperative Vital Signs Last 24 Hrs  T(C): 36.3 (21 Nov 2024 16:17), Max: 36.3 (21 Nov 2024 16:17)  T(F): 97.3 (21 Nov 2024 16:17), Max: 97.3 (21 Nov 2024 16:17)  HR: 81 (21 Nov 2024 16:17) (81 - 81)  BP: 104/67 (21 Nov 2024 16:17) (104/67 - 104/67)  BP(mean): --  RR: 16 (21 Nov 2024 16:17) (16 - 16)  SpO2: 98% (21 Nov 2024 16:17) (98% - 98%)    Parameters below as of 21 Nov 2024 16:17  Patient On (Oxygen Delivery Method): room air    General: Age-appearing, in no acute distress  Head: Normocephalic, atraumatic  ENMT: EOMI, neck supple  Cardiovascular: +S1, S2; Regular rate and rhythm, no murmurs, rubs, gallops  Respiratory: CTA BL, no wheezes, rales, rhonchi  Gastrointestinal: Abdomen soft, non-tender, +BS in all 4 quadrants  Extremities: No clubbing, cyanosis, or edema  Vascular: 2+ pulses, cap refill < 2 seconds  Neuro: Non-focal, AAOx3 (unclear on the year, but does know the president, his name and place), sensation intact BL  Musculoskeletal: Normal tone, no deformities  Skin: Warm, dry; no acute rash seen; multiple skin lesions on face, ears   Psych: Appropriate, cooperative; poor historian

## 2024-11-22 NOTE — H&P ADULT - NSHPLABSRESULTS_GEN_ALL_CORE
11.6   10.88 )-----------( 381      ( 21 Nov 2024 19:03 )             36.9     21 Nov 2024 19:03    139    |  101    |  22.2   ----------------------------<  100    4.9     |  26.0   |  2.05     Ca    8.9        21 Nov 2024 19:03    TPro  6.4    /  Alb  2.9    /  TBili  0.3    /  DBili  x      /  AST  27     /  ALT  12     /  AlkPhos  140    21 Nov 2024 19:03      CAPILLARY BLOOD GLUCOSE        LIVER FUNCTIONS - ( 21 Nov 2024 19:03 )  Alb: 2.9 g/dL / Pro: 6.4 g/dL / ALK PHOS: 140 U/L / ALT: 12 U/L / AST: 27 U/L / GGT: x           Urinalysis Basic - ( 21 Nov 2024 19:03 )    Color: x / Appearance: x / SG: x / pH: x  Gluc: 100 mg/dL / Ketone: x  / Bili: x / Urobili: x   Blood: x / Protein: x / Nitrite: x   Leuk Esterase: x / RBC: x / WBC x   Sq Epi: x / Non Sq Epi: x / Bacteria: x      < from: CT Abdomen and Pelvis No Cont (11.21.24 @ 22:50) >      IMPRESSION:    Again seen is diffuse wall thickening of the rectum and colon with   sparing of the transverse colon. This is unchanged from the previous exam   of 11/16/2024 and suspicious for some form of colitis. No bowel   obstruction.      < end of copied text >

## 2024-11-22 NOTE — H&P ADULT - HISTORY OF PRESENT ILLNESS
78 yo male with pmhx HTN, CAD, CVA on Eliquis, Basal Cell Carcinoma presenting to the ED with 2 weeks of diarrhea.  78 yo male with pmhx HTN, CAD, CVA on Eliquis, Basal Cell Carcinoma presenting to the ED with 2 weeks of diarrhea. Patient is a poor medical historian and cannot give me much of a description of his symptoms other than that he has had continued diarrhea. I attempted to reach his wife for collateral information but she did not answer at the number provided so most history obtained from the chart. Patient has Parotid cancer with mets to the bone on Keytruda. Initially Keytruda was stopped due to diarrhea, but restarted when diarrhea did not stop. Patient started seeing GI at the end of October for these symptoms. Wife called GI today who recommended hospital admission for colonoscopy vs. flex sig. Patient now additionally has JEFF.

## 2024-11-22 NOTE — H&P ADULT - ASSESSMENT
78 yo male with pmhx HTN, CAD, CVA on Eliquis, Basal Cell Carcinoma presenting to the ED with 2 weeks of diarrhea.     Colitis   -Admit to medicine  -Patient recently in obs with colitis iwth negative C. diff and GI PCR, seeming to improve and sent home  -Subsequently worsened and went to GI doctor who advised him to come back in  -CT showing unchanged colitis from prior  -Repeat stool studies  -Will continue Cipro/Flagyl for now to cover for infectious colitis  -GI consulted     JEFF  -Cr 2.05 increased from 1.21 on 11/19 (2 days ago)  -Likely in the setting of GI losses from diarrhea  -Given 2300 cc IVF in the ED, will continue @75 cc/hr    HTN  -Continue Imdur 30 mg daily  -Continue Amlodipine 5 mg daily  -Continue Ramipril 10 mg daily    CAD, Hx of CVA  -Continue Atorvastatin 40 mg  -Continue Eliquis 5 mg BID    Hypothyroidism  -Continue Synthroid 100 mcg daily    DVTppx: Eliquis  GOC:   Dispo: Admit to medicine, likely admission 2 days or greater pending improvement of JEFF and GI eval 78 yo male with pmhx HTN, CAD, CVA on Eliquis, Basal Cell Carcinoma presenting to the ED with 2 weeks of diarrhea.     Colitis ?2/2 Immunotherapy vs. Infectious  -Admit to medicine  -Patient recently in obs with colitis with negative C. diff and GI PCR, seeming to improve and sent home  -Subsequently worsened and called GI doctor who advised him to come back in  -CT showing unchanged colitis from prior  -Repeat c. diff pending, but low suspicion for C. diff  -Hold off antibiotics as this does not appear infectious in nature  -GI consulted for possible colonoscopy/flex sig per outpatient chart note ([facility])  -I believe patient may benefit from Steroids, but will defer to GI pending colonoscopy  -NPO for now    JEFF  -Cr 2.05 increased from 1.21 on 11/19 (2 days ago)  -Likely in the setting of GI losses from diarrhea  -Given 2300 cc IVF in the ED, will continue @ 84 cc/hr  -Monitor BMP    HTN  -Continue Imdur 30 mg daily  -Continue Amlodipine 5 mg daily  -Continue Lisinopril 40 mg daily as sub for Ramipril 10 mg daily    CAD, Hx of CVA  -Continue Atorvastatin 40 mg  -Hold Eliquis 5 mg BID for possible scope    Hypothyroidism  -Continue Synthroid 100 mcg daily    RA, Sjogrens   -Continue [id number] mg BID  -Per d/c rec (11/16), patient on 5 mg daily of Prednisone, but per Rheum note (10/2024)  was to be decreased to 2.5 mg daily; cannot reach wife to confirm med rec so will continue 5 mg for now and please confirm in am    DVTppx: Eliquis on hold for possible colonoscopy  Dispo: Admit to medicine, likely admission 2 days or greater pending improvement of JEFF and GI eval  Medrec: Please confirm meds with wife in morning, med rec obtained from Dr. uJlian and prior d/c rec (11/16)

## 2024-11-22 NOTE — PATIENT PROFILE ADULT - FALL HARM RISK - HARM RISK INTERVENTIONS

## 2024-11-22 NOTE — CONSULT NOTE ADULT - NS ATTEND AMEND GEN_ALL_CORE FT
Patient is a 79-year-old gentleman with a history of hypertension, CAD, CVA (on Eliquis) and metastatic basal cell carcinoma previously on Keytruda who is presenting with diarrhea.  Patient has had ongoing diarrhea for almost a month at this point which was initially attributed to C. difficile which has not now been treated.  He continues to have 4-5 bowel movements per day with nausea, vomiting, lightheadedness and dizziness.  No recent falls.  He is reporting poor p.o. intake as well.  Outpatient workup includes CT enterography with 7 cm of thickening in the distal sigmoid colon and outpatient stool studies without clear source.  Plan for flex sig today with biopsies to assess for pembrolizumab induced colitis versus CMV/other infectious colitis.  Cardiology clearance needed.  Please continue to hold Eliquis, last dose was yesterday.
Consult: Preoperative cardiovascular assessment     78 yo male with pmhx HTN, CAD s/p PCI to pRCA and mLAD with last LHC in 2018 showing patent stents and residual disease OM1 40%, dRCA 50%, CVA on Eliquis, Basal Cell Carcinoma presenting to the ED with 2 weeks of diarrhea.     1) Preoperative cardiovascular assessment for flex sigmoidoscopy   2) CAD s/p PCI to pRCA and mLAD with last LHC in 2018 showing patent stents and residual disease OM1 40%, dRCA 50%dRCA 50% nml IFR, PHARMACOLOGIC NUCLEAR STRESS TEST: 3/15/2024, reported mildly abnormal with a suggestion of inferior wall ischemia from the base to mid cavity, EF 64% (personal review, small to moderate size zone of moderate inferior ischemia from the base to mid cavity)    - no active chest pain or shortness of breath and appears euvolemic at bedside   - patient has RCRI of 1 with Class II Risk 6.0 % 30-day risk of death, MI, or cardiac arrest, and thus considered elevated risk for a low risk procedure and currently is optimized for planned procedure and does not require any further cardiovascular work up and thus may proceed with planned procedure.   -close intra procedure monitoring   - early out of bed to chair and ambulation  -- Overall patient's risk profile is consistent with moderate risk, there are no absolute cardiac contraindications to GI procedures and further testing will not change the patient's risk profile.   - No further workup, we will sign off.    Mini Hutchinson D.O. [facility]  Cardiology/Vascular Cardiology -[facility] Cardiology   Telephone # [phone]

## 2024-11-22 NOTE — ED ADULT NURSE REASSESSMENT NOTE - NS ED NURSE REASSESS COMMENT FT1
US IV placed by MD, pt to get IV abx and fluids, will ctm
pt in restroom trying to provide a sample, pt axox2, no complaints at this time , will ctm

## 2024-11-23 LAB
ALBUMIN SERPL ELPH-MCNC: 2.6 G/DL — LOW (ref 3.3–5.2)
ALP SERPL-CCNC: 103 U/L — SIGNIFICANT CHANGE UP (ref 40–120)
ALT FLD-CCNC: 8 U/L — SIGNIFICANT CHANGE UP
ANION GAP SERPL CALC-SCNC: 9 MMOL/L — SIGNIFICANT CHANGE UP (ref 5–17)
ANION GAP SERPL CALC-SCNC: 9 MMOL/L — SIGNIFICANT CHANGE UP (ref 5–17)
APPEARANCE UR: ABNORMAL
AST SERPL-CCNC: 12 U/L — SIGNIFICANT CHANGE UP
BACTERIA # UR AUTO: ABNORMAL /HPF
BASOPHILS # BLD AUTO: 0.01 K/UL — SIGNIFICANT CHANGE UP (ref 0–0.2)
BASOPHILS NFR BLD AUTO: 0.1 % — SIGNIFICANT CHANGE UP (ref 0–2)
BILIRUB DIRECT SERPL-MCNC: 0.1 MG/DL — SIGNIFICANT CHANGE UP (ref 0–0.3)
BILIRUB INDIRECT FLD-MCNC: 0.2 MG/DL — SIGNIFICANT CHANGE UP (ref 0.2–1)
BILIRUB SERPL-MCNC: 0.4 MG/DL — SIGNIFICANT CHANGE UP (ref 0.4–2)
BILIRUB UR-MCNC: ABNORMAL
BUN SERPL-MCNC: 17.2 MG/DL — SIGNIFICANT CHANGE UP (ref 8–20)
BUN SERPL-MCNC: 18.6 MG/DL — SIGNIFICANT CHANGE UP (ref 8–20)
CALCIUM SERPL-MCNC: 7.6 MG/DL — LOW (ref 8.4–10.5)
CALCIUM SERPL-MCNC: 8.2 MG/DL — LOW (ref 8.4–10.5)
CAST: 10 /LPF — HIGH (ref 0–4)
CHLORIDE SERPL-SCNC: 100 MMOL/L — SIGNIFICANT CHANGE UP (ref 96–108)
CHLORIDE SERPL-SCNC: 102 MMOL/L — SIGNIFICANT CHANGE UP (ref 96–108)
CO2 SERPL-SCNC: 25 MMOL/L — SIGNIFICANT CHANGE UP (ref 22–29)
CO2 SERPL-SCNC: 25 MMOL/L — SIGNIFICANT CHANGE UP (ref 22–29)
COLOR SPEC: SIGNIFICANT CHANGE UP
CREAT SERPL-MCNC: 1.22 MG/DL — SIGNIFICANT CHANGE UP (ref 0.5–1.3)
CREAT SERPL-MCNC: 1.37 MG/DL — HIGH (ref 0.5–1.3)
DIFF PNL FLD: NEGATIVE — SIGNIFICANT CHANGE UP
EGFR: 53 ML/MIN/1.73M2 — LOW
EGFR: 61 ML/MIN/1.73M2 — SIGNIFICANT CHANGE UP
EOSINOPHIL # BLD AUTO: 0 K/UL — SIGNIFICANT CHANGE UP (ref 0–0.5)
EOSINOPHIL NFR BLD AUTO: 0 % — SIGNIFICANT CHANGE UP (ref 0–6)
GLUCOSE BLDC GLUCOMTR-MCNC: 115 MG/DL — HIGH (ref 70–99)
GLUCOSE BLDC GLUCOMTR-MCNC: 127 MG/DL — HIGH (ref 70–99)
GLUCOSE BLDC GLUCOMTR-MCNC: 145 MG/DL — HIGH (ref 70–99)
GLUCOSE BLDC GLUCOMTR-MCNC: 183 MG/DL — HIGH (ref 70–99)
GLUCOSE SERPL-MCNC: 119 MG/DL — HIGH (ref 70–99)
GLUCOSE SERPL-MCNC: 156 MG/DL — HIGH (ref 70–99)
GLUCOSE UR QL: 100 MG/DL
HAV IGM SER-ACNC: SIGNIFICANT CHANGE UP
HBV CORE IGM SER-ACNC: SIGNIFICANT CHANGE UP
HBV SURFACE AG SER-ACNC: SIGNIFICANT CHANGE UP
HCT VFR BLD CALC: 30.8 % — LOW (ref 39–50)
HCV AB S/CO SERPL IA: 0.09 S/CO — SIGNIFICANT CHANGE UP (ref 0–0.99)
HCV AB SERPL-IMP: SIGNIFICANT CHANGE UP
HGB BLD-MCNC: 9.8 G/DL — LOW (ref 13–17)
IMM GRANULOCYTES NFR BLD AUTO: 0.4 % — SIGNIFICANT CHANGE UP (ref 0–0.9)
KETONES UR-MCNC: 15 MG/DL
LEUKOCYTE ESTERASE UR-ACNC: ABNORMAL
LYMPHOCYTES # BLD AUTO: 0.19 K/UL — LOW (ref 1–3.3)
LYMPHOCYTES # BLD AUTO: 2.4 % — LOW (ref 13–44)
MCHC RBC-ENTMCNC: 29.8 PG — SIGNIFICANT CHANGE UP (ref 27–34)
MCHC RBC-ENTMCNC: 31.8 G/DL — LOW (ref 32–36)
MCV RBC AUTO: 93.6 FL — SIGNIFICANT CHANGE UP (ref 80–100)
MONOCYTES # BLD AUTO: 0.04 K/UL — SIGNIFICANT CHANGE UP (ref 0–0.9)
MONOCYTES NFR BLD AUTO: 0.5 % — LOW (ref 2–14)
MUCOUS THREADS # UR AUTO: PRESENT
NEUTROPHILS # BLD AUTO: 7.7 K/UL — HIGH (ref 1.8–7.4)
NEUTROPHILS NFR BLD AUTO: 96.6 % — HIGH (ref 43–77)
NITRITE UR-MCNC: NEGATIVE — SIGNIFICANT CHANGE UP
PH UR: 6 — SIGNIFICANT CHANGE UP (ref 5–8)
PLATELET # BLD AUTO: 317 K/UL — SIGNIFICANT CHANGE UP (ref 150–400)
POTASSIUM SERPL-MCNC: 5.6 MMOL/L — HIGH (ref 3.5–5.3)
POTASSIUM SERPL-MCNC: 6.1 MMOL/L — CRITICAL HIGH (ref 3.5–5.3)
POTASSIUM SERPL-SCNC: 5.6 MMOL/L — HIGH (ref 3.5–5.3)
POTASSIUM SERPL-SCNC: 6.1 MMOL/L — CRITICAL HIGH (ref 3.5–5.3)
PROT SERPL-MCNC: 5 G/DL — LOW (ref 6.6–8.7)
PROT UR-MCNC: 30 MG/DL
RBC # BLD: 3.29 M/UL — LOW (ref 4.2–5.8)
RBC # FLD: 14.6 % — HIGH (ref 10.3–14.5)
RBC CASTS # UR COMP ASSIST: 4 /HPF — SIGNIFICANT CHANGE UP (ref 0–4)
SODIUM SERPL-SCNC: 134 MMOL/L — LOW (ref 135–145)
SODIUM SERPL-SCNC: 136 MMOL/L — SIGNIFICANT CHANGE UP (ref 135–145)
SP GR SPEC: 1.02 — SIGNIFICANT CHANGE UP (ref 1–1.03)
SQUAMOUS # UR AUTO: 2 /HPF — SIGNIFICANT CHANGE UP (ref 0–5)
UROBILINOGEN FLD QL: 1 MG/DL — SIGNIFICANT CHANGE UP (ref 0.2–1)
WBC # BLD: 7.97 K/UL — SIGNIFICANT CHANGE UP (ref 3.8–10.5)
WBC # FLD AUTO: 7.97 K/UL — SIGNIFICANT CHANGE UP (ref 3.8–10.5)
WBC UR QL: 176 /HPF — HIGH (ref 0–5)

## 2024-11-23 PROCEDURE — 93010 ELECTROCARDIOGRAM REPORT: CPT

## 2024-11-23 PROCEDURE — 99232 SBSQ HOSP IP/OBS MODERATE 35: CPT

## 2024-11-23 PROCEDURE — 99233 SBSQ HOSP IP/OBS HIGH 50: CPT

## 2024-11-23 RX ORDER — CEFTRIAXONE SODIUM 1 G
1000 VIAL (EA) INJECTION EVERY 24 HOURS
Refills: 0 | Status: COMPLETED | OUTPATIENT
Start: 2024-11-23 | End: 2024-11-25

## 2024-11-23 RX ORDER — INSULIN REG, HUM S-S BUFF 100/ML
10 VIAL (ML) INJECTION ONCE
Refills: 0 | Status: COMPLETED | OUTPATIENT
Start: 2024-11-23 | End: 2024-11-23

## 2024-11-23 RX ORDER — SODIUM ZIRCONIUM CYCLOSILICATE 5 G/5G
10 POWDER, FOR SUSPENSION ORAL
Refills: 0 | Status: COMPLETED | OUTPATIENT
Start: 2024-11-23 | End: 2024-11-24

## 2024-11-23 RX ADMIN — Medication 100 GRAM(S): at 08:59

## 2024-11-23 RX ADMIN — SULFASALAZINE 1000 MILLIGRAM(S): 500 TABLET ORAL at 05:12

## 2024-11-23 RX ADMIN — Medication 40 MILLIGRAM(S): at 05:10

## 2024-11-23 RX ADMIN — AMLODIPINE BESYLATE 2.5 MILLIGRAM(S): 10 TABLET ORAL at 05:10

## 2024-11-23 RX ADMIN — Medication 84 MILLILITER(S): at 17:23

## 2024-11-23 RX ADMIN — Medication 40 MILLIGRAM(S): at 17:25

## 2024-11-23 RX ADMIN — Medication 30 MILLIGRAM(S): at 13:07

## 2024-11-23 RX ADMIN — Medication 84 MILLILITER(S): at 04:41

## 2024-11-23 RX ADMIN — Medication 10 UNIT(S): at 08:53

## 2024-11-23 RX ADMIN — OXYCODONE HYDROCHLORIDE 15 MILLIGRAM(S): 30 TABLET ORAL at 17:24

## 2024-11-23 RX ADMIN — OXYCODONE HYDROCHLORIDE 15 MILLIGRAM(S): 30 TABLET ORAL at 06:23

## 2024-11-23 RX ADMIN — Medication 1 MILLIGRAM(S): at 12:16

## 2024-11-23 RX ADMIN — Medication 1000 MILLIGRAM(S): at 13:10

## 2024-11-23 RX ADMIN — Medication 40 MILLIGRAM(S): at 05:37

## 2024-11-23 RX ADMIN — Medication 40 MILLIGRAM(S): at 22:31

## 2024-11-23 RX ADMIN — SULFASALAZINE 1000 MILLIGRAM(S): 500 TABLET ORAL at 17:24

## 2024-11-23 RX ADMIN — PANTOPRAZOLE SODIUM 40 MILLIGRAM(S): 40 TABLET, DELAYED RELEASE ORAL at 06:23

## 2024-11-23 RX ADMIN — GABAPENTIN 600 MILLIGRAM(S): 300 CAPSULE ORAL at 17:24

## 2024-11-23 RX ADMIN — Medication 50 MILLILITER(S): at 08:46

## 2024-11-23 RX ADMIN — PROCHLORPERAZINE EDISYLATE 10 MILLIGRAM(S): 5 INJECTION INTRAMUSCULAR; INTRAVENOUS at 05:11

## 2024-11-23 RX ADMIN — PROCHLORPERAZINE EDISYLATE 10 MILLIGRAM(S): 5 INJECTION INTRAMUSCULAR; INTRAVENOUS at 17:24

## 2024-11-23 RX ADMIN — PROCHLORPERAZINE EDISYLATE 10 MILLIGRAM(S): 5 INJECTION INTRAMUSCULAR; INTRAVENOUS at 23:55

## 2024-11-23 RX ADMIN — Medication 30 MILLIGRAM(S): at 12:16

## 2024-11-23 RX ADMIN — OXYCODONE HYDROCHLORIDE 15 MILLIGRAM(S): 30 TABLET ORAL at 05:37

## 2024-11-23 RX ADMIN — Medication 40 MILLIGRAM(S): at 13:09

## 2024-11-23 RX ADMIN — PROCHLORPERAZINE EDISYLATE 10 MILLIGRAM(S): 5 INJECTION INTRAMUSCULAR; INTRAVENOUS at 12:17

## 2024-11-23 RX ADMIN — Medication 100 MICROGRAM(S): at 05:10

## 2024-11-23 RX ADMIN — OXYCODONE HYDROCHLORIDE 15 MILLIGRAM(S): 30 TABLET ORAL at 18:46

## 2024-11-23 RX ADMIN — GABAPENTIN 600 MILLIGRAM(S): 300 CAPSULE ORAL at 05:37

## 2024-11-23 RX ADMIN — SODIUM ZIRCONIUM CYCLOSILICATE 10 GRAM(S): 5 POWDER, FOR SUSPENSION ORAL at 17:24

## 2024-11-23 RX ADMIN — Medication 84 MILLILITER(S): at 22:31

## 2024-11-23 NOTE — PROGRESS NOTE ADULT - SUBJECTIVE AND OBJECTIVE BOX
Patient is a 79y old  Male who presents with a chief complaint of Colitis, JEFF (2024 10:32)      SUBJECTIVE / OVERNIGHT EVENTS: Seen and examined. Feels ok. Wants to eat. Will start diet as tolerated. Denies chest pain, SOB, abd pain, N/V, fever, chills.     MEDICATIONS  (STANDING):  amLODIPine   Tablet 2.5 milliGRAM(s) Oral daily  atorvastatin 40 milliGRAM(s) Oral at bedtime  DULoxetine 40 milliGRAM(s) Oral two times a day  folic acid 1 milliGRAM(s) Oral daily  gabapentin 600 milliGRAM(s) Oral two times a day  isosorbide   mononitrate ER Tablet (IMDUR) 30 milliGRAM(s) Oral daily  lactated ringers. 1000 milliLiter(s) (84 mL/Hr) IV Continuous <Continuous>  levothyroxine 100 MICROGram(s) Oral daily  methylPREDNISolone sodium succinate Injectable 40 milliGRAM(s) IV Push every 8 hours  morphine  IR 30 milliGRAM(s) Oral daily  oxyCODONE  ER Tablet 15 milliGRAM(s) Oral every 12 hours  pantoprazole    Tablet 40 milliGRAM(s) Oral before breakfast  prochlorperazine   Tablet 10 milliGRAM(s) Oral every 6 hours  sulfaSALAzine 1000 milliGRAM(s) Oral two times a day    MEDICATIONS  (PRN):  acetaminophen     Tablet .. 650 milliGRAM(s) Oral every 6 hours PRN Temp greater or equal to 38C (100.4F), Mild Pain (1 - 3)  aluminum hydroxide/magnesium hydroxide/simethicone Suspension 30 milliLiter(s) Oral every 4 hours PRN Dyspepsia  loperamide 2 milliGRAM(s) Oral two times a day PRN Diarrhea  melatonin 3 milliGRAM(s) Oral at bedtime PRN Insomnia  ondansetron Injectable 4 milliGRAM(s) IV Push every 8 hours PRN Nausea and/or Vomiting        I&O's Summary      PHYSICAL EXAM:  Vital Signs Last 24 Hrs  T(C): 36.3 (2024 11:25), Max: 36.5 (2024 15:48)  T(F): 97.4 (2024 11:25), Max: 97.7 (2024 15:48)  HR: 81 (2024 11:25) (68 - 81)  BP: 92/65 (2024 11:25) (92/65 - 150/80)  BP(mean): 76 (2024 22:23) (76 - 76)  RR: 19 (2024 11:25) (18 - 19)  SpO2: 99% (2024 11:25) (96% - 99%)    Parameters below as of 2024 00:03  Patient On (Oxygen Delivery Method): room air        General: Age-appearing, in no acute distress  Head: Normocephalic, atraumatic  ENMT: EOMI, neck supple  Cardiovascular: +S1, S2; Regular rate and rhythm, no murmurs, rubs, gallops  Respiratory: CTA BL, no wheezes, rales, rhonchi  Gastrointestinal: Abdomen soft, non-tender, +BS in all 4 quadrants  Extremities: No clubbing, cyanosis, or edema  Vascular: 2+ pulses, cap refill < 2 seconds  Neuro: Non-focal, AAOx3 (unclear on the year, but does know the president, his name and place), sensation intact BL  Musculoskeletal: Normal tone, no deformities  Skin: Warm, dry; no acute rash seen; multiple skin lesions on face, ears   Psych: Appropriate, cooperative; poor historian    LABS:                        9.8    7.97  )-----------( 317      ( 2024 07:05 )             30.8         136  |  102  |  17.2  ----------------------------<  119[H]  6.1[HH]   |  25.0  |  1.22    Ca    7.6[L]      2024 07:05    TPro  5.0[L]  /  Alb  2.6[L]  /  TBili  0.4  /  DBili  0.1  /  AST  12  /  ALT  8   /  AlkPhos  103            Urinalysis Basic - ( 2024 10:02 )    Color: Dark Yellow / Appearance: Cloudy / S.025 / pH: x  Gluc: x / Ketone: 15 mg/dL  / Bili: Small / Urobili: 1.0 mg/dL   Blood: x / Protein: 30 mg/dL / Nitrite: Negative   Leuk Esterase: Moderate / RBC: 4 /HPF /  /HPF   Sq Epi: x / Non Sq Epi: 2 /HPF / Bacteria: Moderate /HPF        CAPILLARY BLOOD GLUCOSE      POCT Blood Glucose.: 145 mg/dL (2024 10:49)  POCT Blood Glucose.: 183 mg/dL (2024 09:48)  POCT Blood Glucose.: 115 mg/dL (2024 08:44)        RADIOLOGY & ADDITIONAL TESTS:  Results Reviewed:   Imaging Personally Reviewed:  Electrocardiogram Personally Reviewed:

## 2024-11-23 NOTE — PROGRESS NOTE ADULT - SUBJECTIVE AND OBJECTIVE BOX
Chief Complaint:  Patient is a 79y old  Male who presents with a chief complaint of Colitis, JEFF (2024 11:54)      HPI/ 24 hr events: Patient seen and examined at bedside. S/p flex sigmoidoscopy (24) showing  diffuse erythema, edema and loss of vasculature throughout the colon concerning for checkpoint inhibitor colitis v IBD. Pt feeling well this morning. No further diarrhea, however pt has not eaten. Denies nausea, vomiting, abdominal pain, hematemesis, hematochezia, melena. Vitals are overall stable, no leukocytosis, Hgb, LFTs stable.       REVIEW OF SYSTEMS:   General: Negative  HEENT: Negative  CV: Negative  Respiratory: Negative  GI: See HPI  : Negative  MSK: Negative  Hematologic: Negative  Skin: Negative    MEDICATIONS:   MEDICATIONS  (STANDING):  amLODIPine   Tablet 2.5 milliGRAM(s) Oral daily  atorvastatin 40 milliGRAM(s) Oral at bedtime  cefTRIAXone Injectable. 1000 milliGRAM(s) IV Push every 24 hours  DULoxetine 40 milliGRAM(s) Oral two times a day  folic acid 1 milliGRAM(s) Oral daily  gabapentin 600 milliGRAM(s) Oral two times a day  isosorbide   mononitrate ER Tablet (IMDUR) 30 milliGRAM(s) Oral daily  lactated ringers. 1000 milliLiter(s) (84 mL/Hr) IV Continuous <Continuous>  levothyroxine 100 MICROGram(s) Oral daily  methylPREDNISolone sodium succinate Injectable 40 milliGRAM(s) IV Push every 8 hours  morphine  IR 30 milliGRAM(s) Oral daily  oxyCODONE  ER Tablet 15 milliGRAM(s) Oral every 12 hours  pantoprazole    Tablet 40 milliGRAM(s) Oral before breakfast  prochlorperazine   Tablet 10 milliGRAM(s) Oral every 6 hours  sulfaSALAzine 1000 milliGRAM(s) Oral two times a day    MEDICATIONS  (PRN):  acetaminophen     Tablet .. 650 milliGRAM(s) Oral every 6 hours PRN Temp greater or equal to 38C (100.4F), Mild Pain (1 - 3)  aluminum hydroxide/magnesium hydroxide/simethicone Suspension 30 milliLiter(s) Oral every 4 hours PRN Dyspepsia  loperamide 2 milliGRAM(s) Oral two times a day PRN Diarrhea  melatonin 3 milliGRAM(s) Oral at bedtime PRN Insomnia  ondansetron Injectable 4 milliGRAM(s) IV Push every 8 hours PRN Nausea and/or Vomiting      DIET:  Diet, Low Fiber (24 @ 09:51) [Active]  ALLERGIES:   Allergies    penicillin (Rash)    Intolerances        VITAL SIGNS:   Vital Signs Last 24 Hrs  T(C): 36.3 (2024 11:25), Max: 36.5 (2024 15:48)  T(F): 97.4 (2024 11:25), Max: 97.7 (2024 15:48)  HR: 80 (2024 12:14) (68 - 81)  BP: 100/70 (2024 12:14) (92/65 - 150/80)  BP(mean): 76 (2024 22:23) (76 - 76)  RR: 19 (2024 11:25) (18 - 19)  SpO2: 99% (2024 11:25) (96% - 99%)    Parameters below as of 2024 00:03  Patient On (Oxygen Delivery Method): room air      I&O's Summary      PHYSICAL EXAM:   GENERAL:  No acute distress  HEENT:  NC/AT, conjunctiva clear, sclera anicteric  CHEST:  No increased effort  HEART:  Regular rate  ABDOMEN:  Soft, non-tender, non-distended, normoactive bowel sounds, no rebound or guarding  EXTREMITIES: No edema  SKIN:  Warm, dry  NEURO:  Calm, cooperative    LABS:                        9.8    7.97  )-----------( 317      ( 2024 07:05 )             30.8     Hemoglobin: 9.8 g/dL (24 @ 07:05)  Hemoglobin: 9.8 g/dL (24 @ 07:00)  Hemoglobin: 11.6 g/dL (24 @ 19:03)        136  |  102  |  17.2  ----------------------------<  119[H]  6.1[HH]   |  25.0  |  1.22    Ca    7.6[L]      2024 07:05    TPro  5.0[L]  /  Alb  2.6[L]  /  TBili  0.4  /  DBili  0.1  /  AST  12  /  ALT  8   /  AlkPhos  103      LIVER FUNCTIONS - ( 2024 07:05 )  Alb: 2.6 g/dL / Pro: 5.0 g/dL / ALK PHOS: 103 U/L / ALT: 8 U/L / AST: 12 U/L / GGT: x                 Culture - Blood (collected 2024 06:50)  Source: .Blood BLOOD  Preliminary Report (2024 12:00):    No growth at 24 hours          Hepatitis B Core IgM Antibody: Nonreact (24 @ 19:33)  Hepatitis B Surface Antigen: Nonreact (24 @ 19:33)  Hepatitis C Virus S/CO Ratio: 0.09 S/CO (24 @ 19:33)  Hepatitis A IgM Antibody: Nonreact (24 @ 19:33)        RADIOLOGY & ADDITIONAL STUDIES:      ACC: [id number] EXAM:  CT ABDOMEN AND PELVIS   ORDERED BY: LENORA MONDRAGON     PROCEDURE DATE:  2024          INTERPRETATION:  CLINICAL INFORMATION: Vomiting. JEFF. 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    COMPARISON: CT abdomen pelvis 2024.    CONTRAST/COMPLICATIONS:  IV Contrast: NONE  Oral Contrast: NONE      PROCEDURE:  CT of the Abdomen and Pelvis was performed.  Sagittal and coronal reformats were performed.    FINDINGS:  LOWER CHEST: Coronary artery calcifications. Mitral valve annulus   calcification.    LIVER: Within normal limits.  BILE DUCTS: Normal caliber.  GALLBLADDER: Cholecystectomy.  SPLEEN: 2.9 cm cystic splenic lesion unchanged.  PANCREAS: Within normal limits.  ADRENALS: Within normal limits.  KIDNEYS/URETERS: Small left renal cyst.    BLADDER: Within normal limits.  REPRODUCTIVE ORGANS: Prostate is enlarged.    BOWEL: Again seen is diffuse wall thickening of the rectum and colon with   sparing of the transverse colon. This is unchanged from the previous exam   of 2024 and suspicious for some form of colitis. No bowel   obstruction. Appendix is normal. Small scattered colonic diverticula   without diverticulitis.  PERITONEUM/RETROPERITONEUM: Within normal limits.  VESSELS: Atherosclerotic changes.  LYMPH NODES: No lymphadenopathy.  ABDOMINAL WALL: Unchanged left thigh lipoma.  BONES: Degenerative changes. Destructive heterogeneous sclerotic lesion   involving the proximal left femur with a pathologic femoral neck fracture   status post ORIF. Redemonstrated vertebral body osseous lesions.    IMPRESSION:    Again seen is diffuse wall thickening of the rectum and colon with   sparing of the transverse colon. This is unchanged from the previous exam   of 2024 and suspicious for some form of colitis. No bowel   obstruction.      --- End of Report ---            RESHMA GARAY MD; Attending Radiologist  This document has been electronically signed. 2024 11:10PM  24 @ 22:50  < from: Flexible Sigmoidoscopy (24 @ 13:15) >    Sigmoidoscopy Report        Date: 2024 1:15 PM        Patient Name: JOSIANE SHAW        MRN: [id number]        Account Number:        [id number]        Gender: Male         (age): 1945 (79)        Instrument(s):        [facility] [id number]        Attending/Fellow:        Amina Phillips MD                Procedure Room #:        PROCEDURE ROOM 2                        ASA Class:        P3 - 2024 2:28 PM Amina Phillips MD        History of Present Illness:        80 yo male with pmhx HTN, CAD, CVA on Eliquis, Basal Cell Carcinoma (On    Keytruda) presenting to the ED with 2 weeks of diarrhea.  CT Abdomen and Pelvis    No Cont (24) Again seen is diffuse wall thickening of the rectum and colon    with sparing of the transverse colon.        Indications:        Diarrhea - R19.7        Procedure:        The procedure, indications, preparation and potential complications were    explained to the patient, who indicated understanding and signed the    corresponding consent forms. MAC was administered by anesthesiologist Continuous    pulse oximetry and blood pressure monitoring were used throughout the procedure.    Supplemental oxygen was used. The quality of preparation was good. Patient was    placed in left lateral decubitus position. The colonoscope was introduced    through the anus and advanced under direct visualization until Transverse Colon    Retroflexion in the rectum was performed successfully and there were no    remarkable findings grade II hemorrhoids Patient tolerance to the procedure was    excellent. The procedure was not difficult. with the following findings:    hemorrhoids. The colonoscope was withdrawn in a thorough circumferential fashion    with careful view of the entire colon. Blood loss was minimal. The colonoscope    was withdrawn in a thorough circumferential fashion with careful view of the    entire colon.        Limitations/Complications:        There were no apparent limitations or complications        Findings:        Additional findings There was diffuse erythema, edema and loss of vasculature    throughout the colon concerning for checkpoint inhibitor colitis v IBD. Random    biopsies were taken from the transverse colon, descending colon, sigmoid colon,    and rectum. There was a 3-4mm sessile polyp in the sigmoid colon which was not    removed..        Impressions:        There was diffuse erythema, edema and loss of vasculature throughout the colon    concerning for checkpoint inhibitor colitis v IBD.Random biopsies were taken    from the transverse colon, descending colon, sigmoid colon, and rectum. There    was a 3-4mm sessile polyp in the sigmoid colon which was not removed. .        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 Would favor    holding keytruda and begining treatment with steroids as we await pathology.        - Repeat colonoscopy for removal of sigmoid polyp, nonurgent, timing to be    determined in the future        - We will start the patient on IV steroids and plan for an oral steroid taper    when symptoms improve        - Strict stool count        - Obtain Hepatitis B panel and TB testing for anticipation of potentially    starting a biologic medication        - Can restart regular diet as tolerated        - Note, alk phos remains elevated, will attempt to get MRCP over weekend        Specimens:        Jar # 1 :        in the transverse colon        Test(s) requested: Histology        Jar # 2 :        in the descending colon        Test(s) requested: Histology        Jar # 3 :        in the sigmoid colon        Test(s) requested: Histology        Jar # 4 :        in the rectum        Test(s) requested: Histology        Pathology:        Pathology was sent to lab, waiting for results        Attending Participation:        I was present and participated during the entire procedure, including non-key    portions.        Amina Phillips MD        Version 1, Electronically signed on 2024 2:34:20 PM by Amina Phillips MD    < end of copied text >       Chief Complaint:  Patient is a 79y old  Male who presents with a chief complaint of Colitis, JEFF (2024 11:54)      HPI/ 24 hr events: Patient seen and examined at bedside. S/p flex sigmoidoscopy (24) showing  diffuse erythema, edema and loss of vasculature throughout the colon concerning for checkpoint inhibitor colitis v IBD. Pt feeling well this morning. No further diarrhea, however pt has not eaten. Denies nausea, vomiting, abdominal pain, hematemesis, hematochezia, melena. Vitals are overall stable, no leukocytosis, Hgb, LFTs stable.       REVIEW OF SYSTEMS:   General: Negative  HEENT: Negative  CV: Negative  Respiratory: Negative  GI: See HPI  : Negative  MSK: Negative  Hematologic: Negative  Skin: Negative    MEDICATIONS:   MEDICATIONS  (STANDING):  amLODIPine   Tablet 2.5 milliGRAM(s) Oral daily  atorvastatin 40 milliGRAM(s) Oral at bedtime  cefTRIAXone Injectable. 1000 milliGRAM(s) IV Push every 24 hours  DULoxetine 40 milliGRAM(s) Oral two times a day  folic acid 1 milliGRAM(s) Oral daily  gabapentin 600 milliGRAM(s) Oral two times a day  isosorbide   mononitrate ER Tablet (IMDUR) 30 milliGRAM(s) Oral daily  lactated ringers. 1000 milliLiter(s) (84 mL/Hr) IV Continuous <Continuous>  levothyroxine 100 MICROGram(s) Oral daily  methylPREDNISolone sodium succinate Injectable 40 milliGRAM(s) IV Push every 8 hours  morphine  IR 30 milliGRAM(s) Oral daily  oxyCODONE  ER Tablet 15 milliGRAM(s) Oral every 12 hours  pantoprazole    Tablet 40 milliGRAM(s) Oral before breakfast  prochlorperazine   Tablet 10 milliGRAM(s) Oral every 6 hours  sulfaSALAzine 1000 milliGRAM(s) Oral two times a day    MEDICATIONS  (PRN):  acetaminophen     Tablet .. 650 milliGRAM(s) Oral every 6 hours PRN Temp greater or equal to 38C (100.4F), Mild Pain (1 - 3)  aluminum hydroxide/magnesium hydroxide/simethicone Suspension 30 milliLiter(s) Oral every 4 hours PRN Dyspepsia  loperamide 2 milliGRAM(s) Oral two times a day PRN Diarrhea  melatonin 3 milliGRAM(s) Oral at bedtime PRN Insomnia  ondansetron Injectable 4 milliGRAM(s) IV Push every 8 hours PRN Nausea and/or Vomiting      DIET:  Diet, Low Fiber (24 @ 09:51) [Active]  ALLERGIES:   Allergies    penicillin (Rash)    Intolerances        VITAL SIGNS:   Vital Signs Last 24 Hrs  T(C): 36.3 (2024 11:25), Max: 36.5 (2024 15:48)  T(F): 97.4 (2024 11:25), Max: 97.7 (2024 15:48)  HR: 80 (2024 12:14) (68 - 81)  BP: 100/70 (2024 12:14) (92/65 - 150/80)  BP(mean): 76 (2024 22:23) (76 - 76)  RR: 19 (2024 11:25) (18 - 19)  SpO2: 99% (2024 11:25) (96% - 99%)    Parameters below as of 2024 00:03  Patient On (Oxygen Delivery Method): room air      I&O's Summary      PHYSICAL EXAM:   GENERAL:  No acute distress  HEENT:  NC/AT, conjunctiva clear, sclera anicteric  CHEST:  No increased effort  HEART:  Regular rate  ABDOMEN:  Soft, non-tender, non-distended, normoactive bowel sounds, no rebound or guarding  EXTREMITIES: No edema  SKIN:  Warm, dry  NEURO:  Calm, cooperative    LABS:                        9.8    7.97  )-----------( 317      ( 2024 07:05 )             30.8     Hemoglobin: 9.8 g/dL (24 @ 07:05)  Hemoglobin: 9.8 g/dL (24 @ 07:00)  Hemoglobin: 11.6 g/dL (24 @ 19:03)        136  |  102  |  17.2  ----------------------------<  119[H]  6.1[HH]   |  25.0  |  1.22    Ca    7.6[L]      2024 07:05    TPro  5.0[L]  /  Alb  2.6[L]  /  TBili  0.4  /  DBili  0.1  /  AST  12  /  ALT  8   /  AlkPhos  103      LIVER FUNCTIONS - ( 2024 07:05 )  Alb: 2.6 g/dL / Pro: 5.0 g/dL / ALK PHOS: 103 U/L / ALT: 8 U/L / AST: 12 U/L / GGT: x                 Culture - Blood (collected 2024 06:50)  Source: .Blood BLOOD  Preliminary Report (2024 12:00):    No growth at 24 hours          Hepatitis B Core IgM Antibody: Nonreact (24 @ 19:33)  Hepatitis B Surface Antigen: Nonreact (24 @ 19:33)  Hepatitis C Virus S/CO Ratio: 0.09 S/CO (24 @ 19:33)  Hepatitis A IgM Antibody: Nonreact (24 @ 19:33)        RADIOLOGY & ADDITIONAL STUDIES:      ACC: [id number] EXAM:  CT ABDOMEN AND PELVIS   ORDERED BY: LENORA MONDRAGON     PROCEDURE DATE:  2024          INTERPRETATION:  CLINICAL INFORMATION: Vomiting. JEFF. 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    COMPARISON: CT abdomen pelvis 2024.    CONTRAST/COMPLICATIONS:  IV Contrast: NONE  Oral Contrast: NONE      PROCEDURE:  CT of the Abdomen and Pelvis was performed.  Sagittal and coronal reformats were performed.    FINDINGS:  LOWER CHEST: Coronary artery calcifications. Mitral valve annulus   calcification.    LIVER: Within normal limits.  BILE DUCTS: Normal caliber.  GALLBLADDER: Cholecystectomy.  SPLEEN: 2.9 cm cystic splenic lesion unchanged.  PANCREAS: Within normal limits.  ADRENALS: Within normal limits.  KIDNEYS/URETERS: Small left renal cyst.    BLADDER: Within normal limits.  REPRODUCTIVE ORGANS: Prostate is enlarged.    BOWEL: Again seen is diffuse wall thickening of the rectum and colon with   sparing of the transverse colon. This is unchanged from the previous exam   of 2024 and suspicious for some form of colitis. No bowel   obstruction. Appendix is normal. Small scattered colonic diverticula   without diverticulitis.  PERITONEUM/RETROPERITONEUM: Within normal limits.  VESSELS: Atherosclerotic changes.  LYMPH NODES: No lymphadenopathy.  ABDOMINAL WALL: Unchanged left thigh lipoma.  BONES: Degenerative changes. Destructive heterogeneous sclerotic lesion   involving the proximal left femur with a pathologic femoral neck fracture   status post ORIF. Redemonstrated vertebral body osseous lesions.    IMPRESSION:    Again seen is diffuse wall thickening of the rectum and colon with   sparing of the transverse colon. This is unchanged from the previous exam   of 2024 and suspicious for some form of colitis. No bowel   obstruction.      --- End of Report ---            RESHMA GARAY MD; Attending Radiologist  This document has been electronically signed. 2024 11:10PM  24 @ 22:50  < from: Flexible Sigmoidoscopy (24 @ 13:15) >    Sigmoidoscopy Report        Date: 2024 1:15 PM        Patient Name: JOSIANE SHAW        MRN: [id number]        Account Number:        [id number]        Gender: Male         (age): 1945 (79)        Instrument(s):        [facility] [id number]        Attending/Fellow:        Amina Phillips MD                Procedure Room #:        PROCEDURE ROOM 2                        ASA Class:        P3 - 2024 2:28 PM Amina Phillips MD        History of Present Illness:        80 yo male with pmhx HTN, CAD, CVA on Eliquis, Basal Cell Carcinoma (On    Keytruda) presenting to the ED with 2 weeks of diarrhea.  CT Abdomen and Pelvis    No Cont (24) Again seen is diffuse wall thickening of the rectum and colon    with sparing of the transverse colon.        Indications:        Diarrhea - R19.7        Procedure:        The procedure, indications, preparation and potential complications were    explained to the patient, who indicated understanding and signed the    corresponding consent forms. MAC was administered by anesthesiologist Continuous    pulse oximetry and blood pressure monitoring were used throughout the procedure.    Supplemental oxygen was used. The quality of preparation was good. Patient was    placed in left lateral decubitus position. The colonoscope was introduced    through the anus and advanced under direct visualization until Transverse Colon    Retroflexion in the rectum was performed successfully and there were no    remarkable findings grade II hemorrhoids Patient tolerance to the procedure was    excellent. The procedure was not difficult. with the following findings:    hemorrhoids. The colonoscope was withdrawn in a thorough circumferential fashion    with careful view of the entire colon. Blood loss was minimal. The colonoscope    was withdrawn in a thorough circumferential fashion with careful view of the    entire colon.        Limitations/Complications:        There were no apparent limitations or complications        Findings:        Additional findings There was diffuse erythema, edema and loss of vasculature    throughout the colon concerning for checkpoint inhibitor colitis v IBD. Random    biopsies were taken from the transverse colon, descending colon, sigmoid colon,    and rectum. There was a 3-4mm sessile polyp in the sigmoid colon which was not    removed..        Impressions:        There was diffuse erythema, edema and loss of vasculature throughout the colon    concerning for checkpoint inhibitor colitis v IBD.Random biopsies were taken    from the transverse colon, descending colon, sigmoid colon, and rectum. There    was a 3-4mm sessile polyp in the sigmoid colon which was not removed. .        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 Would favor    holding keytruda and begining treatment with steroids as we await pathology.        - Repeat colonoscopy for removal of sigmoid polyp, nonurgent, timing to be    determined in the future        - We will start the patient on IV steroids and plan for an oral steroid taper    when symptoms improve        - Strict stool count        - Obtain Hepatitis B panel and TB testing for anticipation of potentially    starting a biologic medication        - Can restart regular diet as tolerated        - Note, alk phos remains elevated, will attempt to get MRCP over weekend        Specimens:        Jar # 1 :        in the transverse colon        Test(s) requested: Histology        Jar # 2 :        in the descending colon        Test(s) requested: Histology        Jar # 3 :        in the sigmoid colon        Test(s) requested: Histology        Jar # 4 :        in the rectum        Test(s) requested: Histology        Pathology:        Pathology was sent to lab, waiting for results        Attending Participation:        I was present and participated during the entire procedure, including non-key    portions.        Amina Phillips MD        Version 1, Electronically signed on 2024 2:34:20 PM by Amina Phillips MD    < end of copied text >

## 2024-11-23 NOTE — PROGRESS NOTE ADULT - ASSESSMENT
80 yo male with pmhx HTN, CAD, CVA on Eliquis, Basal Cell Carcinoma presenting to the ED with 2 weeks of diarrhea.  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 He has been on/off Keytruda since 2/2/23 for various reasons. In July 2024, he was having diarrhea after antibiotics, found to have +c.diff and treated with 2 courses of antibiotics.  Keytruda was held due to diarrhea. He continued to have diarrhea and cdiff was negative so he was restarted on Keytruda on 9/27/24. He continues to have diarrhea, nausea and vomiting. GI asked to consult for further management.    #diarrhea   #parotid Cancer on Keytruda   CT Abdomen and Pelvis No Cont (11.21.24) Again seen is diffuse wall thickening of the rectum and colon with sparing of the transverse colon. This is unchanged from the previous exam of 11/16/2024 and suspicious for some form of colitis.  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 Also noted with polyp in sigmoid colon.  -Trend CBC and BMP daily  -Follow up pathology results   -Would hold Keytruda until pathology results   -Continue with IV steroids, will need PO taper on DC  -Continue stool count   -low fiber diet as tolerated   -PPI daily for GI mucosa cytoprotection   -Obtain Hepatitis B panel and TB testing for anticipation of potentially starting a biologic medication  -Will need outpatient colonoscopy do sigmoid polyp removal  _________________________________________________________________  Assessment and recommendations are final when note is signed by the attending physician.   80 yo male with pmhx HTN, CAD, CVA on Eliquis, Basal Cell Carcinoma presenting to the ED with 2 weeks of diarrhea.  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 He has been on/off Keytruda since 2/2/23 for various reasons. In July 2024, he was having diarrhea after antibiotics, found to have +c.diff and treated with 2 courses of antibiotics.  Keytruda was held due to diarrhea. He continued to have diarrhea and cdiff was negative so he was restarted on Keytruda on 9/27/24. He continues to have diarrhea, nausea and vomiting. GI asked to consult for further management.    #diarrhea   #parotid Cancer on Keytruda   CT Abdomen and Pelvis No Cont (11.21.24) Again seen is diffuse wall thickening of the rectum and colon with sparing of the transverse colon. This is unchanged from the previous exam of 11/16/2024 and suspicious for some form of colitis.  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 Also noted with polyp in sigmoid colon.  -Trend CBC and BMP daily  -Follow up pathology results   -Would hold Keytruda until pathology results   -Continue with IV steroids, will need PO taper on DC  -Continue stool count   -low fiber diet as tolerated   -PPI daily for GI mucosa cytoprotection   -Obtain Hepatitis B core IgG and IgM and hepatitis B surface antibody and QuantiFeron gold in anticipation of potentially starting a biologic medication  -Will need outpatient colonoscopy do sigmoid polyp removal  _________________________________________________________________  Assessment and recommendations are final when note is signed by the attending physician.

## 2024-11-23 NOTE — PROGRESS NOTE ADULT - ASSESSMENT
78 yo male with pmhx HTN, CAD, CVA on Eliquis, Basal Cell Carcinoma presenting to the ED with 2 weeks of diarrhea.     Colitis ?2/2 Immunotherapy vs. Infectious  -Admit to medicine  -Patient recently in obs with colitis with negative C. diff and GI PCR, seeming to improve and sent home  -Subsequently worsened and called GI doctor who advised him to come back in  -CT showing unchanged colitis from prior  -Repeat c. diff pending, but low suspicion for C. diff  -GI consulted for possible colonoscopy/flex sig per outpatient chart note ([facility])  - S/p Flex sigmoidoscopy 11/22  - GI recs MRCP  - C/w Steroid methylpred 40mg Q8H    Positive UA  - Denies any symptoms  - Will start Ceftriaxone and fu urine c/s    JEFF (resolved)  -Cr 2.05 increased from 1.21 on 11/19 (2 days ago)  -Likely in the setting of GI losses from diarrhea  -Given 2300 cc IVF in the ED, will continue @ 84 cc/hr  -Monitor BMP    Hyperkalemia  - Give hyperk cocktail  - EKG with NSR/ QTc 477  - Will repeat bmp in afternoon    HTN  -Continue Imdur 30 mg daily  -Continue Amlodipine 5 mg daily  -Continue Lisinopril 40 mg daily as sub for Ramipril 10 mg daily    CAD, Hx of CVA  -Continue Atorvastatin 40 mg  -Hold Eliquis 5 mg BID for possible scope    Hypothyroidism  -Continue Synthroid 100 mcg daily    RA, Sjogrens   -Continue [id number] mg BID  -Per d/c rec (11/16), patient on 5 mg daily of Prednisone, but per Rheum note (10/2024)  was to be decreased to 2.5 mg daily; cannot reach wife to confirm med rec so will continue 5 mg for now and please confirm in am    DVTppx: Eliquis on hold for possible colonoscopy    Dispo: Pending MRCP, Possible Colonoscopy, Will treat positive UA

## 2024-11-24 LAB
ALBUMIN SERPL ELPH-MCNC: 2.5 G/DL — LOW (ref 3.3–5.2)
ALP SERPL-CCNC: 103 U/L — SIGNIFICANT CHANGE UP (ref 40–120)
ALT FLD-CCNC: 10 U/L — SIGNIFICANT CHANGE UP
ANION GAP SERPL CALC-SCNC: 8 MMOL/L — SIGNIFICANT CHANGE UP (ref 5–17)
AST SERPL-CCNC: 15 U/L — SIGNIFICANT CHANGE UP
BILIRUB SERPL-MCNC: <0.2 MG/DL — LOW (ref 0.4–2)
BUN SERPL-MCNC: 19.7 MG/DL — SIGNIFICANT CHANGE UP (ref 8–20)
CALCIUM SERPL-MCNC: 7.8 MG/DL — LOW (ref 8.4–10.5)
CHLORIDE SERPL-SCNC: 103 MMOL/L — SIGNIFICANT CHANGE UP (ref 96–108)
CO2 SERPL-SCNC: 26 MMOL/L — SIGNIFICANT CHANGE UP (ref 22–29)
CREAT SERPL-MCNC: 1.35 MG/DL — HIGH (ref 0.5–1.3)
EGFR: 54 ML/MIN/1.73M2 — LOW
GLUCOSE SERPL-MCNC: 162 MG/DL — HIGH (ref 70–99)
HCT VFR BLD CALC: 27 % — LOW (ref 39–50)
HGB BLD-MCNC: 8.6 G/DL — LOW (ref 13–17)
MAGNESIUM SERPL-MCNC: 1.8 MG/DL — SIGNIFICANT CHANGE UP (ref 1.6–2.6)
MCHC RBC-ENTMCNC: 29.4 PG — SIGNIFICANT CHANGE UP (ref 27–34)
MCHC RBC-ENTMCNC: 31.9 G/DL — LOW (ref 32–36)
MCV RBC AUTO: 92.2 FL — SIGNIFICANT CHANGE UP (ref 80–100)
PHOSPHATE SERPL-MCNC: 2.6 MG/DL — SIGNIFICANT CHANGE UP (ref 2.4–4.7)
PLATELET # BLD AUTO: 293 K/UL — SIGNIFICANT CHANGE UP (ref 150–400)
POTASSIUM SERPL-MCNC: 4.9 MMOL/L — SIGNIFICANT CHANGE UP (ref 3.5–5.3)
POTASSIUM SERPL-SCNC: 4.9 MMOL/L — SIGNIFICANT CHANGE UP (ref 3.5–5.3)
PROT SERPL-MCNC: 4.9 G/DL — LOW (ref 6.6–8.7)
RBC # BLD: 2.93 M/UL — LOW (ref 4.2–5.8)
RBC # FLD: 14.6 % — HIGH (ref 10.3–14.5)
SODIUM SERPL-SCNC: 137 MMOL/L — SIGNIFICANT CHANGE UP (ref 135–145)
WBC # BLD: 10.6 K/UL — HIGH (ref 3.8–10.5)
WBC # FLD AUTO: 10.6 K/UL — HIGH (ref 3.8–10.5)

## 2024-11-24 PROCEDURE — 99232 SBSQ HOSP IP/OBS MODERATE 35: CPT

## 2024-11-24 PROCEDURE — 99233 SBSQ HOSP IP/OBS HIGH 50: CPT

## 2024-11-24 RX ADMIN — PROCHLORPERAZINE EDISYLATE 10 MILLIGRAM(S): 5 INJECTION INTRAMUSCULAR; INTRAVENOUS at 12:15

## 2024-11-24 RX ADMIN — OXYCODONE HYDROCHLORIDE 15 MILLIGRAM(S): 30 TABLET ORAL at 05:49

## 2024-11-24 RX ADMIN — Medication 30 MILLIGRAM(S): at 12:14

## 2024-11-24 RX ADMIN — Medication 1 MILLIGRAM(S): at 12:14

## 2024-11-24 RX ADMIN — Medication 40 MILLIGRAM(S): at 13:36

## 2024-11-24 RX ADMIN — GABAPENTIN 600 MILLIGRAM(S): 300 CAPSULE ORAL at 17:33

## 2024-11-24 RX ADMIN — Medication 100 MICROGRAM(S): at 05:50

## 2024-11-24 RX ADMIN — SULFASALAZINE 1000 MILLIGRAM(S): 500 TABLET ORAL at 05:51

## 2024-11-24 RX ADMIN — Medication 84 MILLILITER(S): at 21:33

## 2024-11-24 RX ADMIN — Medication 84 MILLILITER(S): at 17:36

## 2024-11-24 RX ADMIN — Medication 40 MILLIGRAM(S): at 05:49

## 2024-11-24 RX ADMIN — PROCHLORPERAZINE EDISYLATE 10 MILLIGRAM(S): 5 INJECTION INTRAMUSCULAR; INTRAVENOUS at 17:34

## 2024-11-24 RX ADMIN — GABAPENTIN 600 MILLIGRAM(S): 300 CAPSULE ORAL at 05:50

## 2024-11-24 RX ADMIN — Medication 30 MILLIGRAM(S): at 13:15

## 2024-11-24 RX ADMIN — Medication 40 MILLIGRAM(S): at 21:33

## 2024-11-24 RX ADMIN — OXYCODONE HYDROCHLORIDE 15 MILLIGRAM(S): 30 TABLET ORAL at 17:33

## 2024-11-24 RX ADMIN — AMLODIPINE BESYLATE 2.5 MILLIGRAM(S): 10 TABLET ORAL at 05:50

## 2024-11-24 RX ADMIN — PROCHLORPERAZINE EDISYLATE 10 MILLIGRAM(S): 5 INJECTION INTRAMUSCULAR; INTRAVENOUS at 05:51

## 2024-11-24 RX ADMIN — Medication 1000 MILLIGRAM(S): at 12:18

## 2024-11-24 RX ADMIN — SULFASALAZINE 1000 MILLIGRAM(S): 500 TABLET ORAL at 17:33

## 2024-11-24 RX ADMIN — Medication 40 MILLIGRAM(S): at 17:33

## 2024-11-24 RX ADMIN — SODIUM ZIRCONIUM CYCLOSILICATE 10 GRAM(S): 5 POWDER, FOR SUSPENSION ORAL at 06:11

## 2024-11-24 RX ADMIN — OXYCODONE HYDROCHLORIDE 15 MILLIGRAM(S): 30 TABLET ORAL at 06:45

## 2024-11-24 RX ADMIN — Medication 40 MILLIGRAM(S): at 05:50

## 2024-11-24 RX ADMIN — PANTOPRAZOLE SODIUM 40 MILLIGRAM(S): 40 TABLET, DELAYED RELEASE ORAL at 05:54

## 2024-11-24 NOTE — PROGRESS NOTE ADULT - SUBJECTIVE AND OBJECTIVE BOX
Chief Complaint:  Patient is a 79y old  Male who presents with a chief complaint of Colitis, JEFF (24 Nov 2024 10:57)      HPI/ 24 hr events: Patient seen and examined at bedside. Pt feeling well this morning. His diarrhea seems to be improving but still had 3 episodes yesterday. He is tolerating diet. Denies nausea, vomiting, abdominal pain, hematemesis, hematochezia, melena. Vitals are overall stable.      REVIEW OF SYSTEMS:   General: Negative  HEENT: Negative  CV: Negative  Respiratory: Negative  GI: See HPI  : Negative  MSK: Negative  Hematologic: Negative  Skin: Negative    MEDICATIONS:   MEDICATIONS  (STANDING):  amLODIPine   Tablet 2.5 milliGRAM(s) Oral daily  atorvastatin 40 milliGRAM(s) Oral at bedtime  cefTRIAXone Injectable. 1000 milliGRAM(s) IV Push every 24 hours  DULoxetine 40 milliGRAM(s) Oral two times a day  folic acid 1 milliGRAM(s) Oral daily  gabapentin 600 milliGRAM(s) Oral two times a day  isosorbide   mononitrate ER Tablet (IMDUR) 30 milliGRAM(s) Oral daily  lactated ringers. 1000 milliLiter(s) (84 mL/Hr) IV Continuous <Continuous>  levothyroxine 100 MICROGram(s) Oral daily  methylPREDNISolone sodium succinate Injectable 40 milliGRAM(s) IV Push every 8 hours  morphine  IR 30 milliGRAM(s) Oral daily  oxyCODONE  ER Tablet 15 milliGRAM(s) Oral every 12 hours  pantoprazole    Tablet 40 milliGRAM(s) Oral before breakfast  prochlorperazine   Tablet 10 milliGRAM(s) Oral every 6 hours  sulfaSALAzine 1000 milliGRAM(s) Oral two times a day    MEDICATIONS  (PRN):  acetaminophen     Tablet .. 650 milliGRAM(s) Oral every 6 hours PRN Temp greater or equal to 38C (100.4F), Mild Pain (1 - 3)  aluminum hydroxide/magnesium hydroxide/simethicone Suspension 30 milliLiter(s) Oral every 4 hours PRN Dyspepsia  loperamide 2 milliGRAM(s) Oral two times a day PRN Diarrhea  melatonin 3 milliGRAM(s) Oral at bedtime PRN Insomnia  ondansetron Injectable 4 milliGRAM(s) IV Push every 8 hours PRN Nausea and/or Vomiting            DIET:  Diet, Low Fiber (11-23-24 @ 09:51) [Active]          ALLERGIES:   Allergies    penicillin (Rash)    Intolerances        VITAL SIGNS:   Vital Signs Last 24 Hrs  T(C): 36.4 (24 Nov 2024 10:43), Max: 36.4 (24 Nov 2024 04:39)  T(F): 97.5 (24 Nov 2024 10:43), Max: 97.5 (24 Nov 2024 04:39)  HR: 76 (24 Nov 2024 10:43) (65 - 76)  BP: 125/79 (24 Nov 2024 10:43) (106/66 - 135/79)  BP(mean): --  RR: 19 (24 Nov 2024 10:43) (18 - 19)  SpO2: 94% (24 Nov 2024 10:43) (94% - 98%)    Parameters below as of 24 Nov 2024 04:39  Patient On (Oxygen Delivery Method): room air      I&O's Summary      PHYSICAL EXAM:   GENERAL:  No acute distress  HEENT:  NC/AT, conjunctiva clear, sclera anicteric  CHEST:  No increased effort  HEART:  Regular rate  ABDOMEN:  Soft, non-tender, non-distended, normoactive bowel sounds, no rebound or guarding  EXTREMITIES: No edema  SKIN:  Warm, dry  NEURO:  Calm, cooperative    LABS:                        8.6    10.60 )-----------( 293      ( 24 Nov 2024 04:41 )             27.0     Hemoglobin: 8.6 g/dL (11-24-24 @ 04:41)  Hemoglobin: 9.8 g/dL (11-23-24 @ 07:05)  Hemoglobin: 9.8 g/dL (11-22-24 @ 07:00)  Hemoglobin: 11.6 g/dL (11-21-24 @ 19:03)    11-24    137  |  103  |  19.7  ----------------------------<  162[H]  4.9   |  26.0  |  1.35[H]    Ca    7.8[L]      24 Nov 2024 04:41  Phos  2.6     11-24  Mg     1.8     11-24    TPro  4.9[L]  /  Alb  2.5[L]  /  TBili  <0.2[L]  /  DBili  x   /  AST  15  /  ALT  10  /  AlkPhos  103  11-24    LIVER FUNCTIONS - ( 24 Nov 2024 04:41 )  Alb: 2.5 g/dL / Pro: 4.9 g/dL / ALK PHOS: 103 U/L / ALT: 10 U/L / AST: 15 U/L / GGT: x                 Culture - Blood (collected 22 Nov 2024 06:50)  Source: .Blood BLOOD  Preliminary Report (24 Nov 2024 12:01):    No growth at 48 Hours          Hepatitis B Core IgM Antibody: Nonreact (11-22-24 @ 19:33)  Hepatitis B Surface Antigen: Nonreact (11-22-24 @ 19:33)  Hepatitis C Virus S/CO Ratio: 0.09 S/CO (11-22-24 @ 19:33)  Hepatitis A IgM Antibody: Nonreact (11-22-24 @ 19:33)                                  RADIOLOGY & ADDITIONAL STUDIES:      ACC: [id number] EXAM:  CT ABDOMEN AND PELVIS   ORDERED BY: LENORA MONDRAGON     PROCEDURE DATE:  11/21/2024          INTERPRETATION:  CLINICAL INFORMATION: Vomiting. JEFF. 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    COMPARISON: CT abdomen pelvis 11/16/2024.    CONTRAST/COMPLICATIONS:  IV Contrast: NONE  Oral Contrast: NONE      PROCEDURE:  CT of the Abdomen and Pelvis was performed.  Sagittal and coronal reformats were performed.    FINDINGS:  LOWER CHEST: Coronary artery calcifications. Mitral valve annulus   calcification.    LIVER: Within normal limits.  BILE DUCTS: Normal caliber.  GALLBLADDER: Cholecystectomy.  SPLEEN: 2.9 cm cystic splenic lesion unchanged.  PANCREAS: Within normal limits.  ADRENALS: Within normal limits.  KIDNEYS/URETERS: Small left renal cyst.    BLADDER: Within normal limits.  REPRODUCTIVE ORGANS: Prostate is enlarged.    BOWEL: Again seen is diffuse wall thickening of the rectum and colon with   sparing of the transverse colon. This is unchanged from the previous exam   of 11/16/2024 and suspicious for some form of colitis. No bowel   obstruction. Appendix is normal. Small scattered colonic diverticula   without diverticulitis.  PERITONEUM/RETROPERITONEUM: Within normal limits.  VESSELS: Atherosclerotic changes.  LYMPH NODES: No lymphadenopathy.  ABDOMINAL WALL: Unchanged left thigh lipoma.  BONES: Degenerative changes. Destructive heterogeneous sclerotic lesion   involving the proximal left femur with a pathologic femoral neck fracture   status post ORIF. Redemonstrated vertebral body osseous lesions.    IMPRESSION:    Again seen is diffuse wall thickening of the rectum and colon with   sparing of the transverse colon. This is unchanged from the previous exam   of 11/16/2024 and suspicious for some form of colitis. No bowel   obstruction.      --- End of Report ---            RESHMA GARAY MD; Attending Radiologist  This document has been electronically signed. Nov 21 2024 11:10PM  11-21-24 @ 22:50       Chief Complaint:  Patient is a 79y old  Male who presents with a chief complaint of Colitis, JEFF (24 Nov 2024 10:57)    HPI/ 24 hr events: Patient seen and examined at bedside. Pt feeling well this morning. His diarrhea seems to be improving but still had 3 episodes yesterday. He is tolerating diet. Denies nausea, vomiting, abdominal pain, hematemesis, hematochezia, melena. Vitals are overall stable.      REVIEW OF SYSTEMS:   General: Negative  HEENT: Negative  CV: Negative  Respiratory: Negative  GI: See HPI  : Negative  MSK: Negative  Hematologic: Negative  Skin: Negative    MEDICATIONS:   MEDICATIONS  (STANDING):  amLODIPine   Tablet 2.5 milliGRAM(s) Oral daily  atorvastatin 40 milliGRAM(s) Oral at bedtime  cefTRIAXone Injectable. 1000 milliGRAM(s) IV Push every 24 hours  DULoxetine 40 milliGRAM(s) Oral two times a day  folic acid 1 milliGRAM(s) Oral daily  gabapentin 600 milliGRAM(s) Oral two times a day  isosorbide   mononitrate ER Tablet (IMDUR) 30 milliGRAM(s) Oral daily  lactated ringers. 1000 milliLiter(s) (84 mL/Hr) IV Continuous <Continuous>  levothyroxine 100 MICROGram(s) Oral daily  methylPREDNISolone sodium succinate Injectable 40 milliGRAM(s) IV Push every 8 hours  morphine  IR 30 milliGRAM(s) Oral daily  oxyCODONE  ER Tablet 15 milliGRAM(s) Oral every 12 hours  pantoprazole    Tablet 40 milliGRAM(s) Oral before breakfast  prochlorperazine   Tablet 10 milliGRAM(s) Oral every 6 hours  sulfaSALAzine 1000 milliGRAM(s) Oral two times a day    MEDICATIONS  (PRN):  acetaminophen     Tablet .. 650 milliGRAM(s) Oral every 6 hours PRN Temp greater or equal to 38C (100.4F), Mild Pain (1 - 3)  aluminum hydroxide/magnesium hydroxide/simethicone Suspension 30 milliLiter(s) Oral every 4 hours PRN Dyspepsia  loperamide 2 milliGRAM(s) Oral two times a day PRN Diarrhea  melatonin 3 milliGRAM(s) Oral at bedtime PRN Insomnia  ondansetron Injectable 4 milliGRAM(s) IV Push every 8 hours PRN Nausea and/or Vomiting            DIET:  Diet, Low Fiber (11-23-24 @ 09:51) [Active]          ALLERGIES:   Allergies    penicillin (Rash)    Intolerances        VITAL SIGNS:   Vital Signs Last 24 Hrs  T(C): 36.4 (24 Nov 2024 10:43), Max: 36.4 (24 Nov 2024 04:39)  T(F): 97.5 (24 Nov 2024 10:43), Max: 97.5 (24 Nov 2024 04:39)  HR: 76 (24 Nov 2024 10:43) (65 - 76)  BP: 125/79 (24 Nov 2024 10:43) (106/66 - 135/79)  BP(mean): --  RR: 19 (24 Nov 2024 10:43) (18 - 19)  SpO2: 94% (24 Nov 2024 10:43) (94% - 98%)    Parameters below as of 24 Nov 2024 04:39  Patient On (Oxygen Delivery Method): room air      I&O's Summary      PHYSICAL EXAM:   GENERAL:  No acute distress  HEENT:  NC/AT, conjunctiva clear, sclera anicteric  CHEST:  No increased effort  HEART:  Regular rate  ABDOMEN:  Soft, non-tender, non-distended, normoactive bowel sounds, no rebound or guarding  EXTREMITIES: No edema  SKIN:  Warm, dry  NEURO:  Calm, cooperative    LABS:                        8.6    10.60 )-----------( 293      ( 24 Nov 2024 04:41 )             27.0     Hemoglobin: 8.6 g/dL (11-24-24 @ 04:41)  Hemoglobin: 9.8 g/dL (11-23-24 @ 07:05)  Hemoglobin: 9.8 g/dL (11-22-24 @ 07:00)  Hemoglobin: 11.6 g/dL (11-21-24 @ 19:03)    11-24    137  |  103  |  19.7  ----------------------------<  162[H]  4.9   |  26.0  |  1.35[H]    Ca    7.8[L]      24 Nov 2024 04:41  Phos  2.6     11-24  Mg     1.8     11-24    TPro  4.9[L]  /  Alb  2.5[L]  /  TBili  <0.2[L]  /  DBili  x   /  AST  15  /  ALT  10  /  AlkPhos  103  11-24    LIVER FUNCTIONS - ( 24 Nov 2024 04:41 )  Alb: 2.5 g/dL / Pro: 4.9 g/dL / ALK PHOS: 103 U/L / ALT: 10 U/L / AST: 15 U/L / GGT: x                 Culture - Blood (collected 22 Nov 2024 06:50)  Source: .Blood BLOOD  Preliminary Report (24 Nov 2024 12:01):    No growth at 48 Hours          Hepatitis B Core IgM Antibody: Nonreact (11-22-24 @ 19:33)  Hepatitis B Surface Antigen: Nonreact (11-22-24 @ 19:33)  Hepatitis C Virus S/CO Ratio: 0.09 S/CO (11-22-24 @ 19:33)  Hepatitis A IgM Antibody: Nonreact (11-22-24 @ 19:33)                                  RADIOLOGY & ADDITIONAL STUDIES:      ACC: [id number] EXAM:  CT ABDOMEN AND PELVIS   ORDERED BY: LENORA MONDARGON     PROCEDURE DATE:  11/21/2024          INTERPRETATION:  CLINICAL INFORMATION: Vomiting. JEFF. 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    COMPARISON: CT abdomen pelvis 11/16/2024.    CONTRAST/COMPLICATIONS:  IV Contrast: NONE  Oral Contrast: NONE      PROCEDURE:  CT of the Abdomen and Pelvis was performed.  Sagittal and coronal reformats were performed.    FINDINGS:  LOWER CHEST: Coronary artery calcifications. Mitral valve annulus   calcification.    LIVER: Within normal limits.  BILE DUCTS: Normal caliber.  GALLBLADDER: Cholecystectomy.  SPLEEN: 2.9 cm cystic splenic lesion unchanged.  PANCREAS: Within normal limits.  ADRENALS: Within normal limits.  KIDNEYS/URETERS: Small left renal cyst.    BLADDER: Within normal limits.  REPRODUCTIVE ORGANS: Prostate is enlarged.    BOWEL: Again seen is diffuse wall thickening of the rectum and colon with   sparing of the transverse colon. This is unchanged from the previous exam   of 11/16/2024 and suspicious for some form of colitis. No bowel   obstruction. Appendix is normal. Small scattered colonic diverticula   without diverticulitis.  PERITONEUM/RETROPERITONEUM: Within normal limits.  VESSELS: Atherosclerotic changes.  LYMPH NODES: No lymphadenopathy.  ABDOMINAL WALL: Unchanged left thigh lipoma.  BONES: Degenerative changes. Destructive heterogeneous sclerotic lesion   involving the proximal left femur with a pathologic femoral neck fracture   status post ORIF. Redemonstrated vertebral body osseous lesions.    IMPRESSION:    Again seen is diffuse wall thickening of the rectum and colon with   sparing of the transverse colon. This is unchanged from the previous exam   of 11/16/2024 and suspicious for some form of colitis. No bowel   obstruction.      --- End of Report ---            RESHMA GARAY MD; Attending Radiologist  This document has been electronically signed. Nov 21 2024 11:10PM  11-21-24 @ 22:50

## 2024-11-24 NOTE — PROGRESS NOTE ADULT - ASSESSMENT
78 yo male with pmhx HTN, CAD, CVA on Eliquis, Basal Cell Carcinoma presenting to the ED with 2 weeks of diarrhea.  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 He has been on/off Keytruda since 2/2/23 for various reasons. In July 2024, he was having diarrhea after antibiotics, found to have +c.diff and treated with 2 courses of antibiotics.  Keytruda was held due to diarrhea. He continued to have diarrhea and cdiff was negative so he was restarted on Keytruda on 9/27/24. He continues to have diarrhea, nausea and vomiting. GI asked to consult for further management.    #diarrhea   #parotid Cancer on Keytruda   CT Abdomen and Pelvis No Cont (11.21.24) Again seen is diffuse wall thickening of the rectum and colon with sparing of the transverse colon. This is unchanged from the previous exam of 11/16/2024 and suspicious for some form of colitis.  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 Also noted with polyp in sigmoid colon.  -Trend CBC and BMP daily  -Follow up pathology results   -Would hold Keytruda until pathology results   -Continue with IV steroids, will need PO taper on DC  -Continue stool count   -low fiber diet as tolerated   -PPI daily for GI mucosa cytoprotection   -Obtain Hepatitis B core IgG and IgM and hepatitis B surface antibody and QuantiFeron gold in anticipation of potentially starting a biologic medication  -Will need outpatient colonoscopy do sigmoid polyp removal  -Consider PT eval prior to DC per wife's request   _________________________________________________________________  Assessment and recommendations are final when note is signed by the attending physician.

## 2024-11-24 NOTE — PROGRESS NOTE ADULT - TIME BILLING
Time spent reviewing the chart documentation, reviewing labs and imaging studies, evaluating the patient, discussing the plan of care with the consultants & medical team, and documenting.
Review of chart, discussion with patient
Review of chart, discussion with patient and family
Time spent reviewing the chart documentation, reviewing labs and imaging studies, evaluating the patient, discussing the plan of care with the consultants & medical team, and documenting.

## 2024-11-24 NOTE — PROGRESS NOTE ADULT - SUBJECTIVE AND OBJECTIVE BOX
Patient is a 79y old  Male who presents with a chief complaint of Colitis, JEFF (23 Nov 2024 14:36)      SUBJECTIVE / OVERNIGHT EVENTS: Seen and examined. Endorses diarrhea improved though still has 2-3 episodes yesterday. None so far today. Denies chest pain, SOB, abd pain, N/V, fever, chills.     MEDICATIONS  (STANDING):  amLODIPine   Tablet 2.5 milliGRAM(s) Oral daily  atorvastatin 40 milliGRAM(s) Oral at bedtime  cefTRIAXone Injectable. 1000 milliGRAM(s) IV Push every 24 hours  DULoxetine 40 milliGRAM(s) Oral two times a day  folic acid 1 milliGRAM(s) Oral daily  gabapentin 600 milliGRAM(s) Oral two times a day  isosorbide   mononitrate ER Tablet (IMDUR) 30 milliGRAM(s) Oral daily  lactated ringers. 1000 milliLiter(s) (84 mL/Hr) IV Continuous <Continuous>  levothyroxine 100 MICROGram(s) Oral daily  methylPREDNISolone sodium succinate Injectable 40 milliGRAM(s) IV Push every 8 hours  morphine  IR 30 milliGRAM(s) Oral daily  oxyCODONE  ER Tablet 15 milliGRAM(s) Oral every 12 hours  pantoprazole    Tablet 40 milliGRAM(s) Oral before breakfast  prochlorperazine   Tablet 10 milliGRAM(s) Oral every 6 hours  sulfaSALAzine 1000 milliGRAM(s) Oral two times a day    MEDICATIONS  (PRN):  acetaminophen     Tablet .. 650 milliGRAM(s) Oral every 6 hours PRN Temp greater or equal to 38C (100.4F), Mild Pain (1 - 3)  aluminum hydroxide/magnesium hydroxide/simethicone Suspension 30 milliLiter(s) Oral every 4 hours PRN Dyspepsia  loperamide 2 milliGRAM(s) Oral two times a day PRN Diarrhea  melatonin 3 milliGRAM(s) Oral at bedtime PRN Insomnia  ondansetron Injectable 4 milliGRAM(s) IV Push every 8 hours PRN Nausea and/or Vomiting        I&O's Summary      PHYSICAL EXAM:  Vital Signs Last 24 Hrs  T(C): 36.4 (24 Nov 2024 10:43), Max: 36.4 (24 Nov 2024 04:39)  T(F): 97.5 (24 Nov 2024 10:43), Max: 97.5 (24 Nov 2024 04:39)  HR: 76 (24 Nov 2024 10:43) (65 - 81)  BP: 125/79 (24 Nov 2024 10:43) (92/65 - 135/79)  BP(mean): --  RR: 19 (24 Nov 2024 10:43) (18 - 19)  SpO2: 94% (24 Nov 2024 10:43) (94% - 99%)    Parameters below as of 24 Nov 2024 04:39  Patient On (Oxygen Delivery Method): room air        General: Age-appearing, in no acute distress  Head: Normocephalic, atraumatic  ENMT: EOMI, neck supple  Cardiovascular: +S1, S2; Regular rate and rhythm, no murmurs, rubs, gallops  Respiratory: CTA BL, no wheezes, rales, rhonchi  Gastrointestinal: Abdomen soft, non-tender, +BS in all 4 quadrants  Extremities: No clubbing, cyanosis, or edema  Vascular: 2+ pulses, cap refill < 2 seconds  Neuro: Non-focal, AAOx3, sensation intact BL  Musculoskeletal: Normal tone, no deformities  Skin: Warm, dry; no acute rash seen; multiple skin lesions on face, ears   Psych: Appropriate, cooperative; poor historian    LABS:                        8.6    10.60 )-----------( 293      ( 24 Nov 2024 04:41 )             27.0     11-24    137  |  103  |  19.7  ----------------------------<  162[H]  4.9   |  26.0  |  1.35[H]    Ca    7.8[L]      24 Nov 2024 04:41  Phos  2.6     11-24  Mg     1.8     11-24    TPro  4.9[L]  /  Alb  2.5[L]  /  TBili  <0.2[L]  /  DBili  x   /  AST  15  /  ALT  10  /  AlkPhos  103  11-24          Urinalysis Basic - ( 24 Nov 2024 04:41 )    Color: x / Appearance: x / SG: x / pH: x  Gluc: 162 mg/dL / Ketone: x  / Bili: x / Urobili: x   Blood: x / Protein: x / Nitrite: x   Leuk Esterase: x / RBC: x / WBC x   Sq Epi: x / Non Sq Epi: x / Bacteria: x        Culture - Blood (collected 22 Nov 2024 06:50)  Source: .Blood BLOOD  Preliminary Report (23 Nov 2024 12:00):    No growth at 24 hours      CAPILLARY BLOOD GLUCOSE      POCT Blood Glucose.: 127 mg/dL (23 Nov 2024 13:42)        RADIOLOGY & ADDITIONAL TESTS:  Results Reviewed:   Imaging Personally Reviewed:  Electrocardiogram Personally Reviewed:

## 2024-11-24 NOTE — PROGRESS NOTE ADULT - ASSESSMENT
78 yo male with pmhx HTN, CAD, CVA on Eliquis, Basal Cell Carcinoma presenting to the ED with 2 weeks of diarrhea.     Colitis ?2/2 Immunotherapy vs. Infectious  -Admit to medicine  -Patient recently in obs with colitis with negative C. diff and GI PCR, seeming to improve and sent home  -Subsequently worsened and called GI doctor who advised him to come back in  -CT showing unchanged colitis from prior  -C. diff Negative  -GI consulted for possible colonoscopy/flex sig per outpatient chart note ([facility])  - S/p Flex sigmoidoscopy 11/22  - GI recs MRCP  - C/w Steroid methylpred 40mg Q8H-- Will taper to PO when diarrhea improves further.     Positive UA  - Denies any symptoms  - C/w Ceftriaxone and fu urine c/s    Anemia  - Will send iron panel and ferritin, B12/folate  - Hgb 8.6  - Denies any active bleeding.    JEFF (resolved)  -Cr 2.05 increased from 1.21 on 11/19 (2 days ago)  -Likely in the setting of GI losses from diarrhea  -Given 2300 cc IVF in the ED, will continue @ 84 cc/hr  -Monitor BMP    Hyperkalemia (improved)  - Give hyperk cocktail  - EKG with NSR/ QTc 477  - Will repeat bmp in afternoon    HTN  -Continue Imdur 30 mg daily  -Continue Amlodipine 5 mg daily  -Continue Lisinopril 40 mg daily as sub for Ramipril 10 mg daily    CAD, Hx of CVA  -Continue Atorvastatin 40 mg  -Hold Eliquis 5 mg BID for possible scope    Hypothyroidism  -Continue Synthroid 100 mcg daily    RA, Sjogrens   -Continue [id number] mg BID  -Per d/c rec (11/16), patient on 5 mg daily of Prednisone, but per Rheum note (10/2024)  was to be decreased to 2.5 mg daily. Confirmed with wife. Not on any steroids now.        DVTppx: Eliquis on hold for possible colonoscopy and in setting of anemia    Dispo: Pending improvement in diarrhea, taper steroid to PO when tolerated. C/w ABX for uti. FU ucx

## 2024-11-25 DIAGNOSIS — K52.9 NONINFECTIVE GASTROENTERITIS AND COLITIS, UNSPECIFIED: ICD-10-CM

## 2024-11-25 LAB
ALBUMIN SERPL ELPH-MCNC: 2.5 G/DL — LOW (ref 3.3–5.2)
ALP SERPL-CCNC: 103 U/L — SIGNIFICANT CHANGE UP (ref 40–120)
ALT FLD-CCNC: 11 U/L — SIGNIFICANT CHANGE UP
ANION GAP SERPL CALC-SCNC: 11 MMOL/L — SIGNIFICANT CHANGE UP (ref 5–17)
AST SERPL-CCNC: 21 U/L — SIGNIFICANT CHANGE UP
BILIRUB SERPL-MCNC: <0.2 MG/DL — LOW (ref 0.4–2)
BUN SERPL-MCNC: 20.4 MG/DL — HIGH (ref 8–20)
CALCIUM SERPL-MCNC: 7.7 MG/DL — LOW (ref 8.4–10.5)
CHLORIDE SERPL-SCNC: 105 MMOL/L — SIGNIFICANT CHANGE UP (ref 96–108)
CO2 SERPL-SCNC: 20 MMOL/L — LOW (ref 22–29)
CREAT SERPL-MCNC: 1.06 MG/DL — SIGNIFICANT CHANGE UP (ref 0.5–1.3)
CULTURE RESULTS: NO GROWTH — SIGNIFICANT CHANGE UP
EGFR: 72 ML/MIN/1.73M2 — SIGNIFICANT CHANGE UP
FERRITIN SERPL-MCNC: 311 NG/ML — SIGNIFICANT CHANGE UP (ref 30–400)
FOLATE SERPL-MCNC: >20 NG/ML — SIGNIFICANT CHANGE UP
GLUCOSE SERPL-MCNC: 125 MG/DL — HIGH (ref 70–99)
HCT VFR BLD CALC: 30.8 % — LOW (ref 39–50)
HGB BLD-MCNC: 9.6 G/DL — LOW (ref 13–17)
IRON SATN MFR SERPL: 39 % — SIGNIFICANT CHANGE UP (ref 16–55)
IRON SATN MFR SERPL: 76 UG/DL — SIGNIFICANT CHANGE UP (ref 59–158)
MCHC RBC-ENTMCNC: 29.6 PG — SIGNIFICANT CHANGE UP (ref 27–34)
MCHC RBC-ENTMCNC: 31.2 G/DL — LOW (ref 32–36)
MCV RBC AUTO: 95.1 FL — SIGNIFICANT CHANGE UP (ref 80–100)
PLATELET # BLD AUTO: 272 K/UL — SIGNIFICANT CHANGE UP (ref 150–400)
POTASSIUM SERPL-MCNC: 5.1 MMOL/L — SIGNIFICANT CHANGE UP (ref 3.5–5.3)
POTASSIUM SERPL-SCNC: 5.1 MMOL/L — SIGNIFICANT CHANGE UP (ref 3.5–5.3)
PROT SERPL-MCNC: 5.1 G/DL — LOW (ref 6.6–8.7)
RBC # BLD: 3.24 M/UL — LOW (ref 4.2–5.8)
RBC # FLD: 14.7 % — HIGH (ref 10.3–14.5)
SODIUM SERPL-SCNC: 136 MMOL/L — SIGNIFICANT CHANGE UP (ref 135–145)
SPECIMEN SOURCE: SIGNIFICANT CHANGE UP
TIBC SERPL-MCNC: 196 UG/DL — LOW (ref 220–430)
TRANSFERRIN SERPL-MCNC: 137 MG/DL — LOW (ref 180–329)
VIT B12 SERPL-MCNC: 410 PG/ML — SIGNIFICANT CHANGE UP (ref 232–1245)
WBC # BLD: 12.67 K/UL — HIGH (ref 3.8–10.5)
WBC # FLD AUTO: 12.67 K/UL — HIGH (ref 3.8–10.5)

## 2024-11-25 PROCEDURE — 99232 SBSQ HOSP IP/OBS MODERATE 35: CPT

## 2024-11-25 RX ORDER — APIXABAN 2.5 MG/1
5 TABLET, FILM COATED ORAL EVERY 12 HOURS
Refills: 0 | Status: DISCONTINUED | OUTPATIENT
Start: 2024-11-25 | End: 2024-11-26

## 2024-11-25 RX ORDER — PREDNISONE 20 MG/1
40 TABLET ORAL DAILY
Refills: 0 | Status: DISCONTINUED | OUTPATIENT
Start: 2024-11-25 | End: 2024-11-26

## 2024-11-25 RX ORDER — METHYLPREDNISOLONE SOD SUCC 125 MG
32 VIAL (EA) INJECTION EVERY 8 HOURS
Refills: 0 | Status: DISCONTINUED | OUTPATIENT
Start: 2024-11-25 | End: 2024-11-25

## 2024-11-25 RX ORDER — METHYLPREDNISOLONE SOD SUCC 125 MG
40 VIAL (EA) INJECTION EVERY 12 HOURS
Refills: 0 | Status: DISCONTINUED | OUTPATIENT
Start: 2024-11-25 | End: 2024-11-25

## 2024-11-25 RX ADMIN — PANTOPRAZOLE SODIUM 40 MILLIGRAM(S): 40 TABLET, DELAYED RELEASE ORAL at 05:18

## 2024-11-25 RX ADMIN — PROCHLORPERAZINE EDISYLATE 10 MILLIGRAM(S): 5 INJECTION INTRAMUSCULAR; INTRAVENOUS at 12:36

## 2024-11-25 RX ADMIN — Medication 100 MICROGRAM(S): at 05:16

## 2024-11-25 RX ADMIN — Medication 1 MILLIGRAM(S): at 12:35

## 2024-11-25 RX ADMIN — OXYCODONE HYDROCHLORIDE 15 MILLIGRAM(S): 30 TABLET ORAL at 18:30

## 2024-11-25 RX ADMIN — AMLODIPINE BESYLATE 2.5 MILLIGRAM(S): 10 TABLET ORAL at 05:17

## 2024-11-25 RX ADMIN — Medication 40 MILLIGRAM(S): at 05:18

## 2024-11-25 RX ADMIN — Medication 40 MILLIGRAM(S): at 21:51

## 2024-11-25 RX ADMIN — PROCHLORPERAZINE EDISYLATE 10 MILLIGRAM(S): 5 INJECTION INTRAMUSCULAR; INTRAVENOUS at 05:17

## 2024-11-25 RX ADMIN — GABAPENTIN 600 MILLIGRAM(S): 300 CAPSULE ORAL at 05:17

## 2024-11-25 RX ADMIN — APIXABAN 5 MILLIGRAM(S): 2.5 TABLET, FILM COATED ORAL at 17:26

## 2024-11-25 RX ADMIN — Medication 1000 MILLIGRAM(S): at 12:34

## 2024-11-25 RX ADMIN — Medication 40 MILLIGRAM(S): at 17:27

## 2024-11-25 RX ADMIN — PROCHLORPERAZINE EDISYLATE 10 MILLIGRAM(S): 5 INJECTION INTRAMUSCULAR; INTRAVENOUS at 17:28

## 2024-11-25 RX ADMIN — Medication 30 MILLIGRAM(S): at 12:35

## 2024-11-25 RX ADMIN — Medication 30 MILLIGRAM(S): at 13:30

## 2024-11-25 RX ADMIN — PROCHLORPERAZINE EDISYLATE 10 MILLIGRAM(S): 5 INJECTION INTRAMUSCULAR; INTRAVENOUS at 00:05

## 2024-11-25 RX ADMIN — SULFASALAZINE 1000 MILLIGRAM(S): 500 TABLET ORAL at 17:28

## 2024-11-25 RX ADMIN — OXYCODONE HYDROCHLORIDE 15 MILLIGRAM(S): 30 TABLET ORAL at 05:17

## 2024-11-25 RX ADMIN — SULFASALAZINE 1000 MILLIGRAM(S): 500 TABLET ORAL at 05:17

## 2024-11-25 RX ADMIN — OXYCODONE HYDROCHLORIDE 15 MILLIGRAM(S): 30 TABLET ORAL at 17:26

## 2024-11-25 RX ADMIN — GABAPENTIN 600 MILLIGRAM(S): 300 CAPSULE ORAL at 17:26

## 2024-11-25 RX ADMIN — Medication 40 MILLIGRAM(S): at 05:17

## 2024-11-25 RX ADMIN — Medication 84 MILLILITER(S): at 12:37

## 2024-11-25 NOTE — PROGRESS NOTE ADULT - PROBLEM SELECTOR PLAN 1
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

## 2024-11-25 NOTE — PROGRESS NOTE ADULT - ASSESSMENT
80 yo male with pmhx HTN, CAD, CVA on Eliquis, Basal Cell Carcinoma presenting to the ED with 2 weeks of diarrhea.  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 He has been on/off Keytruda since 2/2/23 for various reasons. In July 2024, he was having diarrhea after antibiotics, found to have +c.diff and treated with 2 courses of antibiotics.  Keytruda was held due to diarrhea. He continued to have diarrhea and cdiff was negative so he was restarted on Keytruda on 9/27/24. He continues to have diarrhea, nausea and vomiting. GI asked to consult for further management.    #diarrhea   #parotid Cancer on Keytruda   CT Abdomen and Pelvis No Cont (11.21.24) Again seen is diffuse wall thickening of the rectum and colon with sparing of the transverse colon. This is unchanged from the previous exam of 11/16/2024 and suspicious for some form of colitis.  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 Also noted with polyp in sigmoid colon.  -Trend CBC and BMP daily, increasing leukocytosis ? from steroids   -Follow up pathology results   -Would hold Keytruda until pathology results   -Can switch steroids to PO, will need taper on DC  -low fiber diet as tolerated   -PPI daily for GI mucosa cytoprotection   -Obtain Hepatitis B core IgG and IgM and hepatitis B surface antibody and QuantiFeron gold in anticipation of potentially starting a biologic medication  -Will need outpatient colonoscopy do sigmoid polyp removal  -Consider PT eval prior to DC per wife's request   _________________________________________________________________  Assessment and recommendations are final when note is signed by the attending physician.

## 2024-11-25 NOTE — PROGRESS NOTE ADULT - SUBJECTIVE AND OBJECTIVE BOX
Patient is a 79y old  Male who presents with a chief complaint of Colitis, JEFF (24 Nov 2024 14:09)      SUBJECTIVE / OVERNIGHT EVENTS: Seen and examined. Feels ok. endorses having urinary incontinence today. Still had several episodes of diarrhea yesterday though improving. None so far today. Denies chest pain, SOB, abd pain, N/V, fever, chills.    MEDICATIONS  (STANDING):  amLODIPine   Tablet 2.5 milliGRAM(s) Oral daily  atorvastatin 40 milliGRAM(s) Oral at bedtime  cefTRIAXone Injectable. 1000 milliGRAM(s) IV Push every 24 hours  DULoxetine 40 milliGRAM(s) Oral two times a day  folic acid 1 milliGRAM(s) Oral daily  gabapentin 600 milliGRAM(s) Oral two times a day  isosorbide   mononitrate ER Tablet (IMDUR) 30 milliGRAM(s) Oral daily  lactated ringers. 1000 milliLiter(s) (84 mL/Hr) IV Continuous <Continuous>  levothyroxine 100 MICROGram(s) Oral daily  methylPREDNISolone sodium succinate Injectable 40 milliGRAM(s) IV Push every 12 hours  morphine  IR 30 milliGRAM(s) Oral daily  oxyCODONE  ER Tablet 15 milliGRAM(s) Oral every 12 hours  pantoprazole    Tablet 40 milliGRAM(s) Oral before breakfast  prochlorperazine   Tablet 10 milliGRAM(s) Oral every 6 hours  sulfaSALAzine 1000 milliGRAM(s) Oral two times a day    MEDICATIONS  (PRN):  acetaminophen     Tablet .. 650 milliGRAM(s) Oral every 6 hours PRN Temp greater or equal to 38C (100.4F), Mild Pain (1 - 3)  aluminum hydroxide/magnesium hydroxide/simethicone Suspension 30 milliLiter(s) Oral every 4 hours PRN Dyspepsia  loperamide 2 milliGRAM(s) Oral two times a day PRN Diarrhea  melatonin 3 milliGRAM(s) Oral at bedtime PRN Insomnia  ondansetron Injectable 4 milliGRAM(s) IV Push every 8 hours PRN Nausea and/or Vomiting        I&O's Summary    24 Nov 2024 07:01  -  25 Nov 2024 07:00  --------------------------------------------------------  IN: 1008 mL / OUT: 1450 mL / NET: -442 mL        PHYSICAL EXAM:  Vital Signs Last 24 Hrs  T(C): 36.3 (25 Nov 2024 09:41), Max: 36.4 (24 Nov 2024 17:28)  T(F): 97.4 (25 Nov 2024 09:41), Max: 97.5 (24 Nov 2024 17:28)  HR: 76 (25 Nov 2024 09:41) (67 - 76)  BP: 141/82 (25 Nov 2024 09:41) (125/75 - 141/82)  BP(mean): --  RR: 18 (25 Nov 2024 09:41) (18 - 18)  SpO2: 96% (25 Nov 2024 09:41) (96% - 99%)    Parameters below as of 25 Nov 2024 09:41  Patient On (Oxygen Delivery Method): room air        General: Age-appearing, in no acute distress  Head: Normocephalic, atraumatic  ENMT: EOMI, neck supple  Cardiovascular: +S1, S2; Regular rate and rhythm, no murmurs, rubs, gallops  Respiratory: CTA BL, no wheezes, rales, rhonchi  Gastrointestinal: Abdomen soft, non-tender, +BS in all 4 quadrants  Extremities: No clubbing, cyanosis, or edema  Vascular: 2+ pulses, cap refill < 2 seconds  Neuro: Non-focal, AAOx3, sensation intact BL  Musculoskeletal: Normal tone, no deformities  Skin: Warm, dry; no acute rash seen; multiple skin lesions on face, ears   Psych: Appropriate, cooperative; poor historian    LABS:                        9.6    12.67 )-----------( 272      ( 25 Nov 2024 03:40 )             30.8     11-25    136  |  105  |  20.4[H]  ----------------------------<  125[H]  5.1   |  20.0[L]  |  1.06    Ca    7.7[L]      25 Nov 2024 06:46  Phos  2.6     11-24  Mg     1.8     11-24    TPro  5.1[L]  /  Alb  2.5[L]  /  TBili  <0.2[L]  /  DBili  x   /  AST  21  /  ALT  11  /  AlkPhos  103  11-25          Urinalysis Basic - ( 25 Nov 2024 06:46 )    Color: x / Appearance: x / SG: x / pH: x  Gluc: 125 mg/dL / Ketone: x  / Bili: x / Urobili: x   Blood: x / Protein: x / Nitrite: x   Leuk Esterase: x / RBC: x / WBC x   Sq Epi: x / Non Sq Epi: x / Bacteria: x        Culture - Urine (collected 24 Nov 2024 06:00)  Source: Clean Catch Clean Catch (Midstream)  Final Report (25 Nov 2024 09:15):    No growth      CAPILLARY BLOOD GLUCOSE            RADIOLOGY & ADDITIONAL TESTS:  Results Reviewed:   Imaging Personally Reviewed:  Electrocardiogram Personally Reviewed:

## 2024-11-25 NOTE — PHYSICAL THERAPY INITIAL EVALUATION ADULT - THERAPY FREQUENCY, PT EVAL
Interval History: No new issues     Review of Systems   Unable to perform ROS: Acuity of condition     Objective:     Vital Signs (Most Recent):  Temp: 98.4 °F (36.9 °C) (10/24/19 0301)  Pulse: 61 (10/24/19 0501)  Resp: 20 (10/24/19 0501)  BP: 136/72 (10/24/19 0501)  SpO2: 99 % (10/24/19 0501) Vital Signs (24h Range):  Temp:  [97.4 °F (36.3 °C)-98.6 °F (37 °C)] 98.4 °F (36.9 °C)  Pulse:  [55-70] 61  Resp:  [18-34] 20  SpO2:  [95 %-100 %] 99 %  BP: (116-160)/(58-85) 136/72     Weight: 119.2 kg (262 lb 12.6 oz)  Body mass index is 43.73 kg/m².    Intake/Output Summary (Last 24 hours) at 10/24/2019 0606  Last data filed at 10/24/2019 0501  Gross per 24 hour   Intake 2140.42 ml   Output 1150 ml   Net 990.42 ml      Physical Exam   Constitutional: She appears well-developed and well-nourished.   HENT:   Head: Normocephalic and atraumatic.   Cardiovascular: Normal rate and regular rhythm.   Pulmonary/Chest: Effort normal and breath sounds normal. No stridor. No respiratory distress. She has no wheezes.   Abdominal: Soft. She exhibits no distension. There is no rebound and no guarding.   Neurological: She is alert.   Skin: Skin is warm and dry.   Vitals reviewed.      Significant Labs:   BMP:   Recent Labs   Lab 10/24/19  0237   *      K 3.9      CO2 27   BUN 47*   CREATININE 1.7*   CALCIUM 10.0     CBC:   Recent Labs   Lab 10/23/19  0312   WBC 11.58   HGB 11.7*   HCT 40.3          Significant Imaging:    3-5x/week

## 2024-11-25 NOTE — PROGRESS NOTE ADULT - ASSESSMENT
80 yo male with pmhx HTN, CAD, CVA on Eliquis, Basal Cell Carcinoma presenting to the ED with 2 weeks of diarrhea.     Colitis ?2/2 Immunotherapy vs. Infectious  -Admit to medicine  -Patient recently in obs with colitis with negative C. diff and GI PCR, seeming to improve and sent home  -Subsequently worsened and called GI doctor who advised him to come back in  -CT showing unchanged colitis from prior  -C. diff Negative  -GI consulted for possible colonoscopy/flex sig per outpatient chart note ([facility])  -S/p Flex sigmoidoscopy 11/22  -C/w Steroid methylpred 40mg Q8H--> q12H. Will taper to PO when diarrhea improves further.     Positive UA  - Denies any symptoms  - C/w Ceftriaxone  - Urine c/s without growth    Anemia (improved)  - Reviewed Iron panel and ferritin, B12/folate  - Hgb 8.6-->9.6  - Denies any active bleeding.    JEFF (resolved)  -Cr 2.05 increased from 1.21 on 11/19 (2 days ago)  -Likely in the setting of GI losses from diarrhea  -Given 2300 cc IVF in the ED, will continue @ 84 cc/hr  -Monitor BMP    Hyperkalemia (improved)  - Give hyperk cocktail  - EKG with NSR/ QTc 477    HTN  -Continue Imdur 30 mg daily  -Continue Amlodipine 5 mg daily  -Continue Lisinopril 40 mg daily as sub for Ramipril 10 mg daily    CAD, Hx of CVA  -Continue Atorvastatin 40 mg  -C/w Eliquis 5 mg BID    Hypothyroidism  -Continue Synthroid 100 mcg daily    RA, Sjogrens   -Continue [id number] mg BID  -Per d/c rec (11/16), patient on 5 mg daily of Prednisone, but per Rheum note (10/2024)  was to be decreased to 2.5 mg daily. Confirmed with wife. Not on any steroids now.      DVTppx: Eliquis    Dispo: Pending improvement in diarrhea, taper steroid to PO when tolerated. C/w ABX for uti. PT eval. Likely dc in 1 day.

## 2024-11-25 NOTE — PROGRESS NOTE ADULT - NS ATTEND AMEND GEN_ALL_CORE FT
Patient is a 79-year-old gentleman with a history of CAD, CVA on Eliquis who presented with diarrhea.  Patient had been noted to have taken a checkpoint inhibitor, Keytruda with last dose 9/27/2024.  He subsequently developed dehydration and was intolerant of food and came into the ED for further evaluation.  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  Biopsies were taken in a segmental fashion to assess for degree of inflammation and to differentiate the diagnosis.  Fortunately, management of both entities is with steroids.  Patient has been on IV steroids and this morning reports significant improvement.  He has not had any diarrhea today, he is tolerating his diet.  Would continue his diet today and advance to regular diet if tolerated.  Please continue to monitor bowel movements.  If has significant improvement in diarrhea by tomorrow can switch to p.o. steroids with plan for discharge following the switch.  Would get hepatitis B surface antigen, core antibody and QuantiFERON gold in case of steroid failure or need for long-term biologic therapy.
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
78 yo M with PMH basal cell carcinoma (previously on Keytruda) who presented with diarrhea. He has CT notable for colitis and flexible sigmoidoscopy with colitis consistent with checkpoint inhibitor colitis v IBD. He is doing well on high dose steroids with decrease in bowel movements, though has not completely normalized. Will plan to continue IV steroids today and transition to PO tomorrow. Family requesting any help they cna get at home- defer to primary team for PT/OT eval or other forms of care.

## 2024-11-25 NOTE — PROGRESS NOTE ADULT - NS ATTEST RISK PROBLEM GEN_ALL_CORE FT
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

## 2024-11-25 NOTE — PHYSICAL THERAPY INITIAL EVALUATION ADULT - ADDITIONAL COMMENTS
Pt lives in a condo with spouse, no stairs. Pt has aides 8 hours a day x 7 days a week. Pt has had multiple falls. uses a cane, refusing RW.

## 2024-11-25 NOTE — PROGRESS NOTE ADULT - SUBJECTIVE AND OBJECTIVE BOX
Chief Complaint:  Patient is a 79y old  Male who presents with a chief complaint of Colitis, JEFF (24 Nov 2024 14:09)      HPI/ 24 hr events: Patient seen and examined at bedside. Pt feeling well this morning. His diarrhea is improving, he had 2 loose stools yesterday.  He is tolerating diet. Denies nausea, vomiting, abdominal pain, hematemesis, hematochezia, melena. Vitals are overall stable, leukocytosis up trending to 12 today.      REVIEW OF SYSTEMS:   General: Negative  HEENT: Negative  CV: Negative  Respiratory: Negative  GI: See HPI  : Negative  MSK: Negative  Hematologic: Negative  Skin: Negative    MEDICATIONS:   MEDICATIONS  (STANDING):  amLODIPine   Tablet 2.5 milliGRAM(s) Oral daily  atorvastatin 40 milliGRAM(s) Oral at bedtime  cefTRIAXone Injectable. 1000 milliGRAM(s) IV Push every 24 hours  DULoxetine 40 milliGRAM(s) Oral two times a day  folic acid 1 milliGRAM(s) Oral daily  gabapentin 600 milliGRAM(s) Oral two times a day  isosorbide   mononitrate ER Tablet (IMDUR) 30 milliGRAM(s) Oral daily  lactated ringers. 1000 milliLiter(s) (84 mL/Hr) IV Continuous <Continuous>  levothyroxine 100 MICROGram(s) Oral daily  methylPREDNISolone sodium succinate Injectable 40 milliGRAM(s) IV Push every 12 hours  morphine  IR 30 milliGRAM(s) Oral daily  oxyCODONE  ER Tablet 15 milliGRAM(s) Oral every 12 hours  pantoprazole    Tablet 40 milliGRAM(s) Oral before breakfast  prochlorperazine   Tablet 10 milliGRAM(s) Oral every 6 hours  sulfaSALAzine 1000 milliGRAM(s) Oral two times a day    MEDICATIONS  (PRN):  acetaminophen     Tablet .. 650 milliGRAM(s) Oral every 6 hours PRN Temp greater or equal to 38C (100.4F), Mild Pain (1 - 3)  aluminum hydroxide/magnesium hydroxide/simethicone Suspension 30 milliLiter(s) Oral every 4 hours PRN Dyspepsia  loperamide 2 milliGRAM(s) Oral two times a day PRN Diarrhea  melatonin 3 milliGRAM(s) Oral at bedtime PRN Insomnia  ondansetron Injectable 4 milliGRAM(s) IV Push every 8 hours PRN Nausea and/or Vomiting            DIET:  Diet, Low Fiber (11-23-24 @ 09:51) [Active]          ALLERGIES:   Allergies    penicillin (Rash)    Intolerances        VITAL SIGNS:   Vital Signs Last 24 Hrs  T(C): 36.3 (25 Nov 2024 09:41), Max: 36.4 (24 Nov 2024 17:28)  T(F): 97.4 (25 Nov 2024 09:41), Max: 97.5 (24 Nov 2024 17:28)  HR: 76 (25 Nov 2024 09:41) (67 - 76)  BP: 141/82 (25 Nov 2024 09:41) (125/75 - 141/82)  BP(mean): --  RR: 18 (25 Nov 2024 09:41) (18 - 18)  SpO2: 96% (25 Nov 2024 09:41) (96% - 99%)    Parameters below as of 25 Nov 2024 09:41  Patient On (Oxygen Delivery Method): room air      I&O's Summary    24 Nov 2024 07:01  -  25 Nov 2024 07:00  --------------------------------------------------------  IN: 1008 mL / OUT: 1450 mL / NET: -442 mL        PHYSICAL EXAM:   GENERAL:  No acute distress  HEENT:  NC/AT, conjunctiva clear, sclera anicteric  CHEST:  No increased effort  HEART:  Regular rate  ABDOMEN:  Soft, non-tender, non-distended, normoactive bowel sounds, no rebound or guarding  EXTREMITIES: No edema  SKIN:  Warm, dry  NEURO:  Calm, cooperative    LABS:                        9.6    12.67 )-----------( 272      ( 25 Nov 2024 03:40 )             30.8     Hemoglobin: 9.6 g/dL (11-25-24 @ 03:40)  Hemoglobin: 8.6 g/dL (11-24-24 @ 04:41)  Hemoglobin: 9.8 g/dL (11-23-24 @ 07:05)    11-25    136  |  105  |  20.4[H]  ----------------------------<  125[H]  5.1   |  20.0[L]  |  1.06    Ca    7.7[L]      25 Nov 2024 06:46  Phos  2.6     11-24  Mg     1.8     11-24    TPro  5.1[L]  /  Alb  2.5[L]  /  TBili  <0.2[L]  /  DBili  x   /  AST  21  /  ALT  11  /  AlkPhos  103  11-25    LIVER FUNCTIONS - ( 25 Nov 2024 06:46 )  Alb: 2.5 g/dL / Pro: 5.1 g/dL / ALK PHOS: 103 U/L / ALT: 11 U/L / AST: 21 U/L / GGT: x                 Culture - Urine (collected 24 Nov 2024 06:00)  Source: Clean Catch Clean Catch (Midstream)  Final Report (25 Nov 2024 09:15):    No growth          Hepatitis B Core IgM Antibody: Nonreact (11-22-24 @ 19:33)  Hepatitis B Surface Antigen: Nonreact (11-22-24 @ 19:33)  Hepatitis C Virus S/CO Ratio: 0.09 S/CO (11-22-24 @ 19:33)  Hepatitis A IgM Antibody: Nonreact (11-22-24 @ 19:33)          RADIOLOGY & ADDITIONAL STUDIES:             Chief Complaint:  Patient is a 79y old  Male who presents with a chief complaint of Colitis, JEFF (24 Nov 2024 14:09)      HPI/ 24 hr events: Patient seen and examined at bedside. Pt feeling well this morning. His diarrhea is improving, he had 2 loose stools yesterday.  He is tolerating diet. Denies nausea, vomiting, abdominal pain, hematemesis, hematochezia, melena. Vitals are overall stable, leukocytosis up trending to 12 today.      REVIEW OF SYSTEMS:   General: Negative  HEENT: Negative  CV: Negative  Respiratory: Negative  GI: See HPI  : Negative  MSK: Negative  Hematologic: Negative  Skin: Negative    MEDICATIONS:   MEDICATIONS  (STANDING):  amLODIPine   Tablet 2.5 milliGRAM(s) Oral daily  atorvastatin 40 milliGRAM(s) Oral at bedtime  cefTRIAXone Injectable. 1000 milliGRAM(s) IV Push every 24 hours  DULoxetine 40 milliGRAM(s) Oral two times a day  folic acid 1 milliGRAM(s) Oral daily  gabapentin 600 milliGRAM(s) Oral two times a day  isosorbide   mononitrate ER Tablet (IMDUR) 30 milliGRAM(s) Oral daily  lactated ringers. 1000 milliLiter(s) (84 mL/Hr) IV Continuous <Continuous>  levothyroxine 100 MICROGram(s) Oral daily  methylPREDNISolone sodium succinate Injectable 40 milliGRAM(s) IV Push every 12 hours  morphine  IR 30 milliGRAM(s) Oral daily  oxyCODONE  ER Tablet 15 milliGRAM(s) Oral every 12 hours  pantoprazole    Tablet 40 milliGRAM(s) Oral before breakfast  prochlorperazine   Tablet 10 milliGRAM(s) Oral every 6 hours  sulfaSALAzine 1000 milliGRAM(s) Oral two times a day    MEDICATIONS  (PRN):  acetaminophen     Tablet .. 650 milliGRAM(s) Oral every 6 hours PRN Temp greater or equal to 38C (100.4F), Mild Pain (1 - 3)  aluminum hydroxide/magnesium hydroxide/simethicone Suspension 30 milliLiter(s) Oral every 4 hours PRN Dyspepsia  loperamide 2 milliGRAM(s) Oral two times a day PRN Diarrhea  melatonin 3 milliGRAM(s) Oral at bedtime PRN Insomnia  ondansetron Injectable 4 milliGRAM(s) IV Push every 8 hours PRN Nausea and/or Vomiting            DIET:  Diet, Low Fiber (11-23-24 @ 09:51) [Active]          ALLERGIES:   Allergies    penicillin (Rash)    Intolerances        VITAL SIGNS:   Vital Signs Last 24 Hrs  T(C): 36.3 (25 Nov 2024 09:41), Max: 36.4 (24 Nov 2024 17:28)  T(F): 97.4 (25 Nov 2024 09:41), Max: 97.5 (24 Nov 2024 17:28)  HR: 76 (25 Nov 2024 09:41) (67 - 76)  BP: 141/82 (25 Nov 2024 09:41) (125/75 - 141/82)  BP(mean): --  RR: 18 (25 Nov 2024 09:41) (18 - 18)  SpO2: 96% (25 Nov 2024 09:41) (96% - 99%)    Parameters below as of 25 Nov 2024 09:41  Patient On (Oxygen Delivery Method): room air      I&O's Summary    24 Nov 2024 07:01  -  25 Nov 2024 07:00  --------------------------------------------------------  IN: 1008 mL / OUT: 1450 mL / NET: -442 mL        PHYSICAL EXAM:   GENERAL:  No acute distress  HEENT:  NC/AT, conjunctiva clear, sclera anicteric  CHEST:  No increased effort  HEART:  Regular rate  ABDOMEN:  Soft, non-tender, non-distended, normoactive bowel sounds, no rebound or guarding  EXTREMITIES: No edema  SKIN:  Warm, dry  NEURO:  Calm, cooperative    LABS:                        9.6    12.67 )-----------( 272      ( 25 Nov 2024 03:40 )             30.8     Hemoglobin: 9.6 g/dL (11-25-24 @ 03:40)  Hemoglobin: 8.6 g/dL (11-24-24 @ 04:41)  Hemoglobin: 9.8 g/dL (11-23-24 @ 07:05)    11-25    136  |  105  |  20.4[H]  ----------------------------<  125[H]  5.1   |  20.0[L]  |  1.06    Ca    7.7[L]      25 Nov 2024 06:46  Phos  2.6     11-24  Mg     1.8     11-24    TPro  5.1[L]  /  Alb  2.5[L]  /  TBili  <0.2[L]  /  DBili  x   /  AST  21  /  ALT  11  /  AlkPhos  103  11-25    LIVER FUNCTIONS - ( 25 Nov 2024 06:46 )  Alb: 2.5 g/dL / Pro: 5.1 g/dL / ALK PHOS: 103 U/L / ALT: 11 U/L / AST: 21 U/L / GGT: x                 Culture - Urine (collected 24 Nov 2024 06:00)  Source: Clean Catch Clean Catch (Midstream)  Final Report (25 Nov 2024 09:15):    No growth          Hepatitis B Core IgM Antibody: Nonreact (11-22-24 @ 19:33)  Hepatitis B Surface Antigen: Nonreact (11-22-24 @ 19:33)  Hepatitis C Virus S/CO Ratio: 0.09 S/CO (11-22-24 @ 19:33)  Hepatitis A IgM Antibody: Nonreact (11-22-24 @ 19:33)          RADIOLOGY & ADDITIONAL STUDIES:    < from: CT Abdomen and Pelvis No Cont (11.21.24 @ 22:50) >  IMPRESSION:    Again seen is diffuse wall thickening of the rectum and colon with   sparing of the transverse colon. This is unchanged from the previous exam   of 11/16/2024 and suspicious for some form of colitis. No bowel   obstruction.        < end of copied text >

## 2024-11-26 ENCOUNTER — TRANSCRIPTION ENCOUNTER (OUTPATIENT)
Age: 79
End: 2024-11-26

## 2024-11-26 VITALS
DIASTOLIC BLOOD PRESSURE: 91 MMHG | HEART RATE: 80 BPM | OXYGEN SATURATION: 98 % | RESPIRATION RATE: 17 BRPM | TEMPERATURE: 97 F | SYSTOLIC BLOOD PRESSURE: 159 MMHG

## 2024-11-26 LAB
GAMMA INTERFERON BACKGROUND BLD IA-ACNC: 0.03 IU/ML — SIGNIFICANT CHANGE UP
M TB IFN-G BLD-IMP: ABNORMAL
M TB IFN-G CD4+ BCKGRND COR BLD-ACNC: 0 IU/ML — SIGNIFICANT CHANGE UP
M TB IFN-G CD4+CD8+ BCKGRND COR BLD-ACNC: 0 IU/ML — SIGNIFICANT CHANGE UP
QUANT TB PLUS MITOGEN MINUS NIL: 0.07 IU/ML — SIGNIFICANT CHANGE UP
SURGICAL PATHOLOGY STUDY: SIGNIFICANT CHANGE UP

## 2024-11-26 PROCEDURE — 84295 ASSAY OF SERUM SODIUM: CPT

## 2024-11-26 PROCEDURE — 85018 HEMOGLOBIN: CPT

## 2024-11-26 PROCEDURE — 82607 VITAMIN B-12: CPT

## 2024-11-26 PROCEDURE — 83690 ASSAY OF LIPASE: CPT

## 2024-11-26 PROCEDURE — 84466 ASSAY OF TRANSFERRIN: CPT

## 2024-11-26 PROCEDURE — 99239 HOSP IP/OBS DSCHRG MGMT >30: CPT

## 2024-11-26 PROCEDURE — 71045 X-RAY EXAM CHEST 1 VIEW: CPT

## 2024-11-26 PROCEDURE — 82435 ASSAY OF BLOOD CHLORIDE: CPT

## 2024-11-26 PROCEDURE — 82330 ASSAY OF CALCIUM: CPT

## 2024-11-26 PROCEDURE — 80048 BASIC METABOLIC PNL TOTAL CA: CPT

## 2024-11-26 PROCEDURE — 85027 COMPLETE CBC AUTOMATED: CPT

## 2024-11-26 PROCEDURE — 83540 ASSAY OF IRON: CPT

## 2024-11-26 PROCEDURE — 87086 URINE CULTURE/COLONY COUNT: CPT

## 2024-11-26 PROCEDURE — 70551 MRI BRAIN STEM W/O DYE: CPT

## 2024-11-26 PROCEDURE — 84132 ASSAY OF SERUM POTASSIUM: CPT

## 2024-11-26 PROCEDURE — 82962 GLUCOSE BLOOD TEST: CPT

## 2024-11-26 PROCEDURE — 85014 HEMATOCRIT: CPT

## 2024-11-26 PROCEDURE — 71045 X-RAY EXAM CHEST 1 VIEW: CPT | Mod: 26

## 2024-11-26 PROCEDURE — 82947 ASSAY GLUCOSE BLOOD QUANT: CPT

## 2024-11-26 PROCEDURE — 93005 ELECTROCARDIOGRAM TRACING: CPT

## 2024-11-26 PROCEDURE — 82728 ASSAY OF FERRITIN: CPT

## 2024-11-26 PROCEDURE — 82803 BLOOD GASES ANY COMBINATION: CPT

## 2024-11-26 PROCEDURE — 88305 TISSUE EXAM BY PATHOLOGIST: CPT

## 2024-11-26 PROCEDURE — 83735 ASSAY OF MAGNESIUM: CPT

## 2024-11-26 PROCEDURE — 82746 ASSAY OF FOLIC ACID SERUM: CPT

## 2024-11-26 PROCEDURE — 86480 TB TEST CELL IMMUN MEASURE: CPT

## 2024-11-26 PROCEDURE — 81001 URINALYSIS AUTO W/SCOPE: CPT

## 2024-11-26 PROCEDURE — 96374 THER/PROPH/DIAG INJ IV PUSH: CPT

## 2024-11-26 PROCEDURE — 83550 IRON BINDING TEST: CPT

## 2024-11-26 PROCEDURE — 99285 EMERGENCY DEPT VISIT HI MDM: CPT | Mod: 25

## 2024-11-26 PROCEDURE — 83605 ASSAY OF LACTIC ACID: CPT

## 2024-11-26 PROCEDURE — 80076 HEPATIC FUNCTION PANEL: CPT

## 2024-11-26 PROCEDURE — 80074 ACUTE HEPATITIS PANEL: CPT

## 2024-11-26 PROCEDURE — 74176 CT ABD & PELVIS W/O CONTRAST: CPT | Mod: MC

## 2024-11-26 PROCEDURE — 84100 ASSAY OF PHOSPHORUS: CPT

## 2024-11-26 PROCEDURE — 36415 COLL VENOUS BLD VENIPUNCTURE: CPT

## 2024-11-26 PROCEDURE — 87493 C DIFF AMPLIFIED PROBE: CPT

## 2024-11-26 PROCEDURE — 87040 BLOOD CULTURE FOR BACTERIA: CPT

## 2024-11-26 PROCEDURE — 85025 COMPLETE CBC W/AUTO DIFF WBC: CPT

## 2024-11-26 PROCEDURE — 80053 COMPREHEN METABOLIC PANEL: CPT

## 2024-11-26 RX ORDER — PREDNISONE 20 MG/1
2 TABLET ORAL
Qty: 0 | Refills: 0 | DISCHARGE
Start: 2024-11-26

## 2024-11-26 RX ADMIN — PREDNISONE 40 MILLIGRAM(S): 20 TABLET ORAL at 06:33

## 2024-11-26 RX ADMIN — OXYCODONE HYDROCHLORIDE 15 MILLIGRAM(S): 30 TABLET ORAL at 18:01

## 2024-11-26 RX ADMIN — Medication 40 MILLIGRAM(S): at 06:34

## 2024-11-26 RX ADMIN — AMLODIPINE BESYLATE 2.5 MILLIGRAM(S): 10 TABLET ORAL at 06:33

## 2024-11-26 RX ADMIN — PROCHLORPERAZINE EDISYLATE 10 MILLIGRAM(S): 5 INJECTION INTRAMUSCULAR; INTRAVENOUS at 18:02

## 2024-11-26 RX ADMIN — GABAPENTIN 600 MILLIGRAM(S): 300 CAPSULE ORAL at 06:32

## 2024-11-26 RX ADMIN — PROCHLORPERAZINE EDISYLATE 10 MILLIGRAM(S): 5 INJECTION INTRAMUSCULAR; INTRAVENOUS at 00:14

## 2024-11-26 RX ADMIN — GABAPENTIN 600 MILLIGRAM(S): 300 CAPSULE ORAL at 18:01

## 2024-11-26 RX ADMIN — Medication 40 MILLIGRAM(S): at 18:03

## 2024-11-26 RX ADMIN — Medication 30 MILLIGRAM(S): at 11:38

## 2024-11-26 RX ADMIN — APIXABAN 5 MILLIGRAM(S): 2.5 TABLET, FILM COATED ORAL at 18:01

## 2024-11-26 RX ADMIN — APIXABAN 5 MILLIGRAM(S): 2.5 TABLET, FILM COATED ORAL at 06:33

## 2024-11-26 RX ADMIN — PROCHLORPERAZINE EDISYLATE 10 MILLIGRAM(S): 5 INJECTION INTRAMUSCULAR; INTRAVENOUS at 06:33

## 2024-11-26 RX ADMIN — PANTOPRAZOLE SODIUM 40 MILLIGRAM(S): 40 TABLET, DELAYED RELEASE ORAL at 06:33

## 2024-11-26 RX ADMIN — Medication 1 MILLIGRAM(S): at 11:38

## 2024-11-26 RX ADMIN — SULFASALAZINE 1000 MILLIGRAM(S): 500 TABLET ORAL at 06:37

## 2024-11-26 RX ADMIN — OXYCODONE HYDROCHLORIDE 15 MILLIGRAM(S): 30 TABLET ORAL at 06:33

## 2024-11-26 RX ADMIN — PROCHLORPERAZINE EDISYLATE 10 MILLIGRAM(S): 5 INJECTION INTRAMUSCULAR; INTRAVENOUS at 11:39

## 2024-11-26 RX ADMIN — SULFASALAZINE 1000 MILLIGRAM(S): 500 TABLET ORAL at 18:02

## 2024-11-26 RX ADMIN — Medication 100 MICROGRAM(S): at 06:33

## 2024-11-26 NOTE — DIETITIAN INITIAL EVALUATION ADULT - PERTINENT MEDS FT
MEDICATIONS  (STANDING):  amLODIPine   Tablet 2.5 milliGRAM(s) Oral daily  apixaban 5 milliGRAM(s) Oral every 12 hours  atorvastatin 40 milliGRAM(s) Oral at bedtime  DULoxetine 40 milliGRAM(s) Oral two times a day  folic acid 1 milliGRAM(s) Oral daily  gabapentin 600 milliGRAM(s) Oral two times a day  isosorbide   mononitrate ER Tablet (IMDUR) 30 milliGRAM(s) Oral daily  levothyroxine 100 MICROGram(s) Oral daily  morphine  IR 30 milliGRAM(s) Oral daily  oxyCODONE  ER Tablet 15 milliGRAM(s) Oral every 12 hours  pantoprazole    Tablet 40 milliGRAM(s) Oral before breakfast  predniSONE   Tablet 40 milliGRAM(s) Oral daily  prochlorperazine   Tablet 10 milliGRAM(s) Oral every 6 hours  sulfaSALAzine 1000 milliGRAM(s) Oral two times a day    MEDICATIONS  (PRN):  acetaminophen     Tablet .. 650 milliGRAM(s) Oral every 6 hours PRN Temp greater or equal to 38C (100.4F), Mild Pain (1 - 3)  aluminum hydroxide/magnesium hydroxide/simethicone Suspension 30 milliLiter(s) Oral every 4 hours PRN Dyspepsia  loperamide 2 milliGRAM(s) Oral two times a day PRN Diarrhea  melatonin 3 milliGRAM(s) Oral at bedtime PRN Insomnia  ondansetron Injectable 4 milliGRAM(s) IV Push every 8 hours PRN Nausea and/or Vomiting

## 2024-11-26 NOTE — DISCHARGE NOTE NURSING/CASE MANAGEMENT/SOCIAL WORK - FINANCIAL ASSISTANCE
[facility] provides services at a reduced cost to those who are determined to be eligible through [facility]’s financial assistance program. Information regarding [facility]’s financial assistance program can be found by going to [email or web address] or by calling [phone].

## 2024-11-26 NOTE — PROGRESS NOTE ADULT - SUBJECTIVE AND OBJECTIVE BOX
Patient is a 79y old  Male who presents with a chief complaint of Colitis, JEFF (25 Nov 2024 11:08)      SUBJECTIVE / OVERNIGHT EVENTS: Seen and examined. Feels ok. No diarrhea since yesterday as per patient. Denies chest pain, SOB, abd pain, N/V, fever. TB quant indeterminate. CXR without active lesions noted.     MEDICATIONS  (STANDING):  amLODIPine   Tablet 2.5 milliGRAM(s) Oral daily  apixaban 5 milliGRAM(s) Oral every 12 hours  atorvastatin 40 milliGRAM(s) Oral at bedtime  DULoxetine 40 milliGRAM(s) Oral two times a day  folic acid 1 milliGRAM(s) Oral daily  gabapentin 600 milliGRAM(s) Oral two times a day  isosorbide   mononitrate ER Tablet (IMDUR) 30 milliGRAM(s) Oral daily  levothyroxine 100 MICROGram(s) Oral daily  morphine  IR 30 milliGRAM(s) Oral daily  oxyCODONE  ER Tablet 15 milliGRAM(s) Oral every 12 hours  pantoprazole    Tablet 40 milliGRAM(s) Oral before breakfast  predniSONE   Tablet 40 milliGRAM(s) Oral daily  prochlorperazine   Tablet 10 milliGRAM(s) Oral every 6 hours  sulfaSALAzine 1000 milliGRAM(s) Oral two times a day    MEDICATIONS  (PRN):  acetaminophen     Tablet .. 650 milliGRAM(s) Oral every 6 hours PRN Temp greater or equal to 38C (100.4F), Mild Pain (1 - 3)  aluminum hydroxide/magnesium hydroxide/simethicone Suspension 30 milliLiter(s) Oral every 4 hours PRN Dyspepsia  loperamide 2 milliGRAM(s) Oral two times a day PRN Diarrhea  melatonin 3 milliGRAM(s) Oral at bedtime PRN Insomnia  ondansetron Injectable 4 milliGRAM(s) IV Push every 8 hours PRN Nausea and/or Vomiting        I&O's Summary      PHYSICAL EXAM:  Vital Signs Last 24 Hrs  T(C): 36.4 (26 Nov 2024 05:00), Max: 36.4 (26 Nov 2024 05:00)  T(F): 97.6 (26 Nov 2024 05:00), Max: 97.6 (26 Nov 2024 05:00)  HR: 69 (26 Nov 2024 05:00) (68 - 69)  BP: 162/92 (26 Nov 2024 05:00) (158/80 - 162/92)  BP(mean): --  RR: 18 (26 Nov 2024 05:00) (18 - 18)  SpO2: 96% (26 Nov 2024 05:00) (96% - 97%)    Parameters below as of 26 Nov 2024 05:00  Patient On (Oxygen Delivery Method): room air        General: Age-appearing, in no acute distress  Head: Normocephalic, atraumatic  ENMT: EOMI, neck supple  Cardiovascular: +S1, S2; Regular rate and rhythm, no murmurs, rubs, gallops  Respiratory: CTA BL, no wheezes, rales, rhonchi  Gastrointestinal: Abdomen soft, non-tender, +BS in all 4 quadrants  Extremities: No clubbing, cyanosis, or edema  Neuro: Non-focal, AAOx3, sensation intact BL  Musculoskeletal: Normal tone, no deformities  Skin: Warm, dry; no acute rash seen; multiple skin lesions on face, ears   Psych: Appropriate, cooperative; poor historian    LABS:                        9.6    12.67 )-----------( 272      ( 25 Nov 2024 03:40 )             30.8     11-25    136  |  105  |  20.4[H]  ----------------------------<  125[H]  5.1   |  20.0[L]  |  1.06    Ca    7.7[L]      25 Nov 2024 06:46    TPro  5.1[L]  /  Alb  2.5[L]  /  TBili  <0.2[L]  /  DBili  x   /  AST  21  /  ALT  11  /  AlkPhos  103  11-25          Urinalysis Basic - ( 25 Nov 2024 06:46 )    Color: x / Appearance: x / SG: x / pH: x  Gluc: 125 mg/dL / Ketone: x  / Bili: x / Urobili: x   Blood: x / Protein: x / Nitrite: x   Leuk Esterase: x / RBC: x / WBC x   Sq Epi: x / Non Sq Epi: x / Bacteria: x        Culture - Urine (collected 24 Nov 2024 06:00)  Source: Clean Catch Clean Catch (Midstream)  Final Report (25 Nov 2024 09:15):    No growth      CAPILLARY BLOOD GLUCOSE            RADIOLOGY & ADDITIONAL TESTS:  Results Reviewed:   Imaging Personally Reviewed:  Electrocardiogram Personally Reviewed:

## 2024-11-26 NOTE — DISCHARGE NOTE PROVIDER - NSDCMRMEDTOKEN_GEN_ALL_CORE_FT
amLODIPine 5 mg oral tablet: 0.5 tab(s) orally once a day  apixaban 5 mg oral tablet: 1 tab(s) orally 2 times a day  atorvastatin 40 mg oral tablet: 1 tab(s) orally once a day (at bedtime)  DULoxetine 40 mg oral delayed release capsule: 1 cap(s) orally 2 times a day  ergocalciferol 1.25 mg (50,000 intl units) oral tablet: 1 tab(s) orally once a week  fluorouracil 5% topical cream: Apply topically to affected area once a day (at bedtime) for 4 weeks  folic acid: 1 milligram(s) orally once a day  gabapentin 600 mg oral tablet: 1 tab(s) orally 2 times a day  isosorbide mononitrate 30 mg oral tablet, extended release: 1 tab(s) orally once a day  levothyroxine 100 mcg (0.1 mg) oral capsule: 1 cap(s) orally once a day  loperamide 2 mg oral tablet: 1 tab(s) orally once a day  morphine 30 mg oral tablet: 1 tab(s) orally once a day  pantoprazole 40 mg oral delayed release tablet: 1 tab(s) orally once a day  pilocarpine 7.5 mg oral tablet: 1 tab(s) orally 4 times a day  predniSONE 20 mg oral tablet: 2 tab(s) orally once a day  prochlorperazine 10 mg oral tablet: 1 tab(s) orally every 6 hours  ramipril 10 mg oral tablet: 2 tab(s) orally once a day  sulfaSALAzine 500 mg oral tablet: 2 tab(s) orally 2 times a day  Xtampza ER 18 mg oral capsule, extended release: 1 cap(s) orally 2 times a day for pain

## 2024-11-26 NOTE — DISCHARGE NOTE PROVIDER - NSDCCPCAREPLAN_GEN_ALL_CORE_FT
PRINCIPAL DISCHARGE DIAGNOSIS  Diagnosis: Acute colitis  Assessment and Plan of Treatment: Seen by GI and had flex sigm. Obtained pathology and fu pathology outpatient. Started on Steroids. Diarrhea improved. Follow up with GI outpatient.      SECONDARY DISCHARGE DIAGNOSES  Diagnosis: JEFF (acute kidney injury)  Assessment and Plan of Treatment: Likely due to dehydration. Improved with fluids. Follow up with PCP outpatient.    Diagnosis: Acute UTI  Assessment and Plan of Treatment: Ua positive. Ucx without growth. Treated with ABX. follow up with PCP.    Diagnosis: History of parotid cancer  Assessment and Plan of Treatment: Hold keytruda for now. Follow up with Heme/Onc and GI outpatient for further eval.

## 2024-11-26 NOTE — DIETITIAN INITIAL EVALUATION ADULT - ETIOLOGY
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

## 2024-11-26 NOTE — DISCHARGE NOTE NURSING/CASE MANAGEMENT/SOCIAL WORK - NSDCPEFALRISK_GEN_ALL_CORE
For information on Fall & Injury Prevention, visit: [email or web address] OR  [email or web address] OR  https://www.cdc.gov/steadi/patient.html

## 2024-11-26 NOTE — DISCHARGE NOTE NURSING/CASE MANAGEMENT/SOCIAL WORK - NSDCVIVACCINE_GEN_ALL_CORE_FT
influenza, high-dose, quadrivalent; 28-Oct-2022 12:29; Leilani Ji (RN); [employer]; [id number] (Exp. Date: 30-Jun-2023); IntraMuscular; Deltoid Right.; 0.7 milliLiter(s); VIS (VIS Published: 06-Aug-2021, VIS Presented: 28-Oct-2022);

## 2024-11-26 NOTE — DISCHARGE NOTE PROVIDER - CARE PROVIDER_API CALL
Amina Page  Gastroenterology  [address], [address]  [place] [address]  Phone: [phone]  Fax: [phone]  Follow Up Time:

## 2024-11-26 NOTE — DIETITIAN INITIAL EVALUATION ADULT - OTHER INFO
80 yo male with pmhx HTN, CAD, CVA on Eliquis, Basal Cell Carcinoma presenting to the ED with 2 weeks of diarrhea.     Colitis ?2/2 Immunotherapy vs. Infectious

## 2024-11-26 NOTE — DISCHARGE NOTE PROVIDER - ATTENDING DISCHARGE PHYSICAL EXAMINATION:
General: Age-appearing, in no acute distress  Head: Normocephalic, atraumatic  ENMT: EOMI, neck supple  Cardiovascular: +S1, S2; Regular rate and rhythm, no murmurs, rubs, gallops  Respiratory: CTA BL, no wheezes, rales, rhonchi  Gastrointestinal: Abdomen soft, non-tender, +BS in all 4 quadrants  Extremities: No clubbing, cyanosis, or edema  Neuro: Non-focal, AAOx3, sensation intact BL  Musculoskeletal: Normal tone, no deformities  Skin: Warm, dry; no acute rash seen; multiple skin lesions on face, ears   Psych: Appropriate, cooperative;

## 2024-11-26 NOTE — DIETITIAN INITIAL EVALUATION ADULT - ORAL INTAKE PTA/DIET HISTORY
Pt found in chair lunch tray untouched. Endorses poor appetite and intake x 2 months with diarrhea for several weeks. -200lbs. Adm weight 164 lbs. Education provided on low fiber/ low fat diet/ Pt and wife receptive.

## 2024-11-26 NOTE — DISCHARGE NOTE NURSING/CASE MANAGEMENT/SOCIAL WORK - PATIENT PORTAL LINK FT
You can access the FollowMyHealth Patient Portal offered by [facility] by registering at the following website: [email or web address]. By joining [employer]’s FollowMyHealth portal, you will also be able to view your health information using other applications (apps) compatible with our system.

## 2024-11-26 NOTE — PROGRESS NOTE ADULT - ASSESSMENT
80 yo male with pmhx HTN, CAD, CVA on Eliquis, Basal Cell Carcinoma presenting to the ED with 2 weeks of diarrhea.     Colitis ?2/2 Immunotherapy vs. Infectious  -Admit to medicine  -Patient recently in obs with colitis with negative C. diff and GI PCR, seeming to improve and sent home  -Subsequently worsened and called GI doctor who advised him to come back in  -CT showing unchanged colitis from prior  -C. diff Negative  -GI consulted for possible colonoscopy/flex sig per outpatient chart note ([facility])  -S/p Flex sigmoidoscopy 11/22  -C/w Steroid methylpred 40mg Q8H--> q12H -->taper to PO Prednisone 40mg.     Positive UA  - Denies any symptoms  - S/p Ceftriaxone  - Urine c/s without growth    Anemia (improved)  - Reviewed Iron panel and ferritin, B12/folate  - Hgb 8.6-->9.6  - Denies any active bleeding.    JEFF (resolved)  -Cr 2.05 increased from 1.21 on 11/19 (2 days ago)  -Likely in the setting of GI losses from diarrhea  -Given 2300 cc IVF in the ED, will continue @ 84 cc/hr  -Monitor BMP    Hyperkalemia (improved)  - Give hyperk cocktail  - EKG with NSR/ QTc 477    Leukocytosis  - Likely due to steroids use,    HTN  -Continue Imdur 30 mg daily  -Continue Amlodipine 5 mg daily  -Continue Lisinopril 40 mg daily as sub for Ramipril 10 mg daily    CAD, Hx of CVA  -Continue Atorvastatin 40 mg  -C/w Eliquis 5 mg BID    Hypothyroidism  -Continue Synthroid 100 mcg daily    RA, Sjogrens   -Continue [id number] mg BID  -Per d/c rec (11/16), patient on 5 mg daily of Prednisone, but per Rheum note (10/2024)  was to be decreased to 2.5 mg daily. Confirmed with wife. Not on any steroids now.      DVTppx: Eliquis    Dispo: Medically stable for QASIM.

## 2024-11-26 NOTE — DIETITIAN INITIAL EVALUATION ADULT - PERTINENT LABORATORY DATA
11-25    136  |  105  |  20.4[H]  ----------------------------<  125[H]  5.1   |  20.0[L]  |  1.06    Ca    7.7[L]      25 Nov 2024 06:46    TPro  5.1[L]  /  Alb  2.5[L]  /  TBili  <0.2[L]  /  DBili  x   /  AST  21  /  ALT  11  /  AlkPhos  103  11-25  A1C with Estimated Average Glucose Result: 5.5 % (07-11-24 @ 08:35)  A1C with Estimated Average Glucose Result: 5.6 % (03-11-24 @ 09:10)

## 2024-11-26 NOTE — DISCHARGE NOTE PROVIDER - HOSPITAL COURSE
78 yo male with pmhx HTN, CAD, CVA on Eliquis, Basal Cell Carcinoma presenting to the ED with 2 weeks of diarrhea. Seen by GI and had flex sigmoidoscopy and recommended steroids. He had positive UA which was treated with abx. C.diff negative. GI recommended outpatient follow up for pathology result and colonoscopy. He is clinically stable for discharge to [facility] with outpatient follow up with at his heme/onc, GI and PCP.

## 2024-11-27 ENCOUNTER — APPOINTMENT (OUTPATIENT)
Dept: SURGICAL ONCOLOGY | Facility: CLINIC | Age: 79
End: 2024-11-27

## 2024-11-27 LAB
CULTURE RESULTS: SIGNIFICANT CHANGE UP
SPECIMEN SOURCE: SIGNIFICANT CHANGE UP

## 2024-11-29 ENCOUNTER — RX RENEWAL (OUTPATIENT)
Age: 79
End: 2024-11-29

## 2024-11-29 ENCOUNTER — APPOINTMENT (OUTPATIENT)
Age: 79
End: 2024-11-29

## 2024-12-02 ENCOUNTER — APPOINTMENT (OUTPATIENT)
Dept: MRI IMAGING | Facility: CLINIC | Age: 79
End: 2024-12-02

## 2024-12-04 ENCOUNTER — NON-APPOINTMENT (OUTPATIENT)
Age: 79
End: 2024-12-04

## 2024-12-04 ENCOUNTER — APPOINTMENT (OUTPATIENT)
Dept: CARDIOLOGY | Facility: CLINIC | Age: 79
End: 2024-12-04

## 2024-12-20 ENCOUNTER — APPOINTMENT (OUTPATIENT)
Age: 79
End: 2024-12-20

## 2025-01-01 ENCOUNTER — RX RENEWAL (OUTPATIENT)
Age: 80
End: 2025-01-01

## 2025-01-01 ENCOUNTER — TRANSCRIPTION ENCOUNTER (OUTPATIENT)
Age: 80
End: 2025-01-01

## 2025-01-01 ENCOUNTER — OUTPATIENT (OUTPATIENT)
Dept: OUTPATIENT SERVICES | Facility: HOSPITAL | Age: 80
LOS: 1 days | Discharge: HOME | End: 2025-01-01

## 2025-01-01 ENCOUNTER — INPATIENT (INPATIENT)
Facility: HOSPITAL | Age: 80
LOS: 2 days | Discharge: HOME HEALTH | DRG: 645 | End: 2025-01-31
Attending: STUDENT IN AN ORGANIZED HEALTH CARE EDUCATION/TRAINING PROGRAM | Admitting: STUDENT IN AN ORGANIZED HEALTH CARE EDUCATION/TRAINING PROGRAM
Payer: MEDICARE

## 2025-01-01 ENCOUNTER — EMERGENCY (EMERGENCY)
Facility: HOSPITAL | Age: 80
LOS: 1 days | Discharge: HOME | End: 2025-01-01
Attending: EMERGENCY MEDICINE
Payer: MEDICARE

## 2025-01-01 ENCOUNTER — OUTPATIENT (OUTPATIENT)
Dept: OUTPATIENT SERVICES | Facility: HOSPITAL | Age: 80
LOS: 1 days | End: 2025-01-01

## 2025-01-01 ENCOUNTER — APPOINTMENT (OUTPATIENT)
Dept: SURGICAL ONCOLOGY | Facility: CLINIC | Age: 80
End: 2025-01-01
Payer: MEDICARE

## 2025-01-01 ENCOUNTER — OUTPATIENT (OUTPATIENT)
Dept: OUTPATIENT SERVICES | Facility: HOSPITAL | Age: 80
LOS: 1 days | End: 2025-01-01
Payer: MEDICARE

## 2025-01-01 ENCOUNTER — NON-APPOINTMENT (OUTPATIENT)
Age: 80
End: 2025-01-01

## 2025-01-01 ENCOUNTER — APPOINTMENT (OUTPATIENT)
Dept: GASTROENTEROLOGY | Facility: CLINIC | Age: 80
End: 2025-01-01
Payer: MEDICARE

## 2025-01-01 ENCOUNTER — APPOINTMENT (OUTPATIENT)
Dept: HEMATOLOGY ONCOLOGY | Facility: CLINIC | Age: 80
End: 2025-01-01
Payer: MEDICARE

## 2025-01-01 ENCOUNTER — APPOINTMENT (OUTPATIENT)
Dept: GASTROENTEROLOGY | Facility: CLINIC | Age: 80
End: 2025-01-01

## 2025-01-01 VITALS
TEMPERATURE: 98 F | HEIGHT: 72 IN | WEIGHT: 169.98 LBS | OXYGEN SATURATION: 96 % | RESPIRATION RATE: 18 BRPM | DIASTOLIC BLOOD PRESSURE: 97 MMHG | SYSTOLIC BLOOD PRESSURE: 142 MMHG | HEART RATE: 76 BPM

## 2025-01-01 VITALS
BODY MASS INDEX: 23.8 KG/M2 | DIASTOLIC BLOOD PRESSURE: 87 MMHG | HEART RATE: 75 BPM | SYSTOLIC BLOOD PRESSURE: 128 MMHG | HEIGHT: 71 IN | WEIGHT: 170 LBS | TEMPERATURE: 97.5 F | OXYGEN SATURATION: 96 %

## 2025-01-01 VITALS
OXYGEN SATURATION: 97 % | SYSTOLIC BLOOD PRESSURE: 167 MMHG | RESPIRATION RATE: 20 BRPM | HEART RATE: 60 BPM | TEMPERATURE: 98 F | DIASTOLIC BLOOD PRESSURE: 82 MMHG

## 2025-01-01 VITALS
TEMPERATURE: 97 F | HEIGHT: 71 IN | HEART RATE: 76 BPM | WEIGHT: 185.19 LBS | DIASTOLIC BLOOD PRESSURE: 82 MMHG | SYSTOLIC BLOOD PRESSURE: 140 MMHG | OXYGEN SATURATION: 99 % | RESPIRATION RATE: 18 BRPM

## 2025-01-01 VITALS
DIASTOLIC BLOOD PRESSURE: 71 MMHG | TEMPERATURE: 98 F | WEIGHT: 190.04 LBS | OXYGEN SATURATION: 98 % | HEART RATE: 88 BPM | SYSTOLIC BLOOD PRESSURE: 139 MMHG | RESPIRATION RATE: 20 BRPM

## 2025-01-01 VITALS
RESPIRATION RATE: 18 BRPM | TEMPERATURE: 99 F | OXYGEN SATURATION: 93 % | DIASTOLIC BLOOD PRESSURE: 88 MMHG | HEART RATE: 82 BPM | SYSTOLIC BLOOD PRESSURE: 152 MMHG

## 2025-01-01 DIAGNOSIS — Z01.818 ENCOUNTER FOR OTHER PREPROCEDURAL EXAMINATION: ICD-10-CM

## 2025-01-01 DIAGNOSIS — G89.29 OTHER CHRONIC PAIN: ICD-10-CM

## 2025-01-01 DIAGNOSIS — Z95.5 PRESENCE OF CORONARY ANGIOPLASTY IMPLANT AND GRAFT: Chronic | ICD-10-CM

## 2025-01-01 DIAGNOSIS — Z90.49 ACQUIRED ABSENCE OF OTHER SPECIFIED PARTS OF DIGESTIVE TRACT: Chronic | ICD-10-CM

## 2025-01-01 DIAGNOSIS — M25.552 PAIN IN LEFT HIP: ICD-10-CM

## 2025-01-01 DIAGNOSIS — I10 ESSENTIAL (PRIMARY) HYPERTENSION: ICD-10-CM

## 2025-01-01 DIAGNOSIS — Z86.73 PERSONAL HISTORY OF TRANSIENT ISCHEMIC ATTACK (TIA), AND CEREBRAL INFARCTION WITHOUT RESIDUAL DEFICITS: ICD-10-CM

## 2025-01-01 DIAGNOSIS — Z98.1 ARTHRODESIS STATUS: Chronic | ICD-10-CM

## 2025-01-01 DIAGNOSIS — Z48.89 ENCOUNTER FOR OTHER SPECIFIED SURGICAL AFTERCARE: ICD-10-CM

## 2025-01-01 DIAGNOSIS — I25.10 ATHEROSCLEROTIC HEART DISEASE OF NATIVE CORONARY ARTERY WITHOUT ANGINA PECTORIS: ICD-10-CM

## 2025-01-01 DIAGNOSIS — N39.0 URINARY TRACT INFECTION, SITE NOT SPECIFIED: ICD-10-CM

## 2025-01-01 DIAGNOSIS — Z29.89 ENCOUNTER. FOR OTHER SPECIFIED PROPHYLACTIC MEASURES: ICD-10-CM

## 2025-01-01 DIAGNOSIS — C77.0 SECONDARY AND UNSPECIFIED MALIGNANT NEOPLASM OF LYMPH NODES OF HEAD, FACE AND NECK: ICD-10-CM

## 2025-01-01 DIAGNOSIS — E03.9 HYPOTHYROIDISM, UNSPECIFIED: ICD-10-CM

## 2025-01-01 DIAGNOSIS — Z29.9 ENCOUNTER FOR PROPHYLACTIC MEASURES, UNSPECIFIED: ICD-10-CM

## 2025-01-01 DIAGNOSIS — C80.1 SECONDARY AND UNSPECIFIED MALIGNANT NEOPLASM OF LYMPH NODES OF HEAD, FACE AND NECK: ICD-10-CM

## 2025-01-01 DIAGNOSIS — C79.51 SECONDARY MALIGNANT NEOPLASM OF BONE: ICD-10-CM

## 2025-01-01 DIAGNOSIS — Z09 ENCOUNTER FOR FOLLOW-UP EXAMINATION AFTER COMPLETED TREATMENT FOR CONDITIONS OTHER THAN MALIGNANT NEOPLASM: ICD-10-CM

## 2025-01-01 DIAGNOSIS — Z79.891 LONG TERM (CURRENT) USE OF OPIATE ANALGESIC: ICD-10-CM

## 2025-01-01 DIAGNOSIS — R62.7 ADULT FAILURE TO THRIVE: ICD-10-CM

## 2025-01-01 LAB
A1C WITH ESTIMATED AVERAGE GLUCOSE RESULT: 5.6 % — SIGNIFICANT CHANGE UP (ref 4–5.6)
A1C WITH ESTIMATED AVERAGE GLUCOSE RESULT: 5.8 % — HIGH (ref 4–5.6)
ALBUMIN SERPL ELPH-MCNC: 3 G/DL — LOW (ref 3.3–5.2)
ALBUMIN SERPL ELPH-MCNC: 3.2 G/DL — LOW (ref 3.3–5.2)
ALBUMIN SERPL ELPH-MCNC: 3.3 G/DL — SIGNIFICANT CHANGE UP (ref 3.3–5.2)
ALP SERPL-CCNC: 76 U/L — SIGNIFICANT CHANGE UP (ref 40–120)
ALP SERPL-CCNC: 79 U/L — SIGNIFICANT CHANGE UP (ref 40–120)
ALP SERPL-CCNC: 86 U/L — SIGNIFICANT CHANGE UP (ref 40–120)
ALT FLD-CCNC: 10 U/L — SIGNIFICANT CHANGE UP
ALT FLD-CCNC: 12 U/L — SIGNIFICANT CHANGE UP
ALT FLD-CCNC: 12 U/L — SIGNIFICANT CHANGE UP
ANION GAP SERPL CALC-SCNC: 11 MMOL/L — SIGNIFICANT CHANGE UP (ref 5–17)
ANION GAP SERPL CALC-SCNC: 11 MMOL/L — SIGNIFICANT CHANGE UP (ref 5–17)
ANION GAP SERPL CALC-SCNC: 12 MMOL/L — SIGNIFICANT CHANGE UP (ref 5–17)
ANION GAP SERPL CALC-SCNC: 9 MMOL/L — SIGNIFICANT CHANGE UP (ref 5–17)
ANION GAP SERPL CALC-SCNC: 9 MMOL/L — SIGNIFICANT CHANGE UP (ref 5–17)
APPEARANCE UR: CLEAR — SIGNIFICANT CHANGE UP
APTT BLD: 34.4 SEC — SIGNIFICANT CHANGE UP (ref 24.5–35.6)
AST SERPL-CCNC: 16 U/L — SIGNIFICANT CHANGE UP
AST SERPL-CCNC: 17 U/L — SIGNIFICANT CHANGE UP
AST SERPL-CCNC: 17 U/L — SIGNIFICANT CHANGE UP
BACTERIA # UR AUTO: NEGATIVE /HPF — SIGNIFICANT CHANGE UP
BASOPHILS # BLD AUTO: 0.02 K/UL — SIGNIFICANT CHANGE UP (ref 0–0.2)
BASOPHILS # BLD AUTO: 0.03 K/UL — SIGNIFICANT CHANGE UP (ref 0–0.2)
BASOPHILS NFR BLD AUTO: 0.2 % — SIGNIFICANT CHANGE UP (ref 0–2)
BASOPHILS NFR BLD AUTO: 0.3 % — SIGNIFICANT CHANGE UP (ref 0–2)
BILIRUB SERPL-MCNC: 0.2 MG/DL — LOW (ref 0.4–2)
BILIRUB SERPL-MCNC: 0.3 MG/DL — LOW (ref 0.4–2)
BILIRUB SERPL-MCNC: <0.2 MG/DL — LOW (ref 0.4–2)
BILIRUB UR-MCNC: NEGATIVE — SIGNIFICANT CHANGE UP
BLD GP AB SCN SERPL QL: SIGNIFICANT CHANGE UP
BUN SERPL-MCNC: 12 MG/DL — SIGNIFICANT CHANGE UP (ref 8–20)
BUN SERPL-MCNC: 13.9 MG/DL — SIGNIFICANT CHANGE UP (ref 8–20)
BUN SERPL-MCNC: 14.5 MG/DL — SIGNIFICANT CHANGE UP (ref 8–20)
BUN SERPL-MCNC: 14.5 MG/DL — SIGNIFICANT CHANGE UP (ref 8–20)
BUN SERPL-MCNC: 16.7 MG/DL — SIGNIFICANT CHANGE UP (ref 8–20)
CALCIUM SERPL-MCNC: 9.1 MG/DL — SIGNIFICANT CHANGE UP (ref 8.4–10.5)
CALCIUM SERPL-MCNC: 9.4 MG/DL — SIGNIFICANT CHANGE UP (ref 8.4–10.5)
CALCIUM SERPL-MCNC: 9.4 MG/DL — SIGNIFICANT CHANGE UP (ref 8.4–10.5)
CALCIUM SERPL-MCNC: 9.8 MG/DL — SIGNIFICANT CHANGE UP (ref 8.4–10.5)
CALCIUM SERPL-MCNC: 9.9 MG/DL — SIGNIFICANT CHANGE UP (ref 8.4–10.5)
CAST: 0 /LPF — SIGNIFICANT CHANGE UP (ref 0–4)
CHLORIDE SERPL-SCNC: 100 MMOL/L — SIGNIFICANT CHANGE UP (ref 96–108)
CHLORIDE SERPL-SCNC: 102 MMOL/L — SIGNIFICANT CHANGE UP (ref 96–108)
CHLORIDE SERPL-SCNC: 103 MMOL/L — SIGNIFICANT CHANGE UP (ref 96–108)
CHOLEST SERPL-MCNC: 133 MG/DL — SIGNIFICANT CHANGE UP
CO2 SERPL-SCNC: 28 MMOL/L — SIGNIFICANT CHANGE UP (ref 22–29)
CO2 SERPL-SCNC: 28 MMOL/L — SIGNIFICANT CHANGE UP (ref 22–29)
CO2 SERPL-SCNC: 29 MMOL/L — SIGNIFICANT CHANGE UP (ref 22–29)
COLOR SPEC: SIGNIFICANT CHANGE UP
CREAT SERPL-MCNC: 0.94 MG/DL — SIGNIFICANT CHANGE UP (ref 0.5–1.3)
CREAT SERPL-MCNC: 0.96 MG/DL — SIGNIFICANT CHANGE UP (ref 0.5–1.3)
CREAT SERPL-MCNC: 1.02 MG/DL — SIGNIFICANT CHANGE UP (ref 0.5–1.3)
CREAT SERPL-MCNC: 1.04 MG/DL — SIGNIFICANT CHANGE UP (ref 0.5–1.3)
CREAT SERPL-MCNC: 1.07 MG/DL — SIGNIFICANT CHANGE UP (ref 0.5–1.3)
CULTURE RESULTS: SIGNIFICANT CHANGE UP
DIFF PNL FLD: NEGATIVE — SIGNIFICANT CHANGE UP
EGFR: 71 ML/MIN/1.73M2 — SIGNIFICANT CHANGE UP
EGFR: 74 ML/MIN/1.73M2 — SIGNIFICANT CHANGE UP
EGFR: 76 ML/MIN/1.73M2 — SIGNIFICANT CHANGE UP
EGFR: 81 ML/MIN/1.73M2 — SIGNIFICANT CHANGE UP
EGFR: 83 ML/MIN/1.73M2 — SIGNIFICANT CHANGE UP
EOSINOPHIL # BLD AUTO: 0.03 K/UL — SIGNIFICANT CHANGE UP (ref 0–0.5)
EOSINOPHIL # BLD AUTO: 0.04 K/UL — SIGNIFICANT CHANGE UP (ref 0–0.5)
EOSINOPHIL # BLD AUTO: 0.05 K/UL — SIGNIFICANT CHANGE UP (ref 0–0.5)
EOSINOPHIL # BLD AUTO: 0.05 K/UL — SIGNIFICANT CHANGE UP (ref 0–0.5)
EOSINOPHIL NFR BLD AUTO: 0.3 % — SIGNIFICANT CHANGE UP (ref 0–6)
EOSINOPHIL NFR BLD AUTO: 0.5 % — SIGNIFICANT CHANGE UP (ref 0–6)
EOSINOPHIL NFR BLD AUTO: 0.5 % — SIGNIFICANT CHANGE UP (ref 0–6)
EOSINOPHIL NFR BLD AUTO: 0.6 % — SIGNIFICANT CHANGE UP (ref 0–6)
ESTIMATED AVERAGE GLUCOSE: 114 MG/DL — SIGNIFICANT CHANGE UP (ref 68–114)
ESTIMATED AVERAGE GLUCOSE: 120 MG/DL — HIGH (ref 68–114)
FLUAV AG NPH QL: SIGNIFICANT CHANGE UP
FLUBV AG NPH QL: SIGNIFICANT CHANGE UP
GLUCOSE BLDC GLUCOMTR-MCNC: 98 MG/DL — SIGNIFICANT CHANGE UP (ref 70–99)
GLUCOSE SERPL-MCNC: 78 MG/DL — SIGNIFICANT CHANGE UP (ref 70–99)
GLUCOSE SERPL-MCNC: 82 MG/DL — SIGNIFICANT CHANGE UP (ref 70–99)
GLUCOSE SERPL-MCNC: 89 MG/DL — SIGNIFICANT CHANGE UP (ref 70–99)
GLUCOSE SERPL-MCNC: 95 MG/DL — SIGNIFICANT CHANGE UP (ref 70–99)
GLUCOSE SERPL-MCNC: 99 MG/DL — SIGNIFICANT CHANGE UP (ref 70–99)
GLUCOSE UR QL: NEGATIVE MG/DL — SIGNIFICANT CHANGE UP
HCT VFR BLD CALC: 31.8 % — LOW (ref 39–50)
HCT VFR BLD CALC: 32.3 % — LOW (ref 39–50)
HCT VFR BLD CALC: 32.5 % — LOW (ref 39–50)
HCT VFR BLD CALC: 32.9 % — LOW (ref 39–50)
HCT VFR BLD CALC: 35.9 % — LOW (ref 39–50)
HDLC SERPL-MCNC: 39 MG/DL — LOW
HGB BLD-MCNC: 10.1 G/DL — LOW (ref 13–17)
HGB BLD-MCNC: 10.3 G/DL — LOW (ref 13–17)
HGB BLD-MCNC: 11.5 G/DL — LOW (ref 13–17)
HGB BLD-MCNC: 9.9 G/DL — LOW (ref 13–17)
HGB BLD-MCNC: 9.9 G/DL — LOW (ref 13–17)
IMM GRANULOCYTES NFR BLD AUTO: 0.5 % — SIGNIFICANT CHANGE UP (ref 0–0.9)
IMM GRANULOCYTES NFR BLD AUTO: 0.6 % — SIGNIFICANT CHANGE UP (ref 0–0.9)
IMM GRANULOCYTES NFR BLD AUTO: 0.7 % — SIGNIFICANT CHANGE UP (ref 0–0.9)
IMM GRANULOCYTES NFR BLD AUTO: 0.8 % — SIGNIFICANT CHANGE UP (ref 0–0.9)
INR BLD: 1.12 RATIO — SIGNIFICANT CHANGE UP (ref 0.85–1.16)
KETONES UR-MCNC: NEGATIVE MG/DL — SIGNIFICANT CHANGE UP
LEUKOCYTE ESTERASE UR-ACNC: ABNORMAL
LIPID PNL WITH DIRECT LDL SERPL: 72 MG/DL — SIGNIFICANT CHANGE UP
LYMPHOCYTES # BLD AUTO: 0.72 K/UL — LOW (ref 1–3.3)
LYMPHOCYTES # BLD AUTO: 0.93 K/UL — LOW (ref 1–3.3)
LYMPHOCYTES # BLD AUTO: 1.03 K/UL — SIGNIFICANT CHANGE UP (ref 1–3.3)
LYMPHOCYTES # BLD AUTO: 1.08 K/UL — SIGNIFICANT CHANGE UP (ref 1–3.3)
LYMPHOCYTES # BLD AUTO: 10.9 % — LOW (ref 13–44)
LYMPHOCYTES # BLD AUTO: 11.5 % — LOW (ref 13–44)
LYMPHOCYTES # BLD AUTO: 11.8 % — LOW (ref 13–44)
LYMPHOCYTES # BLD AUTO: 7.9 % — LOW (ref 13–44)
MAGNESIUM SERPL-MCNC: 1.9 MG/DL — SIGNIFICANT CHANGE UP (ref 1.6–2.6)
MCHC RBC-ENTMCNC: 29 PG — SIGNIFICANT CHANGE UP (ref 27–34)
MCHC RBC-ENTMCNC: 29.4 PG — SIGNIFICANT CHANGE UP (ref 27–34)
MCHC RBC-ENTMCNC: 29.8 PG — SIGNIFICANT CHANGE UP (ref 27–34)
MCHC RBC-ENTMCNC: 29.9 PG — SIGNIFICANT CHANGE UP (ref 27–34)
MCHC RBC-ENTMCNC: 30.2 PG — SIGNIFICANT CHANGE UP (ref 27–34)
MCHC RBC-ENTMCNC: 30.7 G/DL — LOW (ref 32–36)
MCHC RBC-ENTMCNC: 30.7 G/DL — LOW (ref 32–36)
MCHC RBC-ENTMCNC: 31.1 G/DL — LOW (ref 32–36)
MCHC RBC-ENTMCNC: 31.7 G/DL — LOW (ref 32–36)
MCHC RBC-ENTMCNC: 32 G/DL — SIGNIFICANT CHANGE UP (ref 32–36)
MCV RBC AUTO: 93.9 FL — SIGNIFICANT CHANGE UP (ref 80–100)
MCV RBC AUTO: 94.2 FL — SIGNIFICANT CHANGE UP (ref 80–100)
MCV RBC AUTO: 94.7 FL — SIGNIFICANT CHANGE UP (ref 80–100)
MCV RBC AUTO: 95.6 FL — SIGNIFICANT CHANGE UP (ref 80–100)
MCV RBC AUTO: 96.1 FL — SIGNIFICANT CHANGE UP (ref 80–100)
MONOCYTES # BLD AUTO: 0.83 K/UL — SIGNIFICANT CHANGE UP (ref 0–0.9)
MONOCYTES # BLD AUTO: 0.88 K/UL — SIGNIFICANT CHANGE UP (ref 0–0.9)
MONOCYTES # BLD AUTO: 0.91 K/UL — HIGH (ref 0–0.9)
MONOCYTES # BLD AUTO: 0.96 K/UL — HIGH (ref 0–0.9)
MONOCYTES NFR BLD AUTO: 10.2 % — SIGNIFICANT CHANGE UP (ref 2–14)
MONOCYTES NFR BLD AUTO: 10.5 % — SIGNIFICANT CHANGE UP (ref 2–14)
MONOCYTES NFR BLD AUTO: 9.6 % — SIGNIFICANT CHANGE UP (ref 2–14)
MONOCYTES NFR BLD AUTO: 9.8 % — SIGNIFICANT CHANGE UP (ref 2–14)
MRSA PCR RESULT.: SIGNIFICANT CHANGE UP
NEUTROPHILS # BLD AUTO: 6.6 K/UL — SIGNIFICANT CHANGE UP (ref 1.8–7.4)
NEUTROPHILS # BLD AUTO: 6.63 K/UL — SIGNIFICANT CHANGE UP (ref 1.8–7.4)
NEUTROPHILS # BLD AUTO: 7.23 K/UL — SIGNIFICANT CHANGE UP (ref 1.8–7.4)
NEUTROPHILS # BLD AUTO: 7.45 K/UL — HIGH (ref 1.8–7.4)
NEUTROPHILS NFR BLD AUTO: 76.2 % — SIGNIFICANT CHANGE UP (ref 43–77)
NEUTROPHILS NFR BLD AUTO: 77 % — SIGNIFICANT CHANGE UP (ref 43–77)
NEUTROPHILS NFR BLD AUTO: 77.7 % — HIGH (ref 43–77)
NEUTROPHILS NFR BLD AUTO: 81.5 % — HIGH (ref 43–77)
NITRITE UR-MCNC: NEGATIVE — SIGNIFICANT CHANGE UP
NON HDL CHOLESTEROL: 94 MG/DL — SIGNIFICANT CHANGE UP
PH UR: 7 — SIGNIFICANT CHANGE UP (ref 5–8)
PLATELET # BLD AUTO: 209 K/UL — SIGNIFICANT CHANGE UP (ref 150–400)
PLATELET # BLD AUTO: 255 K/UL — SIGNIFICANT CHANGE UP (ref 150–400)
PLATELET # BLD AUTO: 266 K/UL — SIGNIFICANT CHANGE UP (ref 150–400)
PLATELET # BLD AUTO: 277 K/UL — SIGNIFICANT CHANGE UP (ref 150–400)
PLATELET # BLD AUTO: 283 K/UL — SIGNIFICANT CHANGE UP (ref 150–400)
POTASSIUM SERPL-MCNC: 4.3 MMOL/L — SIGNIFICANT CHANGE UP (ref 3.5–5.3)
POTASSIUM SERPL-MCNC: 4.5 MMOL/L — SIGNIFICANT CHANGE UP (ref 3.5–5.3)
POTASSIUM SERPL-MCNC: 4.5 MMOL/L — SIGNIFICANT CHANGE UP (ref 3.5–5.3)
POTASSIUM SERPL-SCNC: 4.3 MMOL/L — SIGNIFICANT CHANGE UP (ref 3.5–5.3)
POTASSIUM SERPL-SCNC: 4.5 MMOL/L — SIGNIFICANT CHANGE UP (ref 3.5–5.3)
POTASSIUM SERPL-SCNC: 4.5 MMOL/L — SIGNIFICANT CHANGE UP (ref 3.5–5.3)
PROT SERPL-MCNC: 5.9 G/DL — LOW (ref 6.6–8.7)
PROT SERPL-MCNC: 6 G/DL — LOW (ref 6.6–8.7)
PROT SERPL-MCNC: 6.4 G/DL — LOW (ref 6.6–8.7)
PROT UR-MCNC: SIGNIFICANT CHANGE UP MG/DL
PROTHROM AB SERPL-ACNC: 13 SEC — SIGNIFICANT CHANGE UP (ref 9.9–13.4)
RBC # BLD: 3.31 M/UL — LOW (ref 4.2–5.8)
RBC # BLD: 3.41 M/UL — LOW (ref 4.2–5.8)
RBC # BLD: 3.44 M/UL — LOW (ref 4.2–5.8)
RBC # BLD: 3.46 M/UL — LOW (ref 4.2–5.8)
RBC # BLD: 3.81 M/UL — LOW (ref 4.2–5.8)
RBC # FLD: 15.3 % — HIGH (ref 10.3–14.5)
RBC # FLD: 15.4 % — HIGH (ref 10.3–14.5)
RBC # FLD: 15.4 % — HIGH (ref 10.3–14.5)
RBC # FLD: 15.5 % — HIGH (ref 10.3–14.5)
RBC # FLD: 15.5 % — HIGH (ref 10.3–14.5)
RBC CASTS # UR COMP ASSIST: 4 /HPF — SIGNIFICANT CHANGE UP (ref 0–4)
RSV RNA NPH QL NAA+NON-PROBE: SIGNIFICANT CHANGE UP
S AUREUS DNA NOSE QL NAA+PROBE: DETECTED
SARS-COV-2 RNA SPEC QL NAA+PROBE: SIGNIFICANT CHANGE UP
SODIUM SERPL-SCNC: 138 MMOL/L — SIGNIFICANT CHANGE UP (ref 135–145)
SODIUM SERPL-SCNC: 139 MMOL/L — SIGNIFICANT CHANGE UP (ref 135–145)
SODIUM SERPL-SCNC: 141 MMOL/L — SIGNIFICANT CHANGE UP (ref 135–145)
SODIUM SERPL-SCNC: 143 MMOL/L — SIGNIFICANT CHANGE UP (ref 135–145)
SODIUM SERPL-SCNC: 143 MMOL/L — SIGNIFICANT CHANGE UP (ref 135–145)
SP GR SPEC: 1.02 — SIGNIFICANT CHANGE UP (ref 1–1.03)
SPECIMEN SOURCE: SIGNIFICANT CHANGE UP
SQUAMOUS # UR AUTO: 1 /HPF — SIGNIFICANT CHANGE UP (ref 0–5)
T3 SERPL-MCNC: 46 NG/DL — LOW (ref 80–200)
T3 SERPL-MCNC: 46 NG/DL — LOW (ref 80–200)
T3 SERPL-MCNC: <40 NG/DL — LOW (ref 80–200)
T4 AB SER-ACNC: 3.9 UG/DL — LOW (ref 4.5–12)
T4 AB SER-ACNC: 4 UG/DL — LOW (ref 4.5–12)
T4 AB SER-ACNC: 4.7 UG/DL — SIGNIFICANT CHANGE UP (ref 4.5–12)
T4 FREE SERPL-MCNC: 0.8 NG/DL — LOW (ref 0.9–1.7)
TRIGL SERPL-MCNC: 119 MG/DL — SIGNIFICANT CHANGE UP
TSH SERPL-MCNC: 13.99 UIU/ML — HIGH (ref 0.27–4.2)
TSH SERPL-MCNC: 44.42 UIU/ML — HIGH (ref 0.27–4.2)
TSH SERPL-MCNC: 47.17 UIU/ML — HIGH (ref 0.27–4.2)
UROBILINOGEN FLD QL: 0.2 MG/DL — SIGNIFICANT CHANGE UP (ref 0.2–1)
WBC # BLD: 8.11 K/UL — SIGNIFICANT CHANGE UP (ref 3.8–10.5)
WBC # BLD: 8.5 K/UL — SIGNIFICANT CHANGE UP (ref 3.8–10.5)
WBC # BLD: 8.7 K/UL — SIGNIFICANT CHANGE UP (ref 3.8–10.5)
WBC # BLD: 9.15 K/UL — SIGNIFICANT CHANGE UP (ref 3.8–10.5)
WBC # BLD: 9.4 K/UL — SIGNIFICANT CHANGE UP (ref 3.8–10.5)
WBC # FLD AUTO: 8.11 K/UL — SIGNIFICANT CHANGE UP (ref 3.8–10.5)
WBC # FLD AUTO: 8.5 K/UL — SIGNIFICANT CHANGE UP (ref 3.8–10.5)
WBC # FLD AUTO: 8.7 K/UL — SIGNIFICANT CHANGE UP (ref 3.8–10.5)
WBC # FLD AUTO: 9.15 K/UL — SIGNIFICANT CHANGE UP (ref 3.8–10.5)
WBC # FLD AUTO: 9.4 K/UL — SIGNIFICANT CHANGE UP (ref 3.8–10.5)
WBC UR QL: 71 /HPF — HIGH (ref 0–5)

## 2025-01-01 PROCEDURE — 96374 THER/PROPH/DIAG INJ IV PUSH: CPT

## 2025-01-01 PROCEDURE — 86901 BLOOD TYPING SEROLOGIC RH(D): CPT

## 2025-01-01 PROCEDURE — 99223 1ST HOSP IP/OBS HIGH 75: CPT

## 2025-01-01 PROCEDURE — 84443 ASSAY THYROID STIM HORMONE: CPT

## 2025-01-01 PROCEDURE — 87641 MR-STAPH DNA AMP PROBE: CPT

## 2025-01-01 PROCEDURE — 80048 BASIC METABOLIC PNL TOTAL CA: CPT

## 2025-01-01 PROCEDURE — 87086 URINE CULTURE/COLONY COUNT: CPT

## 2025-01-01 PROCEDURE — 85025 COMPLETE CBC W/AUTO DIFF WBC: CPT

## 2025-01-01 PROCEDURE — 84436 ASSAY OF TOTAL THYROXINE: CPT

## 2025-01-01 PROCEDURE — 87640 STAPH A DNA AMP PROBE: CPT

## 2025-01-01 PROCEDURE — 83735 ASSAY OF MAGNESIUM: CPT

## 2025-01-01 PROCEDURE — 99233 SBSQ HOSP IP/OBS HIGH 50: CPT

## 2025-01-01 PROCEDURE — 99215 OFFICE O/P EST HI 40 MIN: CPT

## 2025-01-01 PROCEDURE — 85027 COMPLETE CBC AUTOMATED: CPT

## 2025-01-01 PROCEDURE — 82550 ASSAY OF CK (CPK): CPT

## 2025-01-01 PROCEDURE — 99212 OFFICE O/P EST SF 10 MIN: CPT

## 2025-01-01 PROCEDURE — 84480 ASSAY TRIIODOTHYRONINE (T3): CPT

## 2025-01-01 PROCEDURE — 87637 SARSCOV2&INF A&B&RSV AMP PRB: CPT

## 2025-01-01 PROCEDURE — 71045 X-RAY EXAM CHEST 1 VIEW: CPT

## 2025-01-01 PROCEDURE — 99284 EMERGENCY DEPT VISIT MOD MDM: CPT

## 2025-01-01 PROCEDURE — 99285 EMERGENCY DEPT VISIT HI MDM: CPT

## 2025-01-01 PROCEDURE — 99285 EMERGENCY DEPT VISIT HI MDM: CPT | Mod: 25

## 2025-01-01 PROCEDURE — 80061 LIPID PANEL: CPT

## 2025-01-01 PROCEDURE — 81001 URINALYSIS AUTO W/SCOPE: CPT

## 2025-01-01 PROCEDURE — 85730 THROMBOPLASTIN TIME PARTIAL: CPT

## 2025-01-01 PROCEDURE — 36415 COLL VENOUS BLD VENIPUNCTURE: CPT

## 2025-01-01 PROCEDURE — 97116 GAIT TRAINING THERAPY: CPT

## 2025-01-01 PROCEDURE — 93010 ELECTROCARDIOGRAM REPORT: CPT

## 2025-01-01 PROCEDURE — 70450 CT HEAD/BRAIN W/O DYE: CPT | Mod: 26

## 2025-01-01 PROCEDURE — 93005 ELECTROCARDIOGRAM TRACING: CPT

## 2025-01-01 PROCEDURE — 85610 PROTHROMBIN TIME: CPT

## 2025-01-01 PROCEDURE — 86900 BLOOD TYPING SEROLOGIC ABO: CPT

## 2025-01-01 PROCEDURE — 97530 THERAPEUTIC ACTIVITIES: CPT

## 2025-01-01 PROCEDURE — 84439 ASSAY OF FREE THYROXINE: CPT

## 2025-01-01 PROCEDURE — 70450 CT HEAD/BRAIN W/O DYE: CPT | Mod: MC

## 2025-01-01 PROCEDURE — 71045 X-RAY EXAM CHEST 1 VIEW: CPT | Mod: 26

## 2025-01-01 PROCEDURE — G0463: CPT

## 2025-01-01 PROCEDURE — 83036 HEMOGLOBIN GLYCOSYLATED A1C: CPT

## 2025-01-01 PROCEDURE — 99213 OFFICE O/P EST LOW 20 MIN: CPT | Mod: 93

## 2025-01-01 PROCEDURE — 80053 COMPREHEN METABOLIC PANEL: CPT

## 2025-01-01 PROCEDURE — 82962 GLUCOSE BLOOD TEST: CPT

## 2025-01-01 PROCEDURE — 86850 RBC ANTIBODY SCREEN: CPT

## 2025-01-01 PROCEDURE — 96375 TX/PRO/DX INJ NEW DRUG ADDON: CPT

## 2025-01-01 PROCEDURE — 99239 HOSP IP/OBS DSCHRG MGMT >30: CPT

## 2025-01-01 RX ORDER — FOLIC ACID 1 MG/1
1 TABLET ORAL
Refills: 0 | DISCHARGE

## 2025-01-01 RX ORDER — SODIUM CHLORIDE 9 MG/ML
1000 INJECTION, SOLUTION INTRAVENOUS ONCE
Refills: 0 | Status: COMPLETED | OUTPATIENT
Start: 2025-01-01 | End: 2025-01-01

## 2025-01-01 RX ORDER — ACETAMINOPHEN, DIPHENHYDRAMINE HCL, PHENYLEPHRINE HCL 325; 25; 5 MG/1; MG/1; MG/1
3 TABLET ORAL AT BEDTIME
Refills: 0 | Status: DISCONTINUED | OUTPATIENT
Start: 2025-01-01 | End: 2025-01-01

## 2025-01-01 RX ORDER — SODIUM CHLORIDE 9 MG/ML
1000 INJECTION, SOLUTION INTRAMUSCULAR; INTRAVENOUS; SUBCUTANEOUS ONCE
Refills: 0 | Status: DISCONTINUED | OUTPATIENT
Start: 2025-01-01 | End: 2025-01-01

## 2025-01-01 RX ORDER — LOPERAMIDE HCL 1 MG/7.5ML
1 SOLUTION ORAL
Qty: 0 | Refills: 0 | DISCHARGE

## 2025-01-01 RX ORDER — APIXABAN 5 MG/1
5 TABLET, FILM COATED ORAL ONCE
Refills: 0 | Status: COMPLETED | OUTPATIENT
Start: 2025-01-01 | End: 2025-01-01

## 2025-01-01 RX ORDER — ISOSORBIDE MONONITRATE 60 MG/1
1 TABLET, EXTENDED RELEASE ORAL
Refills: 0 | DISCHARGE

## 2025-01-01 RX ORDER — LEVOTHYROXINE SODIUM 25 UG/1
100 TABLET ORAL ONCE
Refills: 0 | Status: COMPLETED | OUTPATIENT
Start: 2025-01-01 | End: 2025-01-01

## 2025-01-01 RX ORDER — DULOXETINE 20 MG/1
1 CAPSULE, DELAYED RELEASE ORAL
Refills: 0 | DISCHARGE

## 2025-01-01 RX ORDER — PROCHLORPERAZINE MALEATE 5 MG/1
10 TABLET ORAL EVERY 6 HOURS
Refills: 0 | Status: DISCONTINUED | OUTPATIENT
Start: 2025-01-01 | End: 2025-01-01

## 2025-01-01 RX ORDER — LEVOTHYROXINE SODIUM 25 UG/1
175 TABLET ORAL DAILY
Refills: 0 | Status: DISCONTINUED | OUTPATIENT
Start: 2025-01-01 | End: 2025-01-01

## 2025-01-01 RX ORDER — MORPHINE SULFATE 60 MG/1
1 TABLET, FILM COATED, EXTENDED RELEASE ORAL
Refills: 0 | DISCHARGE

## 2025-01-01 RX ORDER — MAGNESIUM, ALUMINUM HYDROXIDE 200-225/5
30 SUSPENSION, ORAL (FINAL DOSE FORM) ORAL EVERY 4 HOURS
Refills: 0 | Status: DISCONTINUED | OUTPATIENT
Start: 2025-01-01 | End: 2025-01-01

## 2025-01-01 RX ORDER — MUPIROCIN 2 G/100G
1 CREAM TOPICAL
Qty: 1 | Refills: 0
Start: 2025-01-01

## 2025-01-01 RX ORDER — BACTERIOSTATIC SODIUM CHLORIDE 0.9 %
1000 VIAL (ML) INJECTION
Refills: 0 | Status: DISCONTINUED | OUTPATIENT
Start: 2025-01-01 | End: 2025-01-01

## 2025-01-01 RX ORDER — SULFASALAZINE 500 MG
500 TABLET ORAL
Refills: 0 | Status: DISCONTINUED | OUTPATIENT
Start: 2025-01-01 | End: 2025-01-01

## 2025-01-01 RX ORDER — RAMIPRIL 2.5 MG/1
2 CAPSULE ORAL
Refills: 0 | DISCHARGE

## 2025-01-01 RX ORDER — GABAPENTIN 800 MG/1
1 TABLET ORAL
Refills: 0 | DISCHARGE

## 2025-01-01 RX ORDER — LISINOPRIL 30 MG/1
40 TABLET ORAL DAILY
Refills: 0 | Status: ACTIVE | OUTPATIENT
Start: 2025-01-01 | End: 2025-12-24

## 2025-01-01 RX ORDER — LEVOTHYROXINE SODIUM 25 UG/1
100 TABLET ORAL ONCE
Refills: 0 | Status: DISCONTINUED | OUTPATIENT
Start: 2025-01-01 | End: 2025-01-01

## 2025-01-01 RX ORDER — PANTOPRAZOLE 20 MG/1
1 TABLET, DELAYED RELEASE ORAL
Refills: 0 | DISCHARGE

## 2025-01-01 RX ORDER — FOLIC ACID 1 MG
1 TABLET ORAL DAILY
Refills: 0 | Status: DISCONTINUED | OUTPATIENT
Start: 2025-01-01 | End: 2025-01-01

## 2025-01-01 RX ORDER — GABAPENTIN 400 MG/1
1 CAPSULE ORAL
Refills: 0 | DISCHARGE

## 2025-01-01 RX ORDER — ASPIRIN 81 MG
324 TABLET, DELAYED RELEASE (ENTERIC COATED) ORAL ONCE
Refills: 0 | Status: COMPLETED | OUTPATIENT
Start: 2025-01-01 | End: 2025-01-01

## 2025-01-01 RX ORDER — CYANOCOBALAMIN (VITAMIN B-12) 1000MCG/ML
1 VIAL (ML) INJECTION
Refills: 0 | DISCHARGE

## 2025-01-01 RX ORDER — LEVOTHYROXINE SODIUM 25 UG/1
1 TABLET ORAL
Refills: 0 | DISCHARGE

## 2025-01-01 RX ORDER — ATORVASTATIN CALCIUM 40 MG/1
40 TABLET, FILM COATED ORAL AT BEDTIME
Refills: 0 | Status: ACTIVE | OUTPATIENT
Start: 2025-01-01 | End: 2025-12-24

## 2025-01-01 RX ORDER — AMLODIPINE BESYLATE 5 MG
2.5 TABLET ORAL DAILY
Refills: 0 | Status: DISCONTINUED | OUTPATIENT
Start: 2025-01-01 | End: 2025-01-01

## 2025-01-01 RX ORDER — PROCHLORPERAZINE 25 MG
1 SUPPOSITORY, RECTAL RECTAL
Refills: 0 | DISCHARGE

## 2025-01-01 RX ORDER — PILOCARPINE HYDROCHLORIDE 5 MG/1
1 TABLET, FILM COATED ORAL
Refills: 0 | DISCHARGE

## 2025-01-01 RX ORDER — SULFASALAZINE 500 MG/1
2 TABLET ORAL
Refills: 0 | DISCHARGE

## 2025-01-01 RX ORDER — RAMIPRIL 2.5 MG/1
0 CAPSULE ORAL
Refills: 0 | DISCHARGE

## 2025-01-01 RX ORDER — AMLODIPINE BESYLATE 5 MG
1 TABLET ORAL
Refills: 0 | DISCHARGE

## 2025-01-01 RX ORDER — LEVOTHYROXINE SODIUM 25 UG/1
1 TABLET ORAL
Qty: 30 | Refills: 0
Start: 2025-01-01 | End: 2025-02-28

## 2025-01-01 RX ORDER — DULOXETINE 20 MG/1
20 CAPSULE, DELAYED RELEASE ORAL DAILY
Refills: 0 | Status: DISCONTINUED | OUTPATIENT
Start: 2025-01-01 | End: 2025-01-01

## 2025-01-01 RX ORDER — ERGOCALCIFEROL 1.25 MG/1
1 CAPSULE ORAL
Refills: 0 | DISCHARGE

## 2025-01-01 RX ORDER — OXYCODONE HYDROCHLORIDE 30 MG/1
1 TABLET ORAL
Refills: 0 | DISCHARGE

## 2025-01-01 RX ORDER — FLUOROURACIL 5 MG/G
1 CREAM TOPICAL
Refills: 0 | DISCHARGE

## 2025-01-01 RX ORDER — GABAPENTIN 800 MG/1
600 TABLET ORAL
Refills: 0 | Status: DISCONTINUED | OUTPATIENT
Start: 2025-01-01 | End: 2025-01-01

## 2025-01-01 RX ORDER — APIXABAN 5 MG/1
5 TABLET, FILM COATED ORAL EVERY 12 HOURS
Refills: 0 | Status: DISCONTINUED | OUTPATIENT
Start: 2025-01-01 | End: 2025-01-01

## 2025-01-01 RX ORDER — ONDANSETRON 4 MG/1
4 TABLET, ORALLY DISINTEGRATING ORAL EVERY 8 HOURS
Refills: 0 | Status: DISCONTINUED | OUTPATIENT
Start: 2025-01-01 | End: 2025-01-01

## 2025-01-01 RX ORDER — MORPHINE SULFATE 60 MG/1
30 TABLET, FILM COATED, EXTENDED RELEASE ORAL DAILY
Refills: 0 | Status: DISCONTINUED | OUTPATIENT
Start: 2025-01-01 | End: 2025-01-01

## 2025-01-01 RX ORDER — ACETAMINOPHEN 160 MG/5ML
650 SUSPENSION ORAL EVERY 6 HOURS
Refills: 0 | Status: DISCONTINUED | OUTPATIENT
Start: 2025-01-01 | End: 2025-01-01

## 2025-01-01 RX ORDER — ATORVASTATIN CALCIUM 80 MG/1
1 TABLET, FILM COATED ORAL
Refills: 0 | DISCHARGE

## 2025-01-01 RX ORDER — ATORVASTATIN CALCIUM 80 MG/1
40 TABLET, FILM COATED ORAL AT BEDTIME
Refills: 0 | Status: DISCONTINUED | OUTPATIENT
Start: 2025-01-01 | End: 2025-01-01

## 2025-01-01 RX ORDER — PROCHLORPERAZINE MALEATE 5 MG/1
1 TABLET ORAL
Refills: 0 | DISCHARGE

## 2025-01-01 RX ORDER — CYANOCOBALAMIN (VITAMIN B-12) 1000MCG/ML
1000 VIAL (ML) INJECTION DAILY
Refills: 0 | Status: DISCONTINUED | OUTPATIENT
Start: 2025-01-01 | End: 2025-01-01

## 2025-01-01 RX ADMIN — Medication 30 MILLIGRAM(S): at 14:16

## 2025-01-01 RX ADMIN — Medication 75 MILLILITER(S): at 17:30

## 2025-01-01 RX ADMIN — APIXABAN 5 MILLIGRAM(S): 5 TABLET, FILM COATED ORAL at 17:17

## 2025-01-01 RX ADMIN — ATORVASTATIN CALCIUM 40 MILLIGRAM(S): 80 TABLET, FILM COATED ORAL at 21:46

## 2025-01-01 RX ADMIN — APIXABAN 5 MILLIGRAM(S): 5 TABLET, FILM COATED ORAL at 06:15

## 2025-01-01 RX ADMIN — MORPHINE SULFATE 30 MILLIGRAM(S): 60 TABLET, FILM COATED, EXTENDED RELEASE ORAL at 11:34

## 2025-01-01 RX ADMIN — Medication 1000 MICROGRAM(S): at 12:18

## 2025-01-01 RX ADMIN — APIXABAN 5 MILLIGRAM(S): 5 TABLET, FILM COATED ORAL at 05:42

## 2025-01-01 RX ADMIN — PROCHLORPERAZINE MALEATE 10 MILLIGRAM(S): 5 TABLET ORAL at 05:41

## 2025-01-01 RX ADMIN — GABAPENTIN 600 MILLIGRAM(S): 800 TABLET ORAL at 05:42

## 2025-01-01 RX ADMIN — Medication 500 MILLIGRAM(S): at 17:17

## 2025-01-01 RX ADMIN — Medication 40 MILLIGRAM(S): at 11:47

## 2025-01-01 RX ADMIN — LEVOTHYROXINE SODIUM 175 MICROGRAM(S): 25 TABLET ORAL at 05:42

## 2025-01-01 RX ADMIN — DULOXETINE 20 MILLIGRAM(S): 20 CAPSULE, DELAYED RELEASE ORAL at 11:34

## 2025-01-01 RX ADMIN — Medication 30 MILLIGRAM(S): at 12:18

## 2025-01-01 RX ADMIN — APIXABAN 5 MILLIGRAM(S): 5 TABLET, FILM COATED ORAL at 17:26

## 2025-01-01 RX ADMIN — Medication 30 MILLIGRAM(S): at 11:34

## 2025-01-01 RX ADMIN — Medication 1000 MICROGRAM(S): at 11:34

## 2025-01-01 RX ADMIN — PROCHLORPERAZINE MALEATE 10 MILLIGRAM(S): 5 TABLET ORAL at 06:16

## 2025-01-01 RX ADMIN — SODIUM CHLORIDE 1000 MILLILITER(S): 9 INJECTION, SOLUTION INTRAVENOUS at 22:47

## 2025-01-01 RX ADMIN — LEVOTHYROXINE SODIUM 100 MICROGRAM(S): 25 TABLET ORAL at 17:20

## 2025-01-01 RX ADMIN — Medication 50 MILLIGRAM(S): at 17:26

## 2025-01-01 RX ADMIN — Medication 2.5 MILLIGRAM(S): at 11:49

## 2025-01-01 RX ADMIN — ATORVASTATIN CALCIUM 40 MILLIGRAM(S): 80 TABLET, FILM COATED ORAL at 21:17

## 2025-01-01 RX ADMIN — PROCHLORPERAZINE MALEATE 10 MILLIGRAM(S): 5 TABLET ORAL at 14:16

## 2025-01-01 RX ADMIN — Medication 500 MILLIGRAM(S): at 06:16

## 2025-01-01 RX ADMIN — GABAPENTIN 600 MILLIGRAM(S): 800 TABLET ORAL at 06:15

## 2025-01-01 RX ADMIN — Medication 40 MILLIGRAM(S): at 05:42

## 2025-01-01 RX ADMIN — Medication 40 MILLIGRAM(S): at 06:16

## 2025-01-01 RX ADMIN — MORPHINE SULFATE 30 MILLIGRAM(S): 60 TABLET, FILM COATED, EXTENDED RELEASE ORAL at 12:18

## 2025-01-01 RX ADMIN — PROCHLORPERAZINE MALEATE 10 MILLIGRAM(S): 5 TABLET ORAL at 00:17

## 2025-01-01 RX ADMIN — PROCHLORPERAZINE MALEATE 10 MILLIGRAM(S): 5 TABLET ORAL at 17:25

## 2025-01-01 RX ADMIN — Medication 1 MILLIGRAM(S): at 11:47

## 2025-01-01 RX ADMIN — PROCHLORPERAZINE MALEATE 10 MILLIGRAM(S): 5 TABLET ORAL at 12:18

## 2025-01-01 RX ADMIN — GABAPENTIN 600 MILLIGRAM(S): 800 TABLET ORAL at 17:17

## 2025-01-01 RX ADMIN — MORPHINE SULFATE 30 MILLIGRAM(S): 60 TABLET, FILM COATED, EXTENDED RELEASE ORAL at 11:49

## 2025-01-01 RX ADMIN — PROCHLORPERAZINE MALEATE 10 MILLIGRAM(S): 5 TABLET ORAL at 22:05

## 2025-01-01 RX ADMIN — LEVOTHYROXINE SODIUM 175 MICROGRAM(S): 25 TABLET ORAL at 06:16

## 2025-01-01 RX ADMIN — LEVOTHYROXINE SODIUM 100 MICROGRAM(S): 25 TABLET ORAL at 18:41

## 2025-01-01 RX ADMIN — Medication 2.5 MILLIGRAM(S): at 06:16

## 2025-01-01 RX ADMIN — Medication 2.5 MILLIGRAM(S): at 05:41

## 2025-01-01 RX ADMIN — GABAPENTIN 600 MILLIGRAM(S): 800 TABLET ORAL at 17:26

## 2025-01-01 RX ADMIN — Medication 1000 MICROGRAM(S): at 11:46

## 2025-01-01 RX ADMIN — DULOXETINE 20 MILLIGRAM(S): 20 CAPSULE, DELAYED RELEASE ORAL at 12:18

## 2025-01-01 RX ADMIN — DULOXETINE 20 MILLIGRAM(S): 20 CAPSULE, DELAYED RELEASE ORAL at 14:15

## 2025-01-01 RX ADMIN — Medication 500 MILLIGRAM(S): at 05:40

## 2025-01-01 RX ADMIN — Medication 500 MILLIGRAM(S): at 17:25

## 2025-01-01 RX ADMIN — PROCHLORPERAZINE MALEATE 10 MILLIGRAM(S): 5 TABLET ORAL at 11:34

## 2025-01-01 RX ADMIN — SODIUM CHLORIDE 2000 MILLILITER(S): 9 INJECTION, SOLUTION INTRAVENOUS at 20:42

## 2025-01-01 RX ADMIN — PROCHLORPERAZINE MALEATE 10 MILLIGRAM(S): 5 TABLET ORAL at 17:17

## 2025-01-01 RX ADMIN — Medication 1 MILLIGRAM(S): at 11:34

## 2025-01-01 RX ADMIN — Medication 75 MILLILITER(S): at 11:45

## 2025-01-01 RX ADMIN — MORPHINE SULFATE 30 MILLIGRAM(S): 60 TABLET, FILM COATED, EXTENDED RELEASE ORAL at 12:49

## 2025-01-01 RX ADMIN — Medication 1 MILLIGRAM(S): at 12:18

## 2025-01-06 ENCOUNTER — APPOINTMENT (OUTPATIENT)
Dept: NEUROLOGY | Facility: CLINIC | Age: 80
End: 2025-01-06
Payer: MEDICARE

## 2025-01-06 VITALS
OXYGEN SATURATION: 100 % | HEIGHT: 71 IN | WEIGHT: 175 LBS | SYSTOLIC BLOOD PRESSURE: 123 MMHG | DIASTOLIC BLOOD PRESSURE: 70 MMHG | HEART RATE: 88 BPM | BODY MASS INDEX: 24.5 KG/M2

## 2025-01-06 PROCEDURE — G2211 COMPLEX E/M VISIT ADD ON: CPT

## 2025-01-06 PROCEDURE — 99214 OFFICE O/P EST MOD 30 MIN: CPT

## 2025-01-07 ENCOUNTER — APPOINTMENT (OUTPATIENT)
Dept: GASTROENTEROLOGY | Facility: GI CENTER | Age: 80
End: 2025-01-07

## 2025-01-08 ENCOUNTER — NON-APPOINTMENT (OUTPATIENT)
Age: 80
End: 2025-01-08

## 2025-01-08 ENCOUNTER — APPOINTMENT (OUTPATIENT)
Dept: GASTROENTEROLOGY | Facility: CLINIC | Age: 80
End: 2025-01-08
Payer: MEDICARE

## 2025-01-08 VITALS — DIASTOLIC BLOOD PRESSURE: 60 MMHG | OXYGEN SATURATION: 97 % | SYSTOLIC BLOOD PRESSURE: 100 MMHG | HEART RATE: 67 BPM

## 2025-01-08 DIAGNOSIS — Z85.828 PERSONAL HISTORY OF OTHER MALIGNANT NEOPLASM OF SKIN: ICD-10-CM

## 2025-01-08 DIAGNOSIS — Z82.49 FAMILY HISTORY OF ISCHEMIC HEART DISEASE AND OTHER DISEASES OF THE CIRCULATORY SYSTEM: ICD-10-CM

## 2025-01-08 DIAGNOSIS — Z83.3 FAMILY HISTORY OF DIABETES MELLITUS: ICD-10-CM

## 2025-01-08 DIAGNOSIS — F17.290 NICOTINE DEPENDENCE, OTHER TOBACCO PRODUCT, UNCOMPLICATED: ICD-10-CM

## 2025-01-08 DIAGNOSIS — Z87.09 PERSONAL HISTORY OF OTHER DISEASES OF THE RESPIRATORY SYSTEM: ICD-10-CM

## 2025-01-08 DIAGNOSIS — A04.72 ENTEROCOLITIS DUE TO CLOSTRIDIUM DIFFICILE, NOT SPECIFIED AS RECURRENT: ICD-10-CM

## 2025-01-08 DIAGNOSIS — Z86.79 PERSONAL HISTORY OF OTHER DISEASES OF THE CIRCULATORY SYSTEM: ICD-10-CM

## 2025-01-08 DIAGNOSIS — Z87.11 PERSONAL HISTORY OF PEPTIC ULCER DISEASE: ICD-10-CM

## 2025-01-08 DIAGNOSIS — Z86.73 PERSONAL HISTORY OF TRANSIENT ISCHEMIC ATTACK (TIA), AND CEREBRAL INFARCTION W/OUT RESIDUAL DEFICITS: ICD-10-CM

## 2025-01-08 DIAGNOSIS — C80.1 MALIGNANT (PRIMARY) NEOPLASM, UNSPECIFIED: ICD-10-CM

## 2025-01-08 PROCEDURE — 99213 OFFICE O/P EST LOW 20 MIN: CPT

## 2025-01-09 ENCOUNTER — APPOINTMENT (OUTPATIENT)
Dept: CARDIOLOGY | Facility: CLINIC | Age: 80
End: 2025-01-09
Payer: COMMERCIAL

## 2025-01-09 VITALS
HEART RATE: 68 BPM | DIASTOLIC BLOOD PRESSURE: 60 MMHG | SYSTOLIC BLOOD PRESSURE: 104 MMHG | HEIGHT: 71 IN | OXYGEN SATURATION: 98 %

## 2025-01-09 DIAGNOSIS — Z86.73 PERSONAL HISTORY OF TRANSIENT ISCHEMIC ATTACK (TIA), AND CEREBRAL INFARCTION W/OUT RESIDUAL DEFICITS: ICD-10-CM

## 2025-01-09 DIAGNOSIS — I25.10 ATHEROSCLEROTIC HEART DISEASE OF NATIVE CORONARY ARTERY W/OUT ANGINA PECTORIS: ICD-10-CM

## 2025-01-09 DIAGNOSIS — E78.5 HYPERLIPIDEMIA, UNSPECIFIED: ICD-10-CM

## 2025-01-09 DIAGNOSIS — I10 ESSENTIAL (PRIMARY) HYPERTENSION: ICD-10-CM

## 2025-01-09 PROCEDURE — 99204 OFFICE O/P NEW MOD 45 MIN: CPT | Mod: 25

## 2025-01-09 PROCEDURE — 93000 ELECTROCARDIOGRAM COMPLETE: CPT

## 2025-01-10 ENCOUNTER — APPOINTMENT (OUTPATIENT)
Age: 80
End: 2025-01-10

## 2025-01-10 NOTE — ED ADULT TRIAGE NOTE - NSWEIGHTCALCTOOLDRUG_GEN_A_CORE
Please know that your hCG level has increased and appears to be on track for a viable pregnancy.  2 weeks ago you are at 9705.00 and today you are at 43,091.00.  Additionally the development between your first ultrasound and second ultrasound is progressing indicating a viable pregnancy.  I however strongly encouraged to have the level again reevaluated in 2 to 3 days.  Since you have passed a clot today you should have this level reevaluated to confirm that it is still continuing as it should.  If however you start to saturate a pad every hour, if you develop an abnormal discharge, severe abdominal pain, a fever, or severe nausea, vomiting, or diarrhea please return to the ER immediately.  Otherwise follow-up with your OB/GYN as we discussed during your ER visit    used

## 2025-01-14 ENCOUNTER — APPOINTMENT (OUTPATIENT)
Dept: CARDIOLOGY | Facility: CLINIC | Age: 80
End: 2025-01-14

## 2025-01-16 ENCOUNTER — APPOINTMENT (OUTPATIENT)
Dept: MRI IMAGING | Facility: CLINIC | Age: 80
End: 2025-01-16
Payer: MEDICARE

## 2025-01-16 ENCOUNTER — APPOINTMENT (OUTPATIENT)
Dept: CT IMAGING | Facility: CLINIC | Age: 80
End: 2025-01-16
Payer: MEDICARE

## 2025-01-16 PROCEDURE — 73720 MRI LWR EXTREMITY W/O&W/DYE: CPT | Mod: 26,LT

## 2025-01-16 PROCEDURE — 73700 CT LOWER EXTREMITY W/O DYE: CPT | Mod: 26,LT

## 2025-01-23 ENCOUNTER — APPOINTMENT (OUTPATIENT)
Dept: RHEUMATOLOGY | Facility: CLINIC | Age: 80
End: 2025-01-23

## 2025-01-24 ENCOUNTER — NON-APPOINTMENT (OUTPATIENT)
Age: 80
End: 2025-01-24

## 2025-01-24 NOTE — ED ADULT NURSE NOTE - EXTENSIONS OF SELF_ADULT
Patient states that she was at work today and wasn't doing anything and her heart started racing. States this has happened one other time before. Patient states that on her watch her HR was in the 140's for about 2 hours and that she experienced some dizzines. Patient states she doesn't have any chest pain but has mild SOB, states that she experiences some SOB while walking as well. Patient stopped taking a weight loss medication about 2 weeks ago because she didn't like the way it \"made her feel\".    None

## 2025-01-24 NOTE — ED ADULT NURSE NOTE - NSFALLASSESSNEED_ED_ALL_ED
Problem: Discharge Planning  Goal: Discharge to home or other facility with appropriate resources  Outcome: Progressing     Problem: Pain  Goal: Verbalizes/displays adequate comfort level or baseline comfort level  Outcome: Progressing     Problem: Confusion  Goal: Confusion, delirium, dementia, or psychosis is improved or at baseline  Description: INTERVENTIONS:  1. Assess for possible contributors to thought disturbance, including medications, impaired vision or hearing, underlying metabolic abnormalities, dehydration, psychiatric diagnoses, and notify attending LIP  2. [place] high risk fall precautions, as indicated  3. Provide frequent short contacts to provide reality reorientation, refocusing and direction  4. Decrease environmental stimuli, including noise as appropriate  5. Monitor and intervene to maintain adequate nutrition, hydration, elimination, sleep and activity  6. If unable to ensure safety without constant attention obtain sitter and review sitter guidelines with assigned personnel  7. Initiate Psychosocial CNS and Spiritual Care consult, as indicated  Outcome: Progressing     Problem: Nutrition Deficit:  Goal: Optimize nutritional status  Outcome: Not Progressing     Problem: Skin/Tissue Integrity  Goal: Absence of new skin breakdown  Description: 1. Monitor for areas of redness and/or skin breakdown  2. Assess vascular access sites hourly  3. Every 4-6 hours minimum:  Change oxygen saturation probe site  4. Every 4-6 hours:  If on nasal continuous positive airway pressure, respiratory therapy assess nares and determine need for appliance change or resting period.   Outcome: Progressing     Problem: Safety - Adult  Goal: Free from fall injury  2/15/2023 1537 by Ana Sams RN  Outcome: Progressing  2/15/2023 0856 by Moris Leonardo RCP  Outcome: Progressing     Problem: ABCDS Injury Assessment  Goal: Absence of physical injury  Outcome: Progressing     Problem: Nutrition Deficit:  Goal: Optimize nutritional status  Outcome: Not Progressing yes

## 2025-01-24 NOTE — ED PROVIDER NOTE - OBJECTIVE STATEMENT
79y Male hx CAD s/p stents, CVA on eliquis, parotid CA mets to lt femur, complaining of confusion. Patient was recently dc from short term rehab on 1/13/25, was there after one month hospital stay for possible TIA with confusion. Family states he was doing better in the short term rehab and has been gradually declining for the past week. States he is speaking slower, generalized weakness, dec PO intake and now using a wheelchair instead of walker. Patient recently started on macrobid 2 days ago for UTI from PCP, otherwise no other new medications. Denies diarrhea, vomiting, chest pain, SOB, fever, chills, dysuria, hematuria, melena, abdominal pain, vision changes, weakness or paresthesias. Denies any recent falls or injuries.

## 2025-01-24 NOTE — ED PROVIDER NOTE - CLINICAL SUMMARY MEDICAL DECISION MAKING FREE TEXT BOX
9y Male hx CAD s/p stents, CVA on eliquis, parotid CA mets to lt femur, complaining of confusion. Differentials include pneumonia v electrolyte abnormality v CVA. Plan for CT head, labs, fluids, ofirmev. 9y Male hx CAD s/p stents, CVA on eliquis, parotid CA mets to lt femur, complaining of confusion. Differentials include pneumonia v electrolyte abnormality v CVA. Plan for CT head, labs, fluids, ofirmev. CT negative. Labs show elevated TSH. patient has hx hypothyroidism, worsening TSH. Plan to f/u with pcp who fills levothyroxine. will also dc with endocrinologist.

## 2025-01-24 NOTE — ED PROVIDER NOTE - DISPOSITION TYPE
I spoke to patient yesterday. Info for cognitive biofeedback center [place] [employer] for help with ADD. Question form positive. Also, labs for cpx reviewed.   All the labs are within normal limits except 1 of the liver enzy
DISCHARGE

## 2025-01-24 NOTE — ED ADULT NURSE NOTE - OBJECTIVE STATEMENT
Received pt AOx1 c/o generalized weakness. Family states that he has been confused for 2xweeks but has been worsening. Family states he has an infection somewhere but unable to specify where. Denies urinary symptom, n/v/d. Hx of falls, as per family pt has unwitnessed fall yesterday. +Eliquis. Hx of parotid ca with mets to hip. Denies chemo at this moment. Respirations even & unlabored. NAD, safety maintained. Pt made aware of plan of care and verbalized understanding.

## 2025-01-24 NOTE — ED PROVIDER NOTE - NSFOLLOWUPINSTRUCTIONS_ED_ALL_ED_FT
You were evaluated in the emergency department for weakness. You were found to have worsening hypothyroidism. Other than that, your labs and imaging were normal. You were treated symptomatically. Please follow up with your doctor that manages your thyroid medication. An endocrinologist is referred for follow-up to better manage this. Please call to make an appointment.     SEEK IMMEDIATE MEDICAL CARE IF YOU HAVE ANY OF THE FOLLOWING SYMPTOMS: shortness of breath, chest pain, fever over 10 days, or lightheadedness/dizziness.

## 2025-01-24 NOTE — ED PROVIDER NOTE - PHYSICAL EXAMINATION
VITAL SIGNS: I have reviewed nursing notes and confirm.  CONSTITUTIONAL:  sitting comfortably, slow to answer questions, but answering questions appropriately.   SKIN: Skin exam is warm and dry, no acute rash.  HEAD: Normocephalic; atraumatic.  EYES: PERRL, EOM intact; conjunctiva and sclera clear.  ENT: No nasal discharge; airway clear. Throat clear. Mild swelling over rt parotid, baseline.   NECK: Supple; non tender.    CARD: Regular rate and rhythm.  RESP: No wheezes,  no rales or rhonchi.   ABD:  soft; non-distended; non-tender; no rebound or guarding.   EXT: Normal ROM. No clubbing, cyanosis or edema.  NEURO: Alert, oriented x2 to person and place. Grossly unremarkable. No focal deficits.  3/5 motor lt le. 4/5 motor rt le. sensation intact ble. CN 2-12 intact. no facial droop. negative pronator drift.   PSYCH: Cooperative, appropriate. no

## 2025-01-24 NOTE — ED PROVIDER NOTE - CARE PROVIDER_API CALL
Antonino Danielson  Endocrinology/Metab/Diabetes  [address] [place] [id number]  Phone: [phone]  Fax: [phone]  Follow Up Time:

## 2025-01-24 NOTE — ED ADULT TRIAGE NOTE - CHIEF COMPLAINT QUOTE
BIBEMS from home for diarrhea for the past week and generalized weakness. Denies N/V. +lower back pain. Hx of skin CA to face.

## 2025-01-24 NOTE — ED ADULT TRIAGE NOTE - MEANS OF ARRIVAL
Post-op instructions given. Discussed drops, positioning, no strenuous activity and eye protection. Call immediately if eye pain or loss of vision. stretcher

## 2025-01-24 NOTE — ED PROVIDER NOTE - ATTENDING CONTRIBUTION TO CARE
I performed a face to face bedside interview with patient regarding history of present illness, review of symptoms and past medical history. I completed an independent physical exam.  I have discussed patient's plan of care with resident.   I agree with note as stated above including HISTORY OF PRESENT ILLNESS, HIV, PAST MEDICAL/SURGICAL/FAMILY/SOCIAL HISTORY, ALLERGIES AND HOME MEDICATIONS, REVIEW OF SYSTEMS, PHYSICAL EXAM, MEDICAL DECISION MAKING and any PROGRESS NOTES during the time I functioned as the attending physician for this patient unless otherwise noted. My brief assessment is as follows: VITAL SIGNS: I have reviewed nursing notes and confirm.  CONSTITUTIONAL:  sitting comfortably, slow to answer questions, but answering questions appropriately.   SKIN: Skin exam is warm and dry, no acute rash.  HEAD: Normocephalic; atraumatic.  EYES: PERRL, EOM intact; conjunctiva and sclera clear.  ENT: No nasal discharge; airway clear. Throat clear. Mild swelling over rt parotid, baseline.   NECK: Supple; non tender.    CARD: Regular rate and rhythm.  RESP: No wheezes,  no rales or rhonchi.   ABD:  soft; non-distended; non-tender; no rebound or guarding.   EXT: Normal ROM. No clubbing, cyanosis or edema.  NEURO: Alert, oriented x2 to person and place. Grossly unremarkable. No focal deficits.  3/5 motor lt le. 4/5 motor rt le. sensation intact ble. CN 2-12 intact. no facial droop. negative pronator drift.   PSYCH: Cooperative, appropriate.

## 2025-01-24 NOTE — ED PROVIDER NOTE - PATIENT PORTAL LINK FT
You can access the FollowMyHealth Patient Portal offered by [employer] by registering at the following website: [email or web address]. By joining [employer]’s FollowMyHealth portal, you will also be able to view your health information using other applications (apps) compatible with our system.

## 2025-01-24 NOTE — ED ADULT NURSE NOTE - NSFALLRISKINTERV_ED_ALL_ED
Assistance OOB with selected safe patient handling equipment if applicable/Assistance with ambulation/Communicate fall risk and risk factors to all staff, patient, and family/Monitor gait and stability/Monitor for mental status changes and reorient to person, place, and time, as needed/Provide patient with walking aids/Provide visual cue: yellow wristband, yellow gown, etc/Reinforce activity limits and safety measures with patient and family/Toileting schedule using arm’s reach rule for commode and bathroom/Use of alarms - bed, stretcher, chair and/or video monitoring/Call bell, personal items and telephone in reach/Instruct patient to call for assistance before getting out of bed/chair/stretcher/Non-slip footwear applied when patient is off stretcher/[place] to call system/Physically safe environment - no spills, clutter or unnecessary equipment/Purposeful Proactive Rounding/Room/bathroom lighting operational, light cord in reach

## 2025-01-24 NOTE — ED PROVIDER NOTE - AVIAN FLU SYMPTOMS
Chief Complaint   Patient presents with   • Video Visit   • Cough   • Headache     This visit was performed via live interactive two-way video.    During their visit, the patient was informed that their consent to treat includes permission to submit claims to their insurance on their behalf for the services received.  Clinician Location: [facility] [address]    Patient Location: Home  30 minutes minutes were spend in this encounter.    Subjective   Patient ID: Marybel Dalton is a 58 year old female.      Cough and headache for one week. Dizzyness. Headache improved but cough still present. Cough brings up yellow phlegm.   No fever      Past Medical History:   Diagnosis Date   • No known problems        Current Outpatient Medications   Medication Sig   • cetirizine (ZYRTEC) 10 MG tablet TAKE 1 TABLET DAILY AS NEEDED for itchy symptoms   • azithromycin (ZITHROMAX) 250 MG tablet Take 1 tablet by mouth daily. 2 first day     No current facility-administered medications for this visit.       ALLERGIES:   Allergen Reactions   • Hydromorphone Nausea & Vomiting   • Morphine Other (See Comments)     Unknown       Past Surgical History:   Procedure Laterality Date   • Appendectomy     • Hysterectomy     • Tubal ligation         Family History   Problem Relation Age of Onset   • Thyroid Mother    • Patient is unaware of any medical problems Father        Social History     Tobacco Use   • Smoking status: Never Smoker   • Smokeless tobacco: Never Used   Vaping Use   • Vaping Use: never used   Substance Use Topics   • Alcohol use: Never   • Drug use: Never       Review of Systems   Constitutional: Negative for fever.   HENT: Negative for hearing loss.    Eyes: Negative for visual disturbance.   Respiratory: Negative for shortness of breath.    Cardiovascular: Negative for chest pain.   Gastrointestinal: Negative for abdominal pain and vomiting.   Genitourinary: Negative for difficulty urinating and dysuria.    Musculoskeletal: Negative for myalgias.   Neurological: Negative for tremors and seizures.   Psychiatric/Behavioral: Negative for confusion and hallucinations.       Objective   Physical Exam  Vitals and nursing note reviewed.   Constitutional:       Appearance: Normal appearance. She is well-developed.   HENT:      Right Ear: External ear normal.      Left Ear: External ear normal.      Nose: Nose normal.   Eyes:      Extraocular Movements: Extraocular movements intact.   Neck:      Thyroid: No thyromegaly.   Pulmonary:      Effort: Pulmonary effort is normal.      Breath sounds: Normal breath sounds.   Musculoskeletal:      Cervical back: Normal range of motion.   Neurological:      General: No focal deficit present.      Mental Status: She is alert and oriented to person, place, and time.      Cranial Nerves: No cranial nerve deficit.   Psychiatric:         Mood and Affect: Mood normal.         Behavior: Behavior normal.         ASSESSMENT/PLAN: antibiotics and OTC cough medicine.     Problem List Items Addressed This Visit     None      Visit Diagnoses     Acute bronchitis due to other specified organisms    -  Primary          Patient Instructions   Antibiotics and follow up with her own doctor      Return if symptoms worsen or fail to improve.      Viral Cam MD  12/27/2021         No

## 2025-01-24 NOTE — ED PROVIDER NOTE - PROGRESS NOTE DETAILS
Brenda: attempted to dc, wife states she is unable to care for him at night. patient only has daytime aides and is cared for by family every night normally, in the process of reapplying for night aide after being declined. explained there is nothing else to do in ED. patient reassessed and comfortable. no medical complaints. refused home meds x3. Brenda: patient's wife comfortable with plan for dc. states she will be home if ambulette takes him to their residence.

## 2025-01-24 NOTE — ED ADULT NURSE REASSESSMENT NOTE - NS ED NURSE REASSESS COMMENT FT1
Assumed care of patient at 2300 report received from off-going RN. Respirations even and unlabored, resting in ED stretcher, NAD. Wheels locked, bed in lowest position.

## 2025-01-25 NOTE — ED ADULT NURSE REASSESSMENT NOTE - NS ED NURSE REASSESS COMMENT FT1
Pt care assumed @ this time. Pt resting in bed. VSS. Resp even and unlabored. Awaiting ambulette at this time. Pt updated on plan of care and verbalizes understanding.

## 2025-01-27 NOTE — H&P PST ADULT - PROBLEM SELECTOR PLAN 7
Surgical team to address    Total Score [   14  ]    Caprini Score Greater than or =7: High risk, pharmocologic VTE prophylaxis indicated for most patients (in the absence of contraindications)

## 2025-01-27 NOTE — H&P PST ADULT - PROBLEM SELECTOR PLAN 4
Written instructions given on medical optimization packet for PCP to review. Labs printed for PCP visit.  .  *PENDING MEDICAL OPTIMIZATION PCP RASHAD WRIGHT [facility] [phone]   [phone]- **ADDRESS THE TSH and RECHECK UA**

## 2025-01-27 NOTE — H&P PST ADULT - PROBLEM SELECTOR PLAN 6
Reviewed neurology optimization note. "No absolute contraindication to surgical intervention from neurological standpoint"  NEURO OPTIMIZATION  *NOTE PRINTED IN CHART FROM [facility]

## 2025-01-27 NOTE — H&P PST ADULT - HISTORY OF PRESENT ILLNESS
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 Patient was recently dc from  short term rehab on 1/13/25, was there after one month hospital stay for  possible TIA with confusion. Family states he was doing better in the short  term rehab and has been gradually declining for the past week. States he is  speaking slower, generalized weakness, dec PO intake and now using a wheelchair  instead of walker. Patient recently started on macrobid 2 days ago for UTI from  PCP, otherwise no other new medications. Denies diarrhea, vomiting, chest pain,  SOB, fever, chills, dysuria, hematuria, melena, abdominal pain, vision changes,  weakness or paresthesias. Denies any recent falls or injuries. 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 Patient was recently dc from  short term rehab on 1/13/25, was there after one month hospital stay for  possible TIA with confusion. Family states he was doing better in the short  term rehab and has been gradually declining for the past week. States he is  speaking slower, generalized weakness, dec PO intake and now using a wheelchair  instead of walker. Patient recently started on macrobid 2 days ago for UTI from  PCP, otherwise no other new medications. Denies diarrhea, vomiting, chest pain,  SOB, fever, chills, dysuria, hematuria, melena, abdominal pain, vision changes,  weakness or paresthesias. Denies any recent falls or injuries. 78 y/o M seen in PST 1/27   in anticipation of scheduled LEFT HIP REMOVAL OF HARDWARE CONVERSION TOTAL HIP REPLACEMENT TO PROXIMAL FEMUR REPLACEMENT WITH BAKARI on 2/7/25  at [facility] w/DR MARLYS CASPER. 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 History per chart review and spouse Owen.  Spouse reports one year ago cancer metastasized to his left femur which required IM Nailing that has since loosened now requiring total hip replacement. She reports previously was ambulating with cane but is now requiring wheelchair.  He sees pain mgmt for the hip pain.  She reports he recently had an acute inpatient hospitalization.  He ended up with Cdiff diarrhea and weakness and was ultimately placed in short term rehab from around Nov 2024 until 2 weeks ago when he was discharged home on 1/13/25.  She reports since being home he's been progressively declining, more confused, speaking slower, decreased PO intake, forgetting how to use utensils.  His PCP placed him on Macrobid for UTI and reports 2 doses left.  Over this weekend she brought him [facility] ER 1/24 due to worsening cognitive decline. Stroke workup was negative.  UA not resulted from ER but TSH came back abnormal elevated ~30 when previous level in Nov 2024 was ~7. He does not have an endocrinologist and is unsure whom prescribes the Levothyroxine but the wife reports she thinks it may be the oncologist who put him on it. He is seeing PCP for f/u optimization on Wednesday 1/29.  *PENDING MEDICAL OPTIMIZATION PCP RASHAD WRIGHT [facility] [phone]   [phone]- **ADDRESS THE TSH and RECHECK UA**  CARDIO OPTIMIZATION   *NOTE PRINTED IN CHART FROM [facility]  NEURO OPTIMIZATION  *NOTE PRINTED IN CHART FROM [facility]    MRI LEFT FEMUR 1/16/25  IMPRESSION:  1.  Status post ORIF of the left femur with susceptibility artifact   limiting evaluation.  2.  Increased size of lytic destructive lesion in the left proximal femur   with extension to the adductor musculature. No additional lesion noted.  3.  Lipomatous lesion in the proximal sartorius muscle is unchanged.

## 2025-01-27 NOTE — H&P PST ADULT - MUSCULOSKELETAL
details… no joint swelling/no joint erythema/no joint warmth/no calf tenderness/decreased ROM due to pain/no strength/decreased strength

## 2025-01-27 NOTE — H&P PST ADULT - PROBLEM SELECTOR PLAN 3
Instructed LAST DOSE Ramipril on 2/6 in morning  *PENDING MEDICAL OPTIMIZATION PCP RASHAD WRIGHT [facility] [phone]   [phone]- **ADDRESS THE TSH and RECHECK UA**  CARDIO OPTIMIZATION   *NOTE PRINTED IN CHART FROM [facility]

## 2025-01-27 NOTE — H&P PST ADULT - ASSESSMENT
78 y/o M seen in PST    in anticipation of scheduled LEFT HIP REMOVAL OF HARDWARE CONVERSION TOTAL HIP REPLACEMENT TO PROXIMAL FEMUR REPLACEMENT WITH BAKARI on 25  at [facility] w/DR MARLYS CASPER. 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 History per chart review and spouse Owen.  Spouse reports one year ago cancer metastasized to his left femur which required IM Nailing that has since loosened now requiring total hip replacement. She reports previously was ambulating with cane but is now requiring wheelchair.  He sees pain mgmt for the hip pain.  She reports he recently had an acute inpatient hospitalization.  He ended up with Cdiff diarrhea and weakness and was ultimately placed in short term rehab from around 2024 until 2 weeks ago when he was discharged home on 25.  She reports since being home he's been progressively declining, more confused, speaking slower, decreased PO intake, forgetting how to use utensils.  His PCP placed him on Macrobid for UTI and reports 2 doses left.  Over this weekend she brought him [facility] ER  due to worsening cognitive decline. Stroke workup was negative.  UA not resulted from ER but TSH came back abnormal elevated ~30 when previous level in 2024 was ~7. He does not have an endocrinologist and is unsure whom prescribes the Levothyroxine but the wife reports she thinks it may be the oncologist who put him on it. He is seeing PCP for f/u optimization on .  *PENDING MEDICAL OPTIMIZATION PCP RASHAD WRIGHT [facility] [phone]   [phone]- **ADDRESS THE TSH and RECHECK UA**  CARDIO OPTIMIZATION   *NOTE PRINTED IN CHART FROM [facility]  NEURO OPTIMIZATION  *NOTE PRINTED IN CHART FROM [facility]    MRI LEFT FEMUR 25  IMPRESSION:  1.  Status post ORIF of the left femur with susceptibility artifact   limiting evaluation.  2.  Increased size of lytic destructive lesion in the left proximal femur   with extension to the adductor musculature. No additional lesion noted.  3.  Lipomatous lesion in the proximal sartorius muscle is unchanged.    OPIOID RISK TOOL    SHANELLE EACH BOX THAT APPLIES AND ADD TOTALS AT THE END    FAMILY HISTORY OF SUBSTANCE ABUSE                 FEMALE         MALE                                               Alcohol                             [  ]1 pt          [  ]3pts                                               Illegal Durgs                     [  ]2 pts        [  ]3pts                                               Rx Drugs                           [  ]4 pts        [  ]4 pts    PERSONAL HISTORY OF SUBSTANCE ABUSE                                                                                         Alcohol                             [  ]3 pts       [  ]3 pts                                               Illegal Durgs                     [  ]4 pts        [  ]4 pts                                               Rx Drugs                           [  ]5 pts        [  ]5 pts    AGE BETWEEN 16-45 YEARS                                      [  ]1 pt         [  ]1 pt    HISTORY OF PREADOLESCENT  SEXUAL ABUSE                                                             [  ]3 pts        [  ]0pts    PSYCHOLOGICAL DISEASE                     ADD, OCD, Bipolar, Schizophrenia        [  ]2 pts         [  ]2 pts                      Depression                                               [  ]1 pt           [  ]1 pt          Total: 0  A score of 3 or lower indicated LOW risk for future opiod abuse  A score of 4 to 7 indicated moderate risk for future opiod abuse  A score of 8 or higher indicates a high risk for opiod abuse         CAPRINI SCORE    AGE RELATED RISK FACTORS                                                             [ ] Age 41-60 years                                            (1 Point)  [ ] Age: 61-74 years                                           (2 Points)                 [X ] Age= 75 years                                                (3 Points)             DISEASE RELATED RISK FACTORS                                                       [ ] Edema in the lower extremities                 (1 Point)                     [ ] Varicose veins                                               (1 Point)                                 [ X] BMI > 25 Kg/m2                                            (1 Point)                                  [ ] Serious infection (ie PNA)                            (1 Point)                     [ ] Lung disease ( COPD, Emphysema)            (1 Point)                                                                          [ ] Acute myocardial infarction                         (1 Point)                  [ ] Congestive heart failure (in the previous month)  (1 Point)         [ ] Inflammatory bowel disease                            (1 Point)                  [ ] Central venous access, PICC or Port               (2 points)       (within the last month)                                                                [ ] Stroke (in the previous month)                        (5 Points)    [X ] Previous or present malignancy                       (2 points)                                                                                                                                                         HEMATOLOGY RELATED FACTORS                                                         [ ] Prior episodes of VTE                                     (3 Points)                     [ ] Positive family history for VTE                      (3 Points)                  [ ] Prothrombin 50692 A                                     (3 Points)                     [ ] Factor V Leiden                                                (3 Points)                        [ ] Lupus anticoagulants                                      (3 Points)                                                           [ ] Anticardiolipin antibodies                              (3 Points)                                                       [ ] High homocysteine in the blood                   (3 Points)                                             [X ] Other congenital or acquired thrombophilia      (3 Points)                                                [ ] Heparin induced thrombocytopenia                  (3 Points)                                        MOBILITY RELATED FACTORS  [ ] Bed rest                                                         (1 Point)  [ ] Plaster cast                                                    (2 points)  [ ] Bed bound for more than 72 hours           (2 Points)    GENDER SPECIFIC FACTORS  [ ] Pregnancy or had a baby within the last month   (1 Point)  [ ] Post-partum < 6 weeks                                   (1 Point)  [ ] Hormonal therapy  or oral contraception   (1 Point)  [ ] History of pregnancy complications              (1 point)  [ ] Unexplained or recurrent              (1 Point)    OTHER RISK FACTORS                                           (1 Point)  [ ] BMI >40, smoking, diabetes requiring insulin, chemotherapy  blood transfusions and length of surgery over 2 hours    SURGERY RELATED RISK FACTORS  [ ]  Section within the last month     (1 Point)  [ ] Minor surgery                                                  (1 Point)  [ ] Arthroscopic surgery                                       (2 Points)  [ ] Planned major surgery lasting more            (2 Points)      than 45 minutes     [5 ] Elective hip or knee joint replacement       (5 points)       surgery                                                TRAUMA RELATED RISK FACTORS  [ ] Fracture of the hip, pelvis, or leg                       (5 Points)  [ ] Spinal cord injury resulting in paralysis             (5 points)       (in the previous month)    [ ] Paralysis  (less than 1 month)                             (5 Points)  [ ] Multiple Trauma within 1 month                        (5 Points)    Total Score [   14  ]    Caprini Score 0-2: Low Risk, NO VTE prophylaxis required for most patients, encourage ambulation  Caprini Score 3-6: Moderate Risk , pharmacologic VTE prophylaxis is indicated for most patients (in the absence of contraindications)  Caprini Score Greater than or =7: High risk, pharmocologic VTE prophylaxis indicated for most patients (in the absence of contraindications)

## 2025-01-27 NOTE — H&P PST ADULT - OTHER CARE PROVIDERS
PCP RASHAD WRIGHT  CARDIO LISA KRAMER  HEME ONC ROMERO-JOAQUIN      GI KIANA-ALEJANDRO   SURG ONC FERNANDO   NEURO MORRIS WINDY  PAIN MGMT ANGELINA DEL RIO

## 2025-01-27 NOTE — H&P PST ADULT - PROBLEM SELECTOR PLAN 2
Currently on XARELTO. Reviewed cardiology optimization note in [facility]. Per instructions  "The patient was placed on Eliquis after his CVA. If Eliquis is discontinued, the patient should be placed on aspirin 81 mg daily due to his history of drug-eluting stents."  Instructed wife to confirm instructions with cardiologist and/or surgeon regarding instructions for Xarelto pre-operatively  CARDIO OPTIMIZATION   *NOTE PRINTED IN CHART FROM [facility]

## 2025-01-27 NOTE — H&P PST ADULT - PROBLEM SELECTOR PLAN 8
Pain mgmt consult placed for day of surgery   [facility] Reference #: [id number]

## 2025-01-27 NOTE — H&P PST ADULT - PROBLEM SELECTOR PLAN 5
Continue antibiotics as prescribed by PCP  *PENDING MEDICAL OPTIMIZATION PCP RASHAD WRIGHT [facility] [phone]   [phone]- **ADDRESS THE TSH and RECHECK UA** Continue antibiotics as prescribed by PCP. Pt was unable to provide urine sample in PST  *PENDING MEDICAL OPTIMIZATION PCP RASHAD WRIGHT [facility] [phone]   [phone]- **ADDRESS THE TSH and RECHECK UA**

## 2025-01-27 NOTE — H&P PST ADULT - PROBLEM SELECTOR PLAN 1
PST 1/27   in anticipation of scheduled LEFT HIP REMOVAL OF HARDWARE CONVERSION TOTAL HIP REPLACEMENT TO PROXIMAL FEMUR REPLACEMENT WITH BAKARI on 2/7/25  at [facility] w/DR MARLYS CASPER Patient educated on no shaving x 3 days prior to procedure, correct use of surgical scrub, NPO after MN, ERP fluid protocol, preadmission instructions, day of procedure medications, MEDICAL/CARDIAC/NEURO OPTIMIZATION needed.  Pt instructed to stop vitamins/supplements/herbal medications/NSAIDS for one week prior to surgery and discuss with PMD/PRESCIRBER/SPECIALIST any outstanding items or questions about prescriptions/medications. Written and oral instructions given to patient.  *PENDING MEDICAL OPTIMIZATION PCP RASHAD WRIGHT [facility] [phone]   [phone]- **ADDRESS THE TSH and RECHECK UA**  CARDIO OPTIMIZATION   *NOTE PRINTED IN CHART FROM [facility]  NEURO OPTIMIZATION  *NOTE PRINTED IN CHART FROM [facility]

## 2025-01-27 NOTE — H&P PST ADULT - PROBLEM SELECTOR PROBLEM 2
There are no preventive care reminders to display for this patient.    Patient is up to date, no discussion needed.   CAD (coronary artery disease)

## 2025-01-27 NOTE — H&P PST ADULT - REASON FOR ADMISSION
LEFT HIP REMOVAL OF HARDWARE CONVERSION TOTAL HIP REPLACEMENT TO PROXIMAL FEMUR REPLACEMENT WITH BAKARI

## 2025-01-28 NOTE — H&P ADULT - ASSESSMENT
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 Presents to the ED to be evaluated for generalized weakness, fatigue, poor PO intake, difficulty ambulating with hypersomnia (sleeping all day). The patient had pre-op labs done yesterday and was found to have abnormal Thyroid function test, instructed to come to the ED for further evaluation. Labs remarkable for T3:46, T4: 4.0, TSH:44.42. Vitals- BP:146/97, HR:76, Temp:97.7. Received Solu-Cortef 50mg IV, Synthroid 10mcg IV.

## 2025-01-28 NOTE — H&P ADULT - HISTORY OF PRESENT ILLNESS
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 Presents to the ED to be evaluated for generalized weakness, fatigue, poor PO intake, difficulty ambulating with hypersomnia (sleeping all day). The patient had pre-op labs done yesterday and was found to have abnormal Thyroid function test, instructed to come to the ED for further evaluation. Labs remarkable for T3:46, T4: 4.0, TSH:44.42. Vitals- BP:146/97, HR:76, Temp:97.7. Received Solu-Cortef 50mg IV, Synthroid 10mcg IV.     Patient was seen in the ED 1/24 & had full stroke workup which was negative

## 2025-01-28 NOTE — ED PROVIDER NOTE - OBJECTIVE STATEMENT
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  Patient reports that he has had 2 weeks of generalized weakness and fatigue.  He reports that he is sleeping up to 20 hours/day,   Has poor p.o. intake and difficulty with ambulation.  Patient was seen here over the weekend with a stroke workup that was negative with a normal CT head.  Patient went to presurgical testing yesterday where labs were sent and was found to be profoundly hypothyroid and advised to come here

## 2025-01-28 NOTE — H&P ADULT - NSHPPHYSICALEXAM_GEN_ALL_CORE
GENERAL: NAD, comfortable in bed   HEAD:  Atraumatic, Normocephalic  EYES: EOMI, PERRL, conjunctiva and sclera clear  NECK: Supple, No JVD  LUNG: Clear to auscultation bilaterally; No wheezes, rales or rhonchi  HEART: Regular rate and rhythm; No murmurs, rubs, or gallops  ABDOMEN: Soft, Nontender, Nondistended; Normal Bowel sounds   EXTREMITIES:  No clubbing, cyanosis, or edema  PSYCH: normal mood and affect  NEUROLOGY: AAOx3, non-focal

## 2025-01-28 NOTE — ED PROVIDER NOTE - CLINICAL SUMMARY MEDICAL DECISION MAKING FREE TEXT BOX
Patient with severe and symptomatic hypothyroidism.  Called and discussed the case with endocrinology Dr. Mcbride  Who advised 50 IV push of Solu-Cortef followed by 100 IV Synthroid and then tomorrow an additional 75 IV Synthroid and she will see the patient tomorrow

## 2025-01-28 NOTE — H&P ADULT - NSHPLABSRESULTS_GEN_ALL_CORE
10.3   9.40  )-----------( 266      ( 2025 16:55 )             32.5     143  |  103  |  14.5  ----------------------------<  82       4.3   |  29.0  |  1.07    Ca    9.9      2025 16:55  Mg     1.9         TPro  6.0[L]  /  Alb  3.2[L]  /  TBili  <0.2[L]  /  DBili  x   /  AST  17  /  ALT  12  /  AlkPhos  79        Urinalysis Basic - ( 2025 21:22 )  Color: Dark Yellow / Appearance: Clear / S.019 / pH: 7.0  Gluc: Negative mg/dL / Ketone: Negative mg/dL  / Bili: Negative / Urobili: 0.2 mg/dL   Blood: Negative / Protein: Trace mg/dL / Nitrite: Negative   Leuk Esterase: Moderate / RBC: 4 /HPF / WBC 71 /HPF   Sq Epi: 1 /HPF / Bacteria: Negative /HPF  Hyaline Casts: x/WBC Casts: x

## 2025-01-28 NOTE — ED ADULT NURSE NOTE - NSICDXPASTMEDICALHX_GEN_ALL_CORE_FT
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

## 2025-01-28 NOTE — ED ADULT NURSE NOTE - ED STAT RN HANDOFF DETAILS
Pt report given to CDU RN Ashtyn. Pt is resting in stretcher comfortably at this time, no apparent distress noted at this time. Pt safety maintained. Pt denies any complaints at this time.

## 2025-01-28 NOTE — ED ADULT TRIAGE NOTE - CHIEF COMPLAINT QUOTE
pt sent by Doe for hypothyroidism with drowsiness, lethargy, sent for endocrine consult  A&Ox3, resp wnl, in w/c

## 2025-01-28 NOTE — ED PROVIDER NOTE - SKIN NEGATIVE STATEMENT, MLM
Forwarded to Dr. Nicole Kevin-Peter   
LOC letter sent, reminder set  
Patient scheduled for a red follow up on 12/19/2019 at 9:20AM with Dr Dougherty    Patient Prompt Appointment Reminder Response:  Email Email Sent Dec 15 2019 1:18PM [email or web address]    Email Cancelled Via Email Dec 15 2019 1:53PM [email or web address]    The 9:20AM red follow up on 12/19/19 with Dr Dougherty has been cancelled and patient placed on the Wait list to reschedule.    
Send loss of contact letter now and set reminder for 1 month.    
no abrasions, no jaundice, no lesions, no pruritis, and no rashes.

## 2025-01-28 NOTE — H&P ADULT - PROBLEM SELECTOR PLAN 1
T3:46, T4: 4.0, TSH:44.42  S/P: Solu-Cortef 50mg IV, Synthroid 10mcg IV  - Endo consulted   - Synthroid 100mcg IV   - F/U labs   - DVT prophylaxis (Eliquis)

## 2025-01-28 NOTE — H&P ADULT - PROBLEM SELECTOR PLAN 2
Upon ED arrival BP: 146/97  Continue home meds   - Amlodipine 2.5mg   - Ramipril 10mg = Lisinopril 40mg

## 2025-01-28 NOTE — ED ADULT NURSE NOTE - NSFALLHARMRISKINTERV_ED_ALL_ED

## 2025-01-28 NOTE — ED ADULT NURSE NOTE - OBJECTIVE STATEMENT
Patient A&Ox2 sent in for abnormal thyroid levels during out pt presurgical testing.  PMHx of CA.  Haivng R hip replacement on Feb7. As per family, pt has been more forgetful than usual and lethargic. NAD noted, respirations even and unlabored.  Safety precautions in place.  Plan of care explained, pt verbalized understanding.

## 2025-01-29 PROBLEM — Z48.89 ENCOUNTER FOR POSTOPERATIVE WOUND CHECK: Status: ACTIVE | Noted: 2025-01-01

## 2025-01-29 PROBLEM — Z09 SURGICAL FOLLOW-UP CARE: Status: ACTIVE | Noted: 2025-01-29

## 2025-01-29 PROBLEM — E03.9 HYPOTHYROIDISM, UNSPECIFIED: Chronic | Status: ACTIVE | Noted: 2025-01-01

## 2025-01-29 NOTE — PATIENT PROFILE ADULT - NSPROHMSYMPCOND_GEN_A_NUR
cancer Dermal Autograft Text: The defect edges were debeveled with a #15 scalpel blade.  Given the location of the defect, shape of the defect and the proximity to free margins a dermal autograft was deemed most appropriate.  Using a sterile surgical marker, the primary defect shape was transferred to the donor site. The area thus outlined was incised deep to adipose tissue with a #15 scalpel blade.  The harvested graft was then trimmed of adipose and epidermal tissue until only dermis was left.  The skin graft was then placed in the primary defect and oriented appropriately.

## 2025-01-29 NOTE — CONSULT NOTE ADULT - ASSESSMENT
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   Presents to the ED to be evaluated for generalized weakness, fatigue, poor PO intake, difficulty ambulating with hypersomnia (sleeping all day).  Labs remarkable for T3:46, T4: 4.0, TSH:44.42. Vitals- BP:146/97, HR:76, Temp:97.7.   Received Solu-Cortef 50mg IV, Synthroid 10mcg IV.       Per patient and wife he has had hypothyroidism, since 2 years , after he had started keytruda for parotid cancer, has been on replacement since then, his levels have been fluctuating, and doses increases.  Recent dose increased in 12/24 to 137 mcg daily, denies missing doses.  Also in 11/24 he was admitted to hospital for diarrhea, other GI symptoms and thought to be due to keytruda, which was finally stopped then.  This recent TSH in 40s, T4 mildly low and T3 low as well.  complains of confusion and weakness in legs mainly left leg, which he attributes to pain and hardware mal allignment, he is planned to get revision of the hardware.    Looks like his symptoms could be multifactorial, as thyroid functions not that bad and T4 close to normal range.    Hypothyroidism.    S/P: Solu-Cortef 50mg IV, Synthroid 100mcg IV  yesterday.  TFTs improving, now T4 in normal range, T3 still low.  - give another dose of levothyroxine 100 mcg iv today, then switch to oral levothyroxine 175 mcg daily tomorrow.  -repeat FT4 , total T3 and TSH in 1 week with follow up with endo as out pt.     Hypertension.   -Continue Amlodipine , Ramipril , Lisinopril      CAD (coronary artery disease).   -Continue ASA, Statin , Plavix.    Left hip pain, Mets to the Hips   - Pain management   - PT eval.

## 2025-01-29 NOTE — PROGRESS NOTE ADULT - ASSESSMENT
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 Presents to the ED to be evaluated for generalized weakness, fatigue, poor PO intake, difficulty ambulating with hypersomnia (sleeping all day). The patient had pre-op labs done yesterday and was found to have abnormal Thyroid function test, instructed to come to the ED for further evaluation. Labs remarkable for T3:46, T4: 4.0, TSH:44.42. Vitals- BP:146/97, HR:76, Temp:97.7. Received Solu-Cortef 50mg IV, Synthroid 10mcg IV.          Problem/Plan - 1:  ·  Problem: Hypothyroidism.   ·  Plan: T3:46, T4: 4.0, TSH:44.42  S/P: Solu-Cortef 50mg IV, Synthroid 10mcg IV  - Endo consulted on adm? I called on 1/29  - Synthroid 100mcg IV x 1  - F/U labs   - DVT prophylaxis (Eliquis).     Problem/Plan - 2:  ·  Problem: Hypertension.   ·  Plan: Upon ED arrival BP: 146/97  Continue home meds   - Amlodipine 2.5mg   - Ramipril 10mg = Lisinopril 40mg.     Problem/Plan - 3:  ·  Problem: CAD (coronary artery disease).   ·  Plan: Continue home meds   - ASA  - Statin   - Plavix.     Problem/Plan - 4:  ·  Problem: Left hip pain.   ·  Plan: Mets to the Hips   Difficulty ambulating   - Pain management   - PT eval.

## 2025-01-29 NOTE — PHYSICAL THERAPY INITIAL EVALUATION ADULT - PERTINENT HX OF CURRENT PROBLEM, REHAB EVAL
Pt is a 78 y/o male who presented from home with weakness and difficulty ambulating. Pt has hx/o parotid gland CA s/p dissection and radiation with METS to hip. Pt admitted with abnormal labs/hypothyroidism.

## 2025-01-29 NOTE — CONSULT NOTE ADULT - SUBJECTIVE AND OBJECTIVE BOX
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   Presents to the ED to be evaluated for generalized weakness, fatigue, poor PO intake, difficulty ambulating with hypersomnia (sleeping all day).  The patient had pre-op labs done yesterday and was found to have abnormal Thyroid function test, instructed to come to the ED for further evaluation.   Labs remarkable for T3:46, T4: 4.0, TSH:44.42. Vitals- BP:146/97, HR:76, Temp:97.7. Received Solu-Cortef 50mg IV, Synthroid 10mcg IV.   Patient was seen in the ED 1/24 & had full stroke workup which was negative     Per patient and wife he has had hypothyroidism, since 2 years , after he had started keytruda for parotid cancer, has been on replacement since then, his levels have been fluctuating, and doses increases.  Recent dose increased in 12/24 to 137 mcg daily, denies missing doses.  Also in 11/24 he was admitted to hospital for diarrhea, other GI symptoms and thought to be due to keytruda, which was finally stopped then.  This recent TSH in 40s, T4 mildly low and T3 low as well.  complains of confusion and weakness in legs mainly left leg, which he attributes to pain and hardware mal allignment, he is planned to get revision of the had ware.        PAST MEDICAL & SURGICAL HISTORY:  HTN (hypertension)    CAD (coronary artery disease)    GERD (gastroesophageal reflux disease)    Arthritis    H/O spinal stenosis    Childhood asthma    History of blood transfusion    Cancer of salivary gland  s/p 31 sessions of RT    Disorder of bone    Basal cell carcinoma  skin    Squamous cell skin cancer    Rheumatoid arthritis    Other chronic back pain    Pain due to internal orthopedic prosthetic device, implant, or graft    Pain in left hip    Hypothyroid    S/P coronary artery stent placement  x 6 - last one 6 years ago    Status post cervical spinal fusion  1991    History of fusion of lumbar spine  1981    S/P cholecystectomy    H/O parotidectomy  right        Social History: non smoker, no alcohol      FAMILY HISTORY:  FH: CAD (coronary artery disease) (Mother)          Allergies    penicillin (Rash)    Intolerances        REVIEW OF SYSTEMS:    confusion, leg weakness, no cp, no headaches, no visual issues, , no abdominal pain, no n/v, no diarrhea, no constipation.      MEDICATIONS  (STANDING):  amLODIPine   Tablet 2.5 milliGRAM(s) Oral daily  apixaban 5 milliGRAM(s) Oral every 12 hours  atorvastatin 40 milliGRAM(s) Oral at bedtime  cyanocobalamin 1000 MICROGram(s) Oral daily  DULoxetine 20 milliGRAM(s) Oral daily  folic acid 1 milliGRAM(s) Oral daily  gabapentin 600 milliGRAM(s) Oral two times a day  isosorbide   mononitrate ER Tablet (IMDUR) 30 milliGRAM(s) Oral daily  levothyroxine Injectable 100 MICROGram(s) IV Push once  levothyroxine Injectable 100 MICROGram(s) IV Push once  lisinopril 40 milliGRAM(s) Oral daily  morphine ER Tablet 30 milliGRAM(s) Oral daily  prochlorperazine   Tablet 10 milliGRAM(s) Oral every 6 hours  sodium chloride 0.9%. 1000 milliLiter(s) (75 mL/Hr) IV Continuous <Continuous>  sulfaSALAzine 500 milliGRAM(s) Oral two times a day    MEDICATIONS  (PRN):  acetaminophen     Tablet .. 650 milliGRAM(s) Oral every 6 hours PRN Temp greater or equal to 38C (100.4F), Mild Pain (1 - 3)  aluminum hydroxide/magnesium hydroxide/simethicone Suspension 30 milliLiter(s) Oral every 4 hours PRN Dyspepsia  melatonin 3 milliGRAM(s) Oral at bedtime PRN Insomnia  ondansetron Injectable 4 milliGRAM(s) IV Push every 8 hours PRN Nausea and/or Vomiting      Vital Signs Last 24 Hrs  T(C): 36.4 (29 Jan 2025 11:10), Max: 36.8 (28 Jan 2025 23:12)  T(F): 97.6 (29 Jan 2025 11:10), Max: 98.2 (28 Jan 2025 23:12)  HR: 58 (29 Jan 2025 11:10) (54 - 76)  BP: 165/91 (29 Jan 2025 11:10) (142/97 - 168/88)  BP(mean): --  RR: 18 (29 Jan 2025 11:10) (16 - 18)  SpO2: 99% (29 Jan 2025 11:10) (96% - 99%)    Parameters below as of 29 Jan 2025 11:10  Patient On (Oxygen Delivery Method): room air          PHYSICAL EXAM:    Constitutional: NAD, well-groomed, well-developed  HEENT: PERRL  Neck: No LAD, no thyroid enlargement  Respiratory: CTAB  Cardiovascular: S1 and S2, RRR, no M/G/R  Gastrointestinal: BS+, soft, no organomegaly  Extremities: No peripheral edema, no pedal lesions  Vascular: 2+ peripheral pulses  Psychiatric: Normal mood, normal affect  Skin: No rashes, no acanthosis        LABS  01-29    138  |  100  |  13.9  ----------------------------<  78  4.5   |  29.0  |  0.94    Ca    9.4      29 Jan 2025 06:34  Mg     1.9     01-28    TPro  5.9[L]  /  Alb  3.0[L]  /  TBili  0.3[L]  /  DBili  x   /  AST  16  /  ALT  10  /  AlkPhos  76  01-29                          9.9    8.11  )-----------( 277      ( 29 Jan 2025 06:34 )             32.3       A1C with Estimated Average Glucose Result: 5.6 % (01-29-25 @ 06:34)  A1C with Estimated Average Glucose Result: 5.8 % (01-27-25 @ 12:00)        Ketone - Urine: Negative mg/dL (01-28 @ 21:22)    Alkaline Phosphatase: 76 U/L (01-29-25 @ 06:34)  Albumin: 3.0 g/dL (01-29-25 @ 06:34)  Aspartate Aminotransferase (AST/SGOT): 16 U/L (01-29-25 @ 06:34)  Alanine Aminotransferase (ALT/SGPT): 10 U/L (01-29-25 @ 06:34)  Albumin: 3.2 g/dL (01-28-25 @ 16:55)  Aspartate Aminotransferase (AST/SGOT): 17 U/L (01-28-25 @ 16:55)  Alanine Aminotransferase (ALT/SGPT): 12 U/L (01-28-25 @ 16:55)  Alkaline Phosphatase: 79 U/L (01-28-25 @ 16:55)    Triiodothyronine, Total (T3 Total): 46 ng/dL (01-29-25 @ 06:34)  T4, Serum: 4.7 ug/dL (01-29-25 @ 06:34)  Thyroid Stimulating Hormone, Serum: 13.99 uIU/mL (01-29-25 @ 06:34)  Free Thyroxine, Serum: 0.8 ng/dL (01-28-25 @ 18:15)  Triiodothyronine, Total (T3 Total): 46 ng/dL (01-28-25 @ 16:55)  T4, Serum: 4.0 ug/dL (01-28-25 @ 16:55)  Thyroid Stimulating Hormone, Serum: 44.42 uIU/mL (01-28-25 @ 16:55)  Triiodothyronine, Total (T3 Total): <40 ng/dL (01-27-25 @ 12:00)  T4, Serum: 3.9 ug/dL (01-27-25 @ 12:00)  Thyroid Stimulating Hormone, Serum: 47.17 uIU/mL (01-27-25 @ 12:00)  Thyroid Stimulating Hormone, Serum: 30.23 uIU/mL (01-24-25 @ 17:15)    CAPILLARY BLOOD GLUCOSE

## 2025-01-29 NOTE — PHYSICAL THERAPY INITIAL EVALUATION ADULT - ADDITIONAL COMMENTS
Pt was previously at a [facility] and then went home with home health aides 8 hours a day. States he has a few steps to enter and none inside. Reports living with his spouse in a condo.

## 2025-01-29 NOTE — ED ADULT NURSE REASSESSMENT NOTE - NS ED NURSE REASSESS COMMENT FT1
Pt received from day RN TIFFANIE. Pt is resting in stretcher comfortably at this time, no apparent distress noted at this time. Pt safety maintained. Pt denies any complaints at this time. Pt pending endo consult. Pt made aware of plan of care and verbalized understanding.

## 2025-01-29 NOTE — PROGRESS NOTE ADULT - SUBJECTIVE AND OBJECTIVE BOX
JOSIANE SHAW  79y  Male      Patient is a 79y old  Male who presents with a chief complaint of Hypothyroidism (28 Jan 2025 23:55)      INTERVAL HPI/OVERNIGHT EVENTS:  Seen and examined with wife at bedside  AAOx2, slow speaking  States correct year and reason for adm however thought he was at home and was surprised/ confused when told he was in the hospital      REVIEW OF SYSTEMS:  +Cold  +Slow thinking    T(C): 36.4 (01-29-25 @ 11:10), Max: 36.8 (01-28-25 @ 23:12)  HR: 58 (01-29-25 @ 11:10) (54 - 76)  BP: 165/91 (01-29-25 @ 11:10) (142/97 - 168/88)  RR: 18 (01-29-25 @ 11:10) (16 - 18)  SpO2: 99% (01-29-25 @ 11:10) (96% - 99%)  Wt(kg): --Vital Signs Last 24 Hrs  T(C): 36.4 (29 Jan 2025 11:10), Max: 36.8 (28 Jan 2025 23:12)  T(F): 97.6 (29 Jan 2025 11:10), Max: 98.2 (28 Jan 2025 23:12)  HR: 58 (29 Jan 2025 11:10) (54 - 76)  BP: 165/91 (29 Jan 2025 11:10) (142/97 - 168/88)  BP(mean): --  RR: 18 (29 Jan 2025 11:10) (16 - 18)  SpO2: 99% (29 Jan 2025 11:10) (96% - 99%)    Parameters below as of 29 Jan 2025 11:10  Patient On (Oxygen Delivery Method): room air        PHYSICAL EXAM:  GENERAL: NAD, well-groomed, well-developed  HEAD:  Atraumatic, Normocephalic  EYES: EOMI, PERRLA, conjunctiva and sclera clear  ENMT: No tonsillar erythema, exudates, or enlargement;   NECK: Supple, No JVD, Normal thyroid  NERVOUS SYSTEM:  Alert & Oriented X3,   CHEST/LUNG: Clear to percussion bilaterally; No rales, rhonchi, wheezing, or rubs  HEART: Regular rate and rhythm; No murmurs, rubs, or gallops  ABDOMEN: Soft, Nontender, Nondistended; Bowel sounds present  EXTREMITIES:  2+ Peripheral Pulses, No clubbing, cyanosis, or edema  LYMPH: No lymphadenopathy noted  SKIN: No rashes or lesions    Consultant(s) Notes Reviewed:  [x ] YES  [ ] NO  Care Discussed with Consultants/Other Providers [ x] YES  [ ] NO    LABS:                        9.9    8.11  )-----------( 277      ( 29 Jan 2025 06:34 )             32.3     01-29    138  |  100  |  13.9  ----------------------------<  78  4.5   |  29.0  |  0.94    Ca    9.4      29 Jan 2025 06:34  Mg     1.9     01-28    TPro  5.9[L]  /  Alb  3.0[L]  /  TBili  0.3[L]  /  DBili  x   /  AST  16  /  ALT  10  /  AlkPhos  76  01-29    PT/INR - ( 27 Jan 2025 12:00 )   PT: 13.0 sec;   INR: 1.12 ratio         PTT - ( 27 Jan 2025 12:00 )  PTT:34.4 sec  Urinalysis Basic - ( 29 Jan 2025 06:34 )    Color: x / Appearance: x / SG: x / pH: x  Gluc: 78 mg/dL / Ketone: x  / Bili: x / Urobili: x   Blood: x / Protein: x / Nitrite: x   Leuk Esterase: x / RBC: x / WBC x   Sq Epi: x / Non Sq Epi: x / Bacteria: x      CAPILLARY BLOOD GLUCOSE            Urinalysis Basic - ( 29 Jan 2025 06:34 )    Color: x / Appearance: x / SG: x / pH: x  Gluc: 78 mg/dL / Ketone: x  / Bili: x / Urobili: x   Blood: x / Protein: x / Nitrite: x   Leuk Esterase: x / RBC: x / WBC x   Sq Epi: x / Non Sq Epi: x / Bacteria: x        RADIOLOGY & ADDITIONAL TESTS:    Imaging Personally Reviewed:  [ ] YES  [ ] NO    HEALTH ISSUES - PROBLEM Dx:  Hypothyroidism    Hypertension    CAD (coronary artery disease)    Left hip pain

## 2025-01-29 NOTE — PATIENT PROFILE ADULT - FALL HARM RISK - HARM RISK INTERVENTIONS
Assistance with ambulation/Assistance OOB with selected safe patient handling equipment/Communicate Risk of Fall with Harm to all staff/Discuss with provider need for PT consult/Monitor gait and stability/Provide patient with walking aids - walker, cane, crutches/Reinforce activity limits and safety measures with patient and family/Review medications for side effects contributing to fall risk/Tailored Fall Risk Interventions/Use of alarms - bed, chair and/or voice tab/Visual Cue: Yellow wristband and red socks/Bed in lowest position, wheels locked, appropriate side rails in place/Call bell, personal items and telephone in reach/Instruct patient to call for assistance before getting out of bed or chair/Non-slip footwear when patient is out of bed/Ronald to call system/Physically safe environment - no spills, clutter or unnecessary equipment/Purposeful Proactive Rounding/Room/bathroom lighting operational, light cord in reach

## 2025-01-30 NOTE — DISCHARGE NOTE NURSING/CASE MANAGEMENT/SOCIAL WORK - NSDCVIVACCINE_GEN_ALL_CORE_FT
influenza, high-dose, quadrivalent; 28-Oct-2022 12:29; Leilani Ji (RN); [employer]; [id number] (Exp. Date: 30-Jun-2023); IntraMuscular; Deltoid Right.; 0.7 milliLiter(s); VIS (VIS Published: 06-Aug-2021, VIS Presented: 28-Oct-2022);

## 2025-01-30 NOTE — DISCHARGE NOTE PROVIDER - CARE PROVIDER_API CALL
Gely Metz,   Phone: (   )    -  Fax: (   )    -  Follow Up Time:     Vibha Mcbride  Endocrinology/Metab/Diabetes  [address] [address]  Phone: [phone]  Fax: [phone]  Follow Up Time:

## 2025-01-30 NOTE — PROGRESS NOTE ADULT - SUBJECTIVE AND OBJECTIVE BOX
JOSIANE SHAW  79y  Male      Patient is a 79y old  Male who presents with a chief complaint of Hypothyroidism (30 Jan 2025 12:45)      INTERVAL HPI/OVERNIGHT EVENTS:  Seen and examined, wife at bedside  Brother Spike on the phone  Patient is comfortable, alert and oriented but at times cna be forgetful      REVIEW OF SYSTEMS:  cold  weak    T(C): 36.8 (01-31-25 @ 11:25), Max: 36.9 (01-31-25 @ 05:04)  HR: 76 (01-31-25 @ 11:25) (68 - 91)  BP: 146/85 (01-31-25 @ 11:25) (110/71 - 147/81)  RR: 18 (01-31-25 @ 11:28) (18 - 18)  SpO2: 96% (01-31-25 @ 11:28) (90% - 96%)  Wt(kg): --Vital Signs Last 24 Hrs  T(C): 36.8 (31 Jan 2025 11:25), Max: 36.9 (31 Jan 2025 05:04)  T(F): 98.3 (31 Jan 2025 11:25), Max: 98.5 (31 Jan 2025 05:04)  HR: 76 (31 Jan 2025 11:25) (68 - 91)  BP: 146/85 (31 Jan 2025 11:25) (110/71 - 147/81)  BP(mean): --  RR: 18 (31 Jan 2025 11:28) (18 - 18)  SpO2: 96% (31 Jan 2025 11:28) (90% - 96%)    Parameters below as of 31 Jan 2025 11:28  Patient On (Oxygen Delivery Method): room air        PHYSICAL EXAM:  GENERAL: Elderly, appears weak  HEAD:  Atraumatic, Normocephalic  EYES: EOMI, PERRLA, conjunctiva and sclera clear  ENMT: No tonsillar erythema, exudates, or enlargement;   NECK: Supple, No JVD, Normal thyroid  NERVOUS SYSTEM:  Alert & Oriented X3, (forgetful)  CHEST/LUNG: Clear to percussion bilaterally; No rales, rhonchi, wheezing, or rubs  HEART: Regular rate and rhythm; No murmurs, rubs, or gallops  ABDOMEN: Soft, Nontender, Nondistended; Bowel sounds present  EXTREMITIES:  2+ Peripheral Pulses, No clubbing, cyanosis, or edema  LYMPH: No lymphadenopathy noted  SKIN: No rashes or lesions    Consultant(s) Notes Reviewed:  [x ] YES  [ ] NO  Care Discussed with Consultants/Other Providers [ x] YES  [ ] NO    Consultant(s) Notes Reviewed:  [x ] YES  [ ] NO  Care Discussed with Consultants/Other Providers [ x] YES  [ ] NO    LABS:                        11.5   8.50  )-----------( 255      ( 30 Jan 2025 10:31 )             35.9     01-30    139  |  102  |  12.0  ----------------------------<  95  4.5   |  28.0  |  0.96    Ca    9.4      30 Jan 2025 04:30        Urinalysis Basic - ( 30 Jan 2025 04:30 )    Color: x / Appearance: x / SG: x / pH: x  Gluc: 95 mg/dL / Ketone: x  / Bili: x / Urobili: x   Blood: x / Protein: x / Nitrite: x   Leuk Esterase: x / RBC: x / WBC x   Sq Epi: x / Non Sq Epi: x / Bacteria: x      CAPILLARY BLOOD GLUCOSE      POCT Blood Glucose.: 98 mg/dL (30 Jan 2025 16:53)        Urinalysis Basic - ( 30 Jan 2025 04:30 )    Color: x / Appearance: x / SG: x / pH: x  Gluc: 95 mg/dL / Ketone: x  / Bili: x / Urobili: x   Blood: x / Protein: x / Nitrite: x   Leuk Esterase: x / RBC: x / WBC x   Sq Epi: x / Non Sq Epi: x / Bacteria: x        RADIOLOGY & ADDITIONAL TESTS:    Imaging Personally Reviewed:  [ ] YES  [ ] NO    HEALTH ISSUES - PROBLEM Dx:  Hypothyroidism    Hypertension    CAD (coronary artery disease)    Left hip pain

## 2025-01-30 NOTE — PROGRESS NOTE ADULT - TIME BILLING
Time spent reviewing the chart documentation, reviewing labs and imaging studies, evaluating the patient, discussing the plan of care with the consultants & medical team, and documenting.
Time spent reviewing the chart documentation, reviewing labs and imaging studies, evaluating the patient, discussing the plan of care with the consultants & medical team, and documenting.

## 2025-01-30 NOTE — DISCHARGE NOTE NURSING/CASE MANAGEMENT/SOCIAL WORK - NSDCFUADDAPPT_GEN_ALL_CORE_FT
APPTS ARE READY TO BE MADE: [ ] YES    Best Family or Patient Contact (if needed):    Additional Information about above appointments (if needed):    1: PCP  2: Ortho  3: Endocrine    Other comments or requests:    [facility] HA resumed with [facility] for 1/31/25 2pm          APPTS ARE READY TO BE MADE: [ ] YES    Best Family or Patient Contact (if needed):    Additional Information about above appointments (if needed):    1: PCP  2: Ortho  3: Endocrine    Other comments or requests:

## 2025-01-30 NOTE — DISCHARGE NOTE PROVIDER - NSDCFUADDAPPT_GEN_ALL_CORE_FT
APPTS ARE READY TO BE MADE: [ ] YES    Best Family or Patient Contact (if needed):    Additional Information about above appointments (if needed):    1: PCP  2: Ortho  3: Endocrine    Other comments or requests:

## 2025-01-30 NOTE — DISCHARGE NOTE PROVIDER - NSDCMRMEDTOKEN_GEN_ALL_CORE_FT
amLODIPine 2.5 mg oral tablet: 1 tab(s) orally once a day  apixaban 5 mg oral tablet: 1 tab(s) orally 2 times a day  atorvastatin 40 mg oral tablet: 1 tab(s) orally once a day (at bedtime)  cyanocobalamin 1000 mcg oral tablet: 1 tab(s) orally once a day  DULoxetine 20 mg oral delayed release capsule: 1 cap(s) orally once a day  folic acid: 1 milligram(s) orally once a day  gabapentin 600 mg oral tablet: 1 tab(s) orally 2 times a day  isosorbide mononitrate 30 mg oral tablet, extended release: 1 tab(s) orally once a day  levothyroxine 175 mcg (0.175 mg) oral tablet: 1 tab(s) orally once a day  morphine 30 mg/24 hours oral capsule, extended release: 1 cap(s) orally once a day  mupirocin 2% topical ointment: 1 application in each nostril 2 times a day Apply qtip with ointment to both nostrils twice daily on the following dates: 2/2  2/3  2/4  2/5  2/6  pantoprazole 40 mg oral delayed release tablet: 1 tab(s) orally once a day  prochlorperazine 10 mg oral tablet: 1 tab(s) orally every 6 hours  ramipril 10 mg oral tablet: orally once a day  sulfaSALAzine 500 mg oral tablet: 2 tab(s) orally 2 times a day

## 2025-01-30 NOTE — DISCHARGE NOTE PROVIDER - HOSPITAL COURSE
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 Presents to the ED to be evaluated for generalized weakness, fatigue, poor PO intake, difficulty ambulating with hypersomnia (sleeping all day). The patient had pre-op labs done yesterday and was found to have abnormal Thyroid function test, instructed to come to the ED for further evaluation. Labs remarkable for T3:46, T4: 4.0, TSH:44.42. Vitals- BP:146/97, HR:76, Temp:97.7. Received Solu-Cortef 50mg IV, Synthroid 10mcg IV.       Patient was admitted with symptomatic severe hypothyroidism requring IV synthroid and Endocrine evaluation. Patient also has stroke workup on presentation in ER and stroke was ruled out. His mentation improved, he was evaluated by Physical therapy who recommend home PT. Stable for discharge home with outpt follow up with his Surgeon, Endocrinologist, and PCP on dc.

## 2025-01-30 NOTE — DISCHARGE NOTE NURSING/CASE MANAGEMENT/SOCIAL WORK - PATIENT PORTAL LINK FT
You can access the FollowMyHealth Patient Portal offered by [facility] by registering at the following website: [email or web address]. By joining [facility]’s FollowMyHealth portal, you will also be able to view your health information using other applications (apps) compatible with our system.

## 2025-01-30 NOTE — DISCHARGE NOTE PROVIDER - ATTENDING DISCHARGE PHYSICAL EXAMINATION:
Physical Exam:  General: Elderly  HEENT: NCAT, PERRLA, EOMI bl, moist mucous membranes   Neck: Supple, nontender, no mass  Neurology: A&Ox3, nonfocal, CN II-XII grossly intact, sensation intact, no gait abnormalities   Respiratory: CTA B/L, No W/R/R  CV: RRR, +S1/S2, no murmurs, rubs or gallops  Abdominal: Soft, NT, ND +BSx4  Extremities: No C/C/E, + peripheral pulses  MSK: Normal ROM, no joint erythema or warmth, no joint swelling   Skin: warm, dry, normal color, no rash or abnormal lesions

## 2025-01-30 NOTE — DISCHARGE NOTE PROVIDER - NSDCFUSCHEDAPPT_GEN_ALL_CORE_FT
Brannon Blackwell  [facility] Physician [facility]  ORTHOSURG [address]  Scheduled Appointment: 02/07/2025    Brannon Blackwell  [facility]  Scheduled Appointment: 02/07/2025    Brannon Blackwell  [facility]  ORTHOSURG [address]  Scheduled Appointment: 02/25/2025    Meredith Daniel  [facility]  [facility]  Scheduled Appointment: 03/12/2025    Brannon Blackwell  [facility]  ORTHOSURG [address]  Scheduled Appointment: 03/25/2025    Shaista Prasad  [facility] Physician [facility]  GASTRO [address]  Scheduled Appointment: 04/21/2025    Kleiner, Myron I  [facility] Physician [facility]  RHEUM [address]  Scheduled Appointment: 04/24/2025

## 2025-01-30 NOTE — DISCHARGE NOTE PROVIDER - NSDCCPCAREPLAN_GEN_ALL_CORE_FT
PRINCIPAL DISCHARGE DIAGNOSIS  Diagnosis: Hypothyroidism  Assessment and Plan of Treatment: Continue taking all medicaitons as prescirbed. Check your TSH in 1 week and then as per your PCP, or Endocrinologist.

## 2025-01-30 NOTE — DISCHARGE NOTE NURSING/CASE MANAGEMENT/SOCIAL WORK - FINANCIAL ASSISTANCE
[facility] provides services at a reduced cost to those who are determined to be eligible through [facility]’s financial assistance program. Information regarding [facility]’s financial assistance program can be found by going to [email or web address] or by calling [phone].

## 2025-01-31 NOTE — DIETITIAN INITIAL EVALUATION ADULT - ADD RECOMMEND
1) Continue diet as tolerated.   2) Encourage po intake, monitor diet tolerance, and provide assistance at meals as needed.   3) Rx: MVI and vitamin C 500mg daily to aid in wound healing.   4) Obtain weekly weights to monitor trends.

## 2025-01-31 NOTE — DIETITIAN INITIAL EVALUATION ADULT - WEIGHT USED FOR CALCULATIONS
current weight Eat healthy foods you enjoy. Apixaban/Eliquis DOES NOT have a special diet. Limit your alcohol intake.

## 2025-01-31 NOTE — DIETITIAN INITIAL EVALUATION ADULT - OTHER INFO
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 Presents to the ED to be evaluated for generalized weakness, fatigue, poor PO intake, difficulty ambulating with hypersomnia (sleeping all day)."

## 2025-01-31 NOTE — DIETITIAN INITIAL EVALUATION ADULT - PERTINENT MEDS FT
MEDICATIONS  (STANDING):  amLODIPine   Tablet 2.5 milliGRAM(s) Oral daily  apixaban 5 milliGRAM(s) Oral every 12 hours  cyanocobalamin 1000 MICROGram(s) Oral daily  DULoxetine 20 milliGRAM(s) Oral daily  folic acid 1 milliGRAM(s) Oral daily  isosorbide   mononitrate ER Tablet (IMDUR) 30 milliGRAM(s) Oral daily  levothyroxine 175 MICROGram(s) Oral daily  lisinopril 40 milliGRAM(s) Oral daily  morphine ER Tablet 30 milliGRAM(s) Oral daily  prochlorperazine   Tablet 10 milliGRAM(s) Oral every 6 hours  sulfaSALAzine 500 milliGRAM(s) Oral two times a day    MEDICATIONS  (PRN):  ondansetron Injectable 4 milliGRAM(s) IV Push every 8 hours PRN Nausea and/or Vomiting

## 2025-01-31 NOTE — DIETITIAN INITIAL EVALUATION ADULT - ORAL INTAKE PTA/DIET HISTORY
Patient A/OX3 and lethargic upon assessment. Lunch tray at bedside untouched. Per nursing staff pt ate some breakfast, but unsure of amount. Attempted to interview pt, but continuously falling asleep. Per chart review no reported c/o N/V/C/D since admission. Current weight 170 lbs. No edema noted. NFPE not appropriate at this time. Pt remains at high risk for malnutrition. Stage 2 to sacrum noted - pt not appropriate for diet education secondary to lethargy. Per nursing staff pt waiting for transport to be discharged.

## 2025-01-31 NOTE — DIETITIAN INITIAL EVALUATION ADULT - PERTINENT LABORATORY DATA
01-30    139  |  102  |  12.0  ----------------------------<  95  4.5   |  28.0  |  0.96    Ca    9.4      30 Jan 2025 04:30    POCT Blood Glucose.: 98 mg/dL (01-30-25 @ 16:53)  A1C with Estimated Average Glucose Result: 5.6 % (01-29-25 @ 06:34)

## 2025-02-05 ENCOUNTER — NON-APPOINTMENT (OUTPATIENT)
Age: 80
End: 2025-02-05

## 2025-02-07 ENCOUNTER — APPOINTMENT (OUTPATIENT)
Dept: ORTHOPEDIC SURGERY | Facility: HOSPITAL | Age: 80
End: 2025-02-07

## 2025-02-25 ENCOUNTER — APPOINTMENT (OUTPATIENT)
Dept: ORTHOPEDIC SURGERY | Facility: CLINIC | Age: 80
End: 2025-02-25

## 2025-03-12 ENCOUNTER — APPOINTMENT (OUTPATIENT)
Dept: HEMATOLOGY ONCOLOGY | Facility: CLINIC | Age: 80
End: 2025-03-12

## 2025-03-25 ENCOUNTER — APPOINTMENT (OUTPATIENT)
Dept: ORTHOPEDIC SURGERY | Facility: CLINIC | Age: 80
End: 2025-03-25

## 2025-04-21 ENCOUNTER — APPOINTMENT (OUTPATIENT)
Dept: GASTROENTEROLOGY | Facility: CLINIC | Age: 80
End: 2025-04-21

## 2025-04-24 ENCOUNTER — APPOINTMENT (OUTPATIENT)
Dept: RHEUMATOLOGY | Facility: CLINIC | Age: 80
End: 2025-04-24

## 2025-05-06 ENCOUNTER — APPOINTMENT (OUTPATIENT)
Dept: NEUROLOGY | Facility: CLINIC | Age: 80
End: 2025-05-06

## 2025-05-06 ENCOUNTER — APPOINTMENT (OUTPATIENT)
Dept: ORTHOPEDIC SURGERY | Facility: CLINIC | Age: 80
End: 2025-05-06

## 2025-07-29 ENCOUNTER — APPOINTMENT (OUTPATIENT)
Dept: RHEUMATOLOGY | Facility: CLINIC | Age: 80
End: 2025-07-29

## (undated) DEVICE — SYR LUER SLIP TIP 50CC

## (undated) DEVICE — WARMING BLANKET FULL ADULT

## (undated) DEVICE — ELCTR NDL SUBDERMAL 2 CHANNEL

## (undated) DEVICE — UNDERPAD LINEN SAVER 23 X 36"

## (undated) DEVICE — GLV 8 PROTEXIS (CREAM) MICRO

## (undated) DEVICE — GLV 7.5 PROTEXIS (WHITE)

## (undated) DEVICE — BASIN SET DOUBLE

## (undated) DEVICE — SUT VICRYL 2-0 27" FS-1 UNDYED

## (undated) DEVICE — DRAPE SURGICAL #1010

## (undated) DEVICE — DRAPE U POLY BLUE 60"X60"

## (undated) DEVICE — SOL IRR POUR H2O 1500ML

## (undated) DEVICE — TUBING ALARIS PUMP MODULE NON-DEHP

## (undated) DEVICE — PROTECTOR HEEL / ELBOW FLUFFY

## (undated) DEVICE — VENODYNE/SCD SLEEVE CALF MEDIUM

## (undated) DEVICE — TUBING IV EXTENSION MACRO W CLAVE 7"

## (undated) DEVICE — SUT SILK 2-0 30" SH

## (undated) DEVICE — TAPE SILK 3"

## (undated) DEVICE — DRAIN JACKSON PRATT 7FR ROUND END NO TROCAR

## (undated) DEVICE — DRAIN JACKSON PRATT 7MM FLAT FULL NO TROCAR

## (undated) DEVICE — DENTURE CUP PINK

## (undated) DEVICE — SYR SLIP 10CC

## (undated) DEVICE — DRAPE INSTRUMENT POUCH 6.75" X 11"

## (undated) DEVICE — PACK HEAD & NECK

## (undated) DEVICE — STAPLER SKIN VISI-STAT 35 WIDE

## (undated) DEVICE — SOL IRR POUR NS 0.9% 500ML

## (undated) DEVICE — RUBBER BANDS STERILE

## (undated) DEVICE — SOL IRR POUR NS 0.9% 1500ML

## (undated) DEVICE — GLV 7 PROTEXIS (CREAM) MICRO

## (undated) DEVICE — DRAPE 3/4 SHEET 52X76"

## (undated) DEVICE — DRSG WEBRIL 4"

## (undated) DEVICE — SUT VICRYL 0 27" OS-6 UNDYED

## (undated) DEVICE — NERVE LOCATOR  III

## (undated) DEVICE — PREP CHLORAPREP HI-LITE ORANGE 26ML

## (undated) DEVICE — DRAPE MAGNETIC INSTRUMENT MEDIUM

## (undated) DEVICE — SYR IV FLUSH SALINE 10ML 30/TY

## (undated) DEVICE — POSITIONER FOAM EGG CRATE ULNAR 2PCS (PINK)

## (undated) DEVICE — DRSG TELFA 4 X 8

## (undated) DEVICE — DRAPE SPLIT SHEET 77" X 120"

## (undated) DEVICE — SOL IRR BAG H2O 1000ML

## (undated) DEVICE — POSITIONER STRAP ARMBOARD VELCRO TS-30

## (undated) DEVICE — DRAPE FLUID WARMER 44 X 44"

## (undated) DEVICE — PREP BETADINE SPONGE STICKS

## (undated) DEVICE — DRAPE TOWEL BLUE 17" X 24"

## (undated) DEVICE — ELCTR BOVIE PENCIL SMOKE EVACUATION

## (undated) DEVICE — MASK PROCEED EARLOOP LVL 2 50/BX

## (undated) DEVICE — SUT SILK 2-0 18" FS

## (undated) DEVICE — SUT VICRYL 3-0 27" SH UNDYED

## (undated) DEVICE — ELCTR GROUNDING PAD ADULT COVIDIEN

## (undated) DEVICE — Device

## (undated) DEVICE — FORCEP RADIAL JAW 4 W NDL 2.4MM 2.8MM 240CM ORANGE DISP

## (undated) DEVICE — CANISTER DISPOSABLE THIN WALL 3000CC

## (undated) DEVICE — DRILL BIT STRYKER ORTHO 4.2X80MM

## (undated) DEVICE — DRAIN RESERVOIR FOR JACKSON PRATT 100CC CARDINAL

## (undated) DEVICE — DRAPE SHOWER CURTAIN ISOLATION

## (undated) DEVICE — DRSG STERISTRIPS 0.25 X 3"

## (undated) DEVICE — DRSG COBAN 4"

## (undated) DEVICE — PREP BETADINE KIT

## (undated) DEVICE — DRSG CURITY GAUZE SPONGE 4 X 4" 12-PLY NON-STERILE

## (undated) DEVICE — SAFETY PIN

## (undated) DEVICE — SOL IRR POUR H2O 500ML

## (undated) DEVICE — SUT MONOCRYL 4-0 18" P-3 UNDYED

## (undated) DEVICE — REAMER STRYKER ORTHO SHAFT BIXCUT MOD 450MM

## (undated) DEVICE — SYR LUER LOK 20CC

## (undated) DEVICE — DRAPE COVER SNAP 36X30"

## (undated) DEVICE — ELCTR MONOPOLAR STIMULATOR PROBE FLUSH-TIP

## (undated) DEVICE — GOWN IMPERV XL

## (undated) DEVICE — SUT VICRYL 1 36" CTX UNDYED

## (undated) DEVICE — LABELS BLANK W PEN

## (undated) DEVICE — FORCEP RADIAL JAW 4 PEDIATRIC W NDL 1.8MM 2MM 160CM DISP

## (undated) DEVICE — DRSG ADAPTIC 3 X 8"

## (undated) DEVICE — SOL BAG NS 0.9% 1000ML

## (undated) DEVICE — DRSG XEROFORM 5 X 9"

## (undated) DEVICE — FORCEP RADIAL JAW 4 240CM DISP

## (undated) DEVICE — LAP PAD W RING 18 X 18"

## (undated) DEVICE — ELCTR BOVIE TIP BLADE INSULATED 2.75" EDGE

## (undated) DEVICE — GLV 7 PROTEXIS (WHITE)

## (undated) DEVICE — SUT SILK 3-0 18" TIES

## (undated) DEVICE — SUT VICRYL 4-0 27" RB-1 UNDYED

## (undated) DEVICE — PACK MAJOR ABDOMINAL WITH LAP

## (undated) DEVICE — DRSG STERISTRIPS 0.25 X 4"

## (undated) DEVICE — SUT ETHILON 6-0 18" PS-3

## (undated) DEVICE — NDL HYPO SAFE 22G X 1" (BLACK)

## (undated) DEVICE — SYR LUER LOK 5CC

## (undated) DEVICE — DRSG 2X2

## (undated) DEVICE — KIT DEFENDO 4 OLY 4 PC

## (undated) DEVICE — DRSG CURITY GAUZE SPONGE 4 X 4" 12-PLY

## (undated) DEVICE — CATH IV SAFE BC 22G X 1" (BLUE)

## (undated) DEVICE — SUT VICRYL 2-0 27" SH UNDYED

## (undated) DEVICE — SENSOR O2 FINGER ADULT

## (undated) DEVICE — SUT SILK 2-0 18" TIES

## (undated) DEVICE — DRSG BENZOIN 0.6CC

## (undated) DEVICE — PACK IV START WITH CHG